# Patient Record
Sex: FEMALE | Race: WHITE | NOT HISPANIC OR LATINO | Employment: FULL TIME | URBAN - METROPOLITAN AREA
[De-identification: names, ages, dates, MRNs, and addresses within clinical notes are randomized per-mention and may not be internally consistent; named-entity substitution may affect disease eponyms.]

---

## 2017-01-16 ENCOUNTER — ALLSCRIPTS OFFICE VISIT (OUTPATIENT)
Dept: OTHER | Facility: OTHER | Age: 27
End: 2017-01-16

## 2017-04-14 ENCOUNTER — ALLSCRIPTS OFFICE VISIT (OUTPATIENT)
Dept: OTHER | Facility: OTHER | Age: 27
End: 2017-04-14

## 2017-05-22 ENCOUNTER — LAB REQUISITION (OUTPATIENT)
Dept: LAB | Facility: HOSPITAL | Age: 27
End: 2017-05-22
Payer: COMMERCIAL

## 2017-05-22 ENCOUNTER — ALLSCRIPTS OFFICE VISIT (OUTPATIENT)
Dept: OTHER | Facility: OTHER | Age: 27
End: 2017-05-22

## 2017-05-22 DIAGNOSIS — J02.9 ACUTE PHARYNGITIS: ICD-10-CM

## 2017-05-22 LAB — S PYO AG THROAT QL: NEGATIVE

## 2017-05-22 PROCEDURE — 87070 CULTURE OTHR SPECIMN AEROBIC: CPT | Performed by: INTERNAL MEDICINE

## 2017-05-24 LAB — BACTERIA THROAT CULT: NORMAL

## 2017-07-25 ENCOUNTER — ALLSCRIPTS OFFICE VISIT (OUTPATIENT)
Dept: OTHER | Facility: OTHER | Age: 27
End: 2017-07-25

## 2017-07-28 ENCOUNTER — GENERIC CONVERSION - ENCOUNTER (OUTPATIENT)
Dept: OTHER | Facility: OTHER | Age: 27
End: 2017-07-28

## 2017-08-08 ENCOUNTER — GENERIC CONVERSION - ENCOUNTER (OUTPATIENT)
Dept: OTHER | Facility: OTHER | Age: 27
End: 2017-08-08

## 2017-09-21 ENCOUNTER — TRANSCRIBE ORDERS (OUTPATIENT)
Dept: ADMINISTRATIVE | Age: 27
End: 2017-09-21

## 2017-09-21 ENCOUNTER — ALLSCRIPTS OFFICE VISIT (OUTPATIENT)
Dept: OTHER | Facility: OTHER | Age: 27
End: 2017-09-21

## 2017-09-21 ENCOUNTER — APPOINTMENT (OUTPATIENT)
Dept: LAB | Age: 27
End: 2017-09-21
Payer: COMMERCIAL

## 2017-09-21 DIAGNOSIS — R30.0 DYSURIA: ICD-10-CM

## 2017-09-21 DIAGNOSIS — N39.0 URINARY TRACT INFECTION: ICD-10-CM

## 2017-09-21 LAB
BILIRUB UR QL STRIP: NEGATIVE
CLARITY UR: CLEAR
COLOR UR: YELLOW
GLUCOSE UR STRIP-MCNC: NEGATIVE MG/DL
HGB UR QL STRIP.AUTO: NEGATIVE
KETONES UR STRIP-MCNC: NEGATIVE MG/DL
LEUKOCYTE ESTERASE UR QL STRIP: NEGATIVE
NITRITE UR QL STRIP: NEGATIVE
PH UR STRIP.AUTO: 6.5 [PH] (ref 4.5–8)
PROT UR STRIP-MCNC: NEGATIVE MG/DL
SP GR UR STRIP.AUTO: 1 (ref 1–1.03)
UROBILINOGEN UR QL STRIP.AUTO: 0.2 E.U./DL

## 2017-09-21 PROCEDURE — 87086 URINE CULTURE/COLONY COUNT: CPT

## 2017-09-21 PROCEDURE — 81003 URINALYSIS AUTO W/O SCOPE: CPT

## 2017-09-22 LAB — BACTERIA UR CULT: NORMAL

## 2017-09-29 ENCOUNTER — GENERIC CONVERSION - ENCOUNTER (OUTPATIENT)
Dept: OTHER | Facility: OTHER | Age: 27
End: 2017-09-29

## 2017-10-21 ENCOUNTER — HOSPITAL ENCOUNTER (EMERGENCY)
Facility: HOSPITAL | Age: 27
Discharge: HOME | End: 2017-10-21
Attending: EMERGENCY MEDICINE
Payer: COMMERCIAL

## 2017-10-21 ENCOUNTER — OFFICE VISIT (OUTPATIENT)
Dept: URGENT CARE | Age: 27
End: 2017-10-21
Payer: COMMERCIAL

## 2017-10-21 ENCOUNTER — APPOINTMENT (EMERGENCY)
Dept: CT IMAGING | Facility: HOSPITAL | Age: 27
End: 2017-10-21
Payer: COMMERCIAL

## 2017-10-21 VITALS
OXYGEN SATURATION: 100 % | TEMPERATURE: 98.4 F | HEIGHT: 60 IN | RESPIRATION RATE: 20 BRPM | HEART RATE: 76 BPM | SYSTOLIC BLOOD PRESSURE: 120 MMHG | DIASTOLIC BLOOD PRESSURE: 63 MMHG | BODY MASS INDEX: 23.5 KG/M2 | WEIGHT: 119.71 LBS

## 2017-10-21 DIAGNOSIS — H81.10 BENIGN POSITIONAL VERTIGO: Primary | ICD-10-CM

## 2017-10-21 LAB
ALBUMIN SERPL BCP-MCNC: 3.9 G/DL (ref 3.5–5)
ALP SERPL-CCNC: 57 U/L (ref 46–116)
ALT SERPL W P-5'-P-CCNC: 27 U/L (ref 12–78)
ANION GAP SERPL CALCULATED.3IONS-SCNC: 9 MMOL/L (ref 4–13)
AST SERPL W P-5'-P-CCNC: 17 U/L (ref 5–45)
BASOPHILS # BLD AUTO: 0.02 THOUSANDS/ΜL (ref 0–0.1)
BASOPHILS NFR BLD AUTO: 0 % (ref 0–1)
BILIRUB SERPL-MCNC: 0.3 MG/DL (ref 0.2–1)
BUN SERPL-MCNC: 8 MG/DL (ref 5–25)
CALCIUM SERPL-MCNC: 9 MG/DL (ref 8.3–10.1)
CHLORIDE SERPL-SCNC: 104 MMOL/L (ref 100–108)
CLARITY, POC: CLEAR
CO2 SERPL-SCNC: 26 MMOL/L (ref 21–32)
COLOR, POC: YELLOW
CREAT SERPL-MCNC: 0.74 MG/DL (ref 0.6–1.3)
EOSINOPHIL # BLD AUTO: 0.1 THOUSAND/ΜL (ref 0–0.61)
EOSINOPHIL NFR BLD AUTO: 2 % (ref 0–6)
ERYTHROCYTE [DISTWIDTH] IN BLOOD BY AUTOMATED COUNT: 12.6 % (ref 11.6–15.1)
EXT BILIRUBIN, UA: NEGATIVE
EXT BLOOD URINE: NORMAL
EXT GLUCOSE, UA: NEGATIVE
EXT KETONES: NEGATIVE
EXT NITRITE, UA: NEGATIVE
EXT PH, UA: 7.5
EXT PREG TEST URINE: NEGATIVE
EXT PROTEIN, UA: NEGATIVE
EXT SPECIFIC GRAVITY, UA: 1
EXT UROBILINOGEN: NORMAL
GFR SERPL CREATININE-BSD FRML MDRD: 113 ML/MIN/1.73SQ M
GLUCOSE SERPL-MCNC: 90 MG/DL (ref 65–140)
HCT VFR BLD AUTO: 36.2 % (ref 34.8–46.1)
HGB BLD-MCNC: 12 G/DL (ref 11.5–15.4)
LYMPHOCYTES # BLD AUTO: 2.34 THOUSANDS/ΜL (ref 0.6–4.47)
LYMPHOCYTES NFR BLD AUTO: 36 % (ref 14–44)
MAGNESIUM SERPL-MCNC: 2.1 MG/DL (ref 1.6–2.6)
MCH RBC QN AUTO: 29.7 PG (ref 26.8–34.3)
MCHC RBC AUTO-ENTMCNC: 33.1 G/DL (ref 31.4–37.4)
MCV RBC AUTO: 90 FL (ref 82–98)
MONOCYTES # BLD AUTO: 0.53 THOUSAND/ΜL (ref 0.17–1.22)
MONOCYTES NFR BLD AUTO: 8 % (ref 4–12)
NEUTROPHILS # BLD AUTO: 3.49 THOUSANDS/ΜL (ref 1.85–7.62)
NEUTS SEG NFR BLD AUTO: 54 % (ref 43–75)
PLATELET # BLD AUTO: 252 THOUSANDS/UL (ref 149–390)
PMV BLD AUTO: 9.6 FL (ref 8.9–12.7)
POTASSIUM SERPL-SCNC: 3.2 MMOL/L (ref 3.5–5.3)
PROT SERPL-MCNC: 7.4 G/DL (ref 6.4–8.2)
RBC # BLD AUTO: 4.04 MILLION/UL (ref 3.81–5.12)
SODIUM SERPL-SCNC: 139 MMOL/L (ref 136–145)
TSH SERPL DL<=0.05 MIU/L-ACNC: 4.1 UIU/ML (ref 0.36–3.74)
WBC # BLD AUTO: 6.48 THOUSAND/UL (ref 4.31–10.16)
WBC # BLD EST: NEGATIVE 10*3/UL

## 2017-10-21 PROCEDURE — 84443 ASSAY THYROID STIM HORMONE: CPT | Performed by: EMERGENCY MEDICINE

## 2017-10-21 PROCEDURE — 93005 ELECTROCARDIOGRAM TRACING: CPT | Performed by: EMERGENCY MEDICINE

## 2017-10-21 PROCEDURE — 81025 URINE PREGNANCY TEST: CPT | Performed by: EMERGENCY MEDICINE

## 2017-10-21 PROCEDURE — 85025 COMPLETE CBC W/AUTO DIFF WBC: CPT | Performed by: EMERGENCY MEDICINE

## 2017-10-21 PROCEDURE — 70450 CT HEAD/BRAIN W/O DYE: CPT

## 2017-10-21 PROCEDURE — 96360 HYDRATION IV INFUSION INIT: CPT

## 2017-10-21 PROCEDURE — 36415 COLL VENOUS BLD VENIPUNCTURE: CPT | Performed by: EMERGENCY MEDICINE

## 2017-10-21 PROCEDURE — 81002 URINALYSIS NONAUTO W/O SCOPE: CPT | Performed by: EMERGENCY MEDICINE

## 2017-10-21 PROCEDURE — 99203 OFFICE O/P NEW LOW 30 MIN: CPT | Performed by: FAMILY MEDICINE

## 2017-10-21 PROCEDURE — 80053 COMPREHEN METABOLIC PANEL: CPT | Performed by: EMERGENCY MEDICINE

## 2017-10-21 PROCEDURE — 93005 ELECTROCARDIOGRAM TRACING: CPT

## 2017-10-21 PROCEDURE — 83735 ASSAY OF MAGNESIUM: CPT | Performed by: EMERGENCY MEDICINE

## 2017-10-21 PROCEDURE — 99284 EMERGENCY DEPT VISIT MOD MDM: CPT

## 2017-10-21 RX ORDER — NITROFURANTOIN MACROCRYSTALS 50 MG/1
50 CAPSULE ORAL SEE ADMIN INSTRUCTIONS
COMMUNITY
Start: 2016-02-16 | End: 2018-02-27

## 2017-10-21 RX ORDER — PHENAZOPYRIDINE HYDROCHLORIDE 200 MG/1
200 TABLET, FILM COATED ORAL AS NEEDED
COMMUNITY
Start: 2017-09-19 | End: 2018-06-08 | Stop reason: ALTCHOICE

## 2017-10-21 RX ORDER — CIPROFLOXACIN 500 MG/1
500 TABLET, FILM COATED ORAL 2 TIMES DAILY
COMMUNITY
Start: 2017-06-16 | End: 2017-10-27

## 2017-10-21 RX ORDER — POTASSIUM CHLORIDE 20 MEQ/1
40 TABLET, EXTENDED RELEASE ORAL ONCE
Status: COMPLETED | OUTPATIENT
Start: 2017-10-21 | End: 2017-10-21

## 2017-10-21 RX ORDER — ALPRAZOLAM 0.25 MG/1
0.25 TABLET ORAL DAILY PRN
COMMUNITY
Start: 2014-08-12 | End: 2018-02-12 | Stop reason: SDUPTHER

## 2017-10-21 RX ORDER — MECLIZINE HYDROCHLORIDE 25 MG/1
25 TABLET ORAL 3 TIMES DAILY PRN
Qty: 30 TABLET | Refills: 0 | Status: SHIPPED | OUTPATIENT
Start: 2017-10-21 | End: 2018-02-12 | Stop reason: ALTCHOICE

## 2017-10-21 RX ORDER — LUBIPROSTONE 8 UG/1
8 CAPSULE, GELATIN COATED ORAL
COMMUNITY
End: 2018-02-27

## 2017-10-21 RX ORDER — ETONOGESTREL AND ETHINYL ESTRADIOL 11.7; 2.7 MG/1; MG/1
1 INSERT, EXTENDED RELEASE VAGINAL SEE ADMIN INSTRUCTIONS
COMMUNITY
Start: 2016-09-16 | End: 2018-02-27

## 2017-10-21 RX ORDER — MECLIZINE HCL 12.5 MG/1
25 TABLET ORAL ONCE
Status: COMPLETED | OUTPATIENT
Start: 2017-10-21 | End: 2017-10-21

## 2017-10-21 RX ADMIN — SODIUM CHLORIDE 1000 ML: 0.9 INJECTION, SOLUTION INTRAVENOUS at 18:51

## 2017-10-21 RX ADMIN — POTASSIUM CHLORIDE 40 MEQ: 1500 TABLET, EXTENDED RELEASE ORAL at 20:10

## 2017-10-21 RX ADMIN — MECLIZINE 25 MG: 12.5 TABLET ORAL at 19:06
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
## 2017-10-22 LAB
ATRIAL RATE: 78 BPM
P AXIS: -14 DEGREES
PR INTERVAL: 98 MS
QRS AXIS: 86 DEGREES
QRSD INTERVAL: 70 MS
QT INTERVAL: 368 MS
QTC INTERVAL: 419 MS
T WAVE AXIS: 66 DEGREES
VENTRICULAR RATE: 78 BPM

## 2017-10-22 NOTE — DISCHARGE INSTRUCTIONS
Benign Paroxysmal Positional Vertigo, Ambulatory Care   GENERAL INFORMATION:   Benign paroxysmal positional vertigo (BPPV)  is an inner ear condition  With BPPV you have paroxysmal (sudden) attacks of vertigo when you change your head position  Vertigo is the sudden feeling that you or the room is moving or spinning  With each attack of vertigo, you may have nystagmus  Nystagmus is a quick, shaky eye movement that you cannot control  The attacks of vertigo and nystagmus last from a few seconds up to 1 minute  Common symptoms include the following:  Head movements, such as looking up or down and bending over, may lead to an attack of vertigo  Vertigo may also occur when you first lie down or roll over in bed  The nystagmus will decrease with vertigo attacks that occur close together  When you have an attack of BPPV, you may also have the following symptoms:  · Changes in your vision    · Nausea or vomiting    · Poor balance or feeling unsteady when you walk  Seek immediate care for the following symptoms:   · A severe headache that does not go away    · New changes in your vision or feeling weak or confused    · Trouble hearing, or ringing or buzzing in your ears    · Symptoms that last longer than 1 minute  Treatment for BPPV  may go away on it's own  Treatments may include head movements or balance therapy  You may need surgery if other treatments have failed  Manage your symptoms:   · Avoid sudden head movements  · Do not bend over at the waist       · Keep your head raised when you lie down  Place pillows under your upper back and head or rest in a recliner  · Try not to stay in bed for long periods of time  Change your position often when you are in bed  Try not to lie with your head on the same side for long periods of time  · Go to vestibular and balance rehabilitation therapy (VBRT)  During VBRT, you learn exercises to improve your balance and strength   VBRT may help decrease your dizziness and prevent injuries if you are at risk for falls  Ask for more information about VBRT and exercises to decrease your symptoms  Follow up with your healthcare provider as directed:  Write down your questions so you remember to ask them during your visits  CARE AGREEMENT:   You have the right to help plan your care  Learn about your health condition and how it may be treated  Discuss treatment options with your caregivers to decide what care you want to receive  You always have the right to refuse treatment  The above information is an  only  It is not intended as medical advice for individual conditions or treatments  Talk to your doctor, nurse or pharmacist before following any medical regimen to see if it is safe and effective for you  © 2014 [address] is for End User's use only and may not be sold, redistributed or otherwise used for commercial purposes  All illustrations and images included in CareNotes® are the copyrighted property of A D A M , Inc  or Andi Hunter

## 2017-10-22 NOTE — PROGRESS NOTES
Assessment  1  Dizziness (780 4) (R42)    Discussion/Summary  Discussion Summary:   Dizziness with nausea: In the setting of taking ciprofloxacin for increased Urinary frequency, has tolerated ciprofloxacin before Feels like she is going to pass out, feels very unstable, will send to the ED for Further evaluation  Medication Side Effects Reviewed: Possible side effects of new medications were reviewed with the patient/guardian today  Understands and agrees with treatment plan: The treatment plan was reviewed with the patient/guardian  The patient/guardian understands and agrees with the treatment plan      Chief Complaint  Chief Complaint Free Text Note Form: Feeling Dizzy      History of Present Illness  HPI: Patient complaints of feeling Dizzysince today after noon, dizziness has since worsened  Feeling extremely dizzy with any movement of her head  of nausea, complains of ringing in her earstarted Ciprofloxacin for urinary symptom yesterday  denies fevers, complains of feeling heaviness in her head, denies chest pain  Hospital Based Practices Required Assessment:   Pain Assessment   the patient states they do not have pain  Vertigo (Brief): Symptoms:  dizziness,-- loss of balance,-- difficulty ambulating,-- tinnitus-- and-- nausea, but-- no hearing loss  Associated symptoms:  visual changes, but-- no ear pain,-- no ear fullness,-- no upper respiratory symptoms,-- no fever,-- no neck pain-- and-- no neck stiffness  Review of Systems  Focused-Female:   Constitutional: no fever-- and-- no chills  ENT: no earache,-- no nosebleeds-- and-- no hearing loss  Cardiovascular: no chest pain-- and-- no palpitations  Respiratory: no wheezing  Gastrointestinal: no abdominal pain-- and-- no constipation  Neurological: dizziness-- and-- fainting, but-- no headache  Other Symptoms: Feels fullness in her head  Active Problems  1  Chronic constipation (564 00) (H56 09)   2   Contraceptives (V25 02)   3  Depression with anxiety (300 4) (F41 8)   4  Dysuria (788 1) (R30 0)   5  Encounter for contraceptive surveillance (V25 40) (Z30 40)   6  Encounter for gynecological examination (V72 31) (Z01 419)   7  Generalized anxiety disorder (300 02) (F41 1)   8  Irritable bowel syndrome (564 1) (K58 9)   9  Need for influenza vaccination (V04 81) (Z23)   10  Postcoital UTI (599 0) (N39 0)   11  Recurrent urinary tract infection (599 0) (N39 0)   12  Screening for cervical cancer (V76 2) (Z12 4)   13  Sleep disturbances (780 50) (G47 9)    Past Medical History  1  History of Acute upper respiratory infection (465 9) (J06 9)   2  History of External Hemorrhoids With Bleeding (455 5)   3  History of Hematochezia (578 1) (K92 1)   4  History of abdominal pain (V13 89) (Z87 898)   5  History of acute bronchitis (V12 69) (Z87 09)   6  History of acute pharyngitis (V12 69) (Z87 09)   7  History of acute pharyngitis (V12 69) (Z87 09)   8  History of acute sinusitis (V12 69) (Z87 09)   9  History of fatigue (V13 89) (Z87 898)   10  History of urinary frequency (V13 09) (Z87 898)   11  History of urinary tract infection (V13 02) (Z87 440)   12  History of urinary tract infection (V13 02) (Z87 440)   13  History of viral infection (V12 09) (Z86 19)   14  History of Immunity status testing (V72 61) (Z01 84)   15  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)   16  History of Poison ivy dermatitis (692 6) (L23 7)   17  History of Sinusitis (473 9) (J32 9)   18  History of Skin Abscess Of Right Leg (682 6)   19  History of Urinary frequency (788 41) (R35 0)  Active Problems And Past Medical History Reviewed: The active problems and past medical history were reviewed and updated today  Family History  Mother    1  Family history of thyroid disease (V18 19) (Z83 49)  Father    2  Family history of thyroid disease (V18 19) (Z83 49)  Family History    3   Family history of Denial Of Any Significant Medical History   4  Family history of malignant neoplasm (V16 9) (Z80 9)  Family History Reviewed: The family history was reviewed and updated today  Social History   · Being A Social Drinker   · Exercising Regularly   · Never A Smoker  Social History Reviewed: The social history was reviewed and updated today  Surgical History  1  Contraceptives (V25 02)   2  History of Tonsillectomy   3  History of Tonsillectomy  Surgical History Reviewed: The surgical history was reviewed and updated today  Current Meds   1  ALPRAZolam 0 25 MG Oral Tablet; 1 tab PO daily prn; Therapy: 28Pnk9166 to (Evaluate:02Pjy6863); Last Rx:[id number] Ordered   2  Amitiza 8 MCG Oral Capsule Recorded   3  Ciprofloxacin HCl - 500 MG Oral Tablet; TAKE 1 TABLET EVERY 12 HOURS DAILY; Therapy: 29XDE6821 to (Erick 3599)  Requested for: 97PFB8522; Last   Rx:[id number] Ordered   4  Nitrofurantoin Macrocrystal 50 MG Oral Capsule; Take 1 capsule after sexual intercourse; Therapy: 99JKW9468 to (Last Alyssia Hey)  Requested for: 79Fao5941 Ordered   5  NuvaRing 0 12-0 015 MG/24HR Vaginal Ring; APPLY 1 UNIT Once every 3 weeks then   out for 1 week; Therapy: 22NHD3960 to (Last Rx:99Nwk5405)  Requested for: 69Sse0865 Ordered   6  NuvaRing 0 12-0 015 MG/24HR Vaginal Ring; INSERT 1 RING VAGINALLY FOR 3   WEEKS THEN 1 WEEK OFF; Therapy: 59TRF1451 to Recorded   7  Phenazopyridine HCl - 200 MG Oral Tablet; TAKE 1 TABLET 3 TIMES DAILY AFTER   MEALS; Therapy: 24Zam5074 to (Evaluate:59Owq1276)  Requested for: 31BMZ2548; Last   Rx:[id number] Ordered    Allergies  1  No Known Drug Allergies  Denied    2  Amoxicillin-Pot Clavulanate TABS    Physical Exam    Constitutional   General appearance: No acute distress, well appearing and well nourished  Eyes   Pupils and irises: Equal, round and reactive to light  -- No Nystagmus seen     Ears, Nose, Mouth, and Throat   External inspection of ears and nose: Normal  Otoscopic examination: Tympanic membranes translucent with normal light reflex  Canals patent without erythema  Pulmonary   Respiratory effort: No increased work of breathing or signs of respiratory distress  Auscultation of lungs: Clear to auscultation  Cardiovascular   Auscultation of heart: Normal rate and rhythm, normal S1 and S2, without murmurs  Musculoskeletal   Gait and station: Abnormal   Gait evaluation demonstrated Romberg's Positive  Future Appointments    Date/Time Provider Specialty Site   01/09/2018 08:00 AM JUAN Sesay   Internal Medicine [facility]     Signatures   Electronically signed by : JUAN Nelson ; Oct 21 2017  5:39PM EST                       (Author)

## 2017-10-25 ENCOUNTER — ALLSCRIPTS OFFICE VISIT (OUTPATIENT)
Dept: OTHER | Facility: OTHER | Age: 27
End: 2017-10-25

## 2017-10-26 ENCOUNTER — ALLSCRIPTS OFFICE VISIT (OUTPATIENT)
Dept: OTHER | Facility: OTHER | Age: 27
End: 2017-10-26

## 2017-10-26 DIAGNOSIS — R53.82 CHRONIC FATIGUE: ICD-10-CM

## 2017-10-26 DIAGNOSIS — F41.8 OTHER SPECIFIED ANXIETY DISORDERS: ICD-10-CM

## 2017-10-26 DIAGNOSIS — H81.10 BENIGN PAROXYSMAL VERTIGO: ICD-10-CM

## 2017-10-26 NOTE — PROGRESS NOTES
Assessment  1  Recurrent urinary tract infection (599 0) (N39 0)    Chief Complaint  Chief Complaint Free Text Note Form: recurring uti      History of Present Illness  HPI: This is a 44-year-old white female she is nulliparous  She does have a history of recurrent UTIs after intimacy  She is now taking prophylactic antibiotics after intercourse  The patient states she last had sexual activity on Thursday October 19th  She follow her regular antibiotic regimen after intercourse  On Friday she had return of UTI symptoms and was started on Cipro  On Saturday the 20 second she feel dizzy and lightheaded and was seen and and is in [place]  At that time she states urinalysis CT scan were negative  The diagnosis with vertigo  The port there were no urinary symptoms  On signed the the 23rd of October she had a return of some burning with urination and she was put on Keflex  On Monday the 24th to feel better not dizzy until the afternoon  In the afternoon of the day she started having abdominal pain and diarrhea  The diarrhea stopped on Tuesday the 24th  Dizziness has improved not as bad  She has no urinary tract symptoms at all   exam is benign is positive bowel sounds is no guarding is no rebound  There is no pelvic pain at all  There are no urinary tract symptoms at this time  reviewing her history I think she had and a FELECIA reaction to 3 different antibiotics Macrodantin, Cipro, and Keflex  She is now off all antibiotics and is feeling better from a bladder point of view  I do believe she probably has some intestinal discomfort  Will continue to rest her gut  She will call me tomorrow with a progress report  She may have to have a GI thyroid evaluation  Active Problems  1  Chronic constipation (564 00) (K59 09)   2  Contraceptives (V25 02)   3  Depression with anxiety (300 4) (F41 8)   4  Dizziness (780 4) (R42)   5  Dysuria (788 1) (R30 0)   6  Encounter for contraceptive surveillance (V25 40) (Z30 40)   7  Encounter for gynecological examination (V72 31) (Z01 419)   8  Generalized anxiety disorder (300 02) (F41 1)   9  Irritable bowel syndrome (564 1) (K58 9)   10  Need for influenza vaccination (V04 81) (Z23)   11  Postcoital UTI (599 0) (N39 0)   12  Recurrent urinary tract infection (599 0) (N39 0)   13  Screening for cervical cancer (V76 2) (Z12 4)   14  Sleep disturbances (780 50) (G47 9)    Past Medical History  1  History of Acute upper respiratory infection (465 9) (J06 9)   2  History of External Hemorrhoids With Bleeding (455 5)   3  History of Hematochezia (578 1) (K92 1)   4  History of abdominal pain (V13 89) (Z87 898)   5  History of acute bronchitis (V12 69) (Z87 09)   6  History of acute pharyngitis (V12 69) (Z87 09)   7  History of acute pharyngitis (V12 69) (Z87 09)   8  History of acute sinusitis (V12 69) (Z87 09)   9  History of fatigue (V13 89) (Z87 898)   10  History of urinary frequency (V13 09) (Z87 898)   11  History of urinary tract infection (V13 02) (Z87 440)   12  History of urinary tract infection (V13 02) (Z87 440)   13  History of viral infection (V12 09) (Z86 19)   14  History of Immunity status testing (V72 61) (Z01 84)   15  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)   16  History of Poison ivy dermatitis (692 6) (L23 7)   17  History of Sinusitis (473 9) (J32 9)   18  History of Skin Abscess Of Right Leg (682 6)   19  History of Urinary frequency (788 41) (R35 0)    Surgical History  1  Contraceptives (V25 02)   2  History of Tonsillectomy   3  History of Tonsillectomy    Family History  Mother    1  Family history of thyroid disease (V18 19) (Z83 49)  Father    2  Family history of thyroid disease (V18 19) (Z83 49)  Family History    3  Family history of Denial Of Any Significant Medical History   4   Family history of malignant neoplasm (V16 9) (Z80 9)    Social History   · Being A Social Drinker   · Exercising Regularly   · Never A Smoker    Current Meds   1  ALPRAZolam 0 25 MG Oral Tablet; 1 tab PO daily prn; Therapy: 57Iwm3199 to (Evaluate:43Dcv4653); Last Rx:95Qco2621 Ordered   2  Amitiza 8 MCG Oral Capsule Recorded   3  Ciprofloxacin HCl - 500 MG Oral Tablet; TAKE 1 TABLET EVERY 12 HOURS DAILY; Therapy: 09NXN7963 to (Erick 3599)  Requested for: 45PZT0048; Last   Rx:97Agp7855 Ordered   4  Nitrofurantoin Macrocrystal 50 MG Oral Capsule; Take 1 capsule after sexual intercourse; Therapy: 05IXR8371 to (Last Priyanka Cammy)  Requested for: 03Rpy5142 Ordered   5  NuvaRing 0 12-0 015 MG/24HR Vaginal Ring; APPLY 1 UNIT Once every 3 weeks then   out for 1 week; Therapy: 32BHK0537 to (Last Rx:32Fcb4469)  Requested for: 14Cmf0499 Ordered   6  NuvaRing 0 12-0 015 MG/24HR Vaginal Ring; INSERT 1 RING VAGINALLY FOR 3   WEEKS THEN 1 WEEK OFF; Therapy: 92DMJ0237 to Recorded   7  Phenazopyridine HCl - 200 MG Oral Tablet; TAKE 1 TABLET 3 TIMES DAILY AFTER   MEALS; Therapy: 29Vba4758 to (Evaluate:55Nre1179)  Requested for: 98CPA8460; Last   Rx:73Yga1680 Ordered    Allergies  1  No Known Drug Allergies  Denied    2  Amoxicillin-Pot Clavulanate TABS    Vitals  Vital Signs    Recorded: 81FCN6221 79:36BN   Systolic 979   Diastolic 72   Height 5 ft    Weight 116 lb    BMI Calculated 22 66   BSA Calculated 1 48   LMP 86Vva0169     Health Management  Screening for cervical cancer   PELVIC EXAM; every 1 year; Next Due: 04IJG4209; Overdue    Future Appointments    Date/Time Provider Specialty Site   10/31/2017 03:30 PM Patria Mares MD Urology [address] [facility]   01/09/2018 08:00 AM JUAN Peters  [facility]   12/05/2017 10:30 AM JUAN Acosta   Obstetrics/Gynecology [facility] [address]     Signatures   Electronically signed by : JUAN Tai ; Oct 25 2017  1:13PM EST                       (Author)

## 2017-10-26 NOTE — PROGRESS NOTES
Assessment  1  Recurrent urinary tract infection (599 0) (N39 0)    Plan  Dysuria    · Phenazopyridine HCl - 200 MG Oral Tablet; TAKE 1 TABLET 3 TIMES DAILY  AFTER MEALS   Rx By: Geovanny Rodriguez; Dispense: 2 Days ; #:6 Tablet; Refill: 0;For: Dysuria; JUAN MANUEL = N; Verified Transmission to [id number] Last Updated By: System, Konkura; 9/21/2017 11:26:12 AM    Discussion/Summary  Goals and Barriers: The patient has the current Goals: Goals met  The patent has the current Barriers: No barriers  Patient's Capacity to Self-Care: Patient is able to Self-Care  Chief Complaint  Chief Complaint Free Text Note Form: Recurrent UTI      History of Present Illness  HPI: This is a 29-year-old white female, she is nulliparous, she uses the NuvaRing for contraception  Is no problem with recurrent UTIs she's had in the past  Currently she is on Cipro  Still hasn't burning  shows the bladder to be tender  The vagina is clean  recurrent UTI we'll check UA CNS will continue Cipro we'll refill Pyridium  It may need to change to a different antibiotic depending on the culture and sensitivity  She'll talk to me tomorrow for follow-up before the weekend  Active Problems  1  Chronic constipation (564 00) (K59 09)   2  Contraceptives (V25 02)   3  Depression with anxiety (300 4) (F41 8)   4  Dysuria (788 1) (R30 0)   5  Encounter for contraceptive surveillance (V25 40) (Z30 40)   6  Encounter for gynecological examination (V72 31) (Z01 419)   7  Generalized anxiety disorder (300 02) (F41 1)   8  Irritable bowel syndrome (564 1) (K58 9)   9  Need for influenza vaccination (V04 81) (Z23)   10  Postcoital UTI (599 0) (N39 0)   11  Recurrent urinary tract infection (599 0) (N39 0)   12  Screening for cervical cancer (V76 2) (Z12 4)   13  Sleep disturbances (780 50) (G47 9)    Past Medical History  1  History of Acute upper respiratory infection (465 9) (J06 9)   2  History of External Hemorrhoids With Bleeding (455 5)   3  History of Hematochezia (578 1) (K92 1)   4  History of abdominal pain (V13 89) (Z87 898)   5  History of acute bronchitis (V12 69) (Z87 09)   6  History of acute pharyngitis (V12 69) (Z87 09)   7  History of acute pharyngitis (V12 69) (Z87 09)   8  History of acute sinusitis (V12 69) (Z87 09)   9  History of fatigue (V13 89) (Z87 898)   10  History of urinary frequency (V13 09) (Z87 898)   11  History of urinary tract infection (V13 02) (Z87 440)   12  History of urinary tract infection (V13 02) (Z87 440)   13  History of viral infection (V12 09) (Z86 19)   14  History of Immunity status testing (V72 61) (Z01 84)   15  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)   16  History of Poison ivy dermatitis (692 6) (L23 7)   17  History of Sinusitis (473 9) (J32 9)   18  History of Skin Abscess Of Right Leg (682 6)   19  History of Urinary frequency (788 41) (R35 0)    Surgical History  1  Contraceptives (V25 02)   2  History of Tonsillectomy   3  History of Tonsillectomy    Family History  Mother    1  Family history of thyroid disease (V18 19) (Z83 49)  Father    2  Family history of thyroid disease (V18 19) (Z83 49)  Family History    3  Family history of Denial Of Any Significant Medical History   4  Family history of malignant neoplasm (V16 9) (Z80 9)    Social History   · Being A Social Drinker   · Exercising Regularly   · Never A Smoker    Current Meds   1  ALPRAZolam 0 25 MG Oral Tablet; 1 tab PO daily prn; Therapy: 84Wdw8123 to (Evaluate:57Vgm8563); Last Rx:96Cye3610 Ordered   2  Amitiza 8 MCG Oral Capsule Recorded   3  Nitrofurantoin Macrocrystal 50 MG Oral Capsule; Take 1 capsule after sexual intercourse; Therapy: 30XBC8342 to (Last Dimitris Hernández)  Requested for: 96Ekh0508 Ordered   4  NuvaRing 0 12-0 015 MG/24HR Vaginal Ring; APPLY 1 UNIT Once every 3 weeks then   out for 1 week; Therapy: 92IVW3948 to (Last Rx:20Dec2016)  Requested for: 74Uwe4615 Ordered   5   NuvaRing 0  12-0 015 MG/24HR Vaginal Ring; INSERT 1 RING VAGINALLY FOR 3   WEEKS THEN 1 WEEK OFF; Therapy: 61TWJ7651 to Recorded   6  Pyridium 100 MG Oral Tablet; TAKE 1 TABLET 3 TIMES DAILY AFTER MEALS; Therapy: 68Pxd7684 to (Evaluate:19Tuu6057)  Requested for: 57Nae1729; Last   Rx:76Www3352 Ordered    Allergies  1  No Known Drug Allergies  Denied    2  Amoxicillin-Pot Clavulanate TABS    Vitals  Vital Signs    Recorded: 21VGM9285 44:57BD   Systolic 187   Diastolic 74   Height 5 ft 1 in   Weight 117 lb 6 08 oz   BMI Calculated 22 18   BSA Calculated 1 5   LMP 30-Aug-2017     Health Management  Screening for cervical cancer   PELVIC EXAM; every 1 year; Next Due: 91FPS8793; Overdue    Future Appointments    Date/Time Provider Specialty Site   01/09/2018 08:00 AM JUAN Angel   [facility] Medicine [facility]     Signatures   Electronically signed by : JUAN Aquino ; Sep 21 2017 11:26AM EST                       (Author)

## 2017-10-28 ENCOUNTER — APPOINTMENT (OUTPATIENT)
Dept: LAB | Age: 27
End: 2017-10-28
Payer: COMMERCIAL

## 2017-10-28 ENCOUNTER — TRANSCRIBE ORDERS (OUTPATIENT)
Dept: ADMINISTRATIVE | Age: 27
End: 2017-10-28

## 2017-10-28 DIAGNOSIS — F41.8 OTHER SPECIFIED ANXIETY DISORDERS: ICD-10-CM

## 2017-10-28 DIAGNOSIS — R53.82 CHRONIC FATIGUE: ICD-10-CM

## 2017-10-28 DIAGNOSIS — H81.10 BENIGN PAROXYSMAL VERTIGO: ICD-10-CM

## 2017-10-28 LAB — 25(OH)D3 SERPL-MCNC: 40.8 NG/ML (ref 30–100)

## 2017-10-28 PROCEDURE — 86617 LYME DISEASE ANTIBODY: CPT

## 2017-10-28 PROCEDURE — 36415 COLL VENOUS BLD VENIPUNCTURE: CPT

## 2017-10-28 PROCEDURE — 82306 VITAMIN D 25 HYDROXY: CPT

## 2017-10-30 ENCOUNTER — GENERIC CONVERSION - ENCOUNTER (OUTPATIENT)
Dept: OTHER | Facility: OTHER | Age: 27
End: 2017-10-30

## 2017-10-31 ENCOUNTER — GENERIC CONVERSION - ENCOUNTER (OUTPATIENT)
Dept: OTHER | Facility: OTHER | Age: 27
End: 2017-10-31

## 2017-10-31 LAB
B BURGDOR IGG PATRN SER IB-IMP: NEGATIVE
B BURGDOR IGM PATRN SER IB-IMP: NEGATIVE
B BURGDOR18KD IGG SER QL IB: NORMAL
B BURGDOR23KD IGG SER QL IB: NORMAL
B BURGDOR23KD IGM SER QL IB: NORMAL
B BURGDOR28KD IGG SER QL IB: NORMAL
B BURGDOR30KD IGG SER QL IB: NORMAL
B BURGDOR39KD IGG SER QL IB: NORMAL
B BURGDOR39KD IGM SER QL IB: NORMAL
B BURGDOR41KD IGG SER QL IB: NORMAL
B BURGDOR41KD IGM SER QL IB: NORMAL
B BURGDOR45KD IGG SER QL IB: NORMAL
B BURGDOR58KD IGG SER QL IB: NORMAL
B BURGDOR66KD IGG SER QL IB: NORMAL
B BURGDOR93KD IGG SER QL IB: NORMAL

## 2017-11-01 ENCOUNTER — GENERIC CONVERSION - ENCOUNTER (OUTPATIENT)
Dept: OTHER | Facility: OTHER | Age: 27
End: 2017-11-01

## 2017-11-02 NOTE — PROGRESS NOTES
Assessment  1  Benign paroxysmal positional vertigo (386 11) (H81 10)   2  Chronic fatigue (780 79) (R53 82)    Plan   Need for influenza vaccination    · Stop: Fluzone Quadrivalent 0 5 ML Intramuscular Suspension Prefilled  Syringe    (1) LYME ANTIBODY, WESTERN BLOT; Status:Resulted - Requires Verification;   Done: 12LHG9519 12:00AM  GIL:67RDL7421; Ordered; For:Benign paroxysmal positional vertigo, Chronic fatigue; Ordered By:Alok Frazier;   (1) VITAMIN D 25-HYDROXY; Status:Resulted - Requires Verification;   Done: 03BEJ8535 12:00AM  QJD:64KUO5114; Ordered; For:Benign paroxysmal positional vertigo, Chronic fatigue, Depression with anxiety; Ordered By:Alok Frazier;      Discussion/Summary    See ent tomorrowneed vestibular therapy     History of Present Illness  HPI: patient is here to follow up of dizziness that is a spinning sensationto ERscan and bloodwork negativehad a uti was on ciprodue to dizziness and started keflex  and tuesday had diarrheatired hard to focusfeels dizzy but diarrhea and uti symptoms have stopped      Review of Systems    Constitutional: no fever,-- not feeling poorly,-- no chills-- and-- not feeling tired  ENT: no earache,-- no nosebleeds,-- no hearing loss-- and-- no nasal discharge  Cardiovascular: the heart rate was not slow,-- no chest pain,-- the heart rate was not fast-- and-- no palpitations  Respiratory: no shortness of breath,-- no cough,-- no wheezing-- and-- no shortness of breath during exertion  Gastrointestinal: no abdominal pain,-- no nausea,-- no constipation-- and-- no diarrhea  Musculoskeletal: no arthralgias,-- no joint swelling,-- no myalgias-- and-- no joint stiffness  Integumentary: no rashes,-- no itching,-- no skin lesions-- and-- no skin wound  Neurological: dizziness, but-- no headache,-- no numbness-- and-- no confusion  Active Problems  1  Chronic constipation (564 00) (K59 09)   2  Contraceptives (V25 02)   3   Depression with anxiety (300 4) (F41 8)   4  Dizziness (780 4) (R42)   5  Dysuria (788 1) (R30 0)   6  Encounter for contraceptive surveillance (V25 40) (Z30 40)   7  Encounter for gynecological examination (V72 31) (Z01 419)   8  Generalized anxiety disorder (300 02) (F41 1)   9  Irritable bowel syndrome (564 1) (K58 9)   10  Need for influenza vaccination (V04 81) (Z23)   11  Postcoital UTI (599 0) (N39 0)   12  Recurrent urinary tract infection (599 0) (N39 0)   13  Screening for cervical cancer (V76 2) (Z12 4)   14  Sleep disturbances (780 50) (G47 9)    Past Medical History  Active Problems And Past Medical History Reviewed: The active problems and past medical history were reviewed and updated today  Surgical History  Surgical History Reviewed: The surgical history was reviewed and updated today  Social History   · Being A Social Drinker   · Exercising Regularly   · Never A Smoker  The social history was reviewed and updated today  The social history was reviewed and is unchanged  Family History  Family History Reviewed: The family history was reviewed and updated today  Current Meds   1  ALPRAZolam 0 25 MG Oral Tablet; 1 tab PO daily prn; Therapy: 34Wcg4700 to (Evaluate:24Keb8151); Last Rx:10Qva7543 Ordered   2  Amitiza 8 MCG Oral Capsule Recorded   3  Ciprofloxacin HCl - 500 MG Oral Tablet; TAKE 1 TABLET EVERY 12 HOURS DAILY; Therapy: 68QYO6978 to (Erick 4049)  Requested for: 67DJK1212; Last   Rx:59Hir0817 Ordered   4  Nitrofurantoin Macrocrystal 50 MG Oral Capsule; Take 1 capsule after sexual intercourse; Therapy: 38KNC4828 to (Last Albertcj Perez)  Requested for: 13Wej1913 Ordered   5  NuvaRing 0 12-0 015 MG/24HR Vaginal Ring; APPLY 1 UNIT Once every 3 weeks then   out for 1 week; Therapy: 51OXX1085 to (Last Rx:09Cmx0821)  Requested for: 53Ceg6926 Ordered   6  NuvaRing 0 12-0 015 MG/24HR Vaginal Ring; INSERT 1 RING VAGINALLY FOR 3   WEEKS THEN 1 WEEK OFF; Therapy: 37BUO8691 to Recorded   7  Phenazopyridine HCl - 200 MG Oral Tablet; TAKE 1 TABLET 3 TIMES DAILY AFTER   MEALS; Therapy: 23Fzn0688 to (Evaluate:15Ksa9761)  Requested for: 72Nzp8582; Last   Rx:87Isg1183 Ordered    The medication list was reviewed and updated today  Allergies  1  No Known Drug Allergies  Denied    2  Amoxicillin-Pot Clavulanate TABS    Vitals   Recorded: 76XOP4706 02:45PM   Heart Rate 78   Respiration 20   Systolic 749   Diastolic 60   Height 5 ft    Weight 115 lb    BMI Calculated 22 46   BSA Calculated 1 48   O2 Saturation 99     Physical Exam    Constitutional   General appearance: No acute distress, well appearing and well nourished  Eyes   Conjunctiva and lids: No swelling, erythema or discharge  Pupils and irises: Equal, round and reactive to light  Ears, Nose, Mouth, and Throat   External inspection of ears and nose: Normal     Otoscopic examination: Tympanic membranes translucent with normal light reflex  Canals patent without erythema  Nasal mucosa, septum, and turbinates: Normal without edema or erythema  Oropharynx: Normal with no erythema, edema, exudate or lesions  Pulmonary   Respiratory effort: No increased work of breathing or signs of respiratory distress  Auscultation of lungs: Clear to auscultation  Cardiovascular   Auscultation of heart: Normal rate and rhythm, normal S1 and S2, without murmurs  Examination of extremities for edema and/or varicosities: Normal     Abdomen   Abdomen: Non-tender, no masses  Liver and spleen: No hepatomegaly or splenomegaly  Lymphatic   Palpation of lymph nodes in neck: No lymphadenopathy  Musculoskeletal   Gait and station: Normal     Digits and nails: Normal without clubbing or cyanosis  Inspection/palpation of joints, bones, and muscles: Normal     Skin   Skin and subcutaneous tissue: Normal without rashes or lesions      Psychiatric   Orientation to person, place, and time: Normal     Mood and affect: Normal  Results/Data  PHQ-9 Adult Depression Screening 26Oct2017 02:45PM User, Seema     Test Name Result Flag Reference   PHQ-9 Adult Depression Score 5     Over the last two weeks, how often have you been bothered by any of the following problems? Little interest or pleasure in doing things: Not at all - 0  Feeling down, depressed, or hopeless: More than half the days - 2  Trouble falling or staying asleep, or sleeping too much: Not at all - 0  Feeling tired or having little energy: More than half the days - 2  Poor appetite or over eating: Not at all - 0  Feeling bad about yourself - or that you are a failure or have let yourself or your family down: Not at all - 0  Trouble concentrating on things, such as reading the newspaper or watching television: Several days - 1  Moving or speaking so slowly that other people could have noticed  Or the opposite -  being so fidgety or restless that you have been moving around a lot more than usual: Not at all - 0  Thoughts that you would be better off dead, or of hurting yourself in some way: Not at all - 0   PHQ-9 Adult Depression Screening Negative     PHQ-9 Difficulty Level Somewhat difficult     PHQ-9 Severity Mild Depression         Future Appointments    Date/Time Provider Specialty Site   01/09/2018 08:00 AM JUAN Morse  [facility] Medicine [facility]   12/05/2017 10:30 AM JUAN Gee   Obstetrics/Gynecology [facility] [address]     Signatures   Electronically signed by : Juan J Guerrero; Nov 1 2017  3:42PM EST                       (Author)    Electronically signed by : Mike Rodriguez DO; Nov 1 2017  8:54PM EST                       (Author)

## 2017-12-13 ENCOUNTER — GENERIC CONVERSION - ENCOUNTER (OUTPATIENT)
Dept: OTHER | Facility: OTHER | Age: 27
End: 2017-12-13

## 2017-12-13 DIAGNOSIS — R10.2 PELVIC AND PERINEAL PAIN: ICD-10-CM

## 2017-12-19 ENCOUNTER — LAB CONVERSION - ENCOUNTER (OUTPATIENT)
Dept: OTHER | Facility: OTHER | Age: 27
End: 2017-12-19

## 2017-12-19 LAB
ADDITIONAL INFORMATION (HISTORICAL): NORMAL
ADEQUACY: (HISTORICAL): NORMAL
COMMENT (HISTORICAL): NORMAL
CYTOTECHNOLOGIST: (HISTORICAL): NORMAL
INTERPRETATION (HISTORICAL): NORMAL
LMP (HISTORICAL): NORMAL
PREV. BX: (HISTORICAL): NORMAL
PREV. PAP (HISTORICAL): NORMAL
REVIEWED BY (HISTORICAL): NORMAL
SOURCE (HISTORICAL): NORMAL

## 2018-01-08 ENCOUNTER — APPOINTMENT (OUTPATIENT)
Dept: LAB | Age: 28
End: 2018-01-08
Payer: COMMERCIAL

## 2018-01-08 ENCOUNTER — TRANSCRIBE ORDERS (OUTPATIENT)
Dept: ADMINISTRATIVE | Age: 28
End: 2018-01-08

## 2018-01-08 ENCOUNTER — GENERIC CONVERSION - ENCOUNTER (OUTPATIENT)
Dept: INTERNAL MEDICINE CLINIC | Facility: CLINIC | Age: 28
End: 2018-01-08

## 2018-01-08 DIAGNOSIS — R10.9 STOMACH ACHE: ICD-10-CM

## 2018-01-08 DIAGNOSIS — R10.9 STOMACH ACHE: Primary | ICD-10-CM

## 2018-01-08 LAB
BACTERIA UR QL AUTO: NORMAL /HPF
BILIRUB UR QL STRIP: NEGATIVE
CLARITY UR: CLEAR
COLOR UR: YELLOW
GLUCOSE UR STRIP-MCNC: NEGATIVE MG/DL
HGB UR QL STRIP.AUTO: ABNORMAL
HYALINE CASTS #/AREA URNS LPF: NORMAL /LPF
KETONES UR STRIP-MCNC: NEGATIVE MG/DL
LEUKOCYTE ESTERASE UR QL STRIP: NEGATIVE
NITRITE UR QL STRIP: NEGATIVE
NON-SQ EPI CELLS URNS QL MICRO: NORMAL /HPF
PH UR STRIP.AUTO: 7 [PH] (ref 4.5–8)
PROT UR STRIP-MCNC: NEGATIVE MG/DL
RBC #/AREA URNS AUTO: NORMAL /HPF
SP GR UR STRIP.AUTO: 1.01 (ref 1–1.03)
UROBILINOGEN UR QL STRIP.AUTO: 0.2 E.U./DL
WBC #/AREA URNS AUTO: NORMAL /HPF

## 2018-01-08 PROCEDURE — 87086 URINE CULTURE/COLONY COUNT: CPT

## 2018-01-08 PROCEDURE — 81001 URINALYSIS AUTO W/SCOPE: CPT

## 2018-01-09 LAB — BACTERIA UR CULT: NORMAL

## 2018-01-10 ENCOUNTER — APPOINTMENT (EMERGENCY)
Dept: CT IMAGING | Facility: HOSPITAL | Age: 28
End: 2018-01-10
Payer: COMMERCIAL

## 2018-01-10 ENCOUNTER — HOSPITAL ENCOUNTER (EMERGENCY)
Facility: HOSPITAL | Age: 28
Discharge: HOME | End: 2018-01-10
Attending: EMERGENCY MEDICINE | Admitting: EMERGENCY MEDICINE
Payer: COMMERCIAL

## 2018-01-10 VITALS
WEIGHT: 115 LBS | RESPIRATION RATE: 18 BRPM | DIASTOLIC BLOOD PRESSURE: 73 MMHG | OXYGEN SATURATION: 99 % | BODY MASS INDEX: 22.46 KG/M2 | HEART RATE: 104 BPM | SYSTOLIC BLOOD PRESSURE: 149 MMHG | TEMPERATURE: 98.9 F

## 2018-01-10 DIAGNOSIS — R10.2 PELVIC PAIN: ICD-10-CM

## 2018-01-10 DIAGNOSIS — R10.10 PAIN OF UPPER ABDOMEN: Primary | ICD-10-CM

## 2018-01-10 DIAGNOSIS — R30.0 DYSURIA: ICD-10-CM

## 2018-01-10 LAB
ALBUMIN SERPL BCP-MCNC: 3.7 G/DL (ref 3.5–5)
ALP SERPL-CCNC: 53 U/L (ref 46–116)
ALT SERPL W P-5'-P-CCNC: 50 U/L (ref 12–78)
ANION GAP SERPL CALCULATED.3IONS-SCNC: 9 MMOL/L (ref 4–13)
AST SERPL W P-5'-P-CCNC: 33 U/L (ref 5–45)
BASOPHILS # BLD AUTO: 0.02 THOUSANDS/ΜL (ref 0–0.1)
BASOPHILS NFR BLD AUTO: 0 % (ref 0–1)
BILIRUB SERPL-MCNC: 0.3 MG/DL (ref 0.2–1)
BUN SERPL-MCNC: 7 MG/DL (ref 5–25)
CALCIUM SERPL-MCNC: 8.7 MG/DL (ref 8.3–10.1)
CHLAMYDIA DNA CVX QL NAA+PROBE: NORMAL
CHLORIDE SERPL-SCNC: 109 MMOL/L (ref 100–108)
CLARITY, POC: CLEAR
CO2 SERPL-SCNC: 25 MMOL/L (ref 21–32)
COLOR, POC: YELLOW
CREAT SERPL-MCNC: 0.77 MG/DL (ref 0.6–1.3)
EOSINOPHIL # BLD AUTO: 0.07 THOUSAND/ΜL (ref 0–0.61)
EOSINOPHIL NFR BLD AUTO: 2 % (ref 0–6)
ERYTHROCYTE [DISTWIDTH] IN BLOOD BY AUTOMATED COUNT: 12.2 % (ref 11.6–15.1)
EXT BILIRUBIN, UA: NEGATIVE
EXT BLOOD URINE: NEGATIVE
EXT GLUCOSE, UA: NEGATIVE
EXT KETONES: NEGATIVE
EXT NITRITE, UA: NEGATIVE
EXT PH, UA: 7.5
EXT PREG TEST URINE: NEGATIVE
EXT PROTEIN, UA: NEGATIVE
EXT SPECIFIC GRAVITY, UA: 1.01
EXT UROBILINOGEN: NEGATIVE
GFR SERPL CREATININE-BSD FRML MDRD: 108 ML/MIN/1.73SQ M
GLUCOSE SERPL-MCNC: 94 MG/DL (ref 65–140)
HCT VFR BLD AUTO: 37.2 % (ref 34.8–46.1)
HGB BLD-MCNC: 12 G/DL (ref 11.5–15.4)
LIPASE SERPL-CCNC: 91 U/L (ref 73–393)
LYMPHOCYTES # BLD AUTO: 1.46 THOUSANDS/ΜL (ref 0.6–4.47)
LYMPHOCYTES NFR BLD AUTO: 32 % (ref 14–44)
MCH RBC QN AUTO: 29.6 PG (ref 26.8–34.3)
MCHC RBC AUTO-ENTMCNC: 32.3 G/DL (ref 31.4–37.4)
MCV RBC AUTO: 92 FL (ref 82–98)
MONOCYTES # BLD AUTO: 0.42 THOUSAND/ΜL (ref 0.17–1.22)
MONOCYTES NFR BLD AUTO: 9 % (ref 4–12)
N GONORRHOEA DNA GENITAL QL NAA+PROBE: NORMAL
NEUTROPHILS # BLD AUTO: 2.55 THOUSANDS/ΜL (ref 1.85–7.62)
NEUTS SEG NFR BLD AUTO: 57 % (ref 43–75)
PLATELET # BLD AUTO: 239 THOUSANDS/UL (ref 149–390)
PMV BLD AUTO: 9.3 FL (ref 8.9–12.7)
POTASSIUM SERPL-SCNC: 3.8 MMOL/L (ref 3.5–5.3)
PROT SERPL-MCNC: 7.3 G/DL (ref 6.4–8.2)
RBC # BLD AUTO: 4.05 MILLION/UL (ref 3.81–5.12)
SODIUM SERPL-SCNC: 143 MMOL/L (ref 136–145)
WBC # BLD AUTO: 4.52 THOUSAND/UL (ref 4.31–10.16)
WBC # BLD EST: NEGATIVE 10*3/UL

## 2018-01-10 PROCEDURE — 36415 COLL VENOUS BLD VENIPUNCTURE: CPT | Performed by: EMERGENCY MEDICINE

## 2018-01-10 PROCEDURE — 80053 COMPREHEN METABOLIC PANEL: CPT | Performed by: EMERGENCY MEDICINE

## 2018-01-10 PROCEDURE — 99284 EMERGENCY DEPT VISIT MOD MDM: CPT

## 2018-01-10 PROCEDURE — 83690 ASSAY OF LIPASE: CPT | Performed by: EMERGENCY MEDICINE

## 2018-01-10 PROCEDURE — 74177 CT ABD & PELVIS W/CONTRAST: CPT

## 2018-01-10 PROCEDURE — 81002 URINALYSIS NONAUTO W/O SCOPE: CPT | Performed by: EMERGENCY MEDICINE

## 2018-01-10 PROCEDURE — 87591 N.GONORRHOEAE DNA AMP PROB: CPT | Performed by: EMERGENCY MEDICINE

## 2018-01-10 PROCEDURE — 96374 THER/PROPH/DIAG INJ IV PUSH: CPT

## 2018-01-10 PROCEDURE — 85025 COMPLETE CBC W/AUTO DIFF WBC: CPT | Performed by: EMERGENCY MEDICINE

## 2018-01-10 PROCEDURE — 96361 HYDRATE IV INFUSION ADD-ON: CPT

## 2018-01-10 PROCEDURE — 87491 CHLMYD TRACH DNA AMP PROBE: CPT | Performed by: EMERGENCY MEDICINE

## 2018-01-10 PROCEDURE — 81025 URINE PREGNANCY TEST: CPT | Performed by: EMERGENCY MEDICINE

## 2018-01-10 RX ORDER — ONDANSETRON 2 MG/ML
4 INJECTION INTRAMUSCULAR; INTRAVENOUS ONCE
Status: COMPLETED | OUTPATIENT
Start: 2018-01-10 | End: 2018-01-10

## 2018-01-10 RX ORDER — TRAMADOL HYDROCHLORIDE 50 MG/1
50 TABLET ORAL EVERY 6 HOURS PRN
Qty: 20 TABLET | Refills: 0 | Status: SHIPPED | OUTPATIENT
Start: 2018-01-10 | End: 2018-01-20

## 2018-01-10 RX ORDER — PHENAZOPYRIDINE HYDROCHLORIDE 200 MG/1
200 TABLET, FILM COATED ORAL 3 TIMES DAILY PRN
Qty: 6 TABLET | Refills: 0 | Status: SHIPPED | OUTPATIENT
Start: 2018-01-10 | End: 2018-01-12

## 2018-01-10 RX ADMIN — IOHEXOL 100 ML: 350 INJECTION, SOLUTION INTRAVENOUS at 12:30

## 2018-01-10 RX ADMIN — SODIUM CHLORIDE 1000 ML: 0.9 INJECTION, SOLUTION INTRAVENOUS at 11:41

## 2018-01-10 RX ADMIN — ONDANSETRON 4 MG: 2 INJECTION INTRAMUSCULAR; INTRAVENOUS at 11:41

## 2018-01-10 NOTE — DISCHARGE INSTRUCTIONS
Dysuria, Ambulatory Care   GENERAL INFORMATION:   Dysuria  is trouble urinating, or pain, burning, or discomfort when you urinate  Dysuria is usually a symptom of another problem, such as a blockage or urinary tract infection  Common symptoms include the following:   · Fever     · Cloudy, bad smelling urine     · Urge to urinate often but urinating little     · Back, side, or abdominal pain     · Blood in your urine     · Discharge that smells bad     · Itching  Seek immediate care for the following symptoms:   · Severe back, side, or abdominal pain    · A fever and shaking chills    · Vomiting several times in a row  Treatment for dysuria  may include medicines to treat a bacterial infection or help decrease bladder spasms  Manage your dysuria:   · Drink liquids as directed  Ask how much liquid to drink each day and which liquids are best for you  You may need to drink more liquid than usual to flush bacteria out of your body  · A sitz bath  may help decrease your pain  Healthcare providers may give you a portable sitz bath  This is a small tub that fits in the toilet  Fill the sitz bath or a bathtub with 4 to 6 inches of warm water  Sit in the warm water for 20 minutes 2 to 3 times a day  Follow up with your healthcare provider as directed:  Write down your questions so you remember to ask them during your visits  CARE AGREEMENT:   You have the right to help plan your care  Learn about your health condition and how it may be treated  Discuss treatment options with your caregivers to decide what care you want to receive  You always have the right to refuse treatment  The above information is an  only  It is not intended as medical advice for individual conditions or treatments  Talk to your doctor, nurse or pharmacist before following any medical regimen to see if it is safe and effective for you    © 2014 [address] [employer] is for End User's use only and may not be sold, redistributed or otherwise used for commercial purposes  All illustrations and images included in CareNotes® are the copyrighted property of [employer] , [employer]  or Andi Hunter  Acute Abdominal Pain   WHAT YOU NEED TO KNOW:   The cause of your abdominal pain may not be found  If a cause is found, treatment will depend on what the cause is  DISCHARGE INSTRUCTIONS:   Return to the emergency department if:   · You vomit blood or cannot stop vomiting  · You have blood in your bowel movement or it looks like tar  · You have bleeding from your rectum  · Your abdomen is larger than usual, more painful, and hard  · You have severe pain in your abdomen  · You stop passing gas and having bowel movements  · You feel weak, dizzy, or faint  Contact your healthcare provider if:   · You have a fever  · You have new signs and symptoms  · Your symptoms do not get better with treatment  · You have questions or concerns about your condition or care  Medicines  may be given to decrease pain, treat an infection, and manage your symptoms  Take your medicine as directed  Call your healthcare provider if you think your medicine is not helping or if you have side effects  Tell him if you are allergic to any medicine  Keep a list of the medicines, vitamins, and herbs you take  Include the amounts, and when and why you take them  Bring the list or the pill bottles to follow-up visits  Carry your medicine list with you in case of an emergency  Manage your symptoms:   · Apply heat  on your abdomen for 20 to 30 minutes every 2 hours for as many days as directed  Heat helps decrease pain and muscle spasms  · Manage your stress  Stress may cause abdominal pain  Your healthcare provider may recommend relaxation techniques and deep breathing exercises to help decrease your stress   Your healthcare provider may recommend you talk to someone about your stress or anxiety, such as a counselor or a trusted friend  Get plenty of sleep and exercise regularly  · Limit or do not drink alcohol  Alcohol can make your abdominal pain worse  Ask your healthcare provider if it is safe for you to drink alcohol  Also ask how much is safe for you to drink  · Do not smoke  Nicotine and other chemicals in cigarettes can damage your esophagus and stomach  Ask your healthcare provider for information if you currently smoke and need help to quit  E-cigarettes or smokeless tobacco still contain nicotine  Talk to your healthcare provider before you use these products  Make changes to the food you eat as directed:  Do not eat foods that cause abdominal pain or other symptoms  Eat small meals more often  · Eat more high-fiber foods if you are constipated  High-fiber foods include fruits, vegetables, whole-grain foods, and legumes  · Do not eat foods that cause gas if you have bloating  Examples include broccoli, cabbage, and cauliflower  Do not drink soda or carbonated drinks, because these may also cause gas  · Do not eat foods or drinks that contain sorbitol or fructose if you have diarrhea and bloating  Some examples are fruit juices, candy, jelly, and sugar-free gum  · Do not eat high-fat foods, such as fried foods, cheeseburgers, hot dogs, and desserts  · Limit or do not drink caffeine  Caffeine may make symptoms, such as heart burn or nausea, worse  · Drink plenty of liquids to prevent dehydration from diarrhea or vomiting  Ask your healthcare provider how much liquid to drink each day and which liquids are best for you  Follow up with your healthcare provider as directed:  Write down your questions so you remember to ask them during your visits  © 2017 [facility] [facility] Information is for End User's use only and may not be sold, redistributed or otherwise used for commercial purposes   All illustrations and images included in CareNotes® are the copyrighted property of [employer]DR[employer] M[employer]INE[employer] Cait[employer]  or Andi Hunter  The above information is an  only  It is not intended as medical advice for individual conditions or treatments  Talk to your doctor, nurse or pharmacist before following any medical regimen to see if it is safe and effective for you

## 2018-01-10 NOTE — ED PROVIDER NOTES
History  Chief Complaint   Patient presents with    Abdominal Pain     started friday with abd pain and cramping & bloating; hx of IBS and "gallbladder attacks" ; was vomiting over the weekend and now nauseas  burning with urination and bladder pressure noted  loss of appetite noted with nausea  57-year-old female presents to the emergency department for evaluation of abdominal pain  Patient states her symptoms started 6 days ago  She felt that she was experiencing a flare of her irritable bowel syndrome and had upper abdominal discomfort associated with nausea  She then developed nausea followed by dry heaving  She has had continued epigastric discomfort described as a burning pain  She reports extreme bloating of her abdomen over the weekend that has since improved  She is now having lower pelvic pressure with urinary frequency and dysuria  She continues to have upper abdominal pain  She was evaluated by the nurse practitioner at her gastroenterologist's office who recommended an ultrasound of her upper abdomen, she has not scheduled the test yet  She also recommended a urinalysis and sample checked at [facility] demonstrated blood but no other findings concerning for an acute infection  Urine culture is negative  patient has a history of recurrent urinary tract infections but feels that the symptoms are somewhat different than her typical symptoms  She also has a history of constipation and takes medication for this daily  She feels that her abdominal pain is a bit different than her normal ureteral bowel  She denies history of pancreatitis  She reports normal menstrual cycles  No history of ovarian cyst   No vaginal discharge          History provided by:  Patient and medical records   used: No    Abdominal Pain   Pain location:  Epigastric, LLQ and RLQ  Pain quality: burning (epigastric) and pressure (pelvis)    Pain radiates to:  Does not radiate  Pain severity: Moderate  Onset quality:  Gradual  Duration:  5 days  Timing:  Constant  Progression:  Unchanged  Chronicity:  Recurrent (has hx of similar with IBS but this is worse)  Context: not diet changes, not previous surgeries and not sick contacts    Relieved by:  Nothing  Worsened by:  Nothing  Ineffective treatments:  None tried  Associated symptoms: chills, dysuria, nausea and vomiting    Associated symptoms: no chest pain, no cough, no fever, no hematuria, no shortness of breath, no vaginal bleeding and no vaginal discharge    Risk factors: no alcohol abuse, has not had multiple surgeries and no recent hospitalization        Prior to Admission Medications   Prescriptions Last Dose Informant Patient Reported? Taking? ALPRAZolam (XANAX) 0 25 mg tablet   Yes No   Sig: Take 0 25 mg by mouth daily as needed   etonogestrel-ethinyl estradiol (NUVARING) 0 12-0 015 MG/24HR vaginal ring   Yes No   Sig: Insert 1 Dose into the vagina see administration instructions Insert 1 ring vaginally for 3 weeks then 1 week off   lubiprostone (AMITIZA) 8 mcg capsule   Yes No   Sig: Take 8 mcg by mouth as needed   meclizine (ANTIVERT) 25 mg tablet   No No   Sig: Take 1 tablet by mouth 3 (three) times a day as needed for dizziness   nitrofurantoin (MACRODANTIN) 50 mg capsule   Yes No   Sig: Take 50 mg by mouth see administration instructions Take one capsule following sexual intercourse   phenazopyridine (PYRIDIUM) 200 mg tablet   Yes No   Sig: Take 200 mg by mouth as needed      Facility-Administered Medications: None       Past Medical History:   Diagnosis Date    Depression     Gallbladder attack     IBS (irritable bowel syndrome)     Psychiatric disorder     UTI (urinary tract infection)        History reviewed  No pertinent surgical history  History reviewed  No pertinent family history  I have reviewed and agree with the history as documented      Social History   Substance Use Topics    Smoking status: Current Some Day Smoker Packs/day: 0 20     Types: Cigarettes    Smokeless tobacco: Never Used    Alcohol use No      Comment: not any more        Review of Systems   Constitutional: Positive for appetite change and chills  Negative for activity change and fever  Respiratory: Negative for cough, choking and shortness of breath  Cardiovascular: Negative for chest pain  Gastrointestinal: Positive for abdominal distention, abdominal pain, nausea and vomiting  Genitourinary: Positive for dysuria and pelvic pain  Negative for decreased urine volume, difficulty urinating, flank pain, hematuria, vaginal bleeding and vaginal discharge  Musculoskeletal: Negative for back pain  All other systems reviewed and are negative  Physical Exam  ED Triage Vitals [01/10/18 1100]   Temperature Pulse Respirations Blood Pressure SpO2   98 9 °F (37 2 °C) 104 18 149/73 99 %      Temp Source Heart Rate Source Patient Position - Orthostatic VS BP Location FiO2 (%)   Oral Monitor Sitting Left arm --      Pain Score       8           Orthostatic Vital Signs  Vitals:    01/10/18 1100   BP: 149/73   Pulse: 104   Patient Position - Orthostatic VS: Sitting       Physical Exam   Constitutional: She is oriented to person, place, and time  She appears well-developed and well-nourished  She appears distressed  HENT:   Head: Normocephalic  Nose: Nose normal    Mouth/Throat: Oropharynx is clear and moist  No oropharyngeal exudate  Eyes: Conjunctivae and EOM are normal  Pupils are equal, round, and reactive to light  Neck: Normal range of motion  Neck supple  Cardiovascular: Normal rate, regular rhythm, normal heart sounds and intact distal pulses  Pulmonary/Chest: Effort normal and breath sounds normal    Abdominal: Soft  Bowel sounds are normal  She exhibits no distension  There is tenderness in the epigastric area, suprapubic area and left lower quadrant  There is no rebound and no guarding  No hernia         Genitourinary: Uterus normal  Pelvic exam was performed with patient supine  Cervix exhibits no motion tenderness, no discharge and no friability  Right adnexum displays tenderness  Right adnexum displays no mass and no fullness  Left adnexum displays tenderness  Left adnexum displays no mass and no fullness  There is a foreign body (nuvaring) in the vagina  Musculoskeletal: Normal range of motion  She exhibits no edema, tenderness or deformity  Lymphadenopathy:     She has no cervical adenopathy  Neurological: She is alert and oriented to person, place, and time  She has normal strength and normal reflexes  No cranial nerve deficit or sensory deficit  She exhibits normal muscle tone  Coordination and gait normal    Skin: Skin is warm, dry and intact  No rash noted  Psychiatric: She has a normal mood and affect  Her behavior is normal  Judgment and thought content normal    Nursing note and vitals reviewed        ED Medications  Medications   ondansetron (ZOFRAN) injection 4 mg (4 mg Intravenous Given 1/10/18 1141)   sodium chloride 0 9 % bolus 1,000 mL (0 mL Intravenous Stopped 1/10/18 1320)   iohexol (OMNIPAQUE) 350 MG/ML injection (MULTI-DOSE) 100 mL (100 mL Intravenous Given 1/10/18 1230)       Diagnostic Studies  Results Reviewed     Procedure Component Value Units Date/Time    Comprehensive metabolic panel [[id number]]  (Abnormal) Collected:  01/10/18 1139    Lab Status:  Final result Specimen:  Blood from Arm, Right Updated:  01/10/18 1212     Sodium 143 mmol/L      Potassium 3 8 mmol/L      Chloride 109 (H) mmol/L      CO2 25 mmol/L      Anion Gap 9 mmol/L      BUN 7 mg/dL      Creatinine 0 77 mg/dL      Glucose 94 mg/dL      Calcium 8 7 mg/dL      AST 33 U/L      ALT 50 U/L      Alkaline Phosphatase 53 U/L      Total Protein 7 3 g/dL      Albumin 3 7 g/dL      Total Bilirubin 0 30 mg/dL      eGFR 108 ml/min/1 73sq m     Narrative:         National Kidney Disease Education Program recommendations are as follows:  GFR calculation is accurate only with a steady state creatinine  Chronic Kidney disease less than 60 ml/min/1 73 sq  meters  Kidney failure less than 15 ml/min/1 73 sq  meters  Lipase [[id number]]  (Normal) Collected:  01/10/18 1139    Lab Status:  Final result Specimen:  Blood from Arm, Right Updated:  01/10/18 1212     Lipase 91 u/L     Chlamydia/GC amplified DNA by PCR [[id number]] Collected:  01/10/18 1206    Lab Status:   In process Specimen:  Urine from Urine, Other Updated:  01/10/18 1208    CBC and differential [[id number]]  (Normal) Collected:  01/10/18 1139    Lab Status:  Final result Specimen:  Blood from Arm, Right Updated:  01/10/18 1154     WBC 4 52 Thousand/uL      RBC 4 05 Million/uL      Hemoglobin 12 0 g/dL      Hematocrit 37 2 %      MCV 92 fL      MCH 29 6 pg      MCHC 32 3 g/dL      RDW 12 2 %      MPV 9 3 fL      Platelets 227 Thousands/uL      Neutrophils Relative 57 %      Lymphocytes Relative 32 %      Monocytes Relative 9 %      Eosinophils Relative 2 %      Basophils Relative 0 %      Neutrophils Absolute 2 55 Thousands/µL      Lymphocytes Absolute 1 46 Thousands/µL      Monocytes Absolute 0 42 Thousand/µL      Eosinophils Absolute 0 07 Thousand/µL      Basophils Absolute 0 02 Thousands/µL     POCT pregnancy, urine [[id number]]  (Normal) Resulted:  01/10/18 1116    Lab Status:  Final result Updated:  01/10/18 1116     EXT PREG TEST UR (Ref: Negative) negative    POCT urinalysis dipstick [[id number]]  (Normal) Resulted:  01/10/18 1116    Lab Status:  Final result Updated:  01/10/18 1116     Color, UA yellow     Clarity, UA clear     EXT Glucose, UA negative     EXT Bilirubin, UA (Ref: Negative) negative     EXT Ketones, UA (Ref: Negative) negative     EXT Spec Grav, UA 1 010     EXT Blood, UA (Ref: Negative) negative     EXT pH, UA 7 5     EXT Protein, UA (Ref: Negative) negative     EXT Urobilinogen, UA (Ref: 0 2- 1 0) negative     EXT Leukocytes, UA (Ref: Negative) negative     EXT Nitrite, UA (Ref: Negative) negative                 CT abdomen pelvis with contrast   Final Result by Venkata Johnston MD (01/10 0037)   No acute findings  Workstation performed: [id number]                    Procedures  Procedures       Phone Contacts  ED Phone Contact    ED Course  ED Course                                MDM  Number of Diagnoses or Management Options  Dysuria: new and requires workup  Pain of upper abdomen: new and requires workup  Pelvic pain: new and requires workup     Amount and/or Complexity of Data Reviewed  Clinical lab tests: ordered and reviewed  Tests in the radiology section of CPT®: ordered and reviewed  Decide to obtain previous medical records or to obtain history from someone other than the patient: yes  Review and summarize past medical records: yes  Independent visualization of images, tracings, or specimens: yes    Risk of Complications, Morbidity, and/or Mortality  General comments: 57-year-old female with epigastric abdominal pain as well as pelvic pressure and dysuria  Workup in the department does not reveal an etiology of patient's symptoms  I suspect that she may be experiencing a flare of her irritable bowel syndrome as well as she may have recently had an ovarian cyst   Her pelvic exam is normal other than mild tenderness and I feel that PID is unlikely  No sign of urinary tract infection and culture from 2 days ago is without growth  Will recommend follow up with gyn in Gastroenterology  Discussed signs and symptoms to return to the emergency department      Patient Progress  Patient progress: stable    CritCare Time    Disposition  Final diagnoses:   Pain of upper abdomen   Pelvic pain   Dysuria     Time reflects when diagnosis was documented in both MDM as applicable and the Disposition within this note     Time User Action Codes Description Comment    1/10/2018  1:45 PM Evie Apple Add [R10 10] Pain of upper abdomen     1/10/2018  1:45 PM Evie Apple Add [R10 2] Pelvic pain 1/10/2018  1:45 PM Evie Apple Add [R30 0] Dysuria       ED Disposition     ED Disposition Condition Comment    Discharge  Teresita Hicks discharge to home/self care  Condition at discharge: Stable        Follow-up Information     Follow up With Specialties Details Why Georgi Summers MD Internal Medicine Schedule an appointment as soon as possible for a visit in 1 day  [address]  [phone]          Discharge Medication List as of 1/10/2018  1:50 PM      START taking these medications    Details   ! ! phenazopyridine (PYRIDIUM) 200 mg tablet Take 1 tablet by mouth 3 (three) times a day as needed for bladder spasms for up to 2 days Will turn urine orange , Starting Wed 1/10/2018, Until Fri 1/12/2018, Print      traMADol (ULTRAM) 50 mg tablet Take 1 tablet by mouth every 6 (six) hours as needed for moderate pain for up to 10 days, Starting Wed 1/10/2018, Until Sat 1/20/2018, Print       !! - Potential duplicate medications found  Please discuss with provider        CONTINUE these medications which have NOT CHANGED    Details   ALPRAZolam (XANAX) 0 25 mg tablet Take 0 25 mg by mouth daily as needed, Starting Tue 8/12/2014, Historical Med      etonogestrel-ethinyl estradiol (NUVARING) 0 12-0 015 MG/24HR vaginal ring Insert 1 Dose into the vagina see administration instructions Insert 1 ring vaginally for 3 weeks then 1 week off, Starting Fri 9/16/2016, Historical Med      lubiprostone (AMITIZA) 8 mcg capsule Take 8 mcg by mouth as needed, Historical Med      meclizine (ANTIVERT) 25 mg tablet Take 1 tablet by mouth 3 (three) times a day as needed for dizziness, Starting Sat 10/21/2017, Print      nitrofurantoin (MACRODANTIN) 50 mg capsule Take 50 mg by mouth see administration instructions Take one capsule following sexual intercourse, Starting Tue 2/16/2016, Historical Med      !! phenazopyridine (PYRIDIUM) 200 mg tablet Take 200 mg by mouth as needed, Starting Tue 9/19/2017, Historical Med       !! - Potential duplicate medications found  Please discuss with provider  No discharge procedures on file      ED Provider  Electronically Signed by           Omar Rivers DO  01/10/18 1925

## 2018-01-11 NOTE — MISCELLANEOUS
Message  Return to work or school:   Milena Nava is under my professional care  She was seen in my office on 8/30 and 9/9/16       Please excuse from work 8/29, 8/30, 8/31, 9/1, 9/9/16          Signatures   Electronically signed by : JUAN Atkins ; Sep 19 2016 12:11PM EST                       (Author)

## 2018-01-13 VITALS
SYSTOLIC BLOOD PRESSURE: 110 MMHG | HEIGHT: 61 IN | DIASTOLIC BLOOD PRESSURE: 74 MMHG | BODY MASS INDEX: 22.16 KG/M2 | WEIGHT: 117.38 LBS

## 2018-01-13 VITALS
BODY MASS INDEX: 22.58 KG/M2 | HEIGHT: 60 IN | OXYGEN SATURATION: 99 % | SYSTOLIC BLOOD PRESSURE: 110 MMHG | WEIGHT: 115 LBS | RESPIRATION RATE: 20 BRPM | DIASTOLIC BLOOD PRESSURE: 60 MMHG | HEART RATE: 78 BPM

## 2018-01-14 VITALS
SYSTOLIC BLOOD PRESSURE: 120 MMHG | BODY MASS INDEX: 22.84 KG/M2 | HEIGHT: 61 IN | DIASTOLIC BLOOD PRESSURE: 70 MMHG | WEIGHT: 121 LBS

## 2018-01-14 VITALS
SYSTOLIC BLOOD PRESSURE: 100 MMHG | RESPIRATION RATE: 20 BRPM | BODY MASS INDEX: 22.48 KG/M2 | OXYGEN SATURATION: 98 % | HEART RATE: 97 BPM | HEIGHT: 61 IN | WEIGHT: 119.05 LBS | DIASTOLIC BLOOD PRESSURE: 78 MMHG | TEMPERATURE: 99 F

## 2018-01-14 VITALS
WEIGHT: 116 LBS | BODY MASS INDEX: 22.78 KG/M2 | SYSTOLIC BLOOD PRESSURE: 120 MMHG | HEIGHT: 60 IN | DIASTOLIC BLOOD PRESSURE: 72 MMHG

## 2018-01-14 NOTE — PROGRESS NOTES
Assessment    1  Depression with anxiety (300 4) (F41 8)   2  Generalized anxiety disorder (300 02) (F41 1)    Plan  Depression with anxiety, Generalized anxiety disorder    · 1 - Stefan Solorio (Licensed Social Worker) Physician Referral  Consult  Status:  Hold For - Scheduling  Requested for: [id number]  Care Summary provided  : Yes  Generalized anxiety disorder    · From  Escitalopram Oxalate 5 MG Oral Tablet Take 1 tablet daily To  Escitalopram Oxalate 5 MG Oral Tablet Take 2 tablets daily    Discussion/Summary  The patient, patient's family was counseled regarding instructions for management, impressions  total time of encounter was 30 minutes and >15 minutes was spent counseling  Possible side effects of new medications were reviewed with the patient/guardian today  The treatment plan was reviewed with the patient/guardian  The patient/guardian understands and agrees with the treatment plan      Chief Complaint  Patient presents to office today with anxiety that she would like to discuss further  History of Present Illness  HPI: Here with her mother  Worsening anxiety and depression  Crying all the time, very sensitive that even little things bother her  + mood swings  + impulsive-retail therapy  She was on Lexapro 2 years ago and did very well initially  She stopped after < 1 year then restarted in August but did not feel the improvement like she did the first time she took it  She continued taking it until December  Lives with her boyfriend who is very supportive  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, manager    Depression (Follow-Up): The patient states her depression has worsened since the last visit  They have had recurrent episodes of major depression  She describes this as moderate in severity  Comorbid Illnesses: anxiety     Interval Symptoms: worsened depression, worsened depressed mood, worsened loss of interest or pleasure in activities, worsened insomnia, denies excessive sleepiness, denies inability to perform normal activities, worsened loss of energy, worsened trouble concentrating and worsened anxiety  Associated symptoms include: racing thoughts, but no recent weight gain, no recent weight loss, no thoughts of suicide, no social difficulties, no delusions, no employment difficulties, no auditory hallucinations, no periods of excess energy, no financial difficulties, no visual hallucinations, no periods of euphoria and no difficulty with activities of daily living  Social Support: the patient has good social support  Medications: The patient is not currently on any medications for her depression  Review of Systems    Constitutional: no fever  Cardiovascular: no chest pain and no palpitations  Respiratory: no shortness of breath and no cough  Gastrointestinal: no abdominal pain  Active Problems    1  Chronic constipation (564 00) (K59 00)   2  Contraceptives (V25 02)   3  Encounter for contraceptive surveillance (V25 40) (Z30 40)   4  Generalized anxiety disorder (300 02) (F41 1)    Past Medical History  Active Problems And Past Medical History Reviewed: The active problems and past medical history were reviewed and updated today  Social History    · Being A Social Drinker   · Exercising Regularly   · Never A Smoker  The social history was reviewed and updated today  Family History  Family History Reviewed: The family history was reviewed and updated today  Current Meds   1  ALPRAZolam 0 25 MG Oral Tablet; 1 tab PO daily prn; Therapy: 06Xlq5338 to (Evaluate:16Jan2016); Last Rx:26Csp5561 Ordered   2  Escitalopram Oxalate 5 MG Oral Tablet; Take 1 tablet daily; Therapy: 55Uoo4063 to (Last Rx:21Jan2016)  Requested for: 21Jan2016 Ordered   3  Sprintec 28 0 25-35 MG-MCG Oral Tablet; TAKE 1 TABLET DAILY AS DIRECTED; Therapy: ([id number]) to Recorded    The medication list was reviewed and updated today  Allergies    1  Amoxicillin-Pot Clavulanate TABS    Vitals   Recorded: 03YRB2558 12:03PM   Heart Rate 63   Systolic 805   Diastolic 60   Height 5 ft 1 in   Weight 112 lb 2 oz   BMI Calculated 21 19   BSA Calculated 1 48     Physical Exam    Constitutional   General appearance: No acute distress, well appearing and well nourished  Eyes   Conjunctiva and lids: No swelling, erythema or discharge  Ears, Nose, Mouth, and Throat   Otoscopic examination: Tympanic membranes translucent with normal light reflex  Canals patent without erythema  Oropharynx: Normal with no erythema, edema, exudate or lesions  Pulmonary   Respiratory effort: No increased work of breathing or signs of respiratory distress  Auscultation of lungs: Clear to auscultation  Cardiovascular   Auscultation of heart: Normal rate and rhythm, normal S1 and S2, without murmurs  Abdomen   Abdomen: Non-tender, no masses  Liver and spleen: No hepatomegaly or splenomegaly  Lymphatic   Palpation of lymph nodes in neck: No lymphadenopathy  Musculoskeletal   Gait and station: Normal     Psychiatric   Orientation to person, place, and time: Normal     Mood and affect: Normal          Future Appointments    Date/Time Provider Specialty Site   02/16/2016 05:00 PM JUAN Vergara  [facility] [address]   06/09/2016 09:30 AM JUAN Vergara   [facility] [address]     Signatures   Electronically signed by : JUAN Redding ; Jan 26 2016  7:33PM EST                       (Author)

## 2018-01-15 VITALS
HEART RATE: 68 BPM | DIASTOLIC BLOOD PRESSURE: 64 MMHG | HEIGHT: 61 IN | WEIGHT: 119 LBS | TEMPERATURE: 98.8 F | BODY MASS INDEX: 22.47 KG/M2 | SYSTOLIC BLOOD PRESSURE: 116 MMHG

## 2018-01-15 NOTE — PROGRESS NOTES
Assessment    1  Encounter for preventive health examination (V70 0) (Z00 00)   2  Irritable bowel syndrome (564 1) (K58 9)    Discussion/Summary  health maintenance visit Currently, she eats an adequate diet and has an adequate exercise regimen  cervical cancer screening is current Breast cancer screening: breast cancer screening is not indicated  Colorectal cancer screening: colorectal cancer screening is not indicated  Trial of an antihistamine for the reaction she had to the bee sting  She has had chronic constipation but in the past month has had these episodes of severe abd cramping  F/U with Dr Sonia Cardenas but I suspect she will need a PPI and Bentyl prn  rto 6months  The patient was counseled regarding impressions  The treatment plan was reviewed with the patient/guardian  The patient/guardian understands and agrees with the treatment plan      Chief Complaint  The patient presents today for annual wellness exam       History of Present Illness  HM, Adult Female: The patient is being seen for a health maintenance evaluation  The last health maintenance visit was 2 year(s) ago  Social History: Household members include domestic partner  Work status: working full time  General Health: The patient's health since the last visit is described as fair  She has regular dental visits  The patient brushes 2 time(s) a day and flosses 2 time(s) a day  Dental problems: no tooth pain and no caries  She denies vision problems  She denies hearing loss  Immunizations status: up to date  Lifestyle:  She consumes a diverse and healthy diet  She does not have any weight concerns  She exercises regularly  She exercises 1 times per week for 2 hours minutes per session  Exercise includes running/jogging  She does not use tobacco  The patient is a former cigarette smoker  She consumes alcohol  She reports occasional alcohol use and binge drinking 2 times per month   She typically drinks wine, but no beer consumption and no hard liquor consumption  Alcohol concern: The patient has no concerns about alcohol abuse  She denies drug use  Reproductive health: the patient is premenopausal   she reports normal menses  she uses no contraception  she is sexually active  she denies prior pregnancies  Screening: Cervical cancer screening includes a pap smear performed last year  Breast cancer screening includes no previous mammogram  Colorectal cancer screening includes a colonoscopy performed 2014  Metabolic screening includes no previous lipid profile, no previous glucose screening and thyroid function test performed last year  Cardiovascular risk factors: stress, but no hypertension, no obesity, no tobacco use, no illicit drug use and no sedentary lifestyle  HPI: She is seeing Dr Rae Chávez soon for chronic constipation, abd cramping x 1 month  She used to see Dr Li Hand  Epigastric burning, , abd feels hard after certain foods like coffee, carbonated drinks, alcohol, spicy , greasy food  TUMS and Miralax used to work , not so much anymore  no nausea, vomiting  Failed stool softeners and Linzess caused diarrhea  EGD colonoscopy in 2014  She has an apt with Dr Rae Chávez on Friday  She has missed work, events bec of this and it is causing a lot of anxiety  Got stung last week by a ground bee weed whacking at the back of the left knee and has a rash around it  It was much more swollen and it has improved  Applying cortisone cream bec of the itching which is helping      Review of Systems    Constitutional: no fever, no recent weight gain, no chills and no recent weight loss  Cardiovascular: no chest pain and no palpitations  Respiratory: no shortness of breath and no cough  Gastrointestinal: as noted in HPI and no blood in stools  Genitourinary: no dysuria  Psychiatric: sleep disturbances    The patient presents with complaints of anxiety (domenico now with the abdominal issues, takes Xanax infrequently)  Active Problems    1  Chronic constipation (564 00) (K59 09)   2  Contraceptives (V25 02)   3  Depression with anxiety (300 4) (F41 8)   4  Encounter for contraceptive surveillance (V25 40) (Z30 40)   5  Encounter for gynecological examination (V72 31) (Z01 419)   6  Generalized anxiety disorder (300 02) (F41 1)   7  Need for influenza vaccination (V04 81) (Z23)   8  Postcoital UTI (599 0) (N39 0)   9  Recurrent urinary tract infection (599 0) (N39 0)   10  Screening for cervical cancer (V76 2) (Z12 4)   11   Sleep disturbances (780 50) (G47 9)    Past Medical History    · History of Acute upper respiratory infection (465 9) (J06 9)   · History of Dysuria (788 1) (R30 0)   · History of External Hemorrhoids With Bleeding (455 5)   · History of Hematochezia (578 1) (K92 1)   · History of abdominal pain (V13 89) (W39 765)   · History of acute bronchitis (V12 69) (Z87 09)   · History of acute pharyngitis (V12 69) (Z87 09)   · History of acute pharyngitis (V12 69) (Z87 09)   · History of acute sinusitis (V12 69) (Z87 09)   · History of fatigue (V13 89) (V29 786)   · History of urinary frequency (V13 09) (Y25 079)   · History of urinary tract infection (V13 02) (Z87 440)   · History of urinary tract infection (V13 02) (Z87 440)   · History of viral infection (V12 09) (Z86 19)   · History of Immunity status testing (V72 61) (Z01 84)   · 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)   · History of Poison ivy dermatitis (692 6) (L23 7)   · History of Sinusitis (473 9) (J32 9)   · History of Skin Abscess Of Right Leg (682 6)   · History of Urinary frequency (788 41) (R35 0)    Surgical History    · Contraceptives (V25 02)   · History of Tonsillectomy   · History of Tonsillectomy    Family History  Mother    · Family history of thyroid disease (V18 19) (Z80 46)  Father    · Family history of thyroid disease (V18 19) (Z80 46)  Family History    · Family history of Denial Of Any Significant Medical History   · Family history of malignant neoplasm (V16 9) (Z80 9)    Social History    · Being A Social Drinker   · Exercising Regularly   · Never A Smoker    Current Meds   1  ALPRAZolam 0 25 MG Oral Tablet; 1 tab PO daily prn; Therapy: 95Rmp8853 to (Evaluate:93Wbl8551); Last Rx:18Nov2016 Ordered   2  Nitrofurantoin Macrocrystal 50 MG Oral Capsule; Take 1 capsule after sexual intercourse; Therapy: 14MEB8616 to Recorded   3  NuvaRing 0 12-0 015 MG/24HR Vaginal Ring; APPLY 1 UNIT Once every 3 weeks then   out for 1 week; Therapy: 16SLK7816 to (Last Rx:37Ibe1813)  Requested for: 11Yfu5281 Ordered   4  NuvaRing 0 12-0 015 MG/24HR Vaginal Ring; INSERT 1 RING VAGINALLY FOR 3   WEEKS THEN 1 WEEK OFF; Therapy: 80YDC5564 to Recorded    Allergies    1  No Known Drug Allergies  Denied    2  Amoxicillin-Pot Clavulanate TABS    Vitals   Recorded: 74Qhe1149 09:34AM   Temperature 98 7 F   Heart Rate 78   Respiration 20   Systolic 99   Diastolic 62   Weight 833 lb 12 8 oz   BMI Calculated 22 83   BSA Calculated 1 52     Physical Exam    Constitutional   General appearance: No acute distress, well appearing and well nourished  Head and Face   Head and face: Normal     Eyes   Conjunctiva and lids: No swelling, erythema or discharge  Ears, Nose, Mouth, and Throat   Otoscopic examination: Tympanic membranes translucent with normal light reflex  Canals patent without erythema  Oropharynx: Normal with no erythema, edema, exudate or lesions  Neck   Neck: Supple, symmetric, trachea midline, no masses  Thyroid: Normal, no thyromegaly  Pulmonary   Respiratory effort: No increased work of breathing or signs of respiratory distress  Auscultation of lungs: Clear to auscultation  Cardiovascular   Auscultation of heart: Normal rate and rhythm, normal S1 and S2, no murmurs  Abdomen   Abdomen: Non-tender, no masses  Lymphatic   Palpation of lymph nodes in neck: No lymphadenopathy      Musculoskeletal   Gait and station: Normal  Skin mild swelling behind the left knee in the popliteal surface with erythema; puncture site clean, no discharge  Health Management  Screening for cervical cancer   PELVIC EXAM; every 1 year; Next Due: 78NLR4227;  Overdue    Signatures   Electronically signed by : JUAN Lozano ; Jul 25 2017  9:40AM EST                       (Author)

## 2018-01-16 NOTE — RESULT NOTES
Verified Results  (1) VITAMIN D 25-HYDROXY 23Qkt9346 08:32AM Asuncion Trevizo    Order Number: [id number]     Test Name Result Flag Reference   VIT D 25-HYDROX 40 8 ng/mL  30 0-100 0   This assay is a certified procedure of the CDC Vitamin D Standardization Certification Program (VDSCP)     Deficiency <20ng/ml   Insufficiency 20-30ng/ml   Sufficient  ng/ml     *Patients undergoing fluorescein dye angiography may retain small amounts of fluorescein in the body for 48-72 hours post procedure  Samples containing fluorescein can produce falsely elevated Vitamin D values  If the patient had this procedure, a specimen should be resubmitted post fluorescein clearance

## 2018-01-16 NOTE — PSYCH
History of Present Illness  Psychotherapy Provided [facility]: Individual Psychotherapy 60 minutes minutes provided today  Goals addressed in session:   Patient continues to struggle with anxiety and depression  Continues to work and deal with multiple relationship and family stressors  Patient verbal and cooperative  HPI - Psych: Patient continues to report issues with fatigue, decreased energy and motivation  Sleep not as restful or steady as should be  Appetite variable  Working full time and in relationship with boyfriend who has 11year old autistic son and difficult biological mother  Works f/t in social work field  Takes little time for herself and fro self-preservation  Denies any SI  Long history of anxiety and worry  has very good support system   Note   Note:   Will meet weekly with patient to monitor response to meds  In addition, began identifying coping strategies and behavioral changes she will need to make to better manage stress and mood issues  Will assess status next week: 11/30/16 at 9:30AM       Assessment    1   Depression with anxiety (300 4) (F41 8)    Signatures   Electronically signed by : Rochel Closs, LCSW; Nov 23 2016  3:40PM EST                       (Author)

## 2018-01-18 NOTE — MISCELLANEOUS
Message  Return to work or school:   Dre Browne is under my professional care   She was seen in my office on 10/25/2017   She is able to return to work on  10/30/2017            Signatures   Electronically signed by : JUAN Graham ; Oct 25 2017  2:08PM EST                       (Author)

## 2018-01-22 VITALS
TEMPERATURE: 98.7 F | HEART RATE: 78 BPM | DIASTOLIC BLOOD PRESSURE: 62 MMHG | RESPIRATION RATE: 20 BRPM | SYSTOLIC BLOOD PRESSURE: 99 MMHG | BODY MASS INDEX: 22.82 KG/M2 | WEIGHT: 120.8 LBS

## 2018-01-23 ENCOUNTER — ALLSCRIPTS OFFICE VISIT (OUTPATIENT)
Dept: OTHER | Facility: OTHER | Age: 28
End: 2018-01-23

## 2018-01-24 VITALS
SYSTOLIC BLOOD PRESSURE: 118 MMHG | DIASTOLIC BLOOD PRESSURE: 60 MMHG | WEIGHT: 115 LBS | HEIGHT: 60 IN | BODY MASS INDEX: 22.58 KG/M2

## 2018-01-24 NOTE — PROCEDURES
Assessment    1  IUD contraception (V45 51) (Z97 5)    Discussion/Summary  Discussion Summary: This is a 79-year-old white female she is nulliparous, she has now decided to use the [place] IUD for contraception   Consent was given  The procedure went without incident or complication  Ultrasound was performed and it did not show any evidence of perforation  The patient tolerated procedure well  Return to office in 5 weeks for re-evaluation  She was given instruction booklet  Active Problems    1  Benign paroxysmal positional vertigo (386 11) (H81 10)   2  Chronic constipation (564 00) (K59 09)   3  Chronic fatigue (780 79) (R53 82)   4  Contraceptives (V25 02)   5  Depression with anxiety (300 4) (F41 8)   6  Dizziness (780 4) (R42)   7  Dysuria (788 1) (R30 0)   8  Encounter for contraceptive surveillance (V25 40) (Z30 40)   9  Encounter for gynecological examination (V72 31) (Z01 419)   10  Generalized anxiety disorder (300 02) (F41 1)   11  Irritable bowel syndrome (564 1) (K58 9)   12  IUD contraception (V45 51) (Z97 5)   13  Long term use of drug (V58 69) (Z79 899)   14  Need for influenza vaccination (V04 81) (Z23)   15  Pelvic pain (R10 2)   16  Postcoital UTI (599 0) (N39 0)   17  Recurrent urinary tract infection (599 0) (N39 0)   18  Screening for cervical cancer (V76 2) (Z12 4)   19  Sleep disturbances (780 50) (G47 9)   20  Vitamin D deficiency (268 9) (E55 9)    Current Meds    1  Amitiza 8 MCG Oral Capsule Recorded    Allergies    1  No Known Drug Allergies  Denied    2  Amoxicillin-Pot Clavulanate TABS    Procedure    Procedure: intrauterine device (IUD) placement  Risks, benefits and alternatives were discussed with the patient  written consent was obtained prior to the procedure and is detailed in the patient's record  the patient's LMP was 01/17/2018  Procedure Note:   Anesthesia: none  The cervix was stabilized with an Allis Clamp  The cervix required dilation   The uterus was anteverted and sounded to 7 cm  The Li IUD was gently inserted to the fundus of the uterus using standard technique  Post-Procedure:   Patient Status: the patient tolerated the procedure well  Complications: there were no complications  Follow up: return for follow up visit in 1-2 months        Signatures   Electronically signed by : JUAN Becerra ; Jan 23 2018  9:15AM EST                       (Author)

## 2018-02-12 ENCOUNTER — OFFICE VISIT (OUTPATIENT)
Dept: INTERNAL MEDICINE CLINIC | Facility: CLINIC | Age: 28
End: 2018-02-12
Payer: COMMERCIAL

## 2018-02-12 VITALS — HEART RATE: 99 BPM | OXYGEN SATURATION: 99 % | SYSTOLIC BLOOD PRESSURE: 108 MMHG | DIASTOLIC BLOOD PRESSURE: 70 MMHG

## 2018-02-12 DIAGNOSIS — H93.12 TINNITUS OF LEFT EAR: Primary | ICD-10-CM

## 2018-02-12 DIAGNOSIS — K58.1 IRRITABLE BOWEL SYNDROME WITH CONSTIPATION: ICD-10-CM

## 2018-02-12 PROBLEM — K58.9 IRRITABLE BOWEL SYNDROME: Status: ACTIVE | Noted: 2017-07-25

## 2018-02-12 PROCEDURE — 99214 OFFICE O/P EST MOD 30 MIN: CPT | Performed by: INTERNAL MEDICINE

## 2018-02-12 RX ORDER — ALPRAZOLAM 0.25 MG/1
0.25 TABLET ORAL
Qty: 30 TABLET | Refills: 0 | Status: SHIPPED | OUTPATIENT
Start: 2018-02-12 | End: 2019-05-21 | Stop reason: SDUPTHER

## 2018-02-12 NOTE — ASSESSMENT & PLAN NOTE
The hearing loss has resolved  She can try an oral antihistamine for now  She can also take the Xanax at night  Schedule with ENT  And if it is not improving in the next week I asked her to call

## 2018-02-12 NOTE — PROGRESS NOTES
Assessment/Plan:    Tinnitus of left ear  The hearing loss has resolved  She can try an oral antihistamine for now  She can also take the Xanax at night  Schedule with ENT  And if it is not improving in the next week I asked her to call    Irritable bowel syndrome  Better on Amitiza  F/U with GI         Problem List Items Addressed This Visit     Tinnitus of left ear - Primary     The hearing loss has resolved  She can try an oral antihistamine for now  She can also take the Xanax at night  Schedule with ENT  And if it is not improving in the next week I asked her to call         Relevant Medications    ALPRAZolam (XANAX) 0 25 mg tablet    Other Relevant Orders    Ambulatory Referral to Otolaryngology    Irritable bowel syndrome     Better on Amitiza  F/U with GI                 Subjective:      Patient ID: Kim Lawson is a 24 y o  female  She was out with friends on Saturday  (3days ago) and was seated next to the speakers in a club  She experienced hearing loss and ringing in her ears after  She has regained her hearing on that side but the ringing is unchanged  Sometimes, she feels a sharp pain in that ear            The following portions of the patient's history were reviewed and updated as appropriate: allergies, current medications, past family history, past medical history, past social history, past surgical history and problem list   Past Medical History:   Diagnosis Date    Depression     Gallbladder attack     IBS (irritable bowel syndrome)     Psychiatric disorder     UTI (urinary tract infection)      No past surgical history on file  Social History   Substance Use Topics    Smoking status: Current Some Day Smoker     Packs/day: 0 20     Types: Cigarettes    Smokeless tobacco: Never Used    Alcohol use Yes      Comment: occasional     No family history on file    Current Outpatient Prescriptions   Medication Sig Dispense Refill    ALPRAZolam (XANAX) 0 25 mg tablet Take 1 tablet (0 25 mg total) by mouth daily at bedtime as needed for sleep 30 tablet 0    lubiprostone (AMITIZA) 8 mcg capsule Take 8 mcg by mouth daily with breakfast        phenazopyridine (PYRIDIUM) 200 mg tablet Take 200 mg by mouth as needed      etonogestrel-ethinyl estradiol (NUVARING) 0 12-0 015 MG/24HR vaginal ring Insert 1 Dose into the vagina see administration instructions Insert 1 ring vaginally for 3 weeks then 1 week off      nitrofurantoin (MACRODANTIN) 50 mg capsule Take 50 mg by mouth see administration instructions Take one capsule following sexual intercourse       No current facility-administered medications for this visit  No Known Allergies  Review of Systems   Constitutional: Negative for fatigue, fever and unexpected weight change  HENT: Positive for ear pain and tinnitus  Negative for hearing loss, sinus pain, sinus pressure and sore throat  See hpi   Respiratory: Negative for cough, shortness of breath and wheezing  Cardiovascular: Negative for chest pain, palpitations and leg swelling  Gastrointestinal: Positive for constipation (taking amitiza which has helped, follows up with Dr Davie Banuelos)  Negative for abdominal pain, diarrhea, nausea and vomiting  Musculoskeletal: Negative for arthralgias and myalgias  Neurological: Negative for dizziness and headaches  Objective:  Vitals:    02/12/18 1329   BP: 108/70   BP Location: Left arm   Patient Position: Sitting   Cuff Size: Standard   Pulse: 99   SpO2: 99%          Physical Exam   Constitutional: She is oriented to person, place, and time  She appears well-developed and well-nourished  HENT:   Head: Normocephalic and atraumatic  Right Ear: External ear normal    Left Ear: External ear normal    Mouth/Throat: Oropharynx is clear and moist    Eyes: Conjunctivae are normal    Neck: Neck supple  Cardiovascular: Normal rate, regular rhythm and normal heart sounds  No murmur heard    Pulmonary/Chest: Effort normal and breath sounds normal  No respiratory distress  She has no wheezes  She has no rales  Abdominal: Soft  Bowel sounds are normal  She exhibits no distension and no mass  There is no tenderness  There is no rebound and no guarding  Musculoskeletal: Normal range of motion  Neurological: She is alert and oriented to person, place, and time  Skin: Skin is warm and dry  Psychiatric: She has a normal mood and affect   Her behavior is normal  Judgment and thought content normal

## 2018-02-27 ENCOUNTER — OFFICE VISIT (OUTPATIENT)
Dept: INTERNAL MEDICINE CLINIC | Facility: CLINIC | Age: 28
End: 2018-02-27
Payer: COMMERCIAL

## 2018-02-27 VITALS
HEIGHT: 60 IN | HEART RATE: 83 BPM | RESPIRATION RATE: 16 BRPM | SYSTOLIC BLOOD PRESSURE: 102 MMHG | TEMPERATURE: 98.7 F | WEIGHT: 112.2 LBS | OXYGEN SATURATION: 99 % | DIASTOLIC BLOOD PRESSURE: 72 MMHG | BODY MASS INDEX: 22.03 KG/M2

## 2018-02-27 DIAGNOSIS — K52.9 GASTROENTERITIS, ACUTE: Primary | ICD-10-CM

## 2018-02-27 PROCEDURE — 99213 OFFICE O/P EST LOW 20 MIN: CPT | Performed by: NURSE PRACTITIONER

## 2018-02-27 RX ORDER — ETONOGESTREL AND ETHINYL ESTRADIOL 11.7; 2.7 MG/1; MG/1
INSERT, EXTENDED RELEASE VAGINAL
COMMUNITY
Start: 2016-06-27 | End: 2018-02-28 | Stop reason: ALTCHOICE

## 2018-02-27 RX ORDER — LUBIPROSTONE 8 UG/1
8 CAPSULE, GELATIN COATED ORAL DAILY
COMMUNITY
End: 2019-05-21 | Stop reason: SDUPTHER

## 2018-02-27 RX ORDER — NITROFURANTOIN MACROCRYSTALS 50 MG/1
CAPSULE ORAL
COMMUNITY
Start: 2016-02-16 | End: 2018-10-18 | Stop reason: SDUPTHER

## 2018-02-27 RX ORDER — ONDANSETRON 4 MG/1
4 TABLET, FILM COATED ORAL EVERY 8 HOURS PRN
Qty: 20 TABLET | Refills: 0 | Status: SHIPPED | OUTPATIENT
Start: 2018-02-27 | End: 2018-06-08 | Stop reason: ALTCHOICE

## 2018-02-27 RX ORDER — PENICILLIN V POTASSIUM 500 MG/1
500 TABLET ORAL 4 TIMES DAILY
Refills: 1 | COMMUNITY
Start: 2018-01-03 | End: 2018-06-08 | Stop reason: ALTCHOICE

## 2018-02-27 NOTE — PATIENT INSTRUCTIONS
Acute Nausea and Vomiting   WHAT YOU NEED TO KNOW:   What is acute nausea and vomiting? Acute nausea and vomiting starts suddenly, gets worse quickly, and lasts a short time  What are some common causes of acute nausea and vomiting? · Food poisoning    · Large amounts of alcohol    · Certain medicines, too much of any medicine, or stopping a regular medicine too quickly    · Early stages of pregnancy    · Infection in the stomach, intestines, or other organs    · Trauma to the head    · Anxiety or stress    · Gastroparesis (a condition that prevents your stomach from emptying properly)    · Metabolic disorders, such as uremia or adrenal insufficiency  What causes acute nausea and vomiting with stomach pain? · Inflammation of the appendix, gallbladder, stomach, pancreas, kidneys, or other organs    · Gallstones    · Bacteria or a parasite in the digestive system    · Heart attack    · Stomach ulcers, or bowel blockage or twisting  What causes acute nausea and vomiting with other signs and symptoms? You may be sweating and have pale skin, problems with digestion, and more saliva than usual  These signs and symptoms may be caused by the following:  · Problems with your heart rate, blood flow to your heart muscle, blood pressure, or stomach fluid    · Increased pressure or bleeding in the brain    · Swelling of the tissue covering the brain    · Migraine or seizures    · Inner ear disorders that cause problems with balance  How is the cause of acute nausea and vomiting diagnosed? Your healthcare provider will examine you and ask about your medical history  Tell your provider about other signs and symptoms you have  Also tell your provider when you last had nausea and vomiting and how long it continued  Include if it happened before, during, or after a meal, and how much you ate  Your provider will need to know how much came out when you vomited, and if it was fast and forceful   Tell your provider if your vomit smelled like bowel movement or had blood or food in it  Tell your provider if the vomit was bright yellow  You may need any of the following:  · Blood tests  may be used to check for infection or inflammation  · X-ray, CT, or MRI pictures  may be used to find an injury or blockage  How may acute nausea and vomiting be treated? The first goal of treatment for nausea and vomiting is to prevent or treat dehydration  Treatment also depends on the cause of the nausea and vomiting  Any medical condition causing your nausea and vomiting will also be treated  Treatment is also aimed at stopping or preventing your signs and symptoms  You may need one or more of the following:  · Medicines  may be given to calm your stomach and stop your vomiting  You may also need medicines to help you feel more relaxed or to stop nausea and vomiting caused by motion sickness  Gastrointestinal stimulants may be used to help empty your stomach and bowels  This can help decrease your nausea and vomiting  · IV fluids  may be given to replace lost fluids and electrolytes  This may be needed it you cannot drink liquids  · Nasogastric (NG) tube: An NG tube is put into your nose, and passes down your throat until it reaches your stomach  Food and medicine may be given through an NG tube if you cannot take anything by mouth  The tube may instead be attached to suction if caregivers need to keep your stomach empty  What can I do to prevent or manage acute nausea and vomiting? · Do not drink alcohol  Alcohol may upset or irritate your stomach  Too much alcohol can also cause acute nausea and vomiting  · Control stress  Headaches due to stress may cause nausea and vomiting  Find ways to relax and manage your stress  Get more rest and sleep  · Drink more liquids as directed  Vomiting can lead to dehydration  It is important to drink more liquids to help replace lost body fluids   Ask your healthcare provider how much liquid to drink each day and which liquids are best for you  Your provider may recommend that you drink an oral rehydration solution (ORS)  ORS contains water, salts, and sugar that are needed to replace the lost body fluids  Ask what kind of ORS to use, how much to drink, and where to get it  · Eat smaller meals, more often  Eat small amounts of food every 2 to 3 hours, even if you are not hungry  Food in your stomach may decrease your nausea  · Talk to your healthcare provider before you take over-the-counter (OTC) medicines  These medicines can cause serious problems if you use certain other medicines, or you have a medical condition  You may have problems if you use too much or use them for longer than the label says  Follow directions on the label carefully  When should I seek immediate care? · You see blood in your vomit or your bowel movements  · You have sudden, severe pain in your chest and upper abdomen after hard vomiting or retching  · You have swelling in your neck and chest      · You are dizzy, cold, and thirsty, and your eyes and mouth are dry  · You are urinating very little or not at all  · You have muscle weakness, leg cramps, and trouble breathing  · Your heart is beating much faster than normal     · You continue to vomit for more than 48 hours  When should I contact my healthcare provider? · You have frequent dry heaves (vomiting but nothing comes out)  · Your nausea and vomiting does not get better or go away after you use medicine  · You have questions or concerns about your condition or care  CARE AGREEMENT:   You have the right to help plan your care  Learn about your health condition and how it may be treated  Discuss treatment options with your caregivers to decide what care you want to receive  You always have the right to refuse treatment  The above information is an  only   It is not intended as medical advice for individual conditions or treatments  Talk to your doctor, nurse or pharmacist before following any medical regimen to see if it is safe and effective for you  © 2017 [employer] Information is for End User's use only and may not be sold, redistributed or otherwise used for commercial purposes  All illustrations and images included in CareNotes® are the copyrighted property of [employer]  or [employer]

## 2018-02-27 NOTE — PROGRESS NOTES
Assessment/Plan:     Diagnoses and all orders for this visit:    Gastroenteritis, acute  -     ondansetron (ZOFRAN) 4 mg tablet; Take 1 tablet (4 mg total) by mouth every 8 (eight) hours as needed for nausea or vomiting    Other orders  -     penicillin V potassium (VEETID) 500 mg tablet; Take 500 mg by mouth 4 (four) times a day  -     lubiprostone (AMITIZA) 8 mcg capsule; Take by mouth  -     nitrofurantoin (MACRODANTIN) 50 mg capsule; Take by mouth  -     etonogestrel-ethinyl estradiol (NUVARING) 0 12-0 015 MG/24HR vaginal ring; Insert into the vagina          Subjective:      Patient ID: Lola Greenberg is a 24 y o  female  Symptoms started last Wednesday  Diarrhea and vomiting   Vomiting stopped Friday   Still having diarrhea   No appetite, still nauseous  Sunday normal bowel movement, yesterday diarrhea again  Gurgling but no abdominal pain        The following portions of the patient's history were reviewed and updated as appropriate: allergies, current medications, past family history, past medical history, past social history, past surgical history and problem list     Review of Systems   Constitutional: Negative  HENT: Negative  Eyes: Negative  Respiratory: Negative  Cardiovascular: Negative  Gastrointestinal: Positive for diarrhea, nausea and vomiting  Negative for abdominal pain and blood in stool  Genitourinary: Negative for difficulty urinating  Neurological: Negative  Psychiatric/Behavioral: Negative  Objective:      /72 (BP Location: Left arm, Patient Position: Sitting, Cuff Size: Standard)   Pulse 83   Temp 98 7 °F (37 1 °C)   Resp 16   Ht 5' (1 524 m)   Wt 50 9 kg (112 lb 3 2 oz)   SpO2 99%   BMI 21 91 kg/m²          Physical Exam   Constitutional: She is oriented to person, place, and time  She appears well-developed and well-nourished  Cardiovascular: Normal rate, regular rhythm and normal heart sounds      Pulmonary/Chest: Effort normal and breath sounds normal    Abdominal: Soft  Bowel sounds are normal  She exhibits no distension and no mass  There is no tenderness  There is no rebound and no guarding  Neurological: She is alert and oriented to person, place, and time  Psychiatric: She has a normal mood and affect  Her behavior is normal    Vitals reviewed

## 2018-02-28 ENCOUNTER — OFFICE VISIT (OUTPATIENT)
Dept: OBGYN CLINIC | Facility: CLINIC | Age: 28
End: 2018-02-28

## 2018-02-28 VITALS
DIASTOLIC BLOOD PRESSURE: 66 MMHG | BODY MASS INDEX: 22.19 KG/M2 | WEIGHT: 113 LBS | HEIGHT: 60 IN | SYSTOLIC BLOOD PRESSURE: 102 MMHG

## 2018-02-28 DIAGNOSIS — Z30.431 IUD CHECK UP: Primary | ICD-10-CM

## 2018-02-28 PROCEDURE — 99024 POSTOP FOLLOW-UP VISIT: CPT | Performed by: OBSTETRICS & GYNECOLOGY

## 2018-02-28 NOTE — PROGRESS NOTES
This is a 57-year-old white female now returns for IUD follow-up after insertion of the Doug IUD  She is doing well  She has some spotting  There is no problem with intimacy  Her partner is not aware the string  A transabdominal ultrasound shows proper placement of the IUD without evidence of perforation  Pelvic examination shows the IUD string is this present  The patient overall is happy with the IUD she will keep her regular scheduled yearly exam as directed

## 2018-02-28 NOTE — PATIENT INSTRUCTIONS
The patient was informed of a stable IUD checkup  She return to office as needed for yearly exam   She is not having problems with the IUD

## 2018-04-03 ENCOUNTER — OFFICE VISIT (OUTPATIENT)
Dept: INTERNAL MEDICINE CLINIC | Facility: CLINIC | Age: 28
End: 2018-04-03
Payer: COMMERCIAL

## 2018-04-03 VITALS
HEIGHT: 61 IN | WEIGHT: 115.4 LBS | OXYGEN SATURATION: 96 % | SYSTOLIC BLOOD PRESSURE: 104 MMHG | HEART RATE: 81 BPM | BODY MASS INDEX: 21.79 KG/M2 | DIASTOLIC BLOOD PRESSURE: 66 MMHG | TEMPERATURE: 98.4 F

## 2018-04-03 DIAGNOSIS — J01.00 ACUTE NON-RECURRENT MAXILLARY SINUSITIS: Primary | ICD-10-CM

## 2018-04-03 PROBLEM — H93.12 TINNITUS OF LEFT EAR: Status: RESOLVED | Noted: 2018-02-12 | Resolved: 2018-04-03

## 2018-04-03 PROCEDURE — 99213 OFFICE O/P EST LOW 20 MIN: CPT | Performed by: INTERNAL MEDICINE

## 2018-04-03 RX ORDER — AZITHROMYCIN 250 MG/1
TABLET, FILM COATED ORAL
Qty: 6 TABLET | Refills: 0 | Status: SHIPPED | OUTPATIENT
Start: 2018-04-03 | End: 2018-04-07

## 2018-04-03 NOTE — LETTER
April 3, 2018     Patient: Rhys Gutierrez   YOB: 1990   Date of Visit: 4/3/2018       To Whom it May Concern:    Albertina Arias is under my professional care  She was seen in my office on 4/3/2018  Please excuse from work 4/2-4/3/18  If you have any questions or concerns, please don't hesitate to call           Sincerely,          Dhaval Li MD        CC: No Recipients

## 2018-04-03 NOTE — PROGRESS NOTES
Assessment/Plan:     Increase oral fluid intake  May cont OTC meds prn  Call if not improving    Problem List Items Addressed This Visit     Acute non-recurrent maxillary sinusitis - Primary    Relevant Medications    azithromycin (ZITHROMAX) 250 mg tablet            Subjective:      Patient ID: Mary Kay Renyoso is a 24 y o  female  HPI   4 days ago started with sneezing  Sinus congestion, drainage, sore throat  Headaches/sinus pressure  Cough  Dayquil and Flonase Aleve  Denies vomiting, diarrhea      The following portions of the patient's history were reviewed and updated as appropriate: allergies, current medications, past family history, past medical history, past social history, past surgical history and problem list     Review of Systems   Constitutional: Negative for fatigue, fever and unexpected weight change  HENT: Positive for postnasal drip, sinus pain, sinus pressure, sore throat and tinnitus  Respiratory: Positive for cough  Negative for shortness of breath and wheezing  Gastrointestinal: Positive for constipation  Negative for diarrhea, nausea and vomiting  Neurological: Positive for headaches  Objective:      /66   Pulse 81   Temp 98 4 °F (36 9 °C) (Oral)   Ht 5' 1" (1 549 m)   Wt 52 3 kg (115 lb 6 4 oz)   SpO2 96%   BMI 21 80 kg/m²          Physical Exam   Constitutional: She is oriented to person, place, and time  She appears well-developed and well-nourished  HENT:   Head: Normocephalic and atraumatic  Right Ear: External ear normal    Left Ear: External ear normal    Mouth/Throat: Oropharynx is clear and moist    Eyes: Conjunctivae are normal    Neck: Neck supple  Cardiovascular: Normal rate, regular rhythm and normal heart sounds  No murmur heard  Pulmonary/Chest: Effort normal and breath sounds normal  No respiratory distress  She has no wheezes  She has no rales  Abdominal: Soft   Bowel sounds are normal  She exhibits no distension and no mass  There is no tenderness  There is no rebound and no guarding  Musculoskeletal: Normal range of motion  Neurological: She is alert and oriented to person, place, and time  Skin: Skin is warm and dry  Psychiatric: She has a normal mood and affect   Her behavior is normal  Judgment and thought content normal

## 2018-04-04 ENCOUNTER — TELEPHONE (OUTPATIENT)
Dept: INTERNAL MEDICINE CLINIC | Facility: CLINIC | Age: 28
End: 2018-04-04

## 2018-04-04 NOTE — TELEPHONE ENCOUNTER
Pt called in stating she was seen yesterday and diagnosed with a sinus infection and Dr Peggy Smalls gave her a work note however she did not go into work today so she is asking if we can write her a new note and she will come to the office to pick it up as she lives close

## 2018-04-05 ENCOUNTER — TELEPHONE (OUTPATIENT)
Dept: INTERNAL MEDICINE CLINIC | Facility: CLINIC | Age: 28
End: 2018-04-05

## 2018-04-20 ENCOUNTER — TELEPHONE (OUTPATIENT)
Dept: OBGYN CLINIC | Facility: CLINIC | Age: 28
End: 2018-04-20

## 2018-04-20 DIAGNOSIS — B37.9 YEAST INFECTION: Primary | ICD-10-CM

## 2018-04-20 RX ORDER — FLUCONAZOLE 150 MG/1
150 TABLET ORAL ONCE
Qty: 1 TABLET | Refills: 0 | Status: SHIPPED | OUTPATIENT
Start: 2018-04-20 | End: 2018-04-20

## 2018-04-20 NOTE — TELEPHONE ENCOUNTER
Pt having vag itching - has menses now so unable to tell if discharge - was on antibiotic recently for sinus infection - will tx with diflucan x 1 dose, monistat 7 externally    escribe diflucan 150 mg x 1 dose to [facility] ([address])

## 2018-05-07 ENCOUNTER — OFFICE VISIT (OUTPATIENT)
Dept: INTERNAL MEDICINE CLINIC | Facility: CLINIC | Age: 28
End: 2018-05-07
Payer: COMMERCIAL

## 2018-05-07 VITALS
HEART RATE: 72 BPM | BODY MASS INDEX: 21.3 KG/M2 | WEIGHT: 112.8 LBS | HEIGHT: 61 IN | SYSTOLIC BLOOD PRESSURE: 120 MMHG | DIASTOLIC BLOOD PRESSURE: 70 MMHG

## 2018-05-07 DIAGNOSIS — R42 DIZZINESS: ICD-10-CM

## 2018-05-07 DIAGNOSIS — F41.1 GENERALIZED ANXIETY DISORDER: ICD-10-CM

## 2018-05-07 DIAGNOSIS — H81.10 BENIGN PAROXYSMAL POSITIONAL VERTIGO, UNSPECIFIED LATERALITY: ICD-10-CM

## 2018-05-07 DIAGNOSIS — J01.00 ACUTE NON-RECURRENT MAXILLARY SINUSITIS: Primary | ICD-10-CM

## 2018-05-07 DIAGNOSIS — K58.1 IRRITABLE BOWEL SYNDROME WITH CONSTIPATION: ICD-10-CM

## 2018-05-07 PROCEDURE — 99214 OFFICE O/P EST MOD 30 MIN: CPT | Performed by: INTERNAL MEDICINE

## 2018-05-07 RX ORDER — VENLAFAXINE HYDROCHLORIDE 37.5 MG/1
37.5 CAPSULE, EXTENDED RELEASE ORAL DAILY
Qty: 30 CAPSULE | Refills: 1 | Status: SHIPPED | OUTPATIENT
Start: 2018-05-07 | End: 2018-08-28 | Stop reason: SDUPTHER
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
## 2018-05-09 ENCOUNTER — EVALUATION (OUTPATIENT)
Dept: PHYSICAL THERAPY | Facility: CLINIC | Age: 28
End: 2018-05-09
Payer: COMMERCIAL

## 2018-05-09 DIAGNOSIS — H81.10 BENIGN PAROXYSMAL POSITIONAL VERTIGO, UNSPECIFIED LATERALITY: ICD-10-CM

## 2018-05-09 PROCEDURE — 97162 PT EVAL MOD COMPLEX 30 MIN: CPT | Performed by: PHYSICAL THERAPIST

## 2018-05-09 PROCEDURE — G8978 MOBILITY CURRENT STATUS: HCPCS | Performed by: PHYSICAL THERAPIST

## 2018-05-09 PROCEDURE — G8979 MOBILITY GOAL STATUS: HCPCS | Performed by: PHYSICAL THERAPIST
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
## 2018-05-10 ENCOUNTER — TELEPHONE (OUTPATIENT)
Dept: INTERNAL MEDICINE CLINIC | Facility: CLINIC | Age: 28
End: 2018-05-10

## 2018-05-10 DIAGNOSIS — R42 DIZZINESS: Primary | ICD-10-CM

## 2018-05-10 NOTE — TELEPHONE ENCOUNTER
Pt called in stating she was told by physical therapy that they would be sending a "script" over to you to sign off on for her to have an MRI - I don't see anything in her chart like that however I see it mentioned in the chartnote from yesterday that they do recommend that she receive an MRI; she is asking for some clarification on who/what is going to be ordered    Please advise, ty

## 2018-05-10 NOTE — TELEPHONE ENCOUNTER
I spoke with the patient  She will schedule the MRI and call us back with information to be prior authorization
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
## 2018-05-16 DIAGNOSIS — R42 DIZZINESS: Primary | ICD-10-CM

## 2018-05-17 ENCOUNTER — HOSPITAL ENCOUNTER (OUTPATIENT)
Dept: RADIOLOGY | Age: 28
Discharge: HOME | End: 2018-05-17
Payer: COMMERCIAL

## 2018-05-17 ENCOUNTER — EVALUATION (OUTPATIENT)
Dept: OCCUPATIONAL THERAPY | Facility: CLINIC | Age: 28
End: 2018-05-17
Payer: COMMERCIAL

## 2018-05-17 DIAGNOSIS — R42 DIZZINESS: ICD-10-CM

## 2018-05-17 DIAGNOSIS — R42 DIZZINESS: Primary | ICD-10-CM

## 2018-05-17 PROCEDURE — G8991 OTHER PT/OT GOAL STATUS: HCPCS

## 2018-05-17 PROCEDURE — 70551 MRI BRAIN STEM W/O DYE: CPT

## 2018-05-17 PROCEDURE — 97166 OT EVAL MOD COMPLEX 45 MIN: CPT

## 2018-05-17 PROCEDURE — G8990 OTHER PT/OT CURRENT STATUS: HCPCS

## 2018-05-18 ENCOUNTER — TELEPHONE (OUTPATIENT)
Dept: INTERNAL MEDICINE CLINIC | Facility: CLINIC | Age: 28
End: 2018-05-18

## 2018-05-21 ENCOUNTER — OFFICE VISIT (OUTPATIENT)
Dept: OCCUPATIONAL THERAPY | Facility: CLINIC | Age: 28
End: 2018-05-21
Payer: COMMERCIAL

## 2018-05-21 DIAGNOSIS — R42 DIZZINESS: Primary | ICD-10-CM

## 2018-05-21 PROCEDURE — 97535 SELF CARE MNGMENT TRAINING: CPT

## 2018-05-21 NOTE — PROGRESS NOTES
Daily Note     Today's date: 2018  Patient name: James Rendon  : 1990  MRN: [id number]  Referring provider: Elder Brito MD  Dx:   Encounter Diagnosis   Name Primary?  Dizziness Yes                   Subjective: "sometime noise bothers me "      Objective: See treatment diary below      Assessment: Tolerated treatment fair  Patient arrived with a 3/10 HA  Reports that computers and noise usually irritate symptoms  Educated on utilizing earplugs and blue occluder to decrease sensitivities  Multi matrix and noted with difficulty alternating between clutter and closure cards  Brain Box:[place] with 45 second study time and able to recall 75%-100% of information  [id number] word search printed on blue paper and utilized occluder to block clutter  Tracking tube with simple background colors and noted with nausea and increase in HA to a 5/10  Rest break decreased nausea symptoms       Plan: Continued skilled OT per POC with focus on oculo motor and sustained attnetion and memory strategies    INTERVENTION COMMENTS:  Diagnosis: Dizziness [R42]  Precautions: dizziness, anxiety   FOTO:  2 of 10 visits, PN due

## 2018-05-24 ENCOUNTER — OFFICE VISIT (OUTPATIENT)
Dept: OCCUPATIONAL THERAPY | Facility: CLINIC | Age: 28
End: 2018-05-24
Payer: COMMERCIAL

## 2018-05-24 DIAGNOSIS — R42 DIZZINESS: Primary | ICD-10-CM

## 2018-05-24 PROCEDURE — 97535 SELF CARE MNGMENT TRAINING: CPT

## 2018-05-24 NOTE — PROGRESS NOTES
Daily Note     Today's date: 2018  Patient name: Yudi Comer  : 1990  MRN: [id number]  Referring provider: Karina Pulido MD  Dx:   Encounter Diagnosis   Name Primary?  Dizziness Yes                  Subjective: "I was dizzy for like a day after last session "      Objective: See treatment diary below      Assessment: Tolerated treatment fair  Patient stated that she felt an increase in dizziness after last session for about a day  Completed internal/external memory strategies and noted with no difficulties completing packet and patient reporting that she utilizes many of the external strategies already  Underlined letter copy and noted with an increase in dizziness and HA  Discussed with PT possible reassessment if symptoms continue to be severe      Plan: Continued skilled OT per POC     INTERVENTION COMMENTS:  Diagnosis: Dizziness [R42]  Precautions: dizziness, anxiety  FOTO:  3 of 10 visits, PN due

## 2018-05-29 ENCOUNTER — APPOINTMENT (OUTPATIENT)
Dept: OCCUPATIONAL THERAPY | Facility: CLINIC | Age: 28
End: 2018-05-29
Payer: COMMERCIAL

## 2018-05-30 ENCOUNTER — APPOINTMENT (OUTPATIENT)
Dept: OCCUPATIONAL THERAPY | Facility: CLINIC | Age: 28
End: 2018-05-30
Payer: COMMERCIAL

## 2018-05-31 ENCOUNTER — OFFICE VISIT (OUTPATIENT)
Dept: OCCUPATIONAL THERAPY | Facility: CLINIC | Age: 28
End: 2018-05-31
Payer: COMMERCIAL

## 2018-05-31 DIAGNOSIS — R42 DIZZINESS: Primary | ICD-10-CM

## 2018-05-31 PROCEDURE — 97535 SELF CARE MNGMENT TRAINING: CPT

## 2018-05-31 NOTE — PROGRESS NOTES
Daily Note     Today's date: 2018  Patient name: Kandace Sena  : 1990  MRN: [id number]  Referring provider: Karin Hilliard MD  Dx:   Encounter Diagnosis   Name Primary?  Dizziness Yes                  Subjective: "I may have over done over the weekend      Objective: See treatment diary below      Assessment: Tolerated treatment well  No HA upon arrival  Word circles with head turns to spell words with no dizziness or eye fatigue  5-letter word unscrambles with clutter cards and mild difficulty unscrambling  Directional arrow word search with head turns and no noted symptoms  Reports that she is going to a new ENT tomorrow due to fluid in ear and constant "under water" feeling      Plan: Continued skilled OT per POC    INTERVENTION COMMENTS:  Diagnosis: Dizziness [R42]  Precautions: dizziness, anxiety  FOTO:  4 of 10 visits, PN due

## 2018-06-05 ENCOUNTER — OFFICE VISIT (OUTPATIENT)
Dept: OCCUPATIONAL THERAPY | Facility: CLINIC | Age: 28
End: 2018-06-05
Payer: COMMERCIAL

## 2018-06-05 DIAGNOSIS — R42 DIZZINESS: Primary | ICD-10-CM

## 2018-06-05 PROCEDURE — 97535 SELF CARE MNGMENT TRAINING: CPT

## 2018-06-05 NOTE — PROGRESS NOTES
Daily Note     Today's date: 2018  Patient name: Milagros Koyanagi  : 1990  MRN: [id number]  Referring provider: Sharif Pimentel MD  Dx:   Encounter Diagnosis   Name Primary?  Dizziness Yes                  Subjective: "I get really dizzy when I look up and down fast "      Objective: See treatment diary below      Assessment: Tolerated treatment well  4 word mental manipulation task with head turns at seated level  No dizziness noted  Code task with saccades and board to table copy with noted dizziness and nausea at 5/10  Embedded words with categories and required blue occluder  Discussed another PT re-eval for symptoms and will attmept to schedule at next appointment when patient brings planner with  Plan: Continued skilled OT per POC with focus on oculo motor       INTERVENTION COMMENTS:  Diagnosis: Dizziness [R42]  Precautions: dizziness, anxiety  FOTO:  5 of 10 visits, PN due

## 2018-06-07 ENCOUNTER — OFFICE VISIT (OUTPATIENT)
Dept: OCCUPATIONAL THERAPY | Facility: CLINIC | Age: 28
End: 2018-06-07
Payer: COMMERCIAL

## 2018-06-07 DIAGNOSIS — R42 DIZZINESS: Primary | ICD-10-CM

## 2018-06-07 PROCEDURE — 97535 SELF CARE MNGMENT TRAINING: CPT

## 2018-06-07 NOTE — PROGRESS NOTES
Daily Note     Today's date: 2018  Patient name: León Proctor  : 1990  MRN: [id number]  Referring provider: Irma Fenton MD  Dx:   Encounter Diagnosis   Name Primary?  Dizziness Yes                  Subjective: "I was a little off after the other day, but I'm okay today "      Objective: See treatment diary below      Assessment: Tolerated treatment well  Reported pressure in head upon arrival to this session, but no HA or nausea  Initiated Organize the Hour: Visual and Attention with no difficulty sequencing tasks and organizing worksheets  Difficulty with word puzzles with connecting letters to create words/phrases  HA increased to a 6/10 with line tangles  Able to complete last shape maze in window, but task was modified to not use blue foam due to HA  HA decreased to a 5/10 at end of session  Plan: Continued skilled OT per POC with focus on oculo motor and auditory processing      INTERVENTION COMMENTS:  Diagnosis: Dizziness [R42]  Precautions: dizziness, anxiety  FOTO:  6 of 10 visits, PN due

## 2018-06-08 ENCOUNTER — OFFICE VISIT (OUTPATIENT)
Dept: INTERNAL MEDICINE CLINIC | Facility: CLINIC | Age: 28
End: 2018-06-08
Payer: COMMERCIAL

## 2018-06-08 VITALS
HEART RATE: 80 BPM | HEIGHT: 61 IN | SYSTOLIC BLOOD PRESSURE: 115 MMHG | WEIGHT: 111 LBS | DIASTOLIC BLOOD PRESSURE: 70 MMHG | BODY MASS INDEX: 20.96 KG/M2 | TEMPERATURE: 98.5 F

## 2018-06-08 DIAGNOSIS — L30.9 DERMATITIS: ICD-10-CM

## 2018-06-08 DIAGNOSIS — L30.9 ECZEMA, UNSPECIFIED TYPE: Primary | ICD-10-CM

## 2018-06-08 PROCEDURE — 99213 OFFICE O/P EST LOW 20 MIN: CPT | Performed by: NURSE PRACTITIONER

## 2018-06-08 RX ORDER — DESONIDE 0.5 MG/G
CREAM TOPICAL 2 TIMES DAILY
Qty: 30 G | Refills: 0 | Status: SHIPPED | OUTPATIENT
Start: 2018-06-08 | End: 2019-05-21 | Stop reason: ALTCHOICE

## 2018-06-08 NOTE — PROGRESS NOTES
Assessment/Plan:     Diagnoses and all orders for this visit:    Eczema, unspecified type  -     desonide (DESOWEN) 0 05 % cream; Apply topically 2 (two) times a day  -     Ambulatory referral to Dermatology; Future    Dermatitis  Comments:  recommend applying cera ve cream 2-3 times a day   use steroid cream sparingly for 1 week  take antihistamine OTC like loratadine daily for 1 week            Subjective:      Patient ID: James Rendon is a 34 y o  female  Here for a rash under her chin and around her eyes   She first noticed it under her chin a few weeks ago, itchy dry and scaly  That has gone away  Now noticing some dry skin around her eyes  Moisturizing everyday   Using hydrocortisone with some relief   Denies any new lotions, detergents, makeup products  She had eczema as a child         The following portions of the patient's history were reviewed and updated as appropriate: allergies, current medications, past family history, past medical history, past social history, past surgical history and problem list     Review of Systems   Constitutional: Negative  HENT: Negative  Eyes: Positive for itching  Respiratory: Negative  Cardiovascular: Negative  Musculoskeletal: Negative  Skin: Positive for rash  Objective:      /70   Pulse 80   Temp 98 5 °F (36 9 °C)   Ht 5' 0 5" (1 537 m)   Wt 50 3 kg (111 lb)   BMI 21 32 kg/m²          Physical Exam   Constitutional: She is oriented to person, place, and time  She appears well-developed and well-nourished  Neurological: She is oriented to person, place, and time  Skin: Rash noted  Dry and scaly under eyes and upper eyelids bilaterally  No erythema or excoriation    Psychiatric: She has a normal mood and affect  Her behavior is normal    Vitals reviewed

## 2018-06-11 ENCOUNTER — OFFICE VISIT (OUTPATIENT)
Dept: OCCUPATIONAL THERAPY | Facility: CLINIC | Age: 28
End: 2018-06-11
Payer: COMMERCIAL

## 2018-06-11 DIAGNOSIS — R42 DIZZINESS: Primary | ICD-10-CM

## 2018-06-11 PROCEDURE — 97535 SELF CARE MNGMENT TRAINING: CPT
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
## 2018-06-11 NOTE — PROGRESS NOTES
Daily Note     Today's date: 2018  Patient name: Crystal Pantoja  : 1990  MRN: [id number]  Referring provider: Leilani Dumont MD  Dx: No diagnosis found  Subjective: "Theres nothing like a challenge on a Monday morning "      Objective: See treatment diary below      Assessment: Tolerated treatment well  Patient would benefit from continued OT  Pt arrived to therapy session no 0/10 HA  Pt completed OTH focusing on alternating attention and direction follow,HA reported 2/10 with dizziness  Map task placed to right of pt with questions placed in front of pt tabletop w/pt balancing on ball for divided attention task  Pt reported increase in HA 3/10, dizziness 5/10  Therapist modified next activity downgrading to slant board placed slightly right of pt to decrease head turns from 90* to about 45* during peg pattern copy, pt reported decreased dizziness 0/10 and HA 2/10  Auditory processing with immediate recall of classified ads, pt required repeat of 1/5 ads with HA reported at 5/10, no increase in dizziness reported  Pt completed session on blue pball with no noted LOB or report of dizziness due to divided attention  Plan: Continued skilled OT per POC with focus on attention, head turns in HR plane and memory recall        INTERVENTION COMMENTS:  Diagnosis: Dizziness [R42]  Precautions: dizziness, anxiety  7 of 10 visits, PN due

## 2018-06-14 ENCOUNTER — EVALUATION (OUTPATIENT)
Dept: OCCUPATIONAL THERAPY | Facility: CLINIC | Age: 28
End: 2018-06-14
Payer: COMMERCIAL

## 2018-06-14 ENCOUNTER — TELEPHONE (OUTPATIENT)
Dept: NEUROLOGY | Facility: CLINIC | Age: 28
End: 2018-06-14

## 2018-06-14 DIAGNOSIS — R42 DIZZINESS: Primary | ICD-10-CM

## 2018-06-14 PROCEDURE — G8990 OTHER PT/OT CURRENT STATUS: HCPCS

## 2018-06-14 PROCEDURE — G8991 OTHER PT/OT GOAL STATUS: HCPCS

## 2018-06-14 PROCEDURE — 97530 THERAPEUTIC ACTIVITIES: CPT

## 2018-06-18 ENCOUNTER — OFFICE VISIT (OUTPATIENT)
Dept: OCCUPATIONAL THERAPY | Facility: CLINIC | Age: 28
End: 2018-06-18
Payer: COMMERCIAL

## 2018-06-18 DIAGNOSIS — R42 DIZZINESS: Primary | ICD-10-CM

## 2018-06-18 PROCEDURE — 97530 THERAPEUTIC ACTIVITIES: CPT

## 2018-06-21 ENCOUNTER — EVALUATION (OUTPATIENT)
Dept: PHYSICAL THERAPY | Facility: CLINIC | Age: 28
End: 2018-06-21
Payer: COMMERCIAL

## 2018-06-21 DIAGNOSIS — R42 DIZZINESS: Primary | ICD-10-CM

## 2018-06-21 PROCEDURE — 97112 NEUROMUSCULAR REEDUCATION: CPT | Performed by: PHYSICAL THERAPIST

## 2018-06-21 PROCEDURE — G8979 MOBILITY GOAL STATUS: HCPCS | Performed by: PHYSICAL THERAPIST

## 2018-06-21 PROCEDURE — 97164 PT RE-EVAL EST PLAN CARE: CPT | Performed by: PHYSICAL THERAPIST

## 2018-06-21 PROCEDURE — G8978 MOBILITY CURRENT STATUS: HCPCS | Performed by: PHYSICAL THERAPIST
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
## 2018-06-22 ENCOUNTER — OFFICE VISIT (OUTPATIENT)
Dept: OCCUPATIONAL THERAPY | Facility: CLINIC | Age: 28
End: 2018-06-22
Payer: COMMERCIAL

## 2018-06-22 DIAGNOSIS — R42 DIZZINESS: Primary | ICD-10-CM

## 2018-06-22 PROCEDURE — 97535 SELF CARE MNGMENT TRAINING: CPT

## 2018-06-22 NOTE — PROGRESS NOTES
Daily Note     Today's date: 2018  Patient name: Augusto Carlton  : 1990  MRN: [id number]  Referring provider: Dmitri Martinez MD  Dx:   Encounter Diagnosis   Name Primary?  Dizziness Yes                   Subjective: "Is this supposed to be hard?"      Objective: See treatment diary below      Assessment: Tolerated treatment well  Map task on high low table for sustained convergence and visual scanning  Noted with no HA or eye fatigue  Visual perceptual task in mirror placing wooden barrels, mushroom pegs, and pom poms in large peg boards  Moderate difficulty with task and noted with frustration  Attempted B/L peripheral taping, but did not see improvement  Encouraged patient to simulate task at home in mirror  HA at a 4/10 at end of session  Plan: Continued skilled OT per POC with focus on visual perceptual tasks in mirror      INTERVENTION COMMENTS:  Diagnosis: Dizziness [R42]  Precautions: dizziness  FOTO:  10 of 10 visits, PN due

## 2018-06-25 ENCOUNTER — OFFICE VISIT (OUTPATIENT)
Dept: OCCUPATIONAL THERAPY | Facility: CLINIC | Age: 28
End: 2018-06-25
Payer: COMMERCIAL

## 2018-06-25 DIAGNOSIS — R42 DIZZINESS: Primary | ICD-10-CM

## 2018-06-25 PROCEDURE — 97535 SELF CARE MNGMENT TRAINING: CPT

## 2018-06-25 NOTE — PROGRESS NOTES
Daily Note     Today's date: 2018  Patient name: Pascual Zabala  : 1990  MRN: [id number]  Referring provider: Elaine Mcclellan MD  Dx:   Encounter Diagnosis   Name Primary?  Dizziness Yes                   Subjective: "this is a little better than last time "      Objective: See treatment diary below      Assessment: Tolerated treatment well  Visual perceptual retraining utilizing the mirror and completing word circles  Provided patient with modified taping to glasses to prevent below horizon shift when looking into mirror  No noted HA  Occular task with post-it note hallway and noted with minimal eye fatigue  Patient noted with increased functional performance and tolerance to tasks this session         Plan: Continued skilled OT per POC     INTERVENTION COMMENTS:  Diagnosis: Dizziness [R42]  Precautions: dizziness  FOTO:  1 of 8 visits, PN due
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
## 2018-06-27 ENCOUNTER — APPOINTMENT (OUTPATIENT)
Dept: PHYSICAL THERAPY | Facility: CLINIC | Age: 28
End: 2018-06-27
Payer: COMMERCIAL

## 2018-06-28 ENCOUNTER — OFFICE VISIT (OUTPATIENT)
Dept: OCCUPATIONAL THERAPY | Facility: CLINIC | Age: 28
End: 2018-06-28
Payer: COMMERCIAL

## 2018-06-28 ENCOUNTER — OFFICE VISIT (OUTPATIENT)
Dept: PHYSICAL THERAPY | Facility: CLINIC | Age: 28
End: 2018-06-28
Payer: COMMERCIAL

## 2018-06-28 DIAGNOSIS — R42 DIZZINESS: Primary | ICD-10-CM

## 2018-06-28 PROCEDURE — 97530 THERAPEUTIC ACTIVITIES: CPT

## 2018-06-28 PROCEDURE — 97112 NEUROMUSCULAR REEDUCATION: CPT | Performed by: PHYSICAL THERAPIST

## 2018-06-28 NOTE — PROGRESS NOTES
Daily Note     Today's date: 2018  Patient name: Mary Kay Reynoso  : 1990  MRN: [id number]  Referring provider: Trell Higgins MD  Dx:   Encounter Diagnosis     ICD-10-CM    1  Dizziness R42                   Subjective: No dizziness upon arrival, HA about a 2-3/10  No new complaints  After session with increased c/o floating  Discussed issuing HEP tomorrow  Objective: See treatment diary below    Positional testing: Right Left   [facility] [place] [facility] (-) (-)   Roll test: (-) (-)     Precautions: Hx depression and anxiety    Specialty Daily Treatment Diary     Exercise Diary         VORx1, H/V  Standing Plain  2x30s ea       VORcx, H/V  walking In gipson  2x40'       Saccades, H/V  Standing Plain  2x30s ea       BWD Walking with HTs, H/V In gipson  2x40' ea       Walking with 360 turns In gipson EO  2x40'       Foam arms crossed EC Tandem  2x30s       Tandem gait Arms crossed EC in gipson  2x40'       Foam HTs, H/V EC FT  1 min ea                                                                                                           Modalities                                        Assessment: Initiated POC addressing vestibular deficits  Pt tested negative with positional testing once again today  Had most difficulty with walking with 360 turns due to dizziness  Tolerated treatment well as seen by no rest breaks and ability to complete exercises without physical assist from PT  Patient would benefit from continued PT      Plan: Issue and review HEP

## 2018-06-29 ENCOUNTER — OFFICE VISIT (OUTPATIENT)
Dept: PHYSICAL THERAPY | Facility: CLINIC | Age: 28
End: 2018-06-29
Payer: COMMERCIAL

## 2018-06-29 DIAGNOSIS — R42 DIZZINESS: Primary | ICD-10-CM

## 2018-06-29 PROCEDURE — 97112 NEUROMUSCULAR REEDUCATION: CPT | Performed by: PHYSICAL THERAPIST

## 2018-06-29 NOTE — PROGRESS NOTES
Daily Note     Today's date: 2018  Patient name: Ruby Shetty  : 1990  MRN: [id number]  Referring provider: Annabelle Maria MD  Dx:   Encounter Diagnosis     ICD-10-CM    1  Dizziness R42                   Subjective: Pt with no dizziness or HA upon arrival   Stated after last session no increased symptoms later in day  Continued with floating feeling and HA stayed the same, but did not increase  Discussed HEP today  Objective: See treatment diary below    Precautions: Hx depression and anxiety    Specialty Daily Treatment Diary     Exercise Diary  18      VORx1, H/V  Standing Plain  2x30s ea Plain  2x45s ea      VORcx, H/V  walking In gipson  2x40' In gipson  2x40'      Saccades, H/V  Standing Plain  2x30s ea Plain  2x45s ea      BWD Walking with HTs, H/V In gipson  2x40' ea n gipson  2x40' ea      Walking with 360 turns In gipson EO  2x40' in gipson EO  2x40'      Foam arms crossed [facility] Tandem  2x30s Tandem  2x30s ea foot      Tandem gait Arms crossed EC in gipson  2x40' Arms crossed EC in gipson  2x40'      Foam HTs, H/V EC FT  1 min ea Firm in tandem  1 min ea                                                                                                          Modalities                                        Assessment:  Pt able to increase times with oculomotor exercises today without increased complaint  Limited mostly due to eye strain however able to complete  Issued and reviewed HEP, pt communicated understanding  Patient would benefit from continued PT      Plan:  Progress to busy background with oculomotor exercises as pt able
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
## 2018-07-02 ENCOUNTER — APPOINTMENT (OUTPATIENT)
Dept: OCCUPATIONAL THERAPY | Facility: CLINIC | Age: 28
End: 2018-07-02
Payer: COMMERCIAL

## 2018-07-03 ENCOUNTER — OFFICE VISIT (OUTPATIENT)
Dept: PHYSICAL THERAPY | Facility: CLINIC | Age: 28
End: 2018-07-03
Payer: COMMERCIAL

## 2018-07-03 DIAGNOSIS — R42 DIZZINESS: Primary | ICD-10-CM

## 2018-07-03 PROCEDURE — 97140 MANUAL THERAPY 1/> REGIONS: CPT | Performed by: PHYSICAL THERAPIST

## 2018-07-03 PROCEDURE — G0283 ELEC STIM OTHER THAN WOUND: HCPCS | Performed by: PHYSICAL THERAPIST

## 2018-07-03 PROCEDURE — 97010 HOT OR COLD PACKS THERAPY: CPT | Performed by: PHYSICAL THERAPIST

## 2018-07-03 PROCEDURE — 97014 ELECTRIC STIMULATION THERAPY: CPT | Performed by: PHYSICAL THERAPIST

## 2018-07-03 NOTE — PROGRESS NOTES
Daily Note     Today's date: 2018  Patient name: Cora Ellis  : 1990  MRN: [id number]  Referring provider: Jhonatan Nugent MD  Dx:   Encounter Diagnosis     ICD-10-CM    1  Dizziness R42                   Subjective: Massive migraine that started yesterday, only able to tolerate 1/2 day took 2 Alieve yesterday, had to rest, unable to go out with boyfriend  Medication only takes the edge off  With symptoms of dizziness   took 1 Advil Migraine - helped a little bit, still feels like her head and ears are going to explode  Can't think of anything that brought it on  Can't be outside in the heat for long exacerbate symptoms   Could feel spinning  Did not go to work today     Objective: See treatment diary below    Precautions: Hx depression and anxiety    Specialty Daily Treatment Diary     Exercise Diary  18     VORx1, H/V  Standing Plain  2x30s ea Plain  2x45s ea      VORcx, H/V  walking In gipson  2x40' In gipson  2x40'      Saccades, H/V  Standing Plain  2x30s ea Plain  2x45s ea      BWD Walking with HTs, H/V In gipson  2x40' ea n gipson  2x40' ea      Walking with 360 turns In gipson EO  2x40' in gipson EO  2x40'      Foam arms crossed [facility] Tandem  2x30s Tandem  2x30s ea foot      Tandem gait Arms crossed EC in gipson  2x40' Arms crossed EC in gipson  2x40'      Foam HTs, H/V EC FT  1 min ea Firm in tandem  1 min ea              Manual Therapy    Grad II PA mobs C2/C3 x 30" ea     SOR 30"x2  Manual traction   30"x3                                                                                         Modalities        Cold pack E-stim   15 min                             Assessment: Reports good relief of headache symptoms with manual therapy, TPP at C2/C3 avoided frequent palpation and mobs to this area  Also noted good relief with stimulation and cold pack  Educated patient on proper sleeping position - keeping neck in a neurtral position  Advised patient to perform CS stretches  Noted her mom has a tens unit at home she is going to borrow, but is interested in receiving her own stim unit  Plan: continue to progress as able

## 2018-07-05 ENCOUNTER — OFFICE VISIT (OUTPATIENT)
Dept: OCCUPATIONAL THERAPY | Facility: CLINIC | Age: 28
End: 2018-07-05
Payer: COMMERCIAL

## 2018-07-05 ENCOUNTER — OFFICE VISIT (OUTPATIENT)
Dept: PHYSICAL THERAPY | Facility: CLINIC | Age: 28
End: 2018-07-05
Payer: COMMERCIAL

## 2018-07-05 DIAGNOSIS — R42 DIZZINESS: Primary | ICD-10-CM

## 2018-07-05 PROCEDURE — 97140 MANUAL THERAPY 1/> REGIONS: CPT | Performed by: PHYSICAL THERAPIST

## 2018-07-05 PROCEDURE — G0283 ELEC STIM OTHER THAN WOUND: HCPCS | Performed by: PHYSICAL THERAPIST

## 2018-07-05 PROCEDURE — 97535 SELF CARE MNGMENT TRAINING: CPT

## 2018-07-05 PROCEDURE — 97010 HOT OR COLD PACKS THERAPY: CPT | Performed by: PHYSICAL THERAPIST

## 2018-07-05 PROCEDURE — 97014 ELECTRIC STIMULATION THERAPY: CPT | Performed by: PHYSICAL THERAPIST

## 2018-07-05 NOTE — PROGRESS NOTES
Daily Note     Today's date: 2018  Patient name: Facundo Mckinley  : 1990  MRN: [id number]  Referring provider: Gustabo Woodward MD  Dx:   Encounter Diagnosis     ICD-10-CM    1  Dizziness R42                   Subjective: Patient felt good after last sesion, slight increase yesterday, utilized moms TENs unit with good affect  Today with Tyson 6/10 denies dizziness     Objective: See treatment diary below    Precautions: Hx depression and anxiety    Specialty Daily Treatment Diary     Exercise Diary  18    VORx1, H/V  Standing Plain  2x30s ea Plain  2x45s ea      VORcx, H/V  walking In gipson  2x40' In gipson  2x40'      Saccades, H/V  Standing Plain  2x30s ea Plain  2x45s ea      BWD Walking with HTs, H/V In gipson  2x40' ea n gipson  2x40' ea      Walking with 360 turns In gipson EO  2x40' in gipson EO  2x40'      Foam arms crossed [facility] Tandem  2x30s Tandem  2x30s ea foot      Tandem gait Arms crossed EC in gipson  2x40' Arms crossed EC in gipson  2x40'      Foam HTs, H/V EC FT  1 min ea Firm in tandem  1 min ea              Manual Therapy    Grad II PA mobs C2/C3 x 30" ea     SOR 30"x2  Manual traction   30"x3 C2/C3 palpation     SOR 30"x2    Manual traction   30"x3    STM to SCM and Cs paraspinals                                                                                        Modalities        Cold pack E-stim   15 min                             Assessment: ramins with TtP at C2/C3  Post session HA rated 2/10  Continues to find relief with  cervical manual traction     Plan: continue to progress as able

## 2018-07-05 NOTE — PROGRESS NOTES
Daily Note     Today's date: 2018  Patient name: Iggy Tijerina  : 1990  MRN: [id number]  Referring provider: Bony Carrillo MD  Dx:   Encounter Diagnosis   Name Primary?  Dizziness Yes                  Subjective: "I had a really bad migraine "      Objective: See treatment diary below      Assessment: Tolerated treatment well  Arrived to session with a 4/10 HA and stated having a migraine the other day and has had residual HA since  Crossword task in window with head turns and visual perceptual skills  Grocery sort time line task with positional changes and noted with 2/10 dizziness and 5/10 nausea  Seated visual task with horizontal saccades on small slant boards  No relief in symptoms, but required 2 rest breaks due to eyes feeling heavy         Plan: Continued skilled OT per POC     INTERVENTION COMMENTS:  Diagnosis: Dizziness [R42]  Precautions: dizziness  FOTO:  3 of 4 visits, PN scheduled

## 2018-07-06 ENCOUNTER — OFFICE VISIT (OUTPATIENT)
Dept: PHYSICAL THERAPY | Facility: CLINIC | Age: 28
End: 2018-07-06
Payer: COMMERCIAL

## 2018-07-06 DIAGNOSIS — R42 DIZZINESS: Primary | ICD-10-CM

## 2018-07-06 PROCEDURE — 97014 ELECTRIC STIMULATION THERAPY: CPT | Performed by: PHYSICAL THERAPIST

## 2018-07-06 PROCEDURE — 97150 GROUP THERAPEUTIC PROCEDURES: CPT | Performed by: PHYSICAL THERAPIST

## 2018-07-06 PROCEDURE — G0283 ELEC STIM OTHER THAN WOUND: HCPCS | Performed by: PHYSICAL THERAPIST

## 2018-07-06 PROCEDURE — 97140 MANUAL THERAPY 1/> REGIONS: CPT | Performed by: PHYSICAL THERAPIST

## 2018-07-06 NOTE — PROGRESS NOTES
Daily Note     Today's date: 2018  Patient name: Yaz Painting  : 1990  MRN: [id number]  Referring provider: Marcos Calhoun MD  Dx:   Encounter Diagnosis     ICD-10-CM    1  Dizziness R42                   Subjective: Patient felt good after last sesion, slight increase yesterday, utilized moms TENs unit with good affect  Today with Tyson 6/10 denies dizziness     Objective: See treatment diary below    Precautions: Hx depression and anxiety    Specialty Daily Treatment Diary     Exercise Diary  18    VORx1, H/V  Standing Plain  2x30s ea Plain  2x45s ea      VORcx, H/V  walking In gipson  2x40' In gipson  2x40'      Saccades, H/V  Standing Plain  2x30s ea Plain  2x45s ea      BWD Walking with HTs, H/V In gipson  2x40' ea n gipson  2x40' ea      Walking with 360 turns In gipson EO  2x40' in gipson EO  2x40'      Foam arms crossed [facility] Tandem  2x30s Tandem  2x30s ea foot      Tandem gait Arms crossed EC in gipson  2x40' Arms crossed EC in gipson  2x40'      Foam HTs, H/V EC FT  1 min ea Firm in tandem  1 min ea              Manual Therapy    Grad II PA mobs C2/C3 x 30" ea     SOR 30"x2  Manual traction   30"x3 C2/C3 palpation     SOR 30"x2    Manual traction   30"x3    STM to SCM and Cs paraspinals                                                                                        Modalities        Cold pack E-stim   15 min                             Assessment: ramins with TtP at C2/C3  Post session HA rated 2/10  Continues to find relief with  cervical manual traction     Plan: continue to progress as able

## 2018-07-09 ENCOUNTER — EVALUATION (OUTPATIENT)
Dept: OCCUPATIONAL THERAPY | Facility: CLINIC | Age: 28
End: 2018-07-09
Payer: COMMERCIAL

## 2018-07-09 DIAGNOSIS — R42 DIZZINESS: Primary | ICD-10-CM

## 2018-07-09 DIAGNOSIS — H81.10 BENIGN PAROXYSMAL POSITIONAL VERTIGO, UNSPECIFIED LATERALITY: Primary | ICD-10-CM

## 2018-07-09 DIAGNOSIS — R51.9 CHRONIC NONINTRACTABLE HEADACHE, UNSPECIFIED HEADACHE TYPE: ICD-10-CM

## 2018-07-09 DIAGNOSIS — R42 DIZZINESS: ICD-10-CM

## 2018-07-09 DIAGNOSIS — G89.29 CHRONIC NONINTRACTABLE HEADACHE, UNSPECIFIED HEADACHE TYPE: ICD-10-CM

## 2018-07-09 PROCEDURE — G8990 OTHER PT/OT CURRENT STATUS: HCPCS

## 2018-07-09 PROCEDURE — G8991 OTHER PT/OT GOAL STATUS: HCPCS

## 2018-07-09 PROCEDURE — 97530 THERAPEUTIC ACTIVITIES: CPT

## 2018-07-09 RX ORDER — PEN NEEDLE, DIABETIC 32GX 5/32"
1 NEEDLE, DISPOSABLE MISCELLANEOUS DAILY
Qty: 1 DEVICE | Refills: 0 | Status: SHIPPED | OUTPATIENT
Start: 2018-07-09 | End: 2018-07-23 | Stop reason: ALTCHOICE
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
## 2018-07-10 ENCOUNTER — OFFICE VISIT (OUTPATIENT)
Dept: PHYSICAL THERAPY | Facility: CLINIC | Age: 28
End: 2018-07-10
Payer: COMMERCIAL

## 2018-07-10 DIAGNOSIS — M54.2 CERVICALGIA: ICD-10-CM

## 2018-07-10 DIAGNOSIS — R42 DIZZINESS: Primary | ICD-10-CM

## 2018-07-10 PROCEDURE — 97140 MANUAL THERAPY 1/> REGIONS: CPT | Performed by: PHYSICAL THERAPIST

## 2018-07-10 PROCEDURE — 97014 ELECTRIC STIMULATION THERAPY: CPT | Performed by: PHYSICAL THERAPIST

## 2018-07-10 PROCEDURE — G0283 ELEC STIM OTHER THAN WOUND: HCPCS | Performed by: PHYSICAL THERAPIST

## 2018-07-11 ENCOUNTER — OFFICE VISIT (OUTPATIENT)
Dept: OCCUPATIONAL THERAPY | Facility: CLINIC | Age: 28
End: 2018-07-11
Payer: COMMERCIAL

## 2018-07-11 ENCOUNTER — OFFICE VISIT (OUTPATIENT)
Dept: URGENT CARE | Age: 28
End: 2018-07-11
Payer: COMMERCIAL

## 2018-07-11 VITALS
SYSTOLIC BLOOD PRESSURE: 115 MMHG | HEART RATE: 95 BPM | OXYGEN SATURATION: 100 % | TEMPERATURE: 98.5 F | BODY MASS INDEX: 19.79 KG/M2 | HEIGHT: 60 IN | WEIGHT: 100.8 LBS | RESPIRATION RATE: 14 BRPM | DIASTOLIC BLOOD PRESSURE: 69 MMHG

## 2018-07-11 DIAGNOSIS — R39.15 URGENCY OF URINATION: Primary | ICD-10-CM

## 2018-07-11 DIAGNOSIS — R42 DIZZINESS: Primary | ICD-10-CM

## 2018-07-11 LAB
SL AMB  POCT GLUCOSE, UA: NORMAL
SL AMB LEUKOCYTE ESTERASE,UA: NEGATIVE
SL AMB POCT BILIRUBIN,UA: NORMAL
SL AMB POCT BLOOD,UA: NORMAL
SL AMB POCT CLARITY,UA: CLEAR
SL AMB POCT COLOR,UA: YELLOW
SL AMB POCT KETONES,UA: NORMAL
SL AMB POCT NITRITE,UA: NEGATIVE
SL AMB POCT PH,UA: 6.5
SL AMB POCT SPECIFIC GRAVITY,UA: 1.06
SL AMB POCT URINE PROTEIN: NORMAL
SL AMB POCT UROBILINOGEN: 0.2

## 2018-07-11 PROCEDURE — 99213 OFFICE O/P EST LOW 20 MIN: CPT | Performed by: FAMILY MEDICINE

## 2018-07-11 PROCEDURE — 87147 CULTURE TYPE IMMUNOLOGIC: CPT | Performed by: NURSE PRACTITIONER

## 2018-07-11 PROCEDURE — 81002 URINALYSIS NONAUTO W/O SCOPE: CPT | Performed by: FAMILY MEDICINE

## 2018-07-11 PROCEDURE — 87086 URINE CULTURE/COLONY COUNT: CPT | Performed by: NURSE PRACTITIONER

## 2018-07-11 RX ORDER — NITROFURANTOIN 25; 75 MG/1; MG/1
100 CAPSULE ORAL 2 TIMES DAILY
Qty: 10 CAPSULE | Refills: 0 | Status: SHIPPED | OUTPATIENT
Start: 2018-07-11 | End: 2018-07-16

## 2018-07-11 NOTE — PROGRESS NOTES
Daily Note     Today's date: 7/10/2018  Patient name: León Proctor  : 1990  MRN: [id number]  Referring provider: Irma Fenton MD  Dx:   Encounter Diagnosis     ICD-10-CM    1  Dizziness R42                   Subjective: Patient did not have migraine for several days but returned today    Objective: See treatment diary below    Precautions: Hx depression and anxiety    Specialty Daily Treatment Diary     Exercise Diary  18    VORx1, H/V  Standing Plain  2x30s ea Plain  2x45s ea      VORcx, H/V  walking In gipson  2x40' In gipson  2x40'      Saccades, H/V  Standing Plain  2x30s ea Plain  2x45s ea      BWD Walking with HTs, H/V In gipson  2x40' ea n gipson  2x40' ea      Walking with 360 turns In igpson EO  2x40' in gipson EO  2x40'      Foam arms crossed [place] Tandem  2x30s Tandem  2x30s ea foot      Tandem gait Arms crossed EC in gipson  2x40' Arms crossed EC in gipson  2x40'      Foam HTs, H/V EC FT  1 min ea Firm in tandem  1 min ea              Manual Therapy    Grad II PA mobs C2/C3 x 30" ea     SOR 30"x2  Manual traction   30"x3 C2/C3 palpation     SOR 30"x2    Manual traction   30"x3    STM to SCM and Cs paraspinals SOR: left upglides C4-7: manual traction: STM and TrP to UT and cervical paraspinals: GR 5 t-spine                                                                                       Modalities   07/03 7/10    Cold pack E-stim   15 min 10 min                            Assessment: Pt continues to have significant tedenress and hypertoniciyt in right cervical paraspinals and UT  Pt was significantly dizzy after manual therapy  Held on all other activity as HA and nausea were significant to start session and did not resolve post modality and manual      Plan: continue to progress as able

## 2018-07-11 NOTE — PATIENT INSTRUCTIONS
Rest and drink extra fluids  We will start antibiotic and send for culture  If urine culture is negative stop antibiotic  Call in 2 days for results as I will be out of office  Tylenol or motrin as needed for pain or fever  As we discussed the diarrhea, nausea and feeling run down may be related to a viral syndrome  If symptoms become worse go to ER

## 2018-07-11 NOTE — PROGRESS NOTES
Pt arrived for OT session, insurance authorization not yet obtained therefore unable to be seen  Spent 20 minutes discussing w/ pt recent c/o increased HA of 5/10, recent N/V concern for GI bug recently, seen by urgent care, though was d/c'd w/ fluid, unable to complete labs, urged pt to present at ED though pt deferred  Physiatry next door available today for visits, relayed to [facility] PT, to coordinate potential appt  Communicated to pt Abdiel Russell will call pt when insurance authorization is obtained  Pt aware, also relayed to Andrey Ayoub PT as pt is scheduled for PT tomorrow am at 1am    Thank you for the consult!   Please call if you have any questions: [phone]  JUDY Little, OTR/L, C-GCJUAN, CSRS  Director of Outpatient Neuro Occupational Therapy

## 2018-07-11 NOTE — PROGRESS NOTES
[facility]        NAME: Aletha Beauchamp is a 34 y o  female  : 1990    MRN: [id number]  DATE: 2018  TIME: 6:36 PM    Assessment and Plan   Urgency of urination [R39 15]  1  Urgency of urination  POCT urine dip    nitrofurantoin (MACROBID) 100 mg capsule    Urine culture         Patient Instructions     Patient Instructions   Rest and drink extra fluids  We will start antibiotic and send for culture  If urine culture is negative stop antibiotic  Call in 2 days for results as I will be out of office  Tylenol or motrin as needed for pain or fever  As we discussed the diarrhea, nausea and feeling run down may be related to a viral syndrome  If symptoms become worse go to ER  Chief Complaint     Chief Complaint   Patient presents with    Nausea    Abdominal Pain    Possible UTI     urgency         History of Present Illness   Aletha Beauchamp presents to the clinic c/o    This is a 34year old female here today with nausea, diarrhea, headache, urinary urgency, frequency and burning  Symptoms started on 2 nights ago  She states she started to have decreased appetite nausea and feeling poorly  She states the next day she started with diarrhea and feeling poorly  She states diarrhea subsided at this point  She states today she started with urgency, burning and frequency  She has abd discomfort  No fevers or back pain  She feels as though she is dehydrated  She had migraine last week  Today she feels lightheaded but not dizzy  History of dizziness  Review of Systems   Review of Systems   Constitutional: Positive for activity change and fatigue  Negative for fever  HENT: Negative  Respiratory: Negative  Cardiovascular: Negative  Gastrointestinal: Positive for diarrhea and nausea  Negative for vomiting  Genitourinary: Positive for dysuria, frequency and urgency  Neurological: Negative  Psychiatric/Behavioral: Negative  Current Medications     Long-Term Prescriptions   Medication Sig Dispense Refill    ALPRAZolam (XANAX) 0 25 mg tablet Take 1 tablet (0 25 mg total) by mouth daily at bedtime as needed for sleep 30 tablet 0    desonide (DESOWEN) 0 05 % cream Apply topically 2 (two) times a day 30 g 0    venlafaxine (EFFEXOR-XR) 37 5 mg 24 hr capsule Take 1 capsule (37 5 mg total) by mouth daily 30 capsule 1       Current Allergies     Allergies as of 07/11/2018    (No Known Allergies)            The following portions of the patient's history were reviewed and updated as appropriate: allergies, current medications, past family history, past medical history, past social history, past surgical history and problem list     Objective   /69   Pulse 95   Temp 98 5 °F (36 9 °C)   Resp 14   Ht 5' (1 524 m)   Wt 45 7 kg (100 lb 12 8 oz)   SpO2 100%   BMI 19 69 kg/m²        Physical Exam     Physical Exam   Constitutional: She is oriented to person, place, and time  She appears well-developed and well-nourished  Neck: Normal range of motion  Neck supple  Cardiovascular: Normal rate, regular rhythm and normal heart sounds  Pulmonary/Chest: Effort normal    Abdominal: Soft  Bowel sounds are normal  There is no tenderness  Neurological: She is alert and oriented to person, place, and time  Skin: Skin is warm and dry  Psychiatric: She has a normal mood and affect  Her behavior is normal    Nursing note and vitals reviewed        Urine dip: negative
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
## 2018-07-12 ENCOUNTER — APPOINTMENT (OUTPATIENT)
Dept: PHYSICAL THERAPY | Facility: CLINIC | Age: 28
End: 2018-07-12
Payer: COMMERCIAL

## 2018-07-13 ENCOUNTER — HOSPITAL ENCOUNTER (EMERGENCY)
Facility: HOSPITAL | Age: 28
Discharge: HOME | End: 2018-07-13
Attending: EMERGENCY MEDICINE | Admitting: EMERGENCY MEDICINE
Payer: COMMERCIAL

## 2018-07-13 VITALS
DIASTOLIC BLOOD PRESSURE: 56 MMHG | OXYGEN SATURATION: 100 % | RESPIRATION RATE: 16 BRPM | BODY MASS INDEX: 19.48 KG/M2 | HEIGHT: 60 IN | WEIGHT: 99.21 LBS | HEART RATE: 74 BPM | TEMPERATURE: 98.1 F | SYSTOLIC BLOOD PRESSURE: 100 MMHG

## 2018-07-13 DIAGNOSIS — R51.9 CHRONIC NONINTRACTABLE HEADACHE, UNSPECIFIED HEADACHE TYPE: Primary | ICD-10-CM

## 2018-07-13 DIAGNOSIS — G89.29 CHRONIC NONINTRACTABLE HEADACHE, UNSPECIFIED HEADACHE TYPE: Primary | ICD-10-CM

## 2018-07-13 DIAGNOSIS — R11.0 NAUSEA: ICD-10-CM

## 2018-07-13 LAB
BACTERIA UR CULT: ABNORMAL
BACTERIA UR CULT: ABNORMAL
EXT PREG TEST URINE: NEGATIVE

## 2018-07-13 PROCEDURE — 81025 URINE PREGNANCY TEST: CPT | Performed by: EMERGENCY MEDICINE

## 2018-07-13 PROCEDURE — 99283 EMERGENCY DEPT VISIT LOW MDM: CPT

## 2018-07-13 PROCEDURE — 96372 THER/PROPH/DIAG INJ SC/IM: CPT

## 2018-07-13 RX ORDER — LORAZEPAM 0.5 MG/1
1 TABLET ORAL ONCE
Status: COMPLETED | OUTPATIENT
Start: 2018-07-13 | End: 2018-07-13

## 2018-07-13 RX ORDER — ACETAMINOPHEN 325 MG/1
650 TABLET ORAL ONCE
Status: COMPLETED | OUTPATIENT
Start: 2018-07-13 | End: 2018-07-13

## 2018-07-13 RX ORDER — LIDOCAINE HYDROCHLORIDE 20 MG/ML
4 INJECTION, SOLUTION EPIDURAL; INFILTRATION; INTRACAUDAL; PERINEURAL ONCE
Status: COMPLETED | OUTPATIENT
Start: 2018-07-13 | End: 2018-07-13

## 2018-07-13 RX ORDER — LIDOCAINE HYDROCHLORIDE 20 MG/ML
JELLY TOPICAL ONCE
Status: DISCONTINUED | OUTPATIENT
Start: 2018-07-13 | End: 2018-07-13

## 2018-07-13 RX ORDER — METOCLOPRAMIDE 10 MG/1
10 TABLET ORAL ONCE
Status: COMPLETED | OUTPATIENT
Start: 2018-07-13 | End: 2018-07-13

## 2018-07-13 RX ORDER — KETOROLAC TROMETHAMINE 30 MG/ML
15 INJECTION, SOLUTION INTRAMUSCULAR; INTRAVENOUS ONCE
Status: COMPLETED | OUTPATIENT
Start: 2018-07-13 | End: 2018-07-13

## 2018-07-13 RX ORDER — SUMATRIPTAN 100 MG/1
100 TABLET, FILM COATED ORAL ONCE AS NEEDED
Qty: 20 TABLET | Refills: 0 | Status: SHIPPED | OUTPATIENT
Start: 2018-07-13 | End: 2018-09-05 | Stop reason: ALTCHOICE

## 2018-07-13 RX ORDER — HALOPERIDOL 5 MG/ML
5 INJECTION INTRAMUSCULAR ONCE
Status: COMPLETED | OUTPATIENT
Start: 2018-07-13 | End: 2018-07-13

## 2018-07-13 RX ORDER — LIDOCAINE HYDROCHLORIDE 20 MG/ML
5 INJECTION, SOLUTION EPIDURAL; INFILTRATION; INTRACAUDAL; PERINEURAL ONCE
Status: DISCONTINUED | OUTPATIENT
Start: 2018-07-13 | End: 2018-07-13

## 2018-07-13 RX ADMIN — METOCLOPRAMIDE HYDROCHLORIDE 10 MG: 10 TABLET ORAL at 10:33

## 2018-07-13 RX ADMIN — HALOPERIDOL LACTATE 5 MG: 5 INJECTION INTRAMUSCULAR at 11:55

## 2018-07-13 RX ADMIN — ACETAMINOPHEN 650 MG: 325 TABLET, FILM COATED ORAL at 10:33

## 2018-07-13 RX ADMIN — LIDOCAINE HYDROCHLORIDE 4 ML: 20 INJECTION, SOLUTION EPIDURAL; INFILTRATION; INTRACAUDAL; PERINEURAL at 10:50

## 2018-07-13 RX ADMIN — LORAZEPAM 1 MG: 0.5 TABLET ORAL at 11:54

## 2018-07-13 RX ADMIN — KETOROLAC TROMETHAMINE 15 MG: 30 INJECTION, SOLUTION INTRAMUSCULAR at 10:37

## 2018-07-13 NOTE — DISCHARGE INSTRUCTIONS
I have prescribed you Imitrex  This is an abortive headache medication and should be taken at the onset of the headache  If the original dose doesn't decrease the headache you may take one additional dose two hours later  Please use the following medications for your headache  - Tylenol 650mg every 6 hours  - Motrin 400mg every 6 hours  There is no benefit of doses above the above doses for pain  They can be used together at the exact same time as they work in different ways  I also usually recommend riboflavin 400mg and magnesium oxide 400mg taken daily to keep from having headaches; there are various papers that support that these medications can be used daily for prophyalxis (to prevent the headache from coming back on) and if it does come back, it will be less severe than preivous headaches  That being said, if you have worsening of your headache or develop any stroke-like symptoms or if anything else is concerning you you're always welcomed back here for re-evaluation as we discussed  Please follow up with your scheduled neurology visit

## 2018-07-13 NOTE — ED ATTENDING ATTESTATION
Keenan Multani DO, saw and evaluated the patient  I have discussed the patient with the resident/non-physician practitioner and agree with the resident's/non-physician practitioner's findings, Plan of Care, and MDM as documented in the resident's/non-physician practitioner's note, except where noted  All available labs and Radiology studies were reviewed  At this point I agree with the current assessment done in the Emergency Department  I have conducted an independent evaluation of this patient a history and physical is as follows:    51-year-old female presents emergency department for chief complaint of persistent migraines  She states that she has been evaluated by her family doctor who referred her to Neurology  She has also been emergency department  She has trouble getting any relief of her headache  She has an MRI of her brain from several months ago that shows no intracranial abnormalities, but possible mastoid issue  Currently she is alert and oriented x4, no neurologic deficits, pupils equal round reactive to light, extraocular muscles intact, normal range of motion, lungs clear, heart regular rate and rhythm, abdomen soft nontender, mastoids are nontender  Urine hcg neg    We attempted Toradol IM, Reglan p o  in cp no palatine block via intranasal lidocaine  She had minimal relief  We then gave her intramuscular Haldol and oral benzodiazepines  She stated she had moderate relief and was ready for discharge  We attempted to expedite her neurology appointment but Neurology could not make it any sooner for her  She was upset that her neurology appointment is not until October  We also requested a consult emergency department which they are unable to do emergently  We requested for recommendations on prescriptions for migraines and they recommended Imitrex as an abortive      Diagnosis  Headache    Follow-up recommended          Critical Care Time  CritCare Time    Procedures

## 2018-07-13 NOTE — ED PROVIDER NOTES
History  Chief Complaint   Patient presents with    Migraine     Patient esperanza grady the E R  with a recurrent migraine and nausea  This is a 62-year-old female with previous medical history of  Vestibular headaches,anxiety, IBS, depression who presents with a migraine that started last Monday  Patient admits to weakness, diarrhea, nausea and decreased p o  Intake  She has been unable to work for the last week and a half due to the nausea associated with her headache  She denies neck stiffness, fevers, change in vision, change in hearing, focal weakness, numbness tingling  Patient has been unable to take her IBS medication, as she is required to take the IBS medication with food and her p o  Intake has been negligible  Patient admits diarrhea for the last week and a half, cold sweats, occasional dizziness  Patient had two episodes of vertigo last October  She has been worked up for this vertigo and associated migraines from two ENT physicians  The 1st ENT physician diagnosed with Meniere's disease  The 2nd ENT physician Dr Tanya Hines, diagnosed her with vestibular headaches  She has been referred to Neurology and has an appointment in October  She has also seen her PCP for these headaches  She has not been prescribed prophylactic medication  Prior to Admission Medications   Prescriptions Last Dose Informant Patient Reported? Taking?    ALPRAZolam (XANAX) 0 25 mg tablet  Self No Yes   Sig: Take 1 tablet (0 25 mg total) by mouth daily at bedtime as needed for sleep   Nerve Stimulator (PRO COMFORT TENS UNIT) MUSTAPHA  Self No Yes   Si Units by Does not apply route daily   desonide (DESOWEN) 0 05 % cream  Self No Yes   Sig: Apply topically 2 (two) times a day   lubiprostone (AMITIZA) 8 mcg capsule  Self Yes Yes   Sig: Take by mouth   nitrofurantoin (MACROBID) 100 mg capsule   No Yes   Sig: Take 1 capsule (100 mg total) by mouth 2 (two) times a day for 5 days   nitrofurantoin (MACRODANTIN) 50 mg capsule  Self Yes Yes   Sig: Take by mouth   venlafaxine (EFFEXOR-XR) 37 5 mg 24 hr capsule  Self No Yes   Sig: Take 1 capsule (37 5 mg total) by mouth daily      Facility-Administered Medications: None       Past Medical History:   Diagnosis Date    Anxiety     Contraceptive use     LAST ASSESSED: [id number]    Depression     External hemorrhoid, bleeding     LAST ASSESSED: [id number]    Gallbladder attack     Hematochezia     LAST ASSESSED: [id number]    IBS (irritable bowel syndrome)     Immunity status testing     LAST ASSESSED: [id number]    Methicillin resistant Staphylococcus aureus infection     Poison ivy dermatitis     LAST ASSESSED: [id number]    Psychiatric disorder     Skin abscess     RIGHT LEG    Urinary frequency     LAST ASSESSED: [id number]    UTI (urinary tract infection)        Past Surgical History:   Procedure Laterality Date    TONSILLECTOMY      ONSET: 1997    WISDOM TOOTH EXTRACTION         Family History   Problem Relation Age of Onset    Thyroid disease Mother     Thyroid disease Father     Cancer Family         MALIGNANT NEOPLASM     I have reviewed and agree with the history as documented  Social History   Substance Use Topics    Smoking status: Current Some Day Smoker     Packs/day: 0 20     Types: Cigarettes    Smokeless tobacco: Never Used    Alcohol use No      Comment: occasional, SOCIAL        Review of Systems   Constitutional: Positive for chills  Negative for fever  Gastrointestinal: Negative for abdominal pain  Neurological: Positive for dizziness, weakness and headaches  Negative for tremors and numbness         Physical Exam  ED Triage Vitals [07/13/18 0937]   Temperature Pulse Respirations Blood Pressure SpO2   98 1 °F (36 7 °C) 105 18 123/65 99 %      Temp Source Heart Rate Source Patient Position - Orthostatic VS BP Location FiO2 (%)   Oral Monitor Sitting Left arm --      Pain Score       7           Orthostatic Vital Signs  Vitals:    07/13/18 8010 07/13/18 1119 07/13/18 1207   BP: 123/65 120/68 100/56   Pulse: 105 98 74   Patient Position - Orthostatic VS: Sitting Sitting Sitting       Physical Exam   Constitutional: She appears well-developed and well-nourished  No distress  HENT:   Head: Normocephalic and atraumatic  Right Ear: Tympanic membrane and external ear normal    Left Ear: Tympanic membrane and external ear normal    Nose: Nose normal    Mouth/Throat: Oropharynx is clear and moist    Eyes: EOM are normal  Right eye exhibits no discharge  Left eye exhibits no discharge  No scleral icterus  Neck: Normal range of motion  No tracheal deviation present  Cardiovascular: Intact distal pulses  Tachycardia present  Exam reveals no gallop and no friction rub  No murmur heard  Pulmonary/Chest: Effort normal and breath sounds normal  No stridor  No respiratory distress  She has no wheezes  She has no rales  Abdominal: She exhibits no distension  Musculoskeletal: She exhibits no edema or deformity  Neurological: She is alert  No cranial nerve deficit or sensory deficit  She exhibits normal muscle tone  Coordination normal    Negative rhomberg, pronator drift, point to point, heal to shin  Gait without abnormalities  Skin: Skin is warm and dry  She is not diaphoretic  No erythema  No pallor  Psychiatric: She has a normal mood and affect   Her behavior is normal  Judgment and thought content normal        ED Medications  Medications   metoclopramide (REGLAN) tablet 10 mg (10 mg Oral Given 7/13/18 1033)   ketorolac (TORADOL) injection 15 mg (15 mg Intramuscular Given 7/13/18 1037)   acetaminophen (TYLENOL) tablet 650 mg (650 mg Oral Given 7/13/18 1033)   lidocaine (PF) (XYLOCAINE-MPF) 2 % injection 4 mL (4 mL Infiltration Given by Other 7/13/18 1050)   LORazepam (ATIVAN) tablet 1 mg (1 mg Oral Given 7/13/18 1154)   haloperidol lactate (HALDOL) injection 5 mg (5 mg Intramuscular Given 7/13/18 1155)       Diagnostic Studies  Results Reviewed Procedure Component Value Units Date/Time    POCT pregnancy, urine [[id number]]  (Normal) Resulted:  07/13/18 1023    Lab Status:  Final result Updated:  07/13/18 1121     EXT PREG TEST UR (Ref: Negative) negative                 No orders to display         Procedures  Procedures      Phone Consults  ED Phone Contact    ED Course                         MDM  Number of Diagnoses or Management Options  Chronic nonintractable headache, unspecified headache type:   Nausea:   Diagnosis management comments: Patient with long history of headaches similar to this headache  No signs of meningitis (fevers, neck stiffness)  No signs of CVA (normal neuroligical exam and no focal deficits, headache similar to previous headaches)  Recent normal MRI head  This appears to be just a headache  I consulted neurology (Dr Roopa Pacheco) who recommended the patient keep her current follow up and to prescribe her 100 mg of imitrex at discharge with instruction to re-dose on time two hours later if no relief from the initial dose  Patient tolerating PO fluids well  Patient received 15 mg Toradol IM, PO tylenol, lidocaine nasal block and PO Reglan  Nausea was relieved, but headache continued  Patient agreed to IM 5mg haldol and 1 mg PO ativan  On re-asesment patient's headache has decreased and she was ready to be discharged home, which I agreed to  Will follow up with neurology  I also recommended following with PCP        CritCare Time    Disposition  Final diagnoses:   Chronic nonintractable headache, unspecified headache type   Nausea     Time reflects when diagnosis was documented in both MDM as applicable and the Disposition within this note     Time User Action Codes Description Comment    7/13/2018 12:46 PM José Miguel Jackson Add [R51] Chronic nonintractable headache, unspecified headache type     7/13/2018 12:46 PM Meredith Chong Modify [R51] Chronic nonintractable headache, unspecified headache type     7/13/2018 12:47 PM Lorikiko Castañeda Add [R11 0] Nausea       ED Disposition     ED Disposition Condition Comment    Discharge  [facility] discharge to home/self care  Condition at discharge: Stable        Follow-up Information     Follow up With Specialties Details Why Contact Info Additional [address] Theron Diaz MD Internal Medicine   [address]        [facility] [address] Emergency Department Emergency Medicine  If symptoms worsen, focal weakness, numbness, tingling, changes in vision or hearing   [address]  [phone]  ED, [address], [address]    Radhika Silva MD Internal Medicine  As needed [address]  [phone]             Discharge Medication List as of 7/13/2018 12:52 PM      START taking these medications    Details   SUMAtriptan (IMITREX) 100 mg tablet Take 1 tablet (100 mg total) by mouth once as needed for migraine for up to 1 dose May take one additional dose 2 hours later if no affect from first dose , Starting Fri 7/13/2018, Print         CONTINUE these medications which have NOT CHANGED    Details   ALPRAZolam (XANAX) 0 25 mg tablet Take 1 tablet (0 25 mg total) by mouth daily at bedtime as needed for sleep, Starting Mon 2/12/2018, Print      desonide (DESOWEN) 0 05 % cream Apply topically 2 (two) times a day, Starting Fri 6/8/2018, Normal      lubiprostone (AMITIZA) 8 mcg capsule Take by mouth, Historical Med      Nerve Stimulator (PRO COMFORT TENS UNIT) MUSTAPHA 1 Units by Does not apply route daily, Starting Mon 7/9/2018, Normal      nitrofurantoin (MACROBID) 100 mg capsule Take 1 capsule (100 mg total) by mouth 2 (two) times a day for 5 days, Starting Wed 7/11/2018, Until Mon 7/16/2018, Normal      nitrofurantoin (MACRODANTIN) 50 mg capsule Take by mouth, Starting Tue 2/16/2016, Historical Med      venlafaxine (EFFEXOR-XR) 37 5 mg 24 hr capsule Take 1 capsule (37 5 mg total) by mouth daily, Starting Mon 5/7/2018, Normal           No discharge procedures on file  ED Provider  Attending physically available and evaluated Chaitanya Hopkins I managed the patient along with the ED Attending      Electronically Signed by         Salvador Mcdonald DO  07/13/18 2276

## 2018-07-14 ENCOUNTER — HOSPITAL ENCOUNTER (EMERGENCY)
Facility: HOSPITAL | Age: 28
Discharge: HOME | End: 2018-07-14
Admitting: EMERGENCY MEDICINE
Payer: COMMERCIAL

## 2018-07-14 VITALS
TEMPERATURE: 98.9 F | HEART RATE: 96 BPM | WEIGHT: 111.99 LBS | DIASTOLIC BLOOD PRESSURE: 76 MMHG | OXYGEN SATURATION: 99 % | BODY MASS INDEX: 21.87 KG/M2 | SYSTOLIC BLOOD PRESSURE: 130 MMHG | RESPIRATION RATE: 16 BRPM

## 2018-07-14 DIAGNOSIS — R00.2 PALPITATIONS: Primary | ICD-10-CM

## 2018-07-14 LAB
ALBUMIN SERPL BCP-MCNC: 4.4 G/DL (ref 3.5–5)
ALP SERPL-CCNC: 70 U/L (ref 46–116)
ALT SERPL W P-5'-P-CCNC: 21 U/L (ref 12–78)
ANION GAP SERPL CALCULATED.3IONS-SCNC: 10 MMOL/L (ref 4–13)
APTT PPP: 28 SECONDS (ref 24–36)
AST SERPL W P-5'-P-CCNC: 21 U/L (ref 5–45)
BASOPHILS # BLD AUTO: 0.04 THOUSANDS/ΜL (ref 0–0.1)
BASOPHILS NFR BLD AUTO: 1 % (ref 0–1)
BILIRUB SERPL-MCNC: 0.5 MG/DL (ref 0.2–1)
BUN SERPL-MCNC: 4 MG/DL (ref 5–25)
CALCIUM SERPL-MCNC: 9.2 MG/DL (ref 8.3–10.1)
CHLORIDE SERPL-SCNC: 103 MMOL/L (ref 100–108)
CO2 SERPL-SCNC: 27 MMOL/L (ref 21–32)
CREAT SERPL-MCNC: 0.81 MG/DL (ref 0.6–1.3)
DEPRECATED D DIMER PPP: <270 NG/ML (FEU) (ref 0–424)
EOSINOPHIL # BLD AUTO: 0.04 THOUSAND/ΜL (ref 0–0.61)
EOSINOPHIL NFR BLD AUTO: 1 % (ref 0–6)
ERYTHROCYTE [DISTWIDTH] IN BLOOD BY AUTOMATED COUNT: 12.3 % (ref 11.6–15.1)
GFR SERPL CREATININE-BSD FRML MDRD: 101 ML/MIN/1.73SQ M
GLUCOSE SERPL-MCNC: 108 MG/DL (ref 65–140)
HCT VFR BLD AUTO: 39.5 % (ref 34.8–46.1)
HGB BLD-MCNC: 13.2 G/DL (ref 11.5–15.4)
INR PPP: 1.1 (ref 0.86–1.17)
LYMPHOCYTES # BLD AUTO: 1.22 THOUSANDS/ΜL (ref 0.6–4.47)
LYMPHOCYTES NFR BLD AUTO: 18 % (ref 14–44)
MAGNESIUM SERPL-MCNC: 2.2 MG/DL (ref 1.6–2.6)
MCH RBC QN AUTO: 30.3 PG (ref 26.8–34.3)
MCHC RBC AUTO-ENTMCNC: 33.4 G/DL (ref 31.4–37.4)
MCV RBC AUTO: 91 FL (ref 82–98)
MONOCYTES # BLD AUTO: 0.49 THOUSAND/ΜL (ref 0.17–1.22)
MONOCYTES NFR BLD AUTO: 7 % (ref 4–12)
NEUTROPHILS # BLD AUTO: 4.91 THOUSANDS/ΜL (ref 1.85–7.62)
NEUTS SEG NFR BLD AUTO: 73 % (ref 43–75)
PLATELET # BLD AUTO: 260 THOUSANDS/UL (ref 149–390)
PMV BLD AUTO: 9.5 FL (ref 8.9–12.7)
POTASSIUM SERPL-SCNC: 3.3 MMOL/L (ref 3.5–5.3)
PROT SERPL-MCNC: 7.8 G/DL (ref 6.4–8.2)
PROTHROMBIN TIME: 13.9 SECONDS (ref 11.8–14.2)
RBC # BLD AUTO: 4.36 MILLION/UL (ref 3.81–5.12)
SODIUM SERPL-SCNC: 140 MMOL/L (ref 136–145)
TROPONIN I SERPL-MCNC: <0.02 NG/ML
TSH SERPL DL<=0.05 MIU/L-ACNC: 2.21 UIU/ML (ref 0.36–3.74)
WBC # BLD AUTO: 6.7 THOUSAND/UL (ref 4.31–10.16)

## 2018-07-14 PROCEDURE — 83735 ASSAY OF MAGNESIUM: CPT | Performed by: PHYSICIAN ASSISTANT

## 2018-07-14 PROCEDURE — 84484 ASSAY OF TROPONIN QUANT: CPT | Performed by: PHYSICIAN ASSISTANT

## 2018-07-14 PROCEDURE — 96360 HYDRATION IV INFUSION INIT: CPT

## 2018-07-14 PROCEDURE — 85610 PROTHROMBIN TIME: CPT | Performed by: PHYSICIAN ASSISTANT

## 2018-07-14 PROCEDURE — 85025 COMPLETE CBC W/AUTO DIFF WBC: CPT | Performed by: PHYSICIAN ASSISTANT

## 2018-07-14 PROCEDURE — 85379 FIBRIN DEGRADATION QUANT: CPT | Performed by: PHYSICIAN ASSISTANT

## 2018-07-14 PROCEDURE — 93005 ELECTROCARDIOGRAM TRACING: CPT

## 2018-07-14 PROCEDURE — 36415 COLL VENOUS BLD VENIPUNCTURE: CPT | Performed by: PHYSICIAN ASSISTANT

## 2018-07-14 PROCEDURE — 85730 THROMBOPLASTIN TIME PARTIAL: CPT | Performed by: PHYSICIAN ASSISTANT

## 2018-07-14 PROCEDURE — 80053 COMPREHEN METABOLIC PANEL: CPT | Performed by: PHYSICIAN ASSISTANT

## 2018-07-14 PROCEDURE — 99285 EMERGENCY DEPT VISIT HI MDM: CPT

## 2018-07-14 PROCEDURE — 84443 ASSAY THYROID STIM HORMONE: CPT | Performed by: PHYSICIAN ASSISTANT

## 2018-07-14 RX ADMIN — SODIUM CHLORIDE 1000 ML: 0.9 INJECTION, SOLUTION INTRAVENOUS at 10:54

## 2018-07-14 NOTE — ED PROVIDER NOTES
History  Chief Complaint   Patient presents with    Rapid Heart Rate     was at [facility] yesterday for a migraine, she states she was given haldol and ativan prior to leaving and now her heart is "racing and pounding" out of her chest  unable to sleep, restless, called her pmd and was advised to come in for eval     Patient presents emergency room with complaints of a anxiousness and palpitations  She was seen at [facility] yesterday for a 5 day history of a migraine headache  She was medicated for pain with Toradol, and Reglan  That did not relieve her symptoms so she was given an IM dose of a 5 mg of Haldol  She was given a 2nd dose of Ativan because she felt that the Haldol would make her very anxious  She states that she went home and went to sleep for approximately 6 hr  After awakening from her sleep she felt quite anxious  She was instructed by the emergency room to take p o  Benadryl if she felt anxious  She states she took 1 dose of 25 mg without any relief  She still feels anxious today  She feels like she is crawling out of her skin  She complains infant intense palpitations and tachycardia  She denies any chest pain or shortness of breath  She has a history of irritable bowel syndrome social was for some abdominal or tenderness  It is has not changed from her normal  Her headache has resolved  She has had no nausea or vomiting or diarrhea  Past medical history is positive for anxiety, depression, external hemorrhoids, hematochezia, irritable bowel syndrome, MRSA, urinary tract infections  Past surgical history includes a tonsillectomy and wisdom tooth extraction  History provided by:  Patient      Prior to Admission Medications   Prescriptions Last Dose Informant Patient Reported? Taking?    ALPRAZolam (XANAX) 0 25 mg tablet  Self No No   Sig: Take 1 tablet (0 25 mg total) by mouth daily at bedtime as needed for sleep   Nerve Stimulator (PRO COMFORT TENS UNIT) MUSTAPHA Self No No   Si Units by Does not apply route daily   SUMAtriptan (IMITREX) 100 mg tablet   No No   Sig: Take 1 tablet (100 mg total) by mouth once as needed for migraine for up to 1 dose May take one additional dose 2 hours later if no affect from first dose  desonide (DESOWEN) 0 05 % cream  Self No No   Sig: Apply topically 2 (two) times a day   lubiprostone (AMITIZA) 8 mcg capsule  Self Yes No   Sig: Take by mouth   nitrofurantoin (MACROBID) 100 mg capsule   No No   Sig: Take 1 capsule (100 mg total) by mouth 2 (two) times a day for 5 days   nitrofurantoin (MACRODANTIN) 50 mg capsule  Self Yes No   Sig: Take by mouth   venlafaxine (EFFEXOR-XR) 37 5 mg 24 hr capsule  Self No No   Sig: Take 1 capsule (37 5 mg total) by mouth daily      Facility-Administered Medications: None       Past Medical History:   Diagnosis Date    Anxiety     Contraceptive use     LAST ASSESSED: [id number]    Depression     External hemorrhoid, bleeding     LAST ASSESSED: [id number]    Gallbladder attack     Hematochezia     LAST ASSESSED: [id number]    IBS (irritable bowel syndrome)     Immunity status testing     LAST ASSESSED: [id number]    Methicillin resistant Staphylococcus aureus infection     Poison ivy dermatitis     LAST ASSESSED: [id number]    Psychiatric disorder     Skin abscess     RIGHT LEG    Urinary frequency     LAST ASSESSED: [id number]    UTI (urinary tract infection)        Past Surgical History:   Procedure Laterality Date    TONSILLECTOMY      ONSET:     WISDOM TOOTH EXTRACTION         Family History   Problem Relation Age of Onset    Thyroid disease Mother     Thyroid disease Father     Cancer Family         MALIGNANT NEOPLASM     I have reviewed and agree with the history as documented      Social History   Substance Use Topics    Smoking status: Current Every Day Smoker     Packs/day: 0 50     Types: Cigarettes    Smokeless tobacco: Never Used    Alcohol use No      Comment: occasional, SOCIAL        Review of Systems   Constitutional: Positive for activity change  Negative for appetite change, chills, fatigue and fever  HENT: Negative for congestion, dental problem, ear discharge, ear pain, hearing loss, mouth sores, rhinorrhea, sore throat and trouble swallowing  Eyes: Negative for photophobia, pain, discharge, redness, itching and visual disturbance  Respiratory: Negative for cough, chest tightness, shortness of breath and wheezing  Cardiovascular: Positive for palpitations  Negative for chest pain and leg swelling  Gastrointestinal: Negative for abdominal pain, blood in stool, diarrhea, nausea and vomiting  Endocrine: Negative for cold intolerance, heat intolerance, polydipsia, polyphagia and polyuria  Genitourinary: Negative for difficulty urinating, dysuria, hematuria and urgency  Musculoskeletal: Negative for arthralgias, gait problem, myalgias, neck pain and neck stiffness  Skin: Negative for color change, pallor, rash and wound  Neurological: Negative for dizziness, weakness, light-headedness and headaches  Hematological: Negative for adenopathy  Does not bruise/bleed easily  Psychiatric/Behavioral: Negative for confusion  All other systems reviewed and are negative  Physical Exam  Physical Exam   Constitutional: She is oriented to person, place, and time  She appears well-developed and well-nourished  No distress  HENT:   Head: Normocephalic  Right Ear: External ear normal    Left Ear: External ear normal    Nose: Nose normal    Mouth/Throat: Oropharynx is clear and moist    Eyes: Conjunctivae are normal  Right eye exhibits no discharge  Left eye exhibits no discharge  Neck: Neck supple  No JVD present  No tracheal deviation present  No thyromegaly present  Cardiovascular: Regular rhythm, normal heart sounds and intact distal pulses  Exam reveals no gallop and no friction rub  No murmur heard    Tachycardia, 120   Pulmonary/Chest: Effort normal  No stridor  No respiratory distress  She has no wheezes  She has rales  Abdominal: Soft  Bowel sounds are normal  She exhibits no distension and no mass  There is no tenderness  There is rebound  There is no guarding  Musculoskeletal: She exhibits no edema or tenderness  Lymphadenopathy:     She has no cervical adenopathy  Neurological: She is alert and oriented to person, place, and time  Skin: Skin is warm  Capillary refill takes less than 2 seconds  She is not diaphoretic  Psychiatric: She has a normal mood and affect  Her behavior is normal  Judgment and thought content normal    Nursing note and vitals reviewed  Vital Signs  ED Triage Vitals   Temperature Pulse Respirations Blood Pressure SpO2   07/14/18 1012 07/14/18 1012 07/14/18 1012 07/14/18 1012 07/14/18 1012   98 9 °F (37 2 °C) (!) 120 16 130/76 99 %      Temp Source Heart Rate Source Patient Position - Orthostatic VS BP Location FiO2 (%)   07/14/18 1012 07/14/18 1012 07/14/18 1056 07/14/18 1012 --   Oral Monitor Sitting Right arm       Pain Score       07/14/18 1012       No Pain           Vitals:    07/14/18 1012 07/14/18 1056   BP: 130/76 130/76   Pulse: (!) 120 96   Patient Position - Orthostatic VS:  Sitting       Visual Acuity      ED Medications  Medications   sodium chloride 0 9 % bolus 1,000 mL (0 mL Intravenous Stopped 7/14/18 1136)       Diagnostic Studies  Results Reviewed     Procedure Component Value Units Date/Time    TSH [[id number]]  (Normal) Collected:  07/14/18 1050    Lab Status:  Final result Specimen:  Blood from Arm, Left Updated:  07/14/18 1129     TSH 3RD GENERATON 2 210 uIU/mL     Narrative:         Patients undergoing fluorescein dye angiography may retain small amounts of fluorescein in the body for 48-72 hours post procedure  Samples containing fluorescein can produce falsely depressed TSH values  If the patient had this procedure,a specimen should be resubmitted post fluorescein clearance            The recommended reference ranges for TSH during pregnancy are as follows:  First trimester 0 1 to 2 5 uIU/mL  Second trimester  0 2 to 3 0 uIU/mL  Third trimester 0 3 to 3 0 uIU/m      Magnesium [[id number]]  (Normal) Collected:  07/14/18 1050    Lab Status:  Final result Specimen:  Blood from Arm, Left Updated:  07/14/18 1129     Magnesium 2 2 mg/dL     Troponin I [[id number]]  (Normal) Collected:  07/14/18 1050    Lab Status:  Final result Specimen:  Blood from Arm, Left Updated:  07/14/18 1120     Troponin I <0 02 ng/mL     Comprehensive metabolic panel [[id number]]  (Abnormal) Collected:  07/14/18 1050    Lab Status:  Final result Specimen:  Blood from Arm, Left Updated:  07/14/18 1119     Sodium 140 mmol/L      Potassium 3 3 (L) mmol/L      Chloride 103 mmol/L      CO2 27 mmol/L      Anion Gap 10 mmol/L      BUN 4 (L) mg/dL      Creatinine 0 81 mg/dL      Glucose 108 mg/dL      Calcium 9 2 mg/dL      AST 21 U/L      ALT 21 U/L      Alkaline Phosphatase 70 U/L      Total Protein 7 8 g/dL      Albumin 4 4 g/dL      Total Bilirubin 0 50 mg/dL      eGFR 101 ml/min/1 73sq m     Narrative:         National Kidney Disease Education Program recommendations are as follows:  GFR calculation is accurate only with a steady state creatinine  Chronic Kidney disease less than 60 ml/min/1 73 sq  meters  Kidney failure less than 15 ml/min/1 73 sq  meters      D-Dimer [[id number]]  (Normal) Collected:  07/14/18 1051    Lab Status:  Final result Specimen:  Blood from Arm, Left Updated:  07/14/18 1117     D-Dimer, Quant <270 ng/ml (FEU)     Protime-INR [[id number]]  (Normal) Collected:  07/14/18 1051    Lab Status:  Final result Specimen:  Blood from Arm, Left Updated:  07/14/18 1112     Protime 13 9 seconds      INR 1 10    APTT [[id number]]  (Normal) Collected:  07/14/18 1051    Lab Status:  Final result Specimen:  Blood from Arm, Left Updated:  07/14/18 1112     PTT 28 seconds     CBC and differential [[id number]]  (Normal) Collected:  07/14/18 1050    Lab Status:  Final result Specimen:  Blood from Arm, Left Updated:  07/14/18 1103     WBC 6 70 Thousand/uL      RBC 4 36 Million/uL      Hemoglobin 13 2 g/dL      Hematocrit 39 5 %      MCV 91 fL      MCH 30 3 pg      MCHC 33 4 g/dL      RDW 12 3 %      MPV 9 5 fL      Platelets 425 Thousands/uL      Neutrophils Relative 73 %      Lymphocytes Relative 18 %      Monocytes Relative 7 %      Eosinophils Relative 1 %      Basophils Relative 1 %      Neutrophils Absolute 4 91 Thousands/µL      Lymphocytes Absolute 1 22 Thousands/µL      Monocytes Absolute 0 49 Thousand/µL      Eosinophils Absolute 0 04 Thousand/µL      Basophils Absolute 0 04 Thousands/µL                  No orders to display              Procedures  ECG 12 Lead Documentation  Date/Time: 7/14/2018 10:44 AM  Performed by: BloomBoardkiera Ashby  Authorized by: BloomBoardkiera Ashby     Indications / Diagnosis:  Palpitations  ECG reviewed by me, the ED Provider: yes    Patient location:  ED  Previous ECG:     Previous ECG:  Compared to current    Comparison ECG info:  10/21/17    Similarity:  Changes noted    Comparison to cardiac monitor: Yes    Rate:     ECG rate:  104    ECG rate assessment: tachycardic    Rhythm:     Rhythm: sinus tachycardia    Ectopy:     Ectopy: none    QRS:     QRS axis:  Normal    QRS intervals:  Normal  Conduction:     Conduction: normal    ST segments:     ST segments:  Normal  T waves:     T waves comment:  Increased T wave amplitude when compared to previous study  Comments:      Previous short MN interval on tracing from 10/2017           Phone Contacts  ED Phone Contact    ED Course                               MDM  Number of Diagnoses or Management Options  Palpitations: new and requires workup     Amount and/or Complexity of Data Reviewed  Clinical lab tests: ordered and reviewed  Tests in the radiology section of CPT®: ordered and reviewed  Tests in the medicine section of CPT®: ordered and reviewed    Risk of Complications, Morbidity, and/or Mortality  Presenting problems: high  Diagnostic procedures: high  Management options: high  General comments: Patient presents to the emergency room with complaints of palpitations and tachycardia since being medicated with Haldol yesterday at [facility] for an  atypical migraine headache  She was the seen and evaluated  Laboratory studies as well as an EKG were performed which were within normal limits  Her heart rate came down with a L bolus of fluids  She was feeling better  She was discharged home and instructed to follow up with her family physician  Patient Progress  Patient progress: stable    CritCare Time    Disposition  Final diagnoses:   Palpitations     Time reflects when diagnosis was documented in both MDM as applicable and the Disposition within this note     Time User Action Codes Description Comment    7/14/2018 11:31 AM Michael Galdamez Add [R00 2] Palpitations       ED Disposition     ED Disposition Condition Comment    Discharge  [facility] discharge to home/self care      Condition at discharge: Good        Follow-up Information     Follow up With Specialties Details Why Aureliano Mcrae MD Internal Medicine In 2 days  [address]  [phone]            Discharge Medication List as of 7/14/2018 11:33 AM      CONTINUE these medications which have NOT CHANGED    Details   ALPRAZolam (XANAX) 0 25 mg tablet Take 1 tablet (0 25 mg total) by mouth daily at bedtime as needed for sleep, Starting Mon 2/12/2018, Print      desonide (DESOWEN) 0 05 % cream Apply topically 2 (two) times a day, Starting Fri 6/8/2018, Normal      lubiprostone (AMITIZA) 8 mcg capsule Take by mouth, Historical Med      Nerve Stimulator (PRO COMFORT TENS UNIT) MUSTAPHA 1 Units by Does not apply route daily, Starting Mon 7/9/2018, Normal      nitrofurantoin (MACROBID) 100 mg capsule Take 1 capsule (100 mg total) by mouth 2 (two) times a day for 5 days, Starting Wed 7/11/2018, Until Mon 7/16/2018, Normal      nitrofurantoin (MACRODANTIN) 50 mg capsule Take by mouth, Starting Tue 2/16/2016, Historical Med      SUMAtriptan (IMITREX) 100 mg tablet Take 1 tablet (100 mg total) by mouth once as needed for migraine for up to 1 dose May take one additional dose 2 hours later if no affect from first dose , Starting Fri 7/13/2018, Print      venlafaxine (EFFEXOR-XR) 37 5 mg 24 hr capsule Take 1 capsule (37 5 mg total) by mouth daily, Starting Mon 5/7/2018, Normal           No discharge procedures on file      ED Provider  Electronically Signed by           Kevin Childress PA-C  07/14/18 7846

## 2018-07-14 NOTE — DISCHARGE INSTRUCTIONS
Heart Palpitations   WHAT YOU NEED TO KNOW:   Heart palpitations are feelings that your heart races, jumps, throbs, or flutters  You may feel extra beats, no beats for a short time, or skipped beats  You may have these feelings in your chest, throat, or neck  They may happen when you are sitting, standing, or lying  Heart palpitations may be frightening, but are usually not caused by a serious problem  DISCHARGE INSTRUCTIONS:   Call 911 or have someone else call for any of the following:   · You have any of the following signs of a heart attack:      ¨ Squeezing, pressure, or pain in your chest that lasts longer than 5 minutes or returns    ¨ Discomfort or pain in your back, neck, jaw, stomach, or arm     ¨ Trouble breathing    ¨ Nausea or vomiting    ¨ Lightheadedness or a sudden cold sweat, especially with chest pain or trouble breathing    · You have any of the following signs of a stroke:      ¨ Numbness or drooping on one side of your face     ¨ Weakness in an arm or leg    ¨ Confusion or difficulty speaking    ¨ Dizziness, a severe headache, or vision loss    · You faint or lose consciousness  Return to the emergency department if:   · Your palpitations happen more often or get more intense  Contact your healthcare provider if:   · You have new or worsening swelling in your feet or ankles  · You have questions or concerns about your condition or care  Follow up with your healthcare provider as directed: You may need to follow up with a cardiologist  Jelena Navarro may need tests to check for heart problems that cause palpitations  Write down your questions so you remember to ask them during your visits  Keep a record:  Write down when your palpitations start and stop, what you were doing when they started, and your symptoms  Keep track of what you ate or drank within a few hours of your palpitations  Include anything that seemed to help your symptoms, such as lying down or holding your breath   This record will help you and your healthcare provider learn what triggers your palpitations  Bring this record with you to your follow up visits  Help prevent heart palpitations:   · Manage stress and anxiety  Find ways to relax such as listening to music, meditating, or doing yoga  Exercise can also help decrease stress and anxiety  Talk to someone you trust about your stress or anxiety  You can also talk to a therapist      · Get plenty of sleep every night  Ask your healthcare provider how much sleep you need each night  · Do not drink caffeine or alcohol  Caffeine and alcohol can make your palpitations worse  Caffeine is found in soda, coffee, tea, chocolate, and drinks that increase your energy  · Do not smoke  Nicotine and other chemicals in cigarettes and cigars may damage your heart and blood vessels  Ask your healthcare provider for information if you currently smoke and need help to quit  E-cigarettes or smokeless tobacco still contain nicotine  Talk to your healthcare provider before you use these products  · Do not use illegal drugs  Talk to your healthcare provider if you use illegal drugs and want help to quit  © 2017 [facility] Information is for End User's use only and may not be sold, redistributed or otherwise used for commercial purposes  All illustrations and images included in CareNotes® are the copyrighted property of A D A M , Inc  or Andi Hunter  The above information is an  only  It is not intended as medical advice for individual conditions or treatments  Talk to your doctor, nurse or pharmacist before following any medical regimen to see if it is safe and effective for you

## 2018-07-16 ENCOUNTER — TELEPHONE (OUTPATIENT)
Dept: URGENT CARE | Age: 28
End: 2018-07-16

## 2018-07-16 ENCOUNTER — APPOINTMENT (OUTPATIENT)
Dept: PHYSICAL THERAPY | Facility: CLINIC | Age: 28
End: 2018-07-16
Payer: COMMERCIAL

## 2018-07-16 NOTE — TELEPHONE ENCOUNTER
Patient on Macrobid  Not  Currently having any symptoms  Will not change antibiotic as Macrobid would also cover Group B strep and symptoms have resolved

## 2018-07-17 LAB
ATRIAL RATE: 104 BPM
P AXIS: 81 DEGREES
PR INTERVAL: 136 MS
QRS AXIS: 88 DEGREES
QRSD INTERVAL: 76 MS
QT INTERVAL: 320 MS
QTC INTERVAL: 420 MS
T WAVE AXIS: 70 DEGREES
VENTRICULAR RATE: 104 BPM

## 2018-07-17 PROCEDURE — 93010 ELECTROCARDIOGRAM REPORT: CPT | Performed by: INTERNAL MEDICINE

## 2018-07-23 ENCOUNTER — OFFICE VISIT (OUTPATIENT)
Dept: INTERNAL MEDICINE CLINIC | Facility: CLINIC | Age: 28
End: 2018-07-23
Payer: COMMERCIAL

## 2018-07-23 VITALS
BODY MASS INDEX: 21.48 KG/M2 | WEIGHT: 109.4 LBS | DIASTOLIC BLOOD PRESSURE: 64 MMHG | SYSTOLIC BLOOD PRESSURE: 122 MMHG | HEIGHT: 60 IN | OXYGEN SATURATION: 96 % | HEART RATE: 83 BPM

## 2018-07-23 DIAGNOSIS — G43.809 VESTIBULAR MIGRAINE: Primary | ICD-10-CM

## 2018-07-23 DIAGNOSIS — F41.1 GENERALIZED ANXIETY DISORDER: ICD-10-CM

## 2018-07-23 PROCEDURE — 99214 OFFICE O/P EST MOD 30 MIN: CPT | Performed by: INTERNAL MEDICINE
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
## 2018-07-25 ENCOUNTER — OFFICE VISIT (OUTPATIENT)
Dept: PHYSICAL THERAPY | Facility: CLINIC | Age: 28
End: 2018-07-25
Payer: COMMERCIAL

## 2018-07-25 DIAGNOSIS — R42 DIZZINESS: ICD-10-CM

## 2018-07-25 DIAGNOSIS — M54.2 CERVICALGIA: Primary | ICD-10-CM

## 2018-07-25 PROCEDURE — G0283 ELEC STIM OTHER THAN WOUND: HCPCS | Performed by: PHYSICAL THERAPIST

## 2018-07-25 PROCEDURE — 97010 HOT OR COLD PACKS THERAPY: CPT | Performed by: PHYSICAL THERAPIST

## 2018-07-25 PROCEDURE — 97014 ELECTRIC STIMULATION THERAPY: CPT | Performed by: PHYSICAL THERAPIST

## 2018-07-25 PROCEDURE — 97140 MANUAL THERAPY 1/> REGIONS: CPT | Performed by: PHYSICAL THERAPIST

## 2018-07-25 NOTE — PROGRESS NOTES
Daily Note     Today's date: 2018  Patient name: Lelo Snyder  : 1990  MRN: [id number]  Referring provider: Corey Rowley MD  Dx:   Encounter Diagnosis     ICD-10-CM    1  Cervicalgia M54 2    2  Dizziness R42                   Subjective: Reports she was in the ER twice two weeks ago due to severe migraine, rec'd multiple medications left hospital felling like a zombie, later that evening her heart was racing took Benadryl as instructed by ED MD, still felt worse the next day and returened to ED  Placed on heart monitor for 4 hours, unremarkable as per patient  Released with new medication for migraine, was hesitant to use and followed up with PCP  Was advised by PCP to continue use of Effexor for anxiety which also has potential to help with migraine symptoms  Patient following up with neurology 2018  Did not follow up with Physiatry  Objective: See treatment diary below    Precautions: Hx depression and anxiety    Specialty Daily Treatment Diary     Exercise Diary         VORx1, H/V  Standing        VORcx, H/V  walking        Saccades, H/V  Standing        BWD Walking with HTs, H/V        Walking with 360 turns        Foam arms crossed EC        Tandem gait        Foam HTs, H/V                Manual Therapy lateral  Seated STM to LS, UT, SCM and Scalenes b/L  Supine manual cervical distraction  Supine 1st rib mobs  Supine lateral mobs t/o CS   SOR   15 mi                                                                                           Modalities        Cold pack E-stim        MHP 10 min                       Assessment: Denied HA before and after therapy  With increased TP at right CS with greater hypomobility of right 1st rib as well  Patient advised to stretch home      Plan: continue to progress as able

## 2018-07-27 ENCOUNTER — OFFICE VISIT (OUTPATIENT)
Dept: PHYSICAL THERAPY | Facility: CLINIC | Age: 28
End: 2018-07-27
Payer: COMMERCIAL

## 2018-07-27 DIAGNOSIS — M54.2 CERVICALGIA: Primary | ICD-10-CM

## 2018-07-27 DIAGNOSIS — R42 DIZZINESS: ICD-10-CM

## 2018-07-27 PROCEDURE — 97010 HOT OR COLD PACKS THERAPY: CPT | Performed by: PHYSICAL THERAPIST

## 2018-07-27 PROCEDURE — 97014 ELECTRIC STIMULATION THERAPY: CPT | Performed by: PHYSICAL THERAPIST

## 2018-07-27 PROCEDURE — 97140 MANUAL THERAPY 1/> REGIONS: CPT | Performed by: PHYSICAL THERAPIST

## 2018-07-27 PROCEDURE — G0283 ELEC STIM OTHER THAN WOUND: HCPCS | Performed by: PHYSICAL THERAPIST

## 2018-07-27 NOTE — PROGRESS NOTES
Daily Note     Today's date: 2018  Patient name: Karen Lewis  : 1990  MRN: [id number]  Referring provider: Jerica Simmons MD  Dx:   Encounter Diagnosis     ICD-10-CM    1  Cervicalgia M54 2    2  Dizziness R42                   Subjective: Denies issues after last session  Denies migraine today  Objective: See treatment diary below    Precautions: Hx depression and anxiety    Specialty Daily Treatment Diary     Exercise Diary  18      VORx1, H/V  Standing        VORcx, H/V  walking        Saccades, H/V  Standing        BWD Walking with HTs, H/V        Walking with 360 turns        Foam arms crossed EC        Tandem gait        Foam HTs, H/V                Manual Therapy lateral  Seated STM to LS, UT, SCM and Scalenes b/L  Supine manual cervical distraction  Supine 1st rib mobs  Supine lateral mobs t/o CS   SOR    Seated STM to LS, UT, SCM and Scalenes b/L  Supine manual cervical distraction  Supine 1st rib mobs  Supine lateral mobs t/o CS   SOR           bike  10 min   HA 0/10  /10                                                                                  Modalities       Cold pack E-stim        MHP 10 min 10 min                      Assessment: Denied HA before and after therapy  Remains with increased STR and trigger points t/o C-spine    Plan: continue to progress as able

## 2018-07-30 ENCOUNTER — OFFICE VISIT (OUTPATIENT)
Dept: PHYSICAL THERAPY | Facility: CLINIC | Age: 28
End: 2018-07-30
Payer: COMMERCIAL

## 2018-07-30 DIAGNOSIS — R51.9 CHRONIC NONINTRACTABLE HEADACHE, UNSPECIFIED HEADACHE TYPE: ICD-10-CM

## 2018-07-30 DIAGNOSIS — R42 DIZZINESS: ICD-10-CM

## 2018-07-30 DIAGNOSIS — G89.29 CHRONIC NONINTRACTABLE HEADACHE, UNSPECIFIED HEADACHE TYPE: ICD-10-CM

## 2018-07-30 DIAGNOSIS — G43.809 VESTIBULAR MIGRAINE: Primary | ICD-10-CM

## 2018-07-30 PROCEDURE — 97112 NEUROMUSCULAR REEDUCATION: CPT | Performed by: PHYSICAL THERAPIST

## 2018-07-30 PROCEDURE — 97140 MANUAL THERAPY 1/> REGIONS: CPT | Performed by: PHYSICAL THERAPIST

## 2018-07-30 NOTE — PROGRESS NOTES
Daily Note     Today's date: 2018  Patient name: Karrie Donato  : 1990  MRN: [id number]  Referring provider: Antony Howard MD  Dx:   No diagnosis found  Subjective: Denies issues with     Objective: See treatment diary below    Precautions: Hx depression and anxiety    Specialty Daily Treatment Diary     Exercise Diary        VORx1, H/V  Standing  30"x2      VORcx, H/V  walking        Saccades, H/V  Standing        BWD Walking with HTs, H/V        Walking with 360 turns        Foam arms crossed EC        Tandem gait        Foam HTs, H/V                Manual Therapy lateral  Seated STM to LS, UT, SCM and Scalenes b/L  Supine manual cervical distraction  Supine 1st rib mobs  Supine lateral mobs t/o CS   SOR    Seated ST; cervical distraction      Ut stretch  30" x2      llevatot stretch  30"x2      Chin tuck  [id number]                                                                  Modalities        Cold pack E-stim        MHP 10 min                       Assessment: Initiated chin tucks this session and demonstrated good understanding, Trialed VOR exercise which did not increase symptoms  Plan: continue to progress as able

## 2018-08-13 ENCOUNTER — TELEPHONE (OUTPATIENT)
Dept: INTERNAL MEDICINE CLINIC | Facility: CLINIC | Age: 28
End: 2018-08-13

## 2018-08-13 NOTE — TELEPHONE ENCOUNTER
Please have Jc Galvan call the patient to see how the symptoms are and if he thinks the increase is a good idea

## 2018-08-28 DIAGNOSIS — F41.1 GENERALIZED ANXIETY DISORDER: ICD-10-CM

## 2018-08-28 RX ORDER — VENLAFAXINE HYDROCHLORIDE 37.5 MG/1
CAPSULE, EXTENDED RELEASE ORAL
Qty: 30 CAPSULE | Refills: 1 | Status: SHIPPED | OUTPATIENT
Start: 2018-08-28 | End: 2018-09-05

## 2018-09-05 ENCOUNTER — OFFICE VISIT (OUTPATIENT)
Dept: INTERNAL MEDICINE CLINIC | Facility: CLINIC | Age: 28
End: 2018-09-05
Payer: COMMERCIAL

## 2018-09-05 VITALS
SYSTOLIC BLOOD PRESSURE: 112 MMHG | WEIGHT: 108.2 LBS | TEMPERATURE: 98.6 F | HEIGHT: 60 IN | HEART RATE: 106 BPM | OXYGEN SATURATION: 99 % | BODY MASS INDEX: 21.24 KG/M2 | DIASTOLIC BLOOD PRESSURE: 78 MMHG

## 2018-09-05 DIAGNOSIS — F41.1 GENERALIZED ANXIETY DISORDER: ICD-10-CM

## 2018-09-05 DIAGNOSIS — H66.002 ACUTE SUPPURATIVE OTITIS MEDIA OF LEFT EAR WITHOUT SPONTANEOUS RUPTURE OF TYMPANIC MEMBRANE, RECURRENCE NOT SPECIFIED: Primary | ICD-10-CM

## 2018-09-05 PROCEDURE — 99213 OFFICE O/P EST LOW 20 MIN: CPT | Performed by: NURSE PRACTITIONER

## 2018-09-05 RX ORDER — AMOXICILLIN 500 MG/1
500 TABLET, FILM COATED ORAL 2 TIMES DAILY
Qty: 14 TABLET | Refills: 0 | Status: SHIPPED | OUTPATIENT
Start: 2018-09-05 | End: 2018-09-12

## 2018-09-05 RX ORDER — VENLAFAXINE HYDROCHLORIDE 75 MG/1
75 CAPSULE, EXTENDED RELEASE ORAL DAILY
Qty: 30 CAPSULE | Refills: 0 | Status: SHIPPED | OUTPATIENT
Start: 2018-09-05 | End: 2018-09-28 | Stop reason: SDUPTHER

## 2018-09-05 RX ORDER — DEXAMETHASONE 4 MG/1
TABLET ORAL
Refills: 0 | COMMUNITY
Start: 2018-08-01 | End: 2018-09-05 | Stop reason: ALTCHOICE

## 2018-09-05 NOTE — PROGRESS NOTES
Assessment/Plan:    Generalized anxiety disorder  Increase effexor dose  Call with any worsening depression/suicidal thoughts        Diagnoses and all orders for this visit:    Acute suppurative otitis media of left ear without spontaneous rupture of tympanic membrane, recurrence not specified  -     amoxicillin (AMOXIL) 500 MG tablet; Take 1 tablet (500 mg total) by mouth 2 (two) times a day for 7 days    Generalized anxiety disorder  -     venlafaxine (EFFEXOR-XR) 75 mg 24 hr capsule; Take 1 capsule (75 mg total) by mouth daily    Other orders  -     Discontinue: dexamethasone (DECADRON) 4 mg tablet; TAKE 3 TABS IN THE AM FOR 1 DAYS, 2 TABS IN THE AM FOR 1 DAY THEN 1 TAB FOR 1 DAY FOR 3 DAYS          Subjective:      Patient ID: Kandace Sena is a 34 y o  female  Here for left earache, sore throat, nausea, and dry cough   Started Sunday   Denies fever, congestion       Started on effexor for anxiety/depression about 2 months ago  Saw neurology for vertigo/migraines, they recommend increase to 75 mg   She feels good on the effexor, denies any side effects other than mild occasional upset stomach         The following portions of the patient's history were reviewed and updated as appropriate: allergies, current medications, past family history, past medical history, past social history, past surgical history and problem list     Review of Systems   Constitutional: Negative  HENT: Positive for ear pain and sore throat  Negative for ear discharge and hearing loss  Eyes: Negative  Respiratory: Positive for cough  Negative for chest tightness, shortness of breath and wheezing  Cardiovascular: Negative  Gastrointestinal: Negative  Musculoskeletal: Negative for myalgias  Skin: Negative  Neurological: Negative  Psychiatric/Behavioral: Negative            Objective:      /78   Pulse (!) 106   Temp 98 6 °F (37 °C)   Ht 5' (1 524 m)   Wt 49 1 kg (108 lb 3 2 oz)   SpO2 99% BMI 21 13 kg/m²          Physical Exam   Constitutional: She is oriented to person, place, and time  She appears well-developed and well-nourished  HENT:   Right Ear: External ear normal    Left Ear: Tympanic membrane is erythematous and bulging  A middle ear effusion is present  Mouth/Throat: Posterior oropharyngeal erythema present  No oropharyngeal exudate or posterior oropharyngeal edema  Cardiovascular: Normal rate, regular rhythm and normal heart sounds  Pulmonary/Chest: Effort normal and breath sounds normal    Lymphadenopathy:     She has no cervical adenopathy  Neurological: She is alert and oriented to person, place, and time  Psychiatric: She has a normal mood and affect  Her behavior is normal    Vitals reviewed

## 2018-09-06 NOTE — PROGRESS NOTES
Pt last seen on 7/9 due to lack of insurance reimbursement coverage and pending auth pt was not seen, follow up w/ pt several weeks later pt denied interest and/or need for further OT services, D/C from OT caseload, D/c OT  Should pt return will require new script, pt aware  Thank you for the consult!   Please call if you have any questions: [phone]  JUDY Connors, OTR/L, C-GCM, CSRS  Director of Outpatient Neuro Occupational Therapy

## 2018-09-10 ENCOUNTER — TELEPHONE (OUTPATIENT)
Dept: OBGYN CLINIC | Facility: CLINIC | Age: 28
End: 2018-09-10

## 2018-09-10 DIAGNOSIS — N89.8 VAGINAL ITCHING: Primary | ICD-10-CM

## 2018-09-10 RX ORDER — FLUCONAZOLE 150 MG/1
150 TABLET ORAL ONCE
Qty: 2 TABLET | Refills: 0 | Status: SHIPPED | OUTPATIENT
Start: 2018-09-10 | End: 2018-09-10

## 2018-09-10 NOTE — TELEPHONE ENCOUNTER
Pt having vag itching, no noticeable discharge - on antibiotic for ear inf - has 2 days to complete - please sign off on diflucan presc in [facility]

## 2018-09-10 NOTE — TELEPHONE ENCOUNTER
Pt thinks might have yeast infection  Itching  Is on amoxicillin right now for ear infection   Please call [phone]

## 2018-09-10 NOTE — PROGRESS NOTES
Was awaiting authorization approval from insurance company  Was approved for 3 more visits in a minimal amount of time   Patient was called, voicemail left to call and schedule, however patient did not return phone calls and was lost to follow-up

## 2018-09-11 ENCOUNTER — HOSPITAL ENCOUNTER (EMERGENCY)
Facility: HOSPITAL | Age: 28
Discharge: HOME | End: 2018-09-11
Attending: EMERGENCY MEDICINE | Admitting: EMERGENCY MEDICINE
Payer: COMMERCIAL

## 2018-09-11 VITALS
SYSTOLIC BLOOD PRESSURE: 115 MMHG | BODY MASS INDEX: 21.86 KG/M2 | RESPIRATION RATE: 20 BRPM | HEART RATE: 86 BPM | WEIGHT: 111.33 LBS | HEIGHT: 60 IN | DIASTOLIC BLOOD PRESSURE: 73 MMHG | TEMPERATURE: 97.9 F | OXYGEN SATURATION: 98 %

## 2018-09-11 DIAGNOSIS — N39.0 ACUTE URINARY TRACT INFECTION: Primary | ICD-10-CM

## 2018-09-11 LAB
BACTERIA UR QL AUTO: ABNORMAL /HPF
BILIRUB UR QL STRIP: NEGATIVE
CLARITY UR: CLEAR
COLOR UR: ABNORMAL
EXT PREG TEST URINE: NEGATIVE
GLUCOSE UR STRIP-MCNC: NEGATIVE MG/DL
HGB UR QL STRIP.AUTO: ABNORMAL
KETONES UR STRIP-MCNC: NEGATIVE MG/DL
LEUKOCYTE ESTERASE UR QL STRIP: ABNORMAL
NITRITE UR QL STRIP: POSITIVE
NON-SQ EPI CELLS URNS QL MICRO: ABNORMAL /HPF
PH UR STRIP.AUTO: 6.5 [PH] (ref 4.5–8)
PROT UR STRIP-MCNC: NEGATIVE MG/DL
RBC #/AREA URNS AUTO: ABNORMAL /HPF
SP GR UR STRIP.AUTO: <=1.005 (ref 1–1.03)
UROBILINOGEN UR QL STRIP.AUTO: 1 E.U./DL
WBC #/AREA URNS AUTO: ABNORMAL /HPF

## 2018-09-11 PROCEDURE — 99283 EMERGENCY DEPT VISIT LOW MDM: CPT

## 2018-09-11 PROCEDURE — 81025 URINE PREGNANCY TEST: CPT | Performed by: EMERGENCY MEDICINE

## 2018-09-11 PROCEDURE — 81001 URINALYSIS AUTO W/SCOPE: CPT | Performed by: EMERGENCY MEDICINE

## 2018-09-11 RX ORDER — PHENAZOPYRIDINE HYDROCHLORIDE 100 MG/1
100 TABLET, FILM COATED ORAL ONCE
Status: COMPLETED | OUTPATIENT
Start: 2018-09-11 | End: 2018-09-11

## 2018-09-11 RX ORDER — CEPHALEXIN 250 MG/1
500 CAPSULE ORAL EVERY 6 HOURS SCHEDULED
Status: DISCONTINUED | OUTPATIENT
Start: 2018-09-11 | End: 2018-09-11 | Stop reason: HOSPADM

## 2018-09-11 RX ORDER — CEPHALEXIN 500 MG/1
500 CAPSULE ORAL 3 TIMES DAILY
Qty: 30 CAPSULE | Refills: 0 | Status: SHIPPED | OUTPATIENT
Start: 2018-09-11 | End: 2018-09-21

## 2018-09-11 RX ORDER — IBUPROFEN 400 MG/1
400 TABLET ORAL ONCE
Status: COMPLETED | OUTPATIENT
Start: 2018-09-11 | End: 2018-09-11

## 2018-09-11 RX ORDER — ACETAMINOPHEN 325 MG/1
650 TABLET ORAL ONCE
Status: COMPLETED | OUTPATIENT
Start: 2018-09-11 | End: 2018-09-11

## 2018-09-11 RX ADMIN — ACETAMINOPHEN 650 MG: 325 TABLET, FILM COATED ORAL at 01:42

## 2018-09-11 RX ADMIN — CEPHALEXIN 500 MG: 250 CAPSULE ORAL at 01:42

## 2018-09-11 RX ADMIN — IBUPROFEN 400 MG: 400 TABLET, FILM COATED ORAL at 01:42

## 2018-09-11 RX ADMIN — PHENAZOPYRIDINE HYDROCHLORIDE 100 MG: 100 TABLET ORAL at 01:42

## 2018-09-11 NOTE — DISCHARGE INSTRUCTIONS
Urinary Tract Infection in Women, Ambulatory Care   GENERAL INFORMATION:   A urinary tract infection (UTI)  is caused by bacteria that get inside your urinary tract  Most bacteria that enter your urinary tract are expelled when you urinate  If the bacteria stay in your urinary tract, you may get an infection  Your urinary tract includes your kidneys, ureters, bladder, and urethra  Urine is made in your kidneys, and it flows from the ureters to the bladder  Urine leaves the bladder through the urethra  A UTI is more common in your lower urinary tract, which includes your bladder and urethra  Common symptoms include the following:   · Urinating more often or waking from sleep to urinate    · Pain or burning when you urinate    · Pain or pressure in your lower abdomen     · Urine that smells bad    · Blood in your urine    · Leaking urine  Seek immediate care for the following symptoms:   · Urinating very little or not at all    · Vomiting    · Shaking chills with a fever    · Side or back pain that gets worse  Treatment for a UTI  may include medicines to treat a bacterial infection  You may also need medicines to decrease pain and burning, or decrease the urge to urinate often  Prevent a UTI:   · Urinate when you feel the urge  Do not hold your urine  Urinate as soon as you feel you have to  · Drink liquids as directed  Ask how much liquid to drink each day and which liquids are best for you  You may need to drink more fluids than usual to help flush out the bacteria  Do not drink alcohol, caffeine, and citrus juices  These can irritate your bladder and increase your symptoms  · Apply heat  on your abdomen for 20 to 30 minutes every 2 hours for as many days as directed  Heat helps decrease discomfort and pressure in your bladder  Follow up with your healthcare provider as directed:  Write down your questions so you remember to ask them during your visits     CARE AGREEMENT:   You have the right to help plan your care  Learn about your health condition and how it may be treated  Discuss treatment options with your caregivers to decide what care you want to receive  You always have the right to refuse treatment  The above information is an  only  It is not intended as medical advice for individual conditions or treatments  Talk to your doctor, nurse or pharmacist before following any medical regimen to see if it is safe and effective for you  © 2014 [address] is for End User's use only and may not be sold, redistributed or otherwise used for commercial purposes  All illustrations and images included in CareNotes® are the copyrighted property of [employer]  or Andi Hunter

## 2018-09-11 NOTE — ED NOTES
Discharge instruction given to patient  Medication and regimen reviewed with patient  No questions or concerns at this time  Patient able to ambulate out without difficulty        Tavares Tobin RN  09/11/18 9727

## 2018-09-11 NOTE — ED PROVIDER NOTES
History  Chief Complaint   Patient presents with    Painful Urination     Buring during urination and itching, noted blood after urination, frequent UTI's  Took a one time dose of diflucan, burning started within 1 hr, taking amoxicillin for a URI  History provided by:  Patient and parent  Difficulty Urinating   Pain quality:  Burning  Pain severity:  Severe  Onset quality:  Gradual  Duration:  1 day  Timing:  Constant  Progression:  Worsening  Chronicity:  New  Relieved by:  None tried  Worsened by:  Nothing  Ineffective treatments:  None tried  Urinary symptoms: frequent urination    Associated symptoms: no abdominal pain, no fever, no flank pain, no nausea, no vaginal discharge and no vomiting    Risk factors: no hx of pyelonephritis and not pregnant        Prior to Admission Medications   Prescriptions Last Dose Informant Patient Reported? Taking?    ALPRAZolam (XANAX) 0 25 mg tablet Past Week at Unknown time Self No Yes   Sig: Take 1 tablet (0 25 mg total) by mouth daily at bedtime as needed for sleep   amoxicillin (AMOXIL) 500 MG tablet 9/11/2018 at Unknown time  No Yes   Sig: Take 1 tablet (500 mg total) by mouth 2 (two) times a day for 7 days   desonide (DESOWEN) 0 05 % cream 9/11/2018 at Unknown time Self No Yes   Sig: Apply topically 2 (two) times a day   fluconazole (DIFLUCAN) 150 mg tablet   No No   Sig: Take 1 tablet (150 mg total) by mouth once for 1 dose Can repeat in 2 days if symptoms unimproved   lubiprostone (AMITIZA) 8 mcg capsule 9/11/2018 at Unknown time Self Yes Yes   Sig: Take by mouth   nitrofurantoin (MACRODANTIN) 50 mg capsule 9/11/2018 at Unknown time Self Yes Yes   Sig: Take by mouth   venlafaxine (EFFEXOR-XR) 75 mg 24 hr capsule 9/11/2018 at Unknown time  No Yes   Sig: Take 1 capsule (75 mg total) by mouth daily      Facility-Administered Medications: None       Past Medical History:   Diagnosis Date    Anxiety     Contraceptive use     LAST ASSESSED: [id number]    Depression  External hemorrhoid, bleeding     LAST ASSESSED: [id number]    Gallbladder attack     Hematochezia     LAST ASSESSED: [id number]    IBS (irritable bowel syndrome)     Immunity status testing     LAST ASSESSED: [id number]    Methicillin resistant Staphylococcus aureus infection     Migraine     Poison ivy dermatitis     LAST ASSESSED: [id number]    Psychiatric disorder     Skin abscess     RIGHT LEG    Urinary frequency     LAST ASSESSED: [id number]    UTI (urinary tract infection)        Past Surgical History:   Procedure Laterality Date    TONSILLECTOMY      ONSET: 1997    WISDOM TOOTH EXTRACTION         Family History   Problem Relation Age of Onset    Thyroid disease Mother     Thyroid disease Father     Cancer Family         MALIGNANT NEOPLASM     I have reviewed and agree with the history as documented  Social History   Substance Use Topics    Smoking status: Current Every Day Smoker     Packs/day: 0 25     Types: Cigarettes    Smokeless tobacco: Never Used    Alcohol use No      Comment: occasional, SOCIAL        Review of Systems   Constitutional: Negative for activity change, chills, diaphoresis and fever  HENT: Negative for congestion, sinus pressure and sore throat  Eyes: Negative for pain and visual disturbance  Respiratory: Negative for cough, chest tightness, shortness of breath, wheezing and stridor  Cardiovascular: Negative for chest pain and palpitations  Gastrointestinal: Negative for abdominal distention, abdominal pain, constipation, diarrhea, nausea and vomiting  Genitourinary: Positive for dysuria  Negative for flank pain, frequency and vaginal discharge  Musculoskeletal: Negative for neck pain and neck stiffness  Skin: Negative for rash  Neurological: Negative for dizziness, speech difficulty, light-headedness, numbness and headaches  Physical Exam  Physical Exam   Constitutional: She is oriented to person, place, and time   She appears well-developed  No distress  HENT:   Head: Normocephalic and atraumatic  Eyes: Pupils are equal, round, and reactive to light  Neck: Normal range of motion  Neck supple  No tracheal deviation present  Cardiovascular: Normal rate, regular rhythm, normal heart sounds and intact distal pulses  No murmur heard  Pulmonary/Chest: Effort normal and breath sounds normal  No stridor  No respiratory distress  Abdominal: Soft  She exhibits no distension  There is no tenderness  There is no rebound and no guarding  No CVA tenderness, nontender to deep palpation all 4 quadrants mild suprapubic tenderness   Musculoskeletal: Normal range of motion  Neurological: She is alert and oriented to person, place, and time  Skin: Skin is warm and dry  She is not diaphoretic  No erythema  No pallor  Psychiatric: She has a normal mood and affect  Vitals reviewed        Vital Signs  ED Triage Vitals [09/11/18 0016]   Temperature Pulse Respirations Blood Pressure SpO2   97 9 °F (36 6 °C) 86 20 115/73 98 %      Temp Source Heart Rate Source Patient Position - Orthostatic VS BP Location FiO2 (%)   Oral Monitor Sitting Right arm --      Pain Score       No Pain           Vitals:    09/11/18 0016   BP: 115/73   Pulse: 86   Patient Position - Orthostatic VS: Sitting       Visual Acuity      ED Medications  Medications   phenazopyridine (PYRIDIUM) tablet 100 mg (not administered)   cephalexin (KEFLEX) capsule 500 mg (not administered)   acetaminophen (TYLENOL) tablet 650 mg (not administered)   ibuprofen (MOTRIN) tablet 400 mg (not administered)       Diagnostic Studies  Results Reviewed     Procedure Component Value Units Date/Time    UA w Reflex to Microscopic w Reflex to Culture [[id number]]  (Abnormal) Collected:  09/11/18 0107    Lab Status:  Final result Specimen:  Urine from Urine, Clean Catch Updated:  09/11/18 0124     Color, UA Orange     Clarity, UA Clear     Specific Gravity, UA <=1 005     pH, UA 6 5     Leukocytes, UA Small (A)     Nitrite, UA Positive (A)     Protein, UA Negative mg/dl      Glucose, UA Negative mg/dl      Ketones, UA Negative mg/dl      Urobilinogen, UA 1 0 E U /dl      Bilirubin, UA Negative     Blood, UA Trace-Intact (A)    Urine Microscopic [[id number]]  (Abnormal) Collected:  09/11/18 0107    Lab Status:  Final result Specimen:  Urine from Urine, Clean Catch Updated:  09/11/18 0124     RBC, UA 0-1 (A) /hpf      WBC, UA 4-10 (A) /hpf      Epithelial Cells None Seen /hpf      Bacteria, UA Occasional /hpf     POCT pregnancy, urine [[id number]]  (Normal) Resulted:  09/11/18 0110    Lab Status:  Final result Updated:  09/11/18 0110     EXT PREG TEST UR (Ref: Negative) Negative                 No orders to display              Procedures  Procedures       Phone Contacts  ED Phone Contact    ED Course                               MDM  Number of Diagnoses or Management Options  Acute urinary tract infection: new and requires workup  Diagnosis management comments: History of urinary tract infection, nitrite leukocyte positive urine will treat with Keflex       Amount and/or Complexity of Data Reviewed  Clinical lab tests: ordered and reviewed  Decide to obtain previous medical records or to obtain history from someone other than the patient: yes  Obtain history from someone other than the patient: yes  Review and summarize past medical records: yes      CritCare Time    Disposition  Final diagnoses:   Acute urinary tract infection     Time reflects when diagnosis was documented in both MDM as applicable and the Disposition within this note     Time User Action Codes Description Comment    9/11/2018  1:38 AM Isha Bonds Add [N39 0] Acute urinary tract infection       ED Disposition     ED Disposition Condition Comment    Discharge  [address] discharge to home/self care      Condition at discharge: Good        Follow-up Information    None         Patient's Medications   Discharge Prescriptions CEPHALEXIN (KEFLEX) 500 MG CAPSULE    Take 1 capsule (500 mg total) by mouth 3 (three) times a day for 10 days       Start Date: 9/11/2018 End Date: 9/21/2018       Order Dose: 500 mg       Quantity: 30 capsule    Refills: 0     No discharge procedures on file      ED Provider  Electronically Signed by           Arnulfo Neal DO  09/11/18 6089

## 2018-09-14 ENCOUNTER — TELEPHONE (OUTPATIENT)
Dept: OBGYN CLINIC | Facility: CLINIC | Age: 28
End: 2018-09-14

## 2018-09-14 NOTE — TELEPHONE ENCOUNTER
Lm pt's as re: no urine C&S done in ER 9/11/18 when seen for poss UTI - pt was tx - recom to complete med as prescibed & recall if sx persist or recur

## 2018-09-24 ENCOUNTER — TELEPHONE (OUTPATIENT)
Dept: OBGYN CLINIC | Facility: CLINIC | Age: 28
End: 2018-09-24

## 2018-09-24 NOTE — TELEPHONE ENCOUNTER
Pt states she was seen @ [facility] on 9/22/18 - hematuria - urine culture sent out - also had internal exam - vag discharge - told poss yeast - given Diflucan  Pt still having urinary urgency & voiding lesser amts, also vag burning  Pt had called for urine culture results - told no growth   sched appt for 9/25/18 r/o vag inf, may need urology f/u

## 2018-09-26 ENCOUNTER — OFFICE VISIT (OUTPATIENT)
Dept: OBGYN CLINIC | Facility: CLINIC | Age: 28
End: 2018-09-26
Payer: COMMERCIAL

## 2018-09-26 VITALS
BODY MASS INDEX: 21.99 KG/M2 | DIASTOLIC BLOOD PRESSURE: 68 MMHG | HEIGHT: 60 IN | WEIGHT: 112 LBS | SYSTOLIC BLOOD PRESSURE: 112 MMHG

## 2018-09-26 DIAGNOSIS — N76.0 ACUTE VAGINITIS: ICD-10-CM

## 2018-09-26 DIAGNOSIS — N94.9 VAGINAL BURNING: Primary | ICD-10-CM

## 2018-09-26 LAB
SL AMB  POCT GLUCOSE, UA: NEGATIVE
SL AMB LEUKOCYTE ESTERASE,UA: 70
SL AMB POCT BILIRUBIN,UA: NEGATIVE
SL AMB POCT BLOOD,UA: POSITIVE
SL AMB POCT CLARITY,UA: ABNORMAL
SL AMB POCT COLOR,UA: ABNORMAL
SL AMB POCT KETONES,UA: NEGATIVE
SL AMB POCT NITRITE,UA: NEGATIVE
SL AMB POCT PH,UA: NEGATIVE
SL AMB POCT SPECIFIC GRAVITY,UA: 1.01
SL AMB POCT URINE PROTEIN: ABNORMAL
SL AMB POCT UROBILINOGEN: NEGATIVE

## 2018-09-26 PROCEDURE — 87591 N.GONORRHOEAE DNA AMP PROB: CPT | Performed by: NURSE PRACTITIONER

## 2018-09-26 PROCEDURE — 81002 URINALYSIS NONAUTO W/O SCOPE: CPT | Performed by: NURSE PRACTITIONER

## 2018-09-26 PROCEDURE — 87491 CHLMYD TRACH DNA AMP PROBE: CPT | Performed by: NURSE PRACTITIONER

## 2018-09-26 PROCEDURE — 87480 CANDIDA DNA DIR PROBE: CPT | Performed by: NURSE PRACTITIONER

## 2018-09-26 PROCEDURE — 99213 OFFICE O/P EST LOW 20 MIN: CPT | Performed by: NURSE PRACTITIONER

## 2018-09-26 PROCEDURE — 87086 URINE CULTURE/COLONY COUNT: CPT | Performed by: NURSE PRACTITIONER

## 2018-09-26 PROCEDURE — 87510 GARDNER VAG DNA DIR PROBE: CPT | Performed by: NURSE PRACTITIONER

## 2018-09-26 PROCEDURE — 87660 TRICHOMONAS VAGIN DIR PROBE: CPT | Performed by: NURSE PRACTITIONER

## 2018-09-26 NOTE — PROGRESS NOTES
Tarik Nicholson is a 34 y o  female who presents for evaluation of an abnormal vaginal discharge  Symptoms have been present for 3 weeks  She has been treated for an ear infection and presumed UTI dx at [facility]  She has also had 2 doses of Diflucan  Symptoms have not resolved  Vaginal symptoms: burning, discharge described as white, local irritation and vulvar itching  Contraception: IUD  She denies abnormal bleeding, blisters, bumps, dyspareunia, local irritation, post coital bleeding and tears Sexually transmitted infection risk: very low risk of STD exposure  The following portions of the patient's history were reviewed and updated as appropriate: allergies, current medications, past family history, past medical history, past social history, past surgical history and problem list     Review of Systems  Pertinent items are noted in HPI       Objective     Physical Exam   Constitutional: She is oriented to person, place, and time  Vital signs are normal  She appears well-developed and well-nourished  Genitourinary: Pelvic exam was performed with patient supine  Cervix is nulliparous  Cervix exhibits visible IUD strings  Cervix does not exhibit discharge or friability  Genitourinary Comments: The start of a menses is noted on exam     HENT:   Head: Normocephalic and atraumatic  Eyes: Conjunctivae are normal    Neck: Neck supple  Neurological: She is alert and oriented to person, place, and time  Skin: Skin is warm  Nursing note and vitals reviewed  POCT urine dip in office + leukocytes and blood  Assessment/Plan     Vanna Gilford was seen today for vaginal discharge  Diagnoses and all orders for this visit:    Vaginal burning  -     POCT urine dip  -     Urine culture    Acute vaginitis  -     VAGINOSIS DNA PROBE (AFFIRM)  -     Chlamydia/GC amplified DNA by PCR      Patient informed of exam findings   Will call patient with urine culture and genital culture results when available  If treatment is warranted will sent to pharmacy on file  I advised patient to apply monistat external around the introitus nightly  All questions and concerns addressed and patient verbalized understanding

## 2018-09-27 LAB
BACTERIA UR CULT: NORMAL
CHLAMYDIA DNA CVX QL NAA+PROBE: NORMAL
N GONORRHOEA DNA GENITAL QL NAA+PROBE: NORMAL

## 2018-09-28 ENCOUNTER — TELEPHONE (OUTPATIENT)
Dept: OBGYN CLINIC | Facility: CLINIC | Age: 28
End: 2018-09-28

## 2018-09-28 DIAGNOSIS — F41.1 GENERALIZED ANXIETY DISORDER: ICD-10-CM

## 2018-09-28 RX ORDER — VENLAFAXINE HYDROCHLORIDE 75 MG/1
CAPSULE, EXTENDED RELEASE ORAL
Qty: 30 CAPSULE | Refills: 0 | Status: SHIPPED | OUTPATIENT
Start: 2018-09-28 | End: 2018-10-22 | Stop reason: SDUPTHER

## 2018-09-28 NOTE — TELEPHONE ENCOUNTER
Patient is calling in to see if you can send a script for her Effexor to the pharmacy  She is leaving [facility] for [place] and only has a few pills left      Please advise

## 2018-09-29 LAB
CANDIDA RRNA VAG QL PROBE: NEGATIVE
G VAGINALIS RRNA GENITAL QL PROBE: NEGATIVE
T VAGINALIS RRNA GENITAL QL PROBE: NEGATIVE

## 2018-10-01 ENCOUNTER — TELEPHONE (OUTPATIENT)
Dept: OBGYN CLINIC | Facility: CLINIC | Age: 28
End: 2018-10-01

## 2018-10-10 ENCOUNTER — TELEPHONE (OUTPATIENT)
Dept: NEUROLOGY | Facility: CLINIC | Age: 28
End: 2018-10-10

## 2018-10-11 ENCOUNTER — TELEPHONE (OUTPATIENT)
Dept: NEUROLOGY | Facility: CLINIC | Age: 28
End: 2018-10-11

## 2018-10-18 DIAGNOSIS — N30.20 CHRONIC CYSTITIS: Primary | ICD-10-CM

## 2018-10-18 RX ORDER — NITROFURANTOIN MACROCRYSTALS 50 MG/1
50 CAPSULE ORAL AS NEEDED
Qty: 30 CAPSULE | Refills: 2 | Status: SHIPPED | OUTPATIENT
Start: 2018-10-18 | End: 2019-05-10 | Stop reason: SDUPTHER

## 2018-10-22 ENCOUNTER — OFFICE VISIT (OUTPATIENT)
Dept: INTERNAL MEDICINE CLINIC | Facility: CLINIC | Age: 28
End: 2018-10-22
Payer: COMMERCIAL

## 2018-10-22 VITALS
HEART RATE: 102 BPM | BODY MASS INDEX: 21.52 KG/M2 | WEIGHT: 110.2 LBS | RESPIRATION RATE: 16 BRPM | OXYGEN SATURATION: 97 % | SYSTOLIC BLOOD PRESSURE: 110 MMHG | DIASTOLIC BLOOD PRESSURE: 62 MMHG | TEMPERATURE: 98 F

## 2018-10-22 DIAGNOSIS — G43.709 CHRONIC MIGRAINE WITHOUT AURA WITHOUT STATUS MIGRAINOSUS, NOT INTRACTABLE: ICD-10-CM

## 2018-10-22 DIAGNOSIS — J02.9 PHARYNGITIS, UNSPECIFIED ETIOLOGY: Primary | ICD-10-CM

## 2018-10-22 DIAGNOSIS — F41.1 GENERALIZED ANXIETY DISORDER: ICD-10-CM

## 2018-10-22 PROBLEM — H81.10 BENIGN PAROXYSMAL POSITIONAL VERTIGO: Status: RESOLVED | Noted: 2017-10-26 | Resolved: 2018-10-22

## 2018-10-22 PROBLEM — R42 DIZZINESS: Status: RESOLVED | Noted: 2018-05-07 | Resolved: 2018-10-22

## 2018-10-22 PROBLEM — G43.809 VESTIBULAR MIGRAINE: Status: RESOLVED | Noted: 2018-07-23 | Resolved: 2018-10-22

## 2018-10-22 PROCEDURE — 99214 OFFICE O/P EST MOD 30 MIN: CPT | Performed by: INTERNAL MEDICINE

## 2018-10-22 RX ORDER — VENLAFAXINE HYDROCHLORIDE 75 MG/1
75 CAPSULE, EXTENDED RELEASE ORAL DAILY
Qty: 30 CAPSULE | Refills: 5 | Status: SHIPPED | OUTPATIENT
Start: 2018-10-22 | End: 2018-12-04 | Stop reason: SDUPTHER

## 2018-10-22 RX ORDER — FLUTICASONE PROPIONATE 50 MCG
SPRAY, SUSPENSION (ML) NASAL
Refills: 6 | COMMUNITY
Start: 2018-09-28 | End: 2019-01-21 | Stop reason: ALTCHOICE

## 2018-10-22 NOTE — PROGRESS NOTES
Assessment/Plan:  Likely viral  Increase oral fluids  Cont Flonase and try Mucinex prn  Salt water gargles  Lidocaine prn  Call if not improving  Cont Effexor  She also has 37 5mg do I advised that on those days she feels more tired, only take the lower dose  Medical marijuana has helped with her headaches and anxiety  She will look into this further     Problem List Items Addressed This Visit        Cardiovascular and Mediastinum    Chronic migraine without aura without status migrainosus, not intractable    Relevant Medications    venlafaxine (EFFEXOR-XR) 75 mg 24 hr capsule    Riboflavin (B-2-400) 400 MG CAPS       Other    Generalized anxiety disorder    Relevant Medications    venlafaxine (EFFEXOR-XR) 75 mg 24 hr capsule      Other Visit Diagnoses     Pharyngitis, unspecified etiology    -  Primary    Relevant Medications    lidocaine viscous (XYLOCAINE) 2 % mucosal solution            Subjective:      Patient ID: Bill Enriquez is a 34 y o  female  HPI  Scratchy throat 3 days ago, severe last night  Stuffy nose, post nasal drip  Cough  Flonase Dayquil Airborne, tea  Sees Dr Ileana Winchester for chronic migraines, she is on B2 vitamins  The Effexor has helped but has caused some fatigue at the 75mg  She also is on it for anxiety which has been stable  She has not needed the Xanax but admits to trying medical marijuana (her brother's) and this has helped tremendously      The following portions of the patient's history were reviewed and updated as appropriate: allergies, current medications, past family history, past medical history, past social history, past surgical history and problem list     Review of Systems   Constitutional: Positive for fatigue (since raising the Effexor to 75mg))  Negative for fever and unexpected weight change  HENT: Positive for postnasal drip, sinus pressure and sore throat  Negative for ear pain, hearing loss and sinus pain      Respiratory: Negative for cough, shortness of breath and wheezing  Cardiovascular: Positive for palpitations (occasional)  Negative for chest pain and leg swelling  Gastrointestinal: Negative for abdominal pain, constipation, diarrhea, nausea (just this morning from OTC ,meds?) and vomiting  Genitourinary: Positive for menstrual problem (irregular)  Negative for difficulty urinating, dysuria, vaginal discharge and vaginal pain  Musculoskeletal: Negative for arthralgias and myalgias  Neurological: Positive for headaches (On Effexor 75mg daily; sees Dr Lindy Beverly, started B2 vitamins recommended by him)  Negative for dizziness  Psychiatric/Behavioral: Positive for sleep disturbance (able to fall back to sleep easily)  The patient is nervous/anxious (stable on Effexor 75mg)  Objective:      /62 (BP Location: Left arm, Patient Position: Sitting, Cuff Size: Standard)   Pulse 102   Temp 98 °F (36 7 °C)   Resp 16   Wt 50 kg (110 lb 3 2 oz)   SpO2 97%   BMI 21 52 kg/m²          Physical Exam   Constitutional: She is oriented to person, place, and time  She appears well-developed and well-nourished  HENT:   Head: Normocephalic and atraumatic  Right Ear: External ear normal    Left Ear: External ear normal    Mouth/Throat: Oropharynx is clear and moist    Eyes: Conjunctivae are normal    Neck: Neck supple  Cardiovascular: Normal rate, regular rhythm and normal heart sounds  No murmur heard  Pulmonary/Chest: Effort normal and breath sounds normal  No respiratory distress  She has no wheezes  She has no rales  Abdominal: Soft  Bowel sounds are normal  She exhibits no distension and no mass  There is no tenderness  There is no rebound and no guarding  Musculoskeletal: Normal range of motion  Neurological: She is alert and oriented to person, place, and time  Skin: Skin is warm and dry  Psychiatric: She has a normal mood and affect   Her behavior is normal  Judgment and thought content normal

## 2018-10-23 ENCOUNTER — TELEPHONE (OUTPATIENT)
Dept: INTERNAL MEDICINE CLINIC | Facility: CLINIC | Age: 28
End: 2018-10-23

## 2018-10-23 DIAGNOSIS — J06.9 UPPER RESPIRATORY TRACT INFECTION, UNSPECIFIED TYPE: Primary | ICD-10-CM

## 2018-10-23 RX ORDER — AZITHROMYCIN 250 MG/1
TABLET, FILM COATED ORAL
Qty: 6 TABLET | Refills: 0 | Status: SHIPPED | OUTPATIENT
Start: 2018-10-23 | End: 2018-10-27

## 2018-10-23 NOTE — TELEPHONE ENCOUNTER
Pt called in to advise you that she is not feeling any better today - she actually feels worse and states it has started to go into her head she feels congested runny nose etc  Wanted to see if something could be sent in for her  She is also asking if a note can be written as she didn't go to work yesterday or today   Please advise, ty

## 2018-12-04 ENCOUNTER — OFFICE VISIT (OUTPATIENT)
Dept: INTERNAL MEDICINE CLINIC | Facility: CLINIC | Age: 28
End: 2018-12-04
Payer: COMMERCIAL

## 2018-12-04 VITALS
DIASTOLIC BLOOD PRESSURE: 62 MMHG | HEART RATE: 116 BPM | OXYGEN SATURATION: 98 % | BODY MASS INDEX: 22.42 KG/M2 | RESPIRATION RATE: 16 BRPM | HEIGHT: 60 IN | TEMPERATURE: 98.9 F | WEIGHT: 114.2 LBS | SYSTOLIC BLOOD PRESSURE: 100 MMHG

## 2018-12-04 DIAGNOSIS — Z23 NEED FOR INFLUENZA VACCINATION: ICD-10-CM

## 2018-12-04 DIAGNOSIS — K58.1 IRRITABLE BOWEL SYNDROME WITH CONSTIPATION: ICD-10-CM

## 2018-12-04 DIAGNOSIS — F41.1 GENERALIZED ANXIETY DISORDER: Primary | ICD-10-CM

## 2018-12-04 DIAGNOSIS — G43.709 CHRONIC MIGRAINE WITHOUT AURA WITHOUT STATUS MIGRAINOSUS, NOT INTRACTABLE: ICD-10-CM

## 2018-12-04 PROCEDURE — 99214 OFFICE O/P EST MOD 30 MIN: CPT | Performed by: INTERNAL MEDICINE

## 2018-12-04 PROCEDURE — 3008F BODY MASS INDEX DOCD: CPT | Performed by: INTERNAL MEDICINE

## 2018-12-04 RX ORDER — VENLAFAXINE HYDROCHLORIDE 37.5 MG/1
37.5 CAPSULE, EXTENDED RELEASE ORAL DAILY
Qty: 30 CAPSULE | Refills: 2 | Status: SHIPPED | OUTPATIENT
Start: 2018-12-04 | End: 2019-04-09 | Stop reason: ALTCHOICE

## 2018-12-04 NOTE — PROGRESS NOTES
Assessment/Plan:    Irritable bowel syndrome  Controlled on Amitiza    Chronic migraine without aura without status migrainosus, not intractable  Stable, on B2  She is to call Dr Donna Huang if this returns as we wean off Effexor    Generalized anxiety disorder  Because of fatigue and nausea, will cut back on Effexor to 37 5mg  Call with any worsening symptoms         Problem List Items Addressed This Visit        Digestive    Irritable bowel syndrome     Controlled on Amitiza            Cardiovascular and Mediastinum    Chronic migraine without aura without status migrainosus, not intractable     Stable, on B2  She is to call Dr Donna Huang if this returns as we wean off Effexor         Relevant Medications    venlafaxine (EFFEXOR-XR) 37 5 mg 24 hr capsule       Other    Generalized anxiety disorder - Primary     Because of fatigue and nausea, will cut back on Effexor to 37 5mg  Call with any worsening symptoms         Relevant Medications    venlafaxine (EFFEXOR-XR) 37 5 mg 24 hr capsule      Other Visit Diagnoses     Need for influenza vaccination                Subjective:      Patient ID: Wilfredo Mota is a 34 y o  female  HPI  She was placed  on 37 5mg of Effexor in May for anxiety, it was increased to 75mg in September because of her increased anxiety  She was on Lexapro in the past but had fatigue and weight gain  She sees Dr Donna Huang for her headaches who recommended she lower the dose bec of nausea and fatigue   which have worsened  She wants to sleep all the time  Poor motivation, no energy  She has not taken Xanax  The migraine headache comes on when she tries not to sleep   She is taking B2 vitamins  She has trouble sleeping at night but is sleepy most of the day  Also reports facial muscle clenching that she does not do deliberately    The following portions of the patient's history were reviewed and updated as appropriate: allergies, current medications, past family history, past medical history, past social history and problem list     Review of Systems   Constitutional: Positive for fatigue  Negative for fever and unexpected weight change  HENT: Negative for ear pain, hearing loss, sinus pain, sinus pressure and sore throat  Respiratory: Negative for cough, shortness of breath and wheezing  Cardiovascular: Negative for chest pain, palpitations and leg swelling  Gastrointestinal: Positive for nausea  Negative for abdominal pain, constipation (better on Amitiza), diarrhea and vomiting  Musculoskeletal: Negative for arthralgias and myalgias  Neurological: Positive for headaches  Negative for dizziness  Psychiatric/Behavioral: The patient is nervous/anxious  Objective:      /62 (BP Location: Left arm, Patient Position: Sitting, Cuff Size: Standard)   Pulse (!) 116   Temp 98 9 °F (37 2 °C) (Tympanic)   Resp 16   Ht 5' (1 524 m)   Wt 51 8 kg (114 lb 3 2 oz)   SpO2 98%   BMI 22 30 kg/m²          Physical Exam   Constitutional: She is oriented to person, place, and time  She appears well-developed and well-nourished  HENT:   Head: Normocephalic and atraumatic  Right Ear: External ear normal    Left Ear: External ear normal    Mouth/Throat: Oropharynx is clear and moist    Eyes: Conjunctivae are normal    Neck: Neck supple  Cardiovascular: Normal rate, regular rhythm and normal heart sounds  No murmur heard  Pulmonary/Chest: Effort normal and breath sounds normal  No respiratory distress  She has no wheezes  She has no rales  Abdominal: Soft  Bowel sounds are normal  She exhibits no distension and no mass  There is no tenderness  There is no rebound and no guarding  Musculoskeletal: Normal range of motion  Neurological: She is alert and oriented to person, place, and time  Skin: Skin is warm and dry  Psychiatric: She has a normal mood and affect   Her behavior is normal  Judgment and thought content normal

## 2018-12-31 ENCOUNTER — ANNUAL EXAM (OUTPATIENT)
Dept: OBGYN CLINIC | Facility: CLINIC | Age: 28
End: 2018-12-31
Payer: COMMERCIAL

## 2018-12-31 VITALS
BODY MASS INDEX: 24.07 KG/M2 | WEIGHT: 122.6 LBS | DIASTOLIC BLOOD PRESSURE: 64 MMHG | SYSTOLIC BLOOD PRESSURE: 104 MMHG | HEIGHT: 60 IN

## 2018-12-31 DIAGNOSIS — Z01.419 WOMEN'S ANNUAL ROUTINE GYNECOLOGICAL EXAMINATION: Primary | ICD-10-CM

## 2018-12-31 PROCEDURE — G0145 SCR C/V CYTO,THINLAYER,RESCR: HCPCS | Performed by: OBSTETRICS & GYNECOLOGY

## 2018-12-31 PROCEDURE — S0612 ANNUAL GYNECOLOGICAL EXAMINA: HCPCS | Performed by: OBSTETRICS & GYNECOLOGY

## 2018-12-31 NOTE — PATIENT INSTRUCTIONS
The patient was informed of a stable gyn examination  She has had some problem with irregular bleeding on the IUD  She will watch if this problem persists we may consider changing to a stronger IUD  This was skin tech she might have removed  Pilonidal cyst with weight need evaluation by colorectal surgeon

## 2018-12-31 NOTE — PROGRESS NOTES
This is a 32-year-old white female, she is nulliparous  She is no longer in a serious relationship  She has not been sexually active for over was 5 months  She does have  anibal IUD in place  This is work well  She still 7 she has mm of breakthrough bleeding or spotting  Will continue to monitor this if this interferes with her lifestyle we may consider changing to a stronger IUD  She does have a history of chronic migraine headaches  She also has a history of anxiety which she has been treated for  She has a history of honeymoon cystitis with the mac with the and is working well for  She sees a neurologist for her migraine headaches  She has been start her on Effexor for anxiety  The dose was increased this is associated with increased fatigue  The now cut back  She is still smoking a pack of cigarettes every 2 days  We counseled her about the situation  She will try to stop  There are no new major family illnesses report this time  She has a dentist on a regular basis  She is happy with her weight  Review of systems positive for chronic migraine headaches, and anxiety      Past medical history significant for chronic headaches, anxiety/depression, honeymoon cystitis    Surgical history is negative for any major abdominal or pelvic surgery      Family history is positive for thyroid disease, and for cancer  Social history is positive for tobacco, positive for social alcohol      Physical exam this is a well-developed well-nourished white female acute distress  Her BMI is 23 9  Her HEENT is was within normal limits  Her lungs are clear to auscultation  Breast exam symmetrical nontender no retraction or dimpling axilla clear bilaterally  Cardiac exam shows a regular rhythm and rate no murmur  Abdomen is softer no masses positive bowel sounds  Pelvic exam the external genitalia significant for probably a pilonidal cyst at the intergluteal fold, which is now resolved    Also some skin tags   She may consider having these removed the later date  The external genitalia was unremarkable the vagina is clean the cervix is closed IUD string was seen is uterus is midposition normal size is no cervical motion tenderness the adnexa clear bilaterally  A Pap smear was performed  Impression stable gyn examination  The IUD string was seen  A Pap smear was performed  She will watch her menstrual cycles over next few weeks this interfering for lifestyle we may consider removing the IUD replacing it with a stronger 1  She also has a skin tag is a possible cyst   If the possible cyst flares again I strongly suggest she see a colorectal surgeon for evaluation  She should return to office in 1 year

## 2019-01-04 LAB
LAB AP GYN PRIMARY INTERPRETATION: NORMAL
LAB AP LMP: NORMAL
Lab: NORMAL

## 2019-01-16 ENCOUNTER — TELEPHONE (OUTPATIENT)
Dept: INTERNAL MEDICINE CLINIC | Facility: CLINIC | Age: 29
End: 2019-01-16

## 2019-01-21 ENCOUNTER — OFFICE VISIT (OUTPATIENT)
Dept: INTERNAL MEDICINE CLINIC | Facility: CLINIC | Age: 29
End: 2019-01-21
Payer: COMMERCIAL

## 2019-01-21 VITALS
HEART RATE: 92 BPM | BODY MASS INDEX: 23.29 KG/M2 | OXYGEN SATURATION: 99 % | HEIGHT: 60 IN | DIASTOLIC BLOOD PRESSURE: 62 MMHG | SYSTOLIC BLOOD PRESSURE: 98 MMHG | WEIGHT: 118.6 LBS

## 2019-01-21 DIAGNOSIS — K58.1 IRRITABLE BOWEL SYNDROME WITH CONSTIPATION: ICD-10-CM

## 2019-01-21 DIAGNOSIS — G43.709 CHRONIC MIGRAINE WITHOUT AURA WITHOUT STATUS MIGRAINOSUS, NOT INTRACTABLE: ICD-10-CM

## 2019-01-21 DIAGNOSIS — F41.1 GENERALIZED ANXIETY DISORDER: Primary | ICD-10-CM

## 2019-01-21 DIAGNOSIS — R00.2 PALPITATION: ICD-10-CM

## 2019-01-21 PROCEDURE — 93000 ELECTROCARDIOGRAM COMPLETE: CPT | Performed by: INTERNAL MEDICINE

## 2019-01-21 PROCEDURE — 99214 OFFICE O/P EST MOD 30 MIN: CPT | Performed by: INTERNAL MEDICINE

## 2019-01-21 RX ORDER — TOPIRAMATE 25 MG/1
TABLET ORAL
COMMUNITY
Start: 2019-01-18 | End: 2019-03-25 | Stop reason: ALTCHOICE

## 2019-01-21 NOTE — PROGRESS NOTES
Assessment/Plan:    Chronic migraine without aura without status migrainosus, not intractable  Start Topamax    Generalized anxiety disorder  Continue Effexor for now  Discussed starting Buspar   if anxiety remains uncontrolled with or without the Effexor       Problem List Items Addressed This Visit        Digestive    Irritable bowel syndrome       Cardiovascular and Mediastinum    Chronic migraine without aura without status migrainosus, not intractable     Start Topamax         Relevant Medications    topiramate (TOPAMAX) 25 mg tablet       Other    Generalized anxiety disorder - Primary     Continue Effexor for now  Discussed starting Buspar   if anxiety remains uncontrolled with or without the Effexor           Other Visit Diagnoses     Palpitation        Relevant Orders    POCT ECG (Completed)            Subjective:      Patient ID: Jazmín Krueger is a 34 y o  female  HPI  Here for a follow up  Planning to start Topamax for her chronic headaches > three times a week  She has not started it yet, worried about side effects and starting it with being on Effexor  Anxiety is worse, shaky hands on less Effexor but the fatigue has improved  She used to be on Lexapro which helped but caused weight gain  She has noticed weight gain since she has been on Effexor especially since the dose has been changed      The following portions of the patient's history were reviewed and updated as appropriate: allergies, past family history, past medical history, past social history, past surgical history and problem list     Review of Systems   Constitutional: Negative for fatigue, fever and unexpected weight change  HENT: Negative for ear pain, hearing loss, sinus pain, sinus pressure and sore throat  Respiratory: Negative for cough, shortness of breath and wheezing  Cardiovascular: Positive for palpitations  Negative for chest pain and leg swelling     Gastrointestinal: Negative for abdominal pain, constipation, diarrhea, nausea and vomiting  Neurological: Positive for headaches  Negative for dizziness  Psychiatric/Behavioral: The patient is nervous/anxious  Objective:      BP 98/62   Pulse 92   Ht 5' (1 524 m)   Wt 53 8 kg (118 lb 9 6 oz)   SpO2 99%   BMI 23 16 kg/m²          Physical Exam   Constitutional: She is oriented to person, place, and time  She appears well-developed and well-nourished  HENT:   Head: Normocephalic and atraumatic  Eyes: Conjunctivae are normal    Neck: Neck supple  Cardiovascular: Normal rate, regular rhythm and normal heart sounds  No murmur heard  Pulmonary/Chest: Effort normal and breath sounds normal  No respiratory distress  She has no wheezes  She has no rales  Abdominal: Soft  Bowel sounds are normal  She exhibits no distension and no mass  There is no tenderness  There is no rebound and no guarding  Musculoskeletal: Normal range of motion  Neurological: She is alert and oriented to person, place, and time  Skin: Skin is warm and dry  Psychiatric: She has a normal mood and affect   Her behavior is normal  Judgment and thought content normal

## 2019-01-21 NOTE — ASSESSMENT & PLAN NOTE
Continue Effexor for now  Discussed starting Buspar   if anxiety remains uncontrolled with or without the Effexor

## 2019-02-07 ENCOUNTER — OFFICE VISIT (OUTPATIENT)
Dept: URGENT CARE | Age: 29
End: 2019-02-07
Payer: COMMERCIAL

## 2019-02-07 VITALS
HEART RATE: 100 BPM | TEMPERATURE: 98.5 F | OXYGEN SATURATION: 98 % | SYSTOLIC BLOOD PRESSURE: 126 MMHG | DIASTOLIC BLOOD PRESSURE: 60 MMHG | BODY MASS INDEX: 22.58 KG/M2 | HEIGHT: 60 IN | RESPIRATION RATE: 20 BRPM | WEIGHT: 115 LBS

## 2019-02-07 DIAGNOSIS — J06.9 UPPER RESPIRATORY TRACT INFECTION, UNSPECIFIED TYPE: Primary | ICD-10-CM

## 2019-02-07 PROCEDURE — 99213 OFFICE O/P EST LOW 20 MIN: CPT | Performed by: FAMILY MEDICINE

## 2019-02-07 NOTE — PATIENT INSTRUCTIONS
This appears to be a viral illness and no antibiotic is indicated at this time  Ibuprofen as needed for headache, sinus pressure  Mucinex D 1/2 to 1 tablet twice a day with full glass of fluid for daytime symptom relief  Nasal saline spray may be helpful  Vaporizer in bedroom  May use SHORT COURSE (no more than 3 days) of Afrin 12 hour decongestant nasal spray at bedtime to help alleviate nasal congestion  Push fluids  Rest     Nasal drainage, congestions and / or cough typically peak around 8-10 days and then slowly resolve over next few weeks  Yellow or green mucous does not necessarily mean a bacterial infection  If you develop persisting chest pain with shortness of breath seek further attention at ER

## 2019-02-07 NOTE — PROGRESS NOTES
[address] [facility]    NAME: Teresita Hicks is a 34 y o  female  : 1990    MRN: [id number]  DATE: 2019  TIME: 5:02 PM    Assessment and Plan   Upper respiratory tract infection, unspecified type [J06 9]  1  Upper respiratory tract infection, unspecified type         Patient Instructions     Patient Instructions     This appears to be a viral illness and no antibiotic is indicated at this time  Ibuprofen as needed for headache, sinus pressure  Mucinex D 1/2 to 1 tablet twice a day with full glass of fluid for daytime symptom relief  Nasal saline spray may be helpful  Vaporizer in bedroom  May use SHORT COURSE (no more than 3 days) of Afrin 12 hour decongestant nasal spray at bedtime to help alleviate nasal congestion  Push fluids  Rest     Nasal drainage, congestions and / or cough typically peak around 8-10 days and then slowly resolve over next few weeks  Yellow or green mucous does not necessarily mean a bacterial infection  If you develop persisting chest pain with shortness of breath seek further attention at ER  Chief Complaint     Chief Complaint   Patient presents with    Cough     Patient states she does not have an appetite for 1 week   Headache    Nausea       History of Present Illness   Terestia Hicks presents to the clinic c/o  28-year-old female that is here with sinus pressure, facial discomfort, nasal congestion drainage cough loss of appetite some nausea  Started last week with scratchy throat so she used organic tea and an airborne product   she said she had a migraine headache  She has had hot and cold sweats  Head pressure  Has been using as [employer] multi cold brand with Flonase without much relief  Review of Systems   Review of Systems   Constitutional: Positive for activity change and appetite change  HENT: Positive for congestion, postnasal drip, rhinorrhea and sinus pressure  Negative for sinus pain and sore throat  Eyes: Negative  Respiratory: Positive for cough  Negative for apnea, choking, chest tightness, shortness of breath, wheezing and stridor  Cardiovascular: Negative  Skin: Negative  Neurological: Negative          Current Medications     Long-Term Prescriptions   Medication Sig Dispense Refill    ALPRAZolam (XANAX) 0 25 mg tablet Take 1 tablet (0 25 mg total) by mouth daily at bedtime as needed for sleep 30 tablet 0    desonide (DESOWEN) 0 05 % cream Apply topically 2 (two) times a day 30 g 0    Riboflavin (B-2-400) 400 MG CAPS Take 1 capsule (400 mg total) by mouth daily  0    topiramate (TOPAMAX) 25 mg tablet       venlafaxine (EFFEXOR-XR) 37 5 mg 24 hr capsule Take 1 capsule (37 5 mg total) by mouth daily 30 capsule 2       Current Allergies     Allergies as of 02/07/2019    (No Known Allergies)          The following portions of the patient's history were reviewed and updated as appropriate: allergies, current medications, past family history, past medical history, past social history, past surgical history and problem list   Past Medical History:   Diagnosis Date    Anxiety     Contraceptive use     LAST ASSESSED: [id number]    Depression     External hemorrhoid, bleeding     LAST ASSESSED: [id number]    Gallbladder attack     Hematochezia     LAST ASSESSED: [id number]    IBS (irritable bowel syndrome)     Immunity status testing     LAST ASSESSED: [id number]    Methicillin resistant Staphylococcus aureus infection     Migraine     Poison ivy dermatitis     LAST ASSESSED: [id number]    Psychiatric disorder     Skin abscess     RIGHT LEG    Urinary frequency     LAST ASSESSED: [id number]    UTI (urinary tract infection)      Past Surgical History:   Procedure Laterality Date    TONSILLECTOMY      ONSET: 1997    WISDOM TOOTH EXTRACTION       Family History   Problem Relation Age of Onset    Thyroid disease Mother     Thyroid disease Father     Cancer Family         MALIGNANT NEOPLASM       Objective   /60   Pulse 100   Temp 98 5 °F (36 9 °C) (Temporal)   Resp 20   Ht 5' (1 524 m)   Wt 52 2 kg (115 lb)   LMP 01/21/2019 (Exact Date)   SpO2 98%   BMI 22 46 kg/m²   Patient's last menstrual period was 01/21/2019 (exact date)  Physical Exam     Physical Exam   Constitutional: She is oriented to person, place, and time  She appears well-developed and well-nourished  No distress  HENT:   Head: Normocephalic and atraumatic  Right Ear: External ear normal    Left Ear: External ear normal    Mouth/Throat: No oropharyngeal exudate  Cobblestoning of posterior pharynx with patchy redness  No exudate or fetid breath  Nasal turbinates red and edematous  No purulent mucus   Eyes: Pupils are equal, round, and reactive to light  Conjunctivae and EOM are normal  Right eye exhibits no discharge  Left eye exhibits no discharge  Neck: Normal range of motion  Neck supple  Cardiovascular: Normal rate, regular rhythm and normal heart sounds  Exam reveals no gallop and no friction rub  No murmur heard  Pulmonary/Chest: Effort normal and breath sounds normal  No respiratory distress  She has no wheezes  She has no rales  Lymphadenopathy:     She has no cervical adenopathy  Neurological: She is alert and oriented to person, place, and time  Skin: Skin is warm and dry  No rash noted  She is not diaphoretic  Psychiatric: She has a normal mood and affect  Nursing note and vitals reviewed

## 2019-02-14 ENCOUNTER — OFFICE VISIT (OUTPATIENT)
Dept: INTERNAL MEDICINE CLINIC | Facility: CLINIC | Age: 29
End: 2019-02-14
Payer: COMMERCIAL

## 2019-02-14 VITALS
TEMPERATURE: 99.4 F | HEART RATE: 101 BPM | BODY MASS INDEX: 22.54 KG/M2 | DIASTOLIC BLOOD PRESSURE: 64 MMHG | SYSTOLIC BLOOD PRESSURE: 112 MMHG | HEIGHT: 60 IN | OXYGEN SATURATION: 98 % | WEIGHT: 114.8 LBS

## 2019-02-14 DIAGNOSIS — J06.9 UPPER RESPIRATORY TRACT INFECTION, UNSPECIFIED TYPE: Primary | ICD-10-CM

## 2019-02-14 DIAGNOSIS — G43.709 CHRONIC MIGRAINE WITHOUT AURA WITHOUT STATUS MIGRAINOSUS, NOT INTRACTABLE: ICD-10-CM

## 2019-02-14 DIAGNOSIS — F41.1 GENERALIZED ANXIETY DISORDER: ICD-10-CM

## 2019-02-14 DIAGNOSIS — R19.7 DIARRHEA, UNSPECIFIED TYPE: ICD-10-CM

## 2019-02-14 PROCEDURE — 99214 OFFICE O/P EST MOD 30 MIN: CPT | Performed by: INTERNAL MEDICINE

## 2019-02-14 RX ORDER — PREDNISONE 20 MG/1
20 TABLET ORAL 2 TIMES DAILY WITH MEALS
Qty: 10 TABLET | Refills: 0 | Status: SHIPPED | OUTPATIENT
Start: 2019-02-14 | End: 2019-03-25 | Stop reason: ALTCHOICE

## 2019-02-14 NOTE — PROGRESS NOTES
Assessment/Plan:  Stop doxycycline  Start prednisone  Continue Mucinex DM  Watery diearrhea- Continue doxy for at least 1 more day  Obtain stool for Cdiff     Problem List Items Addressed This Visit        Cardiovascular and Mediastinum    Chronic migraine without aura without status migrainosus, not intractable       Other    Generalized anxiety disorder      Other Visit Diagnoses     Upper respiratory tract infection, unspecified type    -  Primary    Relevant Medications    predniSONE 20 mg tablet    Diarrhea, unspecified type        Relevant Orders    Clostridium difficile toxin by PCR            Subjective:      Patient ID: Cameron Ocasio is a 34 y o  female  HPI  2 weeks ago woke up with a scratchy throat but felt well otherwise  [employer]  She was seen in urgent care a week later for sinusitis and she was advised to start Mucinex DM and Afrin  Afrin caused burning in her nose after one use  A few days later, she was seen in [facility] for a severe cough and rhinorrhea  and was prescribed doxycycline 100mg BID and a codeine cough syrup  Rapid strep was negative  She is coughing but not abele to expectorate , +wheezing  She felt much better yesterday  Still with a poor appetite and feels tired  Today woke up with GI upset,watery diarrhea and vomiting x2  She is on day 7 of doxycyline  Ribs are very sore and tender, needs to hug herself when she coughs      The following portions of the patient's history were reviewed and updated as appropriate: allergies, past family history, past medical history, past social history, past surgical history and problem list     Review of Systems   Constitutional: Positive for fatigue  Negative for fever and unexpected weight change  HENT: Positive for congestion, rhinorrhea and sinus pressure  Negative for ear pain, hearing loss and sore throat  Respiratory: Positive for cough and wheezing  Negative for shortness of breath      Cardiovascular: Positive for chest pain  Negative for palpitations and leg swelling  Gastrointestinal: Positive for diarrhea, nausea and vomiting  Negative for abdominal pain and constipation  Musculoskeletal: Positive for neck pain (better with chiropractic manipulation)  Neurological: Negative for headaches  Objective:      /64   Pulse 101   Temp 99 4 °F (37 4 °C)   Ht 5' (1 524 m)   Wt 52 1 kg (114 lb 12 8 oz)   LMP 01/21/2019 (Exact Date)   SpO2 98%   BMI 22 42 kg/m²          Physical Exam   Constitutional: She is oriented to person, place, and time  She appears ill  HENT:   Head: Normocephalic and atraumatic  Right Ear: External ear normal    Left Ear: External ear normal    Mouth/Throat: Oropharynx is clear and moist    Eyes: Conjunctivae are normal    Neck: Neck supple  Cardiovascular: Normal rate, regular rhythm and normal heart sounds  No murmur heard  Pulmonary/Chest: Effort normal and breath sounds normal  No respiratory distress  She has no wheezes  She has no rales  Abdominal: Soft  Bowel sounds are normal  She exhibits no distension and no mass  There is no tenderness  There is no rebound and no guarding  Musculoskeletal: Normal range of motion  Neurological: She is alert and oriented to person, place, and time  Skin: Skin is warm and dry  Psychiatric: She has a normal mood and affect   Her behavior is normal  Judgment and thought content normal

## 2019-02-14 NOTE — LETTER
February 14, 2019     Patient: Rigoberto Franco   YOB: 1990   Date of Visit: 2/14/2019       To Whom it May Concern:    Yuli Gaxiola is under my professional care  She was seen in my office on 2/14/2019  She may return to work on 2/18/19  Please excuse from work 2/14-2/15/19  If you have any questions or concerns, please don't hesitate to call           Sincerely,          Antonia Steen MD        CC: No Recipients

## 2019-03-06 ENCOUNTER — OFFICE VISIT (OUTPATIENT)
Dept: OBGYN CLINIC | Facility: CLINIC | Age: 29
End: 2019-03-06
Payer: COMMERCIAL

## 2019-03-06 VITALS
SYSTOLIC BLOOD PRESSURE: 112 MMHG | HEIGHT: 61 IN | DIASTOLIC BLOOD PRESSURE: 78 MMHG | WEIGHT: 117 LBS | BODY MASS INDEX: 22.09 KG/M2

## 2019-03-06 DIAGNOSIS — L91.8 SKIN TAG: Primary | ICD-10-CM

## 2019-03-06 PROCEDURE — 88305 TISSUE EXAM BY PATHOLOGIST: CPT | Performed by: PATHOLOGY

## 2019-03-06 PROCEDURE — 11200 RMVL SKIN TAGS UP TO&INC 15: CPT | Performed by: OBSTETRICS & GYNECOLOGY

## 2019-03-06 RX ORDER — THIAMINE HCL 100 MG
1000 TABLET ORAL DAILY
COMMUNITY
End: 2019-04-09 | Stop reason: ALTCHOICE

## 2019-03-06 RX ORDER — LIDOCAINE 50 MG/G
OINTMENT TOPICAL AS NEEDED
Qty: 30 G | Refills: 0 | Status: SHIPPED | OUTPATIENT
Start: 2019-03-06 | End: 2019-05-21 | Stop reason: ALTCHOICE

## 2019-03-06 NOTE — PATIENT INSTRUCTIONS
A pedunculated skin tag was removed from the right upper thigh without difficulty and thick  Bleeding was controlled with Monsel's  Tissue sent to pathology  The patient denies use lidocaine gel for pain  Return to office p r n  Basis  The patient is also complaining of breakthrough bleeding on the IUD  I have asked her to continue she track her menstrual calendar return to office in 6 weeks with the counter for further discussion of bleeding pattern with the intrauterine device

## 2019-03-06 NOTE — PROGRESS NOTES
Skin tag removal  Date/Time: 3/6/2019 3:24 PM  Performed by: Brianna Burks MD  Authorized by: Brianna Burks MD     Procedure Details - Skin Tag Destruction:     Up to 15      Body area:  Lower extremity    Lower extremity location:  R upper leg    Initial size (mm):  3    Final defect size (mm):  1 5    Malignancy: benign lesion      Destruction method: scissors used for extraction    Lesion 6:      Small skin tag from the right upper thigh   Prior to removal the patient gave verbal consent her mother was present agrees  The right upper thigh lesion was cleaned with Betadine  The base of the incision was injected with 1% lidocaine  Using a scalpel and the lesion was excised  The remaining base was cauterized with Monsel's solution  A Band-Aid was applied  The in there was no bleeding patient is doing well  She will be given scripts in for lidocaine for pain that make her later  She return my office p r n     The tissue was sent to pathology  In a probably benign

## 2019-03-25 ENCOUNTER — OFFICE VISIT (OUTPATIENT)
Dept: INTERNAL MEDICINE CLINIC | Facility: CLINIC | Age: 29
End: 2019-03-25
Payer: COMMERCIAL

## 2019-03-25 VITALS
RESPIRATION RATE: 16 BRPM | BODY MASS INDEX: 21.49 KG/M2 | OXYGEN SATURATION: 97 % | SYSTOLIC BLOOD PRESSURE: 115 MMHG | DIASTOLIC BLOOD PRESSURE: 72 MMHG | HEIGHT: 61 IN | WEIGHT: 113.8 LBS | HEART RATE: 100 BPM

## 2019-03-25 DIAGNOSIS — F41.1 GENERALIZED ANXIETY DISORDER: Primary | ICD-10-CM

## 2019-03-25 DIAGNOSIS — F32.A DEPRESSION, UNSPECIFIED DEPRESSION TYPE: ICD-10-CM

## 2019-03-25 DIAGNOSIS — K58.1 IRRITABLE BOWEL SYNDROME WITH CONSTIPATION: ICD-10-CM

## 2019-03-25 DIAGNOSIS — G43.709 CHRONIC MIGRAINE WITHOUT AURA WITHOUT STATUS MIGRAINOSUS, NOT INTRACTABLE: ICD-10-CM

## 2019-03-25 PROCEDURE — 99214 OFFICE O/P EST MOD 30 MIN: CPT | Performed by: INTERNAL MEDICINE

## 2019-03-25 RX ORDER — BUPROPION HYDROCHLORIDE 150 MG/1
150 TABLET, EXTENDED RELEASE ORAL 2 TIMES DAILY
Qty: 60 TABLET | Refills: 2 | Status: SHIPPED | OUTPATIENT
Start: 2019-03-25 | End: 2019-05-21 | Stop reason: ALTCHOICE

## 2019-03-25 NOTE — ASSESSMENT & PLAN NOTE
PHQ9=15  LOUISA 7=15  Start Wellbutrin  Cont Effexor but discussed weaning off if feeling too tired, sedated  Lexapro caused weight gain before

## 2019-03-28 ENCOUNTER — TELEPHONE (OUTPATIENT)
Dept: INTERNAL MEDICINE CLINIC | Facility: CLINIC | Age: 29
End: 2019-03-28

## 2019-03-29 ENCOUNTER — APPOINTMENT (OUTPATIENT)
Dept: LAB | Facility: CLINIC | Age: 29
End: 2019-03-29
Payer: COMMERCIAL

## 2019-03-29 DIAGNOSIS — R19.7 DIARRHEA, UNSPECIFIED TYPE: ICD-10-CM

## 2019-03-29 PROCEDURE — 87493 C DIFF AMPLIFIED PROBE: CPT

## 2019-03-30 LAB — C DIFF TOX GENS STL QL NAA+PROBE: NORMAL

## 2019-04-09 ENCOUNTER — OFFICE VISIT (OUTPATIENT)
Dept: INTERNAL MEDICINE CLINIC | Facility: CLINIC | Age: 29
End: 2019-04-09
Payer: COMMERCIAL

## 2019-04-09 ENCOUNTER — APPOINTMENT (OUTPATIENT)
Dept: LAB | Facility: CLINIC | Age: 29
End: 2019-04-09
Payer: COMMERCIAL

## 2019-04-09 VITALS
HEIGHT: 61 IN | SYSTOLIC BLOOD PRESSURE: 110 MMHG | WEIGHT: 110.2 LBS | HEART RATE: 100 BPM | TEMPERATURE: 99 F | BODY MASS INDEX: 20.81 KG/M2 | OXYGEN SATURATION: 98 % | DIASTOLIC BLOOD PRESSURE: 70 MMHG

## 2019-04-09 DIAGNOSIS — F41.1 GENERALIZED ANXIETY DISORDER: ICD-10-CM

## 2019-04-09 DIAGNOSIS — R61 NIGHT SWEATS: ICD-10-CM

## 2019-04-09 DIAGNOSIS — K58.1 IRRITABLE BOWEL SYNDROME WITH CONSTIPATION: ICD-10-CM

## 2019-04-09 DIAGNOSIS — R19.7 DIARRHEA, UNSPECIFIED TYPE: Primary | ICD-10-CM

## 2019-04-09 LAB
ALBUMIN SERPL BCP-MCNC: 4 G/DL (ref 3.5–5)
ALP SERPL-CCNC: 91 U/L (ref 46–116)
ALT SERPL W P-5'-P-CCNC: 15 U/L (ref 12–78)
ANION GAP SERPL CALCULATED.3IONS-SCNC: 6 MMOL/L (ref 4–13)
AST SERPL W P-5'-P-CCNC: 11 U/L (ref 5–45)
BASOPHILS # BLD AUTO: 0.01 THOUSANDS/ΜL (ref 0–0.1)
BASOPHILS NFR BLD AUTO: 0 % (ref 0–1)
BILIRUB SERPL-MCNC: 0.55 MG/DL (ref 0.2–1)
BUN SERPL-MCNC: 4 MG/DL (ref 5–25)
CALCIUM SERPL-MCNC: 9.3 MG/DL (ref 8.3–10.1)
CHLORIDE SERPL-SCNC: 103 MMOL/L (ref 100–108)
CO2 SERPL-SCNC: 27 MMOL/L (ref 21–32)
CREAT SERPL-MCNC: 0.66 MG/DL (ref 0.6–1.3)
EOSINOPHIL # BLD AUTO: 0.08 THOUSAND/ΜL (ref 0–0.61)
EOSINOPHIL NFR BLD AUTO: 1 % (ref 0–6)
ERYTHROCYTE [DISTWIDTH] IN BLOOD BY AUTOMATED COUNT: 12.4 % (ref 11.6–15.1)
GFR SERPL CREATININE-BSD FRML MDRD: 122 ML/MIN/1.73SQ M
GLUCOSE P FAST SERPL-MCNC: 82 MG/DL (ref 65–99)
HCT VFR BLD AUTO: 38.8 % (ref 34.8–46.1)
HGB BLD-MCNC: 12.3 G/DL (ref 11.5–15.4)
IMM GRANULOCYTES # BLD AUTO: 0.03 THOUSAND/UL (ref 0–0.2)
IMM GRANULOCYTES NFR BLD AUTO: 0 % (ref 0–2)
LYMPHOCYTES # BLD AUTO: 0.74 THOUSANDS/ΜL (ref 0.6–4.47)
LYMPHOCYTES NFR BLD AUTO: 7 % (ref 14–44)
MCH RBC QN AUTO: 29.6 PG (ref 26.8–34.3)
MCHC RBC AUTO-ENTMCNC: 31.7 G/DL (ref 31.4–37.4)
MCV RBC AUTO: 93 FL (ref 82–98)
MONOCYTES # BLD AUTO: 0.58 THOUSAND/ΜL (ref 0.17–1.22)
MONOCYTES NFR BLD AUTO: 6 % (ref 4–12)
NEUTROPHILS # BLD AUTO: 9.14 THOUSANDS/ΜL (ref 1.85–7.62)
NEUTS SEG NFR BLD AUTO: 86 % (ref 43–75)
NRBC BLD AUTO-RTO: 0 /100 WBCS
PLATELET # BLD AUTO: 356 THOUSANDS/UL (ref 149–390)
PMV BLD AUTO: 9.7 FL (ref 8.9–12.7)
POTASSIUM SERPL-SCNC: 3.8 MMOL/L (ref 3.5–5.3)
PROT SERPL-MCNC: 7.8 G/DL (ref 6.4–8.2)
RBC # BLD AUTO: 4.16 MILLION/UL (ref 3.81–5.12)
SODIUM SERPL-SCNC: 136 MMOL/L (ref 136–145)
TSH SERPL DL<=0.05 MIU/L-ACNC: 0.81 UIU/ML (ref 0.36–3.74)
WBC # BLD AUTO: 10.58 THOUSAND/UL (ref 4.31–10.16)

## 2019-04-09 PROCEDURE — 84443 ASSAY THYROID STIM HORMONE: CPT | Performed by: INTERNAL MEDICINE

## 2019-04-09 PROCEDURE — 99214 OFFICE O/P EST MOD 30 MIN: CPT | Performed by: INTERNAL MEDICINE

## 2019-04-09 PROCEDURE — 3008F BODY MASS INDEX DOCD: CPT | Performed by: INTERNAL MEDICINE

## 2019-04-09 PROCEDURE — 36415 COLL VENOUS BLD VENIPUNCTURE: CPT | Performed by: INTERNAL MEDICINE

## 2019-04-09 PROCEDURE — 85025 COMPLETE CBC W/AUTO DIFF WBC: CPT | Performed by: INTERNAL MEDICINE

## 2019-04-09 PROCEDURE — 80053 COMPREHEN METABOLIC PANEL: CPT | Performed by: INTERNAL MEDICINE

## 2019-04-09 RX ORDER — DICYCLOMINE HYDROCHLORIDE 10 MG/1
10 CAPSULE ORAL 3 TIMES DAILY PRN
Qty: 30 CAPSULE | Refills: 1 | Status: SHIPPED | OUTPATIENT
Start: 2019-04-09 | End: 2019-05-21 | Stop reason: ALTCHOICE

## 2019-04-11 DIAGNOSIS — D72.9 NEUTROPHILIA: Primary | ICD-10-CM

## 2019-04-15 ENCOUNTER — TRANSCRIBE ORDERS (OUTPATIENT)
Dept: LAB | Facility: CLINIC | Age: 29
End: 2019-04-15

## 2019-04-15 ENCOUNTER — APPOINTMENT (OUTPATIENT)
Dept: LAB | Facility: CLINIC | Age: 29
End: 2019-04-15
Payer: COMMERCIAL

## 2019-04-15 DIAGNOSIS — R19.7 DIARRHEA, UNSPECIFIED TYPE: Primary | ICD-10-CM

## 2019-04-15 LAB
CAMPYLOBACTER DNA SPEC NAA+PROBE: NORMAL
SALMONELLA DNA SPEC QL NAA+PROBE: NORMAL
SHIGA TOXIN STX GENE SPEC NAA+PROBE: NORMAL
SHIGELLA DNA SPEC QL NAA+PROBE: NORMAL
WBC STL QL MICRO: NORMAL

## 2019-04-15 PROCEDURE — 87205 SMEAR GRAM STAIN: CPT | Performed by: INTERNAL MEDICINE

## 2019-04-15 PROCEDURE — 36415 COLL VENOUS BLD VENIPUNCTURE: CPT | Performed by: INTERNAL MEDICINE

## 2019-04-15 PROCEDURE — 87505 NFCT AGENT DETECTION GI: CPT | Performed by: INTERNAL MEDICINE

## 2019-04-15 PROCEDURE — 87209 SMEAR COMPLEX STAIN: CPT

## 2019-04-15 PROCEDURE — 87177 OVA AND PARASITES SMEARS: CPT

## 2019-04-18 LAB — O+P STL CONC: NORMAL

## 2019-04-19 ENCOUNTER — OFFICE VISIT (OUTPATIENT)
Dept: OBGYN CLINIC | Facility: CLINIC | Age: 29
End: 2019-04-19
Payer: COMMERCIAL

## 2019-04-19 VITALS
HEIGHT: 60 IN | DIASTOLIC BLOOD PRESSURE: 68 MMHG | BODY MASS INDEX: 21.48 KG/M2 | SYSTOLIC BLOOD PRESSURE: 120 MMHG | WEIGHT: 109.4 LBS

## 2019-04-19 DIAGNOSIS — Z30.432 ENCOUNTER FOR REMOVAL OF INTRAUTERINE CONTRACEPTIVE DEVICE (IUD): Primary | ICD-10-CM

## 2019-04-19 PROCEDURE — 58301 REMOVE INTRAUTERINE DEVICE: CPT | Performed by: OBSTETRICS & GYNECOLOGY

## 2019-05-10 DIAGNOSIS — N30.20 CHRONIC CYSTITIS: ICD-10-CM

## 2019-05-10 RX ORDER — NITROFURANTOIN MACROCRYSTALS 50 MG/1
50 CAPSULE ORAL AS NEEDED
Qty: 30 CAPSULE | Refills: 2 | Status: SHIPPED | OUTPATIENT
Start: 2019-05-10 | End: 2019-12-15 | Stop reason: SDUPTHER

## 2019-05-21 ENCOUNTER — OFFICE VISIT (OUTPATIENT)
Dept: INTERNAL MEDICINE CLINIC | Facility: CLINIC | Age: 29
End: 2019-05-21
Payer: COMMERCIAL

## 2019-05-21 VITALS
WEIGHT: 113.6 LBS | DIASTOLIC BLOOD PRESSURE: 70 MMHG | OXYGEN SATURATION: 98 % | HEART RATE: 88 BPM | BODY MASS INDEX: 21.45 KG/M2 | HEIGHT: 61 IN | SYSTOLIC BLOOD PRESSURE: 130 MMHG

## 2019-05-21 DIAGNOSIS — F41.1 GENERALIZED ANXIETY DISORDER: Primary | ICD-10-CM

## 2019-05-21 DIAGNOSIS — G43.709 CHRONIC MIGRAINE WITHOUT AURA WITHOUT STATUS MIGRAINOSUS, NOT INTRACTABLE: ICD-10-CM

## 2019-05-21 DIAGNOSIS — K58.1 IRRITABLE BOWEL SYNDROME WITH CONSTIPATION: ICD-10-CM

## 2019-05-21 PROCEDURE — 99214 OFFICE O/P EST MOD 30 MIN: CPT | Performed by: INTERNAL MEDICINE

## 2019-05-21 RX ORDER — ALPRAZOLAM 0.25 MG/1
0.25 TABLET ORAL
Qty: 30 TABLET | Refills: 0 | Status: SHIPPED | OUTPATIENT
Start: 2019-05-21 | End: 2020-01-28

## 2019-05-21 RX ORDER — LUBIPROSTONE 8 UG/1
8 CAPSULE, GELATIN COATED ORAL DAILY
Qty: 90 CAPSULE | Refills: 1
Start: 2019-05-21 | End: 2019-07-19 | Stop reason: SDUPTHER

## 2019-05-28 ENCOUNTER — APPOINTMENT (EMERGENCY)
Dept: CT IMAGING | Facility: HOSPITAL | Age: 29
End: 2019-05-28
Payer: COMMERCIAL

## 2019-05-28 ENCOUNTER — HOSPITAL ENCOUNTER (EMERGENCY)
Facility: HOSPITAL | Age: 29
Discharge: HOME | End: 2019-05-28
Attending: EMERGENCY MEDICINE | Admitting: EMERGENCY MEDICINE
Payer: COMMERCIAL

## 2019-05-28 ENCOUNTER — APPOINTMENT (EMERGENCY)
Dept: RADIOLOGY | Facility: HOSPITAL | Age: 29
End: 2019-05-28
Payer: COMMERCIAL

## 2019-05-28 VITALS
SYSTOLIC BLOOD PRESSURE: 125 MMHG | OXYGEN SATURATION: 100 % | WEIGHT: 112.43 LBS | TEMPERATURE: 98.5 F | BODY MASS INDEX: 21.24 KG/M2 | RESPIRATION RATE: 20 BRPM | HEART RATE: 116 BPM | DIASTOLIC BLOOD PRESSURE: 61 MMHG

## 2019-05-28 DIAGNOSIS — M54.16 LUMBAR RADICULOPATHY: Primary | ICD-10-CM

## 2019-05-28 LAB — EXT PREG TEST URINE: NEGATIVE

## 2019-05-28 PROCEDURE — 70450 CT HEAD/BRAIN W/O DYE: CPT

## 2019-05-28 PROCEDURE — 72100 X-RAY EXAM L-S SPINE 2/3 VWS: CPT

## 2019-05-28 PROCEDURE — 81025 URINE PREGNANCY TEST: CPT | Performed by: EMERGENCY MEDICINE

## 2019-05-28 PROCEDURE — 99285 EMERGENCY DEPT VISIT HI MDM: CPT

## 2019-05-28 PROCEDURE — 99284 EMERGENCY DEPT VISIT MOD MDM: CPT | Performed by: EMERGENCY MEDICINE

## 2019-05-28 RX ORDER — METHYLPREDNISOLONE 4 MG/1
TABLET ORAL
Qty: 21 TABLET | Refills: 0 | Status: SHIPPED | OUTPATIENT
Start: 2019-05-28 | End: 2019-07-19 | Stop reason: ALTCHOICE

## 2019-05-30 ENCOUNTER — OFFICE VISIT (OUTPATIENT)
Dept: OBGYN CLINIC | Facility: CLINIC | Age: 29
End: 2019-05-30
Payer: COMMERCIAL

## 2019-05-30 ENCOUNTER — TELEPHONE (OUTPATIENT)
Dept: OBGYN CLINIC | Facility: CLINIC | Age: 29
End: 2019-05-30

## 2019-05-30 VITALS
HEIGHT: 60 IN | BODY MASS INDEX: 21.99 KG/M2 | WEIGHT: 112 LBS | SYSTOLIC BLOOD PRESSURE: 110 MMHG | DIASTOLIC BLOOD PRESSURE: 70 MMHG

## 2019-05-30 DIAGNOSIS — L23.9 CONTACT ALLERGIC REACTION: Primary | ICD-10-CM

## 2019-05-30 PROCEDURE — 99213 OFFICE O/P EST LOW 20 MIN: CPT | Performed by: NURSE PRACTITIONER

## 2019-07-19 ENCOUNTER — OFFICE VISIT (OUTPATIENT)
Dept: INTERNAL MEDICINE CLINIC | Facility: CLINIC | Age: 29
End: 2019-07-19
Payer: COMMERCIAL

## 2019-07-19 VITALS
DIASTOLIC BLOOD PRESSURE: 62 MMHG | SYSTOLIC BLOOD PRESSURE: 102 MMHG | BODY MASS INDEX: 21.6 KG/M2 | WEIGHT: 110 LBS | HEART RATE: 99 BPM | HEIGHT: 60 IN | OXYGEN SATURATION: 98 %

## 2019-07-19 DIAGNOSIS — K58.1 IRRITABLE BOWEL SYNDROME WITH CONSTIPATION: ICD-10-CM

## 2019-07-19 DIAGNOSIS — F41.1 GENERALIZED ANXIETY DISORDER: Primary | ICD-10-CM

## 2019-07-19 DIAGNOSIS — F32.A DEPRESSION, UNSPECIFIED DEPRESSION TYPE: ICD-10-CM

## 2019-07-19 DIAGNOSIS — R39.9 UTI SYMPTOMS: ICD-10-CM

## 2019-07-19 LAB
BACTERIA UR QL AUTO: ABNORMAL /HPF
BILIRUB UR QL STRIP: NEGATIVE
CLARITY UR: CLEAR
COLOR UR: YELLOW
GLUCOSE UR STRIP-MCNC: NEGATIVE MG/DL
HGB UR QL STRIP.AUTO: ABNORMAL
HYALINE CASTS #/AREA URNS LPF: ABNORMAL /LPF
KETONES UR STRIP-MCNC: NEGATIVE MG/DL
LEUKOCYTE ESTERASE UR QL STRIP: NEGATIVE
NITRITE UR QL STRIP: NEGATIVE
NON-SQ EPI CELLS URNS QL MICRO: ABNORMAL /HPF
PH UR STRIP.AUTO: 6.5 [PH]
PROT UR STRIP-MCNC: NEGATIVE MG/DL
RBC #/AREA URNS AUTO: ABNORMAL /HPF
SL AMB  POCT GLUCOSE, UA: NEGATIVE
SL AMB LEUKOCYTE ESTERASE,UA: NEGATIVE
SL AMB POCT BILIRUBIN,UA: NEGATIVE
SL AMB POCT BLOOD,UA: ABNORMAL
SL AMB POCT CLARITY,UA: CLEAR
SL AMB POCT COLOR,UA: YELLOW
SL AMB POCT KETONES,UA: NEGATIVE
SL AMB POCT NITRITE,UA: NEGATIVE
SL AMB POCT PH,UA: 6
SL AMB POCT SPECIFIC GRAVITY,UA: 1.02
SL AMB POCT URINE HCG: NEGATIVE
SL AMB POCT URINE PROTEIN: NEGATIVE
SL AMB POCT UROBILINOGEN: NEGATIVE
SP GR UR STRIP.AUTO: 1.02 (ref 1–1.03)
UROBILINOGEN UR QL STRIP.AUTO: 0.2 E.U./DL
WBC #/AREA URNS AUTO: ABNORMAL /HPF

## 2019-07-19 PROCEDURE — 81025 URINE PREGNANCY TEST: CPT | Performed by: INTERNAL MEDICINE

## 2019-07-19 PROCEDURE — 3008F BODY MASS INDEX DOCD: CPT | Performed by: INTERNAL MEDICINE

## 2019-07-19 PROCEDURE — 99214 OFFICE O/P EST MOD 30 MIN: CPT | Performed by: INTERNAL MEDICINE

## 2019-07-19 PROCEDURE — 81002 URINALYSIS NONAUTO W/O SCOPE: CPT | Performed by: INTERNAL MEDICINE

## 2019-07-19 PROCEDURE — 81001 URINALYSIS AUTO W/SCOPE: CPT | Performed by: INTERNAL MEDICINE

## 2019-07-19 RX ORDER — BUPROPION HYDROCHLORIDE 150 MG/1
150 TABLET, EXTENDED RELEASE ORAL 2 TIMES DAILY
Qty: 60 TABLET | Refills: 2
Start: 2019-07-19 | End: 2019-09-09 | Stop reason: SINTOL

## 2019-07-19 RX ORDER — LUBIPROSTONE 8 UG/1
8 CAPSULE, GELATIN COATED ORAL DAILY
Qty: 90 CAPSULE | Refills: 1 | Status: SHIPPED | OUTPATIENT
Start: 2019-07-19 | End: 2019-10-22 | Stop reason: HOSPADM

## 2019-07-19 NOTE — PROGRESS NOTES
Assessment/Plan:  Long h/o anxiety  Resume Wellbutrin     Problem List Items Addressed This Visit        Digestive    Irritable bowel syndrome    Relevant Medications    lubiprostone (AMITIZA) 8 mcg capsule       Other    Generalized anxiety disorder - Primary    Relevant Medications    buPROPion (WELLBUTRIN SR) 150 mg 12 hr tablet      Other Visit Diagnoses     Depression, unspecified depression type        Relevant Medications    buPROPion (WELLBUTRIN SR) 150 mg 12 hr tablet    UTI symptoms        Relevant Orders    POCT urine dip (Completed)    POCT urine HCG (Completed)    UA w Reflex to Microscopic w Reflex to Culture - Clinic Collect            Subjective:      Patient ID: Karrie Donato is a 34 y o  female  HPI  Increased anxiety over the past 2-3 weeks  Nervous for meetings,crying sleeps, heart racing, clammy hands and feet  No trigger, no new issues to cause symptoms  Known anxiety disorder  She took Xanax last night  She has been on Lexapro and Effexor which caused side effects  She did take Wellbutrin but not for a long time bec she was feeling well  She tolerated  LMP 6/35, stopped 7/1 but bled again 7/6-7/7  IUD removed 4/19/19  Using condoms for birth control  Possible UTI? +urgency, no dysuria    The following portions of the patient's history were reviewed and updated as appropriate: allergies, current medications, past family history, past medical history, past social history and problem list     Review of Systems   Constitutional: Negative for fatigue, fever and unexpected weight change  HENT: Negative for ear pain, hearing loss, sinus pressure, sinus pain and sore throat  Respiratory: Negative for cough, shortness of breath and wheezing  Cardiovascular: Negative for chest pain, palpitations and leg swelling  Gastrointestinal: Negative for abdominal pain, constipation, diarrhea, nausea and vomiting  Genitourinary: Positive for urgency  Negative for dysuria  Musculoskeletal: Negative for arthralgias and myalgias  Neurological: Positive for headaches  Negative for dizziness  Psychiatric/Behavioral: Positive for dysphoric mood  Negative for hallucinations and suicidal ideas  The patient is nervous/anxious  See hpi         Objective:      /62   Pulse 99   Ht 5' (1 524 m)   Wt 49 9 kg (110 lb)   SpO2 98%   BMI 21 48 kg/m²          Physical Exam   Constitutional: She is oriented to person, place, and time  She appears well-developed and well-nourished  HENT:   Head: Normocephalic and atraumatic  Mouth/Throat: Oropharynx is clear and moist    Eyes: Conjunctivae are normal    Neck: Neck supple  Cardiovascular: Normal rate, regular rhythm and normal heart sounds  No murmur heard  Pulmonary/Chest: Effort normal and breath sounds normal  No respiratory distress  She has no wheezes  She has no rales  Abdominal: Soft  Bowel sounds are normal  She exhibits no distension and no mass  There is no tenderness  There is no rebound and no guarding  Neurological: She is alert and oriented to person, place, and time  Skin: Skin is warm and dry  Psychiatric: Her behavior is normal  Judgment and thought content normal  Her mood appears anxious

## 2019-08-16 ENCOUNTER — TELEPHONE (OUTPATIENT)
Dept: OBGYN CLINIC | Facility: CLINIC | Age: 29
End: 2019-08-16

## 2019-08-16 DIAGNOSIS — B37.3 YEAST VAGINITIS: Primary | ICD-10-CM

## 2019-08-16 RX ORDER — FLUCONAZOLE 150 MG/1
150 TABLET ORAL DAILY
Qty: 2 TABLET | Refills: 0 | Status: SHIPPED | OUTPATIENT
Start: 2019-08-16 | End: 2019-08-18

## 2019-08-16 NOTE — TELEPHONE ENCOUNTER
Pt feels she has yeast infection secondary to antibiotic use (was on PCN x 7 days for dental)  Will use Monistat 7 ext & tx with Diflucan x 2 days    Please sign off on presc for same to [facility])

## 2019-09-09 ENCOUNTER — OFFICE VISIT (OUTPATIENT)
Dept: INTERNAL MEDICINE CLINIC | Facility: CLINIC | Age: 29
End: 2019-09-09
Payer: COMMERCIAL

## 2019-09-09 VITALS
HEART RATE: 64 BPM | DIASTOLIC BLOOD PRESSURE: 64 MMHG | SYSTOLIC BLOOD PRESSURE: 98 MMHG | HEIGHT: 60 IN | OXYGEN SATURATION: 100 % | WEIGHT: 110.6 LBS | BODY MASS INDEX: 21.71 KG/M2

## 2019-09-09 DIAGNOSIS — G43.709 CHRONIC MIGRAINE WITHOUT AURA WITHOUT STATUS MIGRAINOSUS, NOT INTRACTABLE: ICD-10-CM

## 2019-09-09 DIAGNOSIS — F41.1 GENERALIZED ANXIETY DISORDER: Primary | ICD-10-CM

## 2019-09-09 PROCEDURE — 99214 OFFICE O/P EST MOD 30 MIN: CPT | Performed by: INTERNAL MEDICINE

## 2019-09-09 RX ORDER — BUSPIRONE HYDROCHLORIDE 5 MG/1
5 TABLET ORAL 2 TIMES DAILY
Qty: 60 TABLET | Refills: 3 | Status: SHIPPED | OUTPATIENT
Start: 2019-09-09 | End: 2020-01-28

## 2019-09-09 NOTE — PROGRESS NOTES
Assessment/Plan:    Chronic migraine without aura without status migrainosus, not intractable  Off meds  stable    Generalized anxiety disorder  Willing to try new agent  Start Buspar 5mg and increase to BID if tolerated in a week      Declines flu vaccine     Problem List Items Addressed This Visit        Cardiovascular and Mediastinum    Chronic migraine without aura without status migrainosus, not intractable     Off meds  stable            Other    Generalized anxiety disorder - Primary     Willing to try new agent  Start Buspar 5mg and increase to BID if tolerated in a week           Relevant Medications    busPIRone (BUSPAR) 5 mg tablet            Subjective:      Patient ID: Augusto Carlton is a 34 y o  female  HPI  Here for a follow up  Started Wellbutrin in mid July for anxiety but experienced palpitations so stopped after 2 weeks  She has been doing well without it but has had a long h/o anxiety  She gained weight and had decrease in libido on Lexapro and had GI upset on Effexor  She is willing to try a new medication    The following portions of the patient's history were reviewed and updated as appropriate: allergies, current medications, past family history, past medical history, past social history and problem list     Review of Systems   Constitutional: Negative for fatigue, fever and unexpected weight change  HENT: Negative for ear pain, hearing loss, sinus pressure, sinus pain and sore throat  Respiratory: Negative for cough, shortness of breath and wheezing  Cardiovascular: Negative for chest pain, palpitations and leg swelling  Gastrointestinal: Negative for abdominal pain, constipation, diarrhea, nausea and vomiting  Musculoskeletal: Negative for arthralgias and myalgias  Neurological: Negative for dizziness and headaches (occasional)  Poor short term memory   Psychiatric/Behavioral: The patient is not nervous/anxious            Objective:      BP 98/64   Pulse 64 Ht 5' (1 524 m)   Wt 50 2 kg (110 lb 9 6 oz)   SpO2 100%   BMI 21 60 kg/m²          Physical Exam   Constitutional: She is oriented to person, place, and time  She appears well-developed and well-nourished  HENT:   Head: Normocephalic and atraumatic  Right Ear: External ear normal    Left Ear: External ear normal    Mouth/Throat: Oropharynx is clear and moist    Eyes: Conjunctivae are normal    Neck: Neck supple  Cardiovascular: Normal rate, regular rhythm and normal heart sounds  No murmur heard  Pulmonary/Chest: Effort normal and breath sounds normal  No respiratory distress  She has no wheezes  She has no rales  Abdominal: Soft  Bowel sounds are normal  She exhibits no distension and no mass  There is no tenderness  There is no rebound and no guarding  Musculoskeletal: Normal range of motion  Neurological: She is alert and oriented to person, place, and time  Skin: Skin is warm and dry  Psychiatric: She has a normal mood and affect   Her behavior is normal  Judgment and thought content normal

## 2019-09-27 ENCOUNTER — OFFICE VISIT (OUTPATIENT)
Dept: INTERNAL MEDICINE CLINIC | Facility: CLINIC | Age: 29
End: 2019-09-27
Payer: COMMERCIAL

## 2019-09-27 VITALS
BODY MASS INDEX: 21.75 KG/M2 | TEMPERATURE: 98.3 F | SYSTOLIC BLOOD PRESSURE: 122 MMHG | HEIGHT: 60 IN | DIASTOLIC BLOOD PRESSURE: 62 MMHG | WEIGHT: 110.8 LBS | RESPIRATION RATE: 16 BRPM | HEART RATE: 102 BPM | OXYGEN SATURATION: 99 %

## 2019-09-27 DIAGNOSIS — B02.9 HERPES ZOSTER WITHOUT COMPLICATION: Primary | ICD-10-CM

## 2019-09-27 PROCEDURE — 99213 OFFICE O/P EST LOW 20 MIN: CPT | Performed by: NURSE PRACTITIONER

## 2019-09-27 RX ORDER — VALACYCLOVIR HYDROCHLORIDE 1 G/1
1000 TABLET, FILM COATED ORAL 3 TIMES DAILY
Qty: 21 TABLET | Refills: 0 | Status: SHIPPED | OUTPATIENT
Start: 2019-09-27 | End: 2019-10-22 | Stop reason: ALTCHOICE

## 2019-09-27 RX ORDER — VALACYCLOVIR HYDROCHLORIDE 1 G/1
1000 TABLET, FILM COATED ORAL 3 TIMES DAILY
Qty: 21 TABLET | Refills: 0
Start: 2019-09-27 | End: 2019-09-27 | Stop reason: SDUPTHER

## 2019-09-27 NOTE — ASSESSMENT & PLAN NOTE
Appears to be shingles  Start valtrex  rto in one week to eval  Avoid contact with children and pregnant women

## 2019-09-27 NOTE — PROGRESS NOTES
Assessment/Plan:    Herpes zoster without complication  Appears to be shingles  Start valtrex  rto in one week to eval  Avoid contact with children and pregnant women       Diagnoses and all orders for this visit:    Herpes zoster without complication  -     Discontinue: valACYclovir (VALTREX) 1,000 mg tablet; Take 1 tablet (1,000 mg total) by mouth 3 (three) times a day for 7 days  -     valACYclovir (VALTREX) 1,000 mg tablet; Take 1 tablet (1,000 mg total) by mouth 3 (three) times a day for 7 days          Subjective:      Patient ID: Augusto Carlton is a 34 y o  female  Patient is here for vesicle like lesion right lower back that is painful and itchy  Had chicken pox as a child  Not draining yet    Being treated for bronchitis      The following portions of the patient's history were reviewed and updated as appropriate: allergies, current medications, past family history, past medical history, past social history, past surgical history and problem list     Review of Systems   Constitutional: Negative  HENT: Negative  Eyes: Negative  Respiratory: Negative  Cardiovascular: Negative  Gastrointestinal: Negative  Musculoskeletal: Negative  Skin: Positive for rash  Neurological: Negative            Objective:      /62   Pulse 102   Temp 98 3 °F (36 8 °C) (Oral)   Resp 16   Ht 5' (1 524 m)   Wt 50 3 kg (110 lb 12 8 oz)   SpO2 99%   BMI 21 64 kg/m²          Physical Exam   Skin:

## 2019-10-01 ENCOUNTER — OFFICE VISIT (OUTPATIENT)
Dept: INTERNAL MEDICINE CLINIC | Facility: CLINIC | Age: 29
End: 2019-10-01
Payer: COMMERCIAL

## 2019-10-01 VITALS
HEIGHT: 60 IN | OXYGEN SATURATION: 98 % | DIASTOLIC BLOOD PRESSURE: 64 MMHG | WEIGHT: 110 LBS | TEMPERATURE: 98.3 F | HEART RATE: 64 BPM | BODY MASS INDEX: 21.6 KG/M2 | SYSTOLIC BLOOD PRESSURE: 112 MMHG

## 2019-10-01 DIAGNOSIS — B02.9 HERPES ZOSTER WITHOUT COMPLICATION: Primary | ICD-10-CM

## 2019-10-01 DIAGNOSIS — J02.9 SORE THROAT: ICD-10-CM

## 2019-10-01 DIAGNOSIS — N76.0 ACUTE VAGINITIS: ICD-10-CM

## 2019-10-01 PROCEDURE — 99213 OFFICE O/P EST LOW 20 MIN: CPT | Performed by: INTERNAL MEDICINE

## 2019-10-01 RX ORDER — FLUCONAZOLE 150 MG/1
150 TABLET ORAL ONCE
Qty: 1 TABLET | Refills: 0 | Status: SHIPPED | OUTPATIENT
Start: 2019-10-01 | End: 2019-10-01

## 2019-10-01 NOTE — PROGRESS NOTES
Assessment/Plan:  I suspect the rash is herpes simplex more than shingles  Treatment is the same  Finish Valtrex  I asked her to call and come in if the vesicles return so a culture can be obtained  Current mild pharyngitis-finish Augmentin, Valtrex  May take otc antihistamines +/- INS for allergy symptoms       Problem List Items Addressed This Visit        Other    Herpes zoster without complication - Primary      Other Visit Diagnoses     Sore throat        Acute vaginitis        Relevant Medications    fluconazole (DIFLUCAN) 150 mg tablet            Subjective:      Patient ID: Braden Jaramillo is a 34 y o  female  HPI  She was seen at [facility] on 9/25 for a week of a cough  She was dx with bronchitis and treated with Augmentin  She was seen 2 days later here in the office for a vesicular rash on the right flank  Dx: shingles   She is still on the Valtrex  Lesion comes and goes, minimal pain  C/o 2 days of a sore throat and called today to get checked  Burning in the nose, on and off sinus congestion   Minimal cough  +vaginal discharge and itching    The following portions of the patient's history were reviewed and updated as appropriate: allergies, current medications, past medical history, past social history, past surgical history and problem list     Review of Systems   Constitutional: Negative for chills  HENT: Positive for congestion, postnasal drip, sinus pain and sore throat  Respiratory: Positive for cough  Skin: Positive for rash  Objective:      /64   Pulse 64   Temp 98 3 °F (36 8 °C)   Ht 5' (1 524 m)   Wt 49 9 kg (110 lb)   SpO2 98%   BMI 21 48 kg/m²          Physical Exam   Constitutional: She is oriented to person, place, and time  She appears well-nourished  Non-toxic appearance  She does not appear ill  No distress     HENT:   Right Ear: Hearing, tympanic membrane and ear canal normal    Left Ear: Hearing and ear canal normal  A middle ear effusion (minimal) is present  Mouth/Throat: Oropharynx is clear and moist and mucous membranes are normal  No oral lesions  No oropharyngeal exudate, posterior oropharyngeal edema or posterior oropharyngeal erythema  Neck: Neck supple  Cardiovascular: Normal rate, regular rhythm and normal heart sounds  No murmur heard  Pulmonary/Chest: Effort normal and breath sounds normal  No stridor  No respiratory distress  She has no wheezes  She has no rhonchi  She has no rales  Neurological: She is alert and oriented to person, place, and time  Skin: Rash (small crop of dried scabs on the right flank) noted  Psychiatric: She has a normal mood and affect   Her behavior is normal

## 2019-10-22 ENCOUNTER — TELEPHONE (OUTPATIENT)
Dept: INTERNAL MEDICINE CLINIC | Facility: CLINIC | Age: 29
End: 2019-10-22

## 2019-10-22 NOTE — TELEPHONE ENCOUNTER
Spoke with her  Bubble in the sacrum which was itching for a few days but now scabbed  It is not painful   At this point, no swab needed  Shingles (if this was even shingles)  does not pose a significant risk to her pregnancy which she just found out 2 weeks ago  She has stopped the Buspar (category B) on her own and has not been on Amitiza for weeks anyway  She has an OB appt in a few weeks

## 2019-10-22 NOTE — TELEPHONE ENCOUNTER
The patient was in about two months ago with the shingles  She was told to come back if another bubble formed  You wanted to swab it  The bubble looks different from the ones that she had with the shingles  She also just found out that she is pregnant and would like to know if the shingles would affect her fetus

## 2019-10-31 ENCOUNTER — OFFICE VISIT (OUTPATIENT)
Dept: INTERNAL MEDICINE CLINIC | Facility: CLINIC | Age: 29
End: 2019-10-31
Payer: COMMERCIAL

## 2019-10-31 VITALS
WEIGHT: 107.6 LBS | SYSTOLIC BLOOD PRESSURE: 110 MMHG | HEIGHT: 60 IN | TEMPERATURE: 99 F | DIASTOLIC BLOOD PRESSURE: 62 MMHG | OXYGEN SATURATION: 98 % | BODY MASS INDEX: 21.13 KG/M2 | HEART RATE: 70 BPM

## 2019-10-31 DIAGNOSIS — O21.0 MORNING SICKNESS: ICD-10-CM

## 2019-10-31 DIAGNOSIS — K58.1 IRRITABLE BOWEL SYNDROME WITH CONSTIPATION: ICD-10-CM

## 2019-10-31 DIAGNOSIS — F41.1 GENERALIZED ANXIETY DISORDER: Primary | ICD-10-CM

## 2019-10-31 DIAGNOSIS — B02.9 HERPES ZOSTER WITHOUT COMPLICATION: ICD-10-CM

## 2019-10-31 DIAGNOSIS — Z3A.01 LESS THAN 8 WEEKS GESTATION OF PREGNANCY: ICD-10-CM

## 2019-10-31 DIAGNOSIS — L73.9 FOLLICULITIS: ICD-10-CM

## 2019-10-31 DIAGNOSIS — R30.0 DYSURIA: ICD-10-CM

## 2019-10-31 LAB
SL AMB  POCT GLUCOSE, UA: NORMAL
SL AMB LEUKOCYTE ESTERASE,UA: NORMAL
SL AMB POCT BILIRUBIN,UA: NORMAL
SL AMB POCT BLOOD,UA: NORMAL
SL AMB POCT CLARITY,UA: NORMAL
SL AMB POCT COLOR,UA: YELLOW
SL AMB POCT KETONES,UA: NORMAL
SL AMB POCT NITRITE,UA: NORMAL
SL AMB POCT PH,UA: 6.5
SL AMB POCT SPECIFIC GRAVITY,UA: 1.01
SL AMB POCT URINE PROTEIN: NORMAL
SL AMB POCT UROBILINOGEN: 3.5

## 2019-10-31 PROCEDURE — 3008F BODY MASS INDEX DOCD: CPT | Performed by: INTERNAL MEDICINE

## 2019-10-31 PROCEDURE — 81002 URINALYSIS NONAUTO W/O SCOPE: CPT | Performed by: INTERNAL MEDICINE

## 2019-10-31 PROCEDURE — 99214 OFFICE O/P EST MOD 30 MIN: CPT | Performed by: INTERNAL MEDICINE

## 2019-10-31 RX ORDER — SENNA AND DOCUSATE SODIUM 50; 8.6 MG/1; MG/1
1 TABLET, FILM COATED ORAL DAILY
Refills: 0
Start: 2019-10-31 | End: 2020-01-28

## 2019-10-31 RX ORDER — SERTRALINE HYDROCHLORIDE 25 MG/1
25 TABLET, FILM COATED ORAL DAILY
Qty: 30 TABLET | Refills: 2 | Status: SHIPPED | OUTPATIENT
Start: 2019-10-31 | End: 2020-01-28

## 2019-10-31 RX ORDER — ONDANSETRON 4 MG/1
4 TABLET, FILM COATED ORAL EVERY 8 HOURS PRN
Qty: 30 TABLET | Refills: 2 | Status: SHIPPED | OUTPATIENT
Start: 2019-10-31 | End: 2020-05-07 | Stop reason: ALTCHOICE

## 2019-10-31 RX ORDER — ALBUTEROL SULFATE 90 UG/1
AEROSOL, METERED RESPIRATORY (INHALATION)
COMMUNITY
Start: 2019-09-19 | End: 2020-05-07 | Stop reason: ALTCHOICE

## 2019-10-31 NOTE — PROGRESS NOTES
Assessment/Plan:    Irritable bowel syndrome  She has stopped Amitiza  Try Senokot S    Generalized anxiety disorder  Start sertraline 25mg           Problem List Items Addressed This Visit        Digestive    Irritable bowel syndrome     She has stopped Amitiza  Try Senokot S         Relevant Medications    senna-docusate sodium (SENOKOT-S) 8 6-50 mg per tablet       Other    Generalized anxiety disorder - Primary     Start sertraline 25mg           Relevant Medications    sertraline (ZOLOFT) 25 mg tablet    RESOLVED: Herpes zoster without complication      Other Visit Diagnoses     Morning sickness        Zofran prn    Relevant Medications    ondansetron (ZOFRAN) 4 mg tablet    Less than 8 weeks gestation of pregnancy        Intake with OB nurse scheduled    Dysuria        Normal urine dip  Stay hydrated    Relevant Orders    POCT urine dip (Completed)    Folliculitis        Current lesions are not vesicular and are few in various areas  Pt reassured  She may try Hibiclens  Subjective:      Patient ID: Genna Trent is a 34 y o  female  HPI  Patient here with her mother  Found out she was pregnant 3-4 weeks ago  C/o increasing anxiety, depression  She has a h/o anxiety and has tried Lexapro, Effexor, and most recently , Buspar  Lexapro caused weight gain  Effexor caused fogginess  She was tolerating Buspar but stopped when she found out she was pregnant  Also reporting severe nausea, vomiting at times  Poor appetite and weight loss  She was diagnosed with shingles last month  She had a few vesicles on the right lower back  She was treated with Valtrex  She noticed a small similar looking lesion on the sacrum which has resolved  She has a "pimple" on the back, on each arm  Worried that this is shingles      The following portions of the patient's history were reviewed and updated as appropriate: allergies, current medications, past family history, past medical history, past surgical history and problem list     Review of Systems   Constitutional: Positive for fatigue and unexpected weight change  Negative for fever  HENT: Negative for congestion, sinus pressure and sore throat  Respiratory: Negative for cough, shortness of breath and wheezing  Cardiovascular: Negative for chest pain, palpitations and leg swelling  Gastrointestinal: Positive for constipation, nausea and vomiting  Negative for abdominal pain and diarrhea  Genitourinary: Positive for dysuria (at end of stream)  Negative for difficulty urinating  Musculoskeletal: Negative for arthralgias and myalgias  Skin:        See hpi   Neurological: Positive for headaches  Negative for dizziness  Psychiatric/Behavioral: Positive for dysphoric mood  The patient is nervous/anxious  Objective:      /62   Pulse 70   Temp 99 °F (37 2 °C)   Ht 5' (1 524 m)   Wt 48 8 kg (107 lb 9 6 oz)   SpO2 98%   BMI 21 01 kg/m²          Physical Exam   Constitutional: She is oriented to person, place, and time  No distress  HENT:   Head: Normocephalic and atraumatic  Right Ear: External ear normal    Left Ear: External ear normal    Mouth/Throat: Oropharynx is clear and moist    Eyes: Conjunctivae are normal    Neck: Neck supple  Cardiovascular: Normal rate, regular rhythm and normal heart sounds  No murmur heard  Pulmonary/Chest: Effort normal and breath sounds normal  No respiratory distress  She has no wheezes  She has no rales  Abdominal: Soft  She exhibits no distension and no mass  There is no tenderness  There is no rebound and no guarding  Neurological: She is alert and oriented to person, place, and time  Skin: Skin is warm and dry  She is not diaphoretic  Small pustule on the right mid back, on each arm   Psychiatric: Her behavior is normal  Judgment and thought content normal  Her mood appears anxious  She exhibits a depressed mood

## 2019-11-03 PROBLEM — B02.9 HERPES ZOSTER WITHOUT COMPLICATION: Status: RESOLVED | Noted: 2019-09-27 | Resolved: 2019-11-03

## 2019-11-06 ENCOUNTER — INITIAL PRENATAL (OUTPATIENT)
Dept: OBGYN CLINIC | Facility: CLINIC | Age: 29
End: 2019-11-06

## 2019-11-06 VITALS — HEIGHT: 60 IN | BODY MASS INDEX: 21.87 KG/M2 | WEIGHT: 111.4 LBS

## 2019-11-06 DIAGNOSIS — Z36.9 ANTENATAL SCREENING ENCOUNTER: ICD-10-CM

## 2019-11-06 DIAGNOSIS — Z3A.08 8 WEEKS GESTATION OF PREGNANCY: ICD-10-CM

## 2019-11-06 DIAGNOSIS — Z34.01 ENCOUNTER FOR SUPERVISION OF NORMAL FIRST PREGNANCY IN FIRST TRIMESTER: Primary | ICD-10-CM

## 2019-11-06 PROCEDURE — OBC: Performed by: OBSTETRICS & GYNECOLOGY

## 2019-11-06 NOTE — PROGRESS NOTES
INITIAL PRENATAL NURSE APPT:      Planned pregnancy but not actively attempting - had IUD removed 2019 then condoms until recently  Pt wears seat belt  Pt has q 6 months dental cleanings - up to date  No travel plans during pregnancy  Pt usually does not get flu vaccine - recom in pregnancy  Pt works as  ([employer])  Pt quit smoking with pregnancy ( prev / ppd)  Pt recently had shingles on back - tx with Valtrex 2019  Hx anxiety - was started on Zoloft 25 mg by pcp 19 - d/c Buspar with pregnancy  (+) nausea & dry heaves (has presc from pcp for Zofran 4 mg), (+) breast tenderness, (+) urinary frequency  No vag bleeding  initial prenatal labs ordered - pt will verify preferred lab  Reviewed nutrition, SQS testing, Level 2 U/S, TDAP @ 28 wk  Pt had yearly & pap 18 - pap wnl - no hx abn pap

## 2019-11-14 ENCOUNTER — APPOINTMENT (OUTPATIENT)
Dept: LAB | Age: 29
End: 2019-11-14
Payer: COMMERCIAL

## 2019-11-14 ENCOUNTER — TELEPHONE (OUTPATIENT)
Dept: OBGYN CLINIC | Facility: CLINIC | Age: 29
End: 2019-11-14

## 2019-11-14 DIAGNOSIS — O26.891 DYSURIA DURING PREGNANCY IN FIRST TRIMESTER: Primary | ICD-10-CM

## 2019-11-14 DIAGNOSIS — R30.0 DYSURIA DURING PREGNANCY IN FIRST TRIMESTER: Primary | ICD-10-CM

## 2019-11-14 LAB
BACTERIA UR QL AUTO: NORMAL /HPF
BILIRUB UR QL STRIP: NEGATIVE
CLARITY UR: CLEAR
COLOR UR: YELLOW
GLUCOSE UR STRIP-MCNC: NEGATIVE MG/DL
HGB UR QL STRIP.AUTO: NEGATIVE
HYALINE CASTS #/AREA URNS LPF: NORMAL /LPF
KETONES UR STRIP-MCNC: NEGATIVE MG/DL
LEUKOCYTE ESTERASE UR QL STRIP: NEGATIVE
NITRITE UR QL STRIP: NEGATIVE
NON-SQ EPI CELLS URNS QL MICRO: NORMAL /HPF
PH UR STRIP.AUTO: 6 [PH]
PROT UR STRIP-MCNC: NEGATIVE MG/DL
RBC #/AREA URNS AUTO: NORMAL /HPF
SP GR UR STRIP.AUTO: 1.01 (ref 1–1.03)
UROBILINOGEN UR QL STRIP.AUTO: 0.2 E.U./DL
WBC #/AREA URNS AUTO: NORMAL /HPF

## 2019-11-14 PROCEDURE — 87086 URINE CULTURE/COLONY COUNT: CPT | Performed by: NURSE PRACTITIONER

## 2019-11-14 PROCEDURE — 81001 URINALYSIS AUTO W/SCOPE: CPT | Performed by: NURSE PRACTITIONER

## 2019-11-14 NOTE — TELEPHONE ENCOUNTER
Patient called with complained of frequency and burning with urination  Orders placed for UA and Culture  Will call patient with results, if treatment is needed will send to pharmacy on file

## 2019-11-15 ENCOUNTER — TELEPHONE (OUTPATIENT)
Dept: OBGYN CLINIC | Facility: CLINIC | Age: 29
End: 2019-11-15

## 2019-11-15 LAB — BACTERIA UR CULT: NORMAL

## 2019-11-17 ENCOUNTER — APPOINTMENT (OUTPATIENT)
Dept: LAB | Age: 29
End: 2019-11-17
Payer: COMMERCIAL

## 2019-11-17 DIAGNOSIS — Z36.9 ANTENATAL SCREENING ENCOUNTER: ICD-10-CM

## 2019-11-17 LAB
ABO GROUP BLD: NORMAL
AMORPH PHOS CRY URNS QL MICRO: NORMAL /HPF
BACTERIA UR QL AUTO: NORMAL /HPF
BASOPHILS # BLD AUTO: 0.04 THOUSANDS/ΜL (ref 0–0.1)
BASOPHILS NFR BLD AUTO: 1 % (ref 0–1)
BILIRUB UR QL STRIP: NEGATIVE
BLD GP AB SCN SERPL QL: NEGATIVE
CLARITY UR: NORMAL
COLOR UR: YELLOW
EOSINOPHIL # BLD AUTO: 0.07 THOUSAND/ΜL (ref 0–0.61)
EOSINOPHIL NFR BLD AUTO: 1 % (ref 0–6)
ERYTHROCYTE [DISTWIDTH] IN BLOOD BY AUTOMATED COUNT: 13 % (ref 11.6–15.1)
GLUCOSE UR STRIP-MCNC: NEGATIVE MG/DL
HBV SURFACE AG SER QL: NORMAL
HCT VFR BLD AUTO: 34.6 % (ref 34.8–46.1)
HGB BLD-MCNC: 11.2 G/DL (ref 11.5–15.4)
HGB UR QL STRIP.AUTO: NEGATIVE
IMM GRANULOCYTES # BLD AUTO: 0.01 THOUSAND/UL (ref 0–0.2)
IMM GRANULOCYTES NFR BLD AUTO: 0 % (ref 0–2)
KETONES UR STRIP-MCNC: NEGATIVE MG/DL
LEUKOCYTE ESTERASE UR QL STRIP: NEGATIVE
LYMPHOCYTES # BLD AUTO: 0.88 THOUSANDS/ΜL (ref 0.6–4.47)
LYMPHOCYTES NFR BLD AUTO: 13 % (ref 14–44)
MCH RBC QN AUTO: 30.4 PG (ref 26.8–34.3)
MCHC RBC AUTO-ENTMCNC: 32.4 G/DL (ref 31.4–37.4)
MCV RBC AUTO: 94 FL (ref 82–98)
MONOCYTES # BLD AUTO: 0.46 THOUSAND/ΜL (ref 0.17–1.22)
MONOCYTES NFR BLD AUTO: 7 % (ref 4–12)
NEUTROPHILS # BLD AUTO: 5.58 THOUSANDS/ΜL (ref 1.85–7.62)
NEUTS SEG NFR BLD AUTO: 78 % (ref 43–75)
NITRITE UR QL STRIP: NEGATIVE
NON-SQ EPI CELLS URNS QL MICRO: NORMAL /HPF
NRBC BLD AUTO-RTO: 0 /100 WBCS
PH UR STRIP.AUTO: 8 [PH]
PLATELET # BLD AUTO: 239 THOUSANDS/UL (ref 149–390)
PMV BLD AUTO: 9.8 FL (ref 8.9–12.7)
PROT UR STRIP-MCNC: NEGATIVE MG/DL
RBC # BLD AUTO: 3.68 MILLION/UL (ref 3.81–5.12)
RBC #/AREA URNS AUTO: NORMAL /HPF
RH BLD: POSITIVE
RUBV IGG SERPL IA-ACNC: 12.4 IU/ML
SP GR UR STRIP.AUTO: 1.02 (ref 1–1.03)
UROBILINOGEN UR QL STRIP.AUTO: 0.2 E.U./DL
WBC # BLD AUTO: 7.04 THOUSAND/UL (ref 4.31–10.16)
WBC #/AREA URNS AUTO: NORMAL /HPF

## 2019-11-17 PROCEDURE — 80081 OBSTETRIC PANEL INC HIV TSTG: CPT

## 2019-11-17 PROCEDURE — 87086 URINE CULTURE/COLONY COUNT: CPT

## 2019-11-17 PROCEDURE — 36415 COLL VENOUS BLD VENIPUNCTURE: CPT

## 2019-11-17 PROCEDURE — 81001 URINALYSIS AUTO W/SCOPE: CPT

## 2019-11-18 ENCOUNTER — TELEPHONE (OUTPATIENT)
Dept: OBGYN CLINIC | Facility: CLINIC | Age: 29
End: 2019-11-18

## 2019-11-18 LAB
BACTERIA UR CULT: ABNORMAL
HIV 1+2 AB+HIV1 P24 AG SERPL QL IA: NORMAL
RPR SER QL: NORMAL

## 2019-11-20 ENCOUNTER — ROUTINE PRENATAL (OUTPATIENT)
Dept: OBGYN CLINIC | Facility: CLINIC | Age: 29
End: 2019-11-20
Payer: COMMERCIAL

## 2019-11-20 VITALS
DIASTOLIC BLOOD PRESSURE: 70 MMHG | BODY MASS INDEX: 21.79 KG/M2 | HEIGHT: 60 IN | SYSTOLIC BLOOD PRESSURE: 110 MMHG | WEIGHT: 111 LBS

## 2019-11-20 DIAGNOSIS — Z36.9 ANTENATAL SCREENING ENCOUNTER: ICD-10-CM

## 2019-11-20 DIAGNOSIS — Z34.81 PRENATAL CARE, SUBSEQUENT PREGNANCY, FIRST TRIMESTER: Primary | ICD-10-CM

## 2019-11-20 DIAGNOSIS — Z23 NEED FOR INFLUENZA VACCINATION: ICD-10-CM

## 2019-11-20 PROCEDURE — 87070 CULTURE OTHR SPECIMN AEROBIC: CPT | Performed by: OBSTETRICS & GYNECOLOGY

## 2019-11-20 PROCEDURE — 87591 N.GONORRHOEAE DNA AMP PROB: CPT | Performed by: OBSTETRICS & GYNECOLOGY

## 2019-11-20 PROCEDURE — 87106 FUNGI IDENTIFICATION YEAST: CPT | Performed by: OBSTETRICS & GYNECOLOGY

## 2019-11-20 PROCEDURE — 90471 IMMUNIZATION ADMIN: CPT | Performed by: OBSTETRICS & GYNECOLOGY

## 2019-11-20 PROCEDURE — 87491 CHLMYD TRACH DNA AMP PROBE: CPT | Performed by: OBSTETRICS & GYNECOLOGY

## 2019-11-20 PROCEDURE — G0145 SCR C/V CYTO,THINLAYER,RESCR: HCPCS | Performed by: OBSTETRICS & GYNECOLOGY

## 2019-11-20 PROCEDURE — 90686 IIV4 VACC NO PRSV 0.5 ML IM: CPT | Performed by: OBSTETRICS & GYNECOLOGY

## 2019-11-20 PROCEDURE — PNV: Performed by: OBSTETRICS & GYNECOLOGY

## 2019-11-20 NOTE — PATIENT INSTRUCTIONS
Intrauterine pregnancy is the size less than dates [facility] changed to the 17th June 2020  Suggest sequential screening test   Stable gyn examination    Her lab work reviewed return to office in 4 weeks

## 2019-11-20 NOTE — PROGRESS NOTES
Intrauterine pregnancy EDC changed to June 17th  The patient states she has irregular menstrual cycles  I strongly suggest making arrangements for consultation Maternal-Fetal Medicine  She stopped tobacco   We talked about diet calorie intake and water  She is doing well with the prenatal vitamins  Return to office in 4 weeks

## 2019-11-22 LAB — BACTERIA GENITAL AEROBE CULT: ABNORMAL

## 2019-11-26 LAB
C TRACH DNA SPEC QL NAA+PROBE: NEGATIVE
LAB AP GYN PRIMARY INTERPRETATION: NORMAL
LAB AP LMP: NORMAL
Lab: NORMAL
N GONORRHOEA DNA SPEC QL NAA+PROBE: NEGATIVE
PATH INTERP SPEC-IMP: NORMAL

## 2019-12-09 ENCOUNTER — ROUTINE PRENATAL (OUTPATIENT)
Dept: PERINATAL CARE | Facility: CLINIC | Age: 29
End: 2019-12-09
Payer: COMMERCIAL

## 2019-12-09 VITALS
BODY MASS INDEX: 20.73 KG/M2 | HEIGHT: 61 IN | WEIGHT: 109.8 LBS | SYSTOLIC BLOOD PRESSURE: 111 MMHG | DIASTOLIC BLOOD PRESSURE: 73 MMHG | HEART RATE: 84 BPM

## 2019-12-09 DIAGNOSIS — F41.1 GENERALIZED ANXIETY DISORDER: ICD-10-CM

## 2019-12-09 DIAGNOSIS — Z13.79 GENETIC SCREENING: ICD-10-CM

## 2019-12-09 DIAGNOSIS — K58.1 IRRITABLE BOWEL SYNDROME WITH CONSTIPATION: ICD-10-CM

## 2019-12-09 DIAGNOSIS — Z3A.13 13 WEEKS GESTATION OF PREGNANCY: Primary | ICD-10-CM

## 2019-12-09 PROCEDURE — 99202 OFFICE O/P NEW SF 15 MIN: CPT | Performed by: OBSTETRICS & GYNECOLOGY

## 2019-12-09 PROCEDURE — 76813 OB US NUCHAL MEAS 1 GEST: CPT | Performed by: OBSTETRICS & GYNECOLOGY

## 2019-12-09 NOTE — PROGRESS NOTES
Ob ultrasound and brief MFM consultation    An ultrasound for viability, dating and nuchal translucency was completed today  See Ob Procedures in EPIC  1  Live, herrera fetus with size = dates; JERILYN 06 14 2020  Normal nuchal translucency  Today's ultrasound findings and suggested follow-up were discussed  with the patient  The Sequential Screen was discussed in detail, including the sensitivity for detection of Down syndrome  Definitive prenatal diagnosis is possible only through genetic amniocentesis or [facility]   The patient had a fingerstick blood collection for hCG and CARL-A to complete the initial component of the Sequential Screen  Results should be available within one week  Ob Hx:  primigravida    Medical hx:  Depression and anxiety; on medication  Zoloft 25 mg daily;  IBS with constipation; not on medications now  Surgical hx:   negative    Medications:    Zoloft 25 mg daily; Macrodantin 50 p mg after vaginal intercourse  Was using Amitiza (lubiprostone) and D? Cd once she learned of the pregnancy due to lack of data on safety  Zofran 4 mg q 8 hr x nausea  Allergies: none    Family Hx: non contributory    Social Hx: No tobacco alcohol or illicit drug use  I reviewed the results of this ultrasound with Ms Shelby Benjamin and her partner Mary Jean-Baptiste  and answered their questions  Regarding SSRI and SNRI exposure in pregnancy, a recent systematic review and meta-analysis [facility] 2019 January, PMID [id number]) suggested that of this class of medications, sertraline (Zoloft) is associated with the lowest risk of PPHN  PPHN is a rare condition resulting in failure of normal relaxation of fetal vascular bed during the transition period after birth resulting in increased resistance in the pulmonary blood vessels; it occurs in 2-6 cases per 1000 live births   A lower risk for PPHN associated with sertraline, compared other SSRIs, is biologically plausible, as sertraline has been shown to cross the placenta at a lower percentage than other SSRIs  Table 3 of this article displays "treatment ranks" for SSRIs (via "P-scores") and probability of PPHN as follows, in order of decreasing risk of PPHN: sertraline (Zoloft), followed by escitalopram (Lexapro), Paroxetine (Paxil), Citalopram (Celexa), followed by Fluoxetine (Prozac)  SSRIs are compatible with breastfeeding ([place] 68 #92, Use of Psychiatric Medications in Pregnancy and Lactation)  Current practice guidelines encourage women who took effective antidepressant regimens prior to pregnancy, to continue with the same treatment regimen therefore I advised she continued  Counseling to reduce risk of recurrence of postpartum depression  There are significant risks associated with untreated and under treated depression and other mental illnesses in pregnancy  The [address] [place] [address] is an additional resource for screening and treatment of depression  Resources for a mental health crisis include the emergency department and the [place] [address] at 6-324-570-UCHP  Ms Manan Lentz has a history of autoimmune disease (irritable bowel syndrome with constipation) which can be associated with placental insufficiency and fetal growth restriction,  birth and increased risk for adverse  and maternal outcomes  Most women fall into one of three groups; those who have improvement in their symptoms with no flares in pregnancy, those who remain as they were before pregnancy and those who have worsening of symptoms all with an increased risk of  relapse in the postpartum period  It is recommended she has a consultation with rheumatology and in her case GI, to follow up in pregnancy and especially for care in the postpartum period  Recommendations:    1  Follow-up multiple marker serum screening at 16 to 20 weeks gestation is recommended to complete the Sequential Screen        2  Fetal Level II ultrasound imaging is scheduled at about 20 weeks gestation  In addition to review of the ultrasound results I completed a consultation in 20 minutes with > 50% in direct face to face contact and coordination of a plan of care  Thank you for referring your patient to our offices  The limitations of ultrasound to detect all anomalies was reviewed and how it is not  a test to rule out aneuploidy  If you have any further questions do not hesitate to contact us as [phone]      Monique Kiser MD

## 2019-12-09 NOTE — LETTER
December 9, 2019     Elmore Sandhoff, MD  [address]  [address]  [address] [place] [address]    Patient: Teddy Crespo   YOB: 1990   Date of Visit: 12/9/2019       Dear Dr Gamal Frederick: Thank you for referring Saul Crump to me for evaluation  Below are my notes for this consultation  If you have questions, please do not hesitate to call me  I look forward to following your patient along with you  Sincerely,        Augustus Vogel MD        CC: No Recipients  Augustus Vogel MD  12/9/2019 11:58 AM  Sign at close encounter  Ob ultrasound and brief MFM consultation    An ultrasound for viability, dating and nuchal translucency was completed today  See Ob Procedures in EPIC  1  Live, herrera fetus with size = dates; JERILYN 06 14 2020  Normal nuchal translucency  Today's ultrasound findings and suggested follow-up were discussed  with the patient  The Sequential Screen was discussed in detail, including the sensitivity for detection of Down syndrome  Definitive prenatal diagnosis is possible only through genetic amniocentesis or CVS   The patient had a fingerstick blood collection for hCG and CARL-A to complete the initial component of the Sequential Screen  Results should be available within one week  Ob Hx:  primigravida    Medical hx:  Depression and anxiety; on medication  Zoloft 25 mg daily;  IBS with constipation; not on medications now  Surgical hx:   negative    Medications:    Zoloft 25 mg daily; Macrodantin 50 p mg after vaginal intercourse  Was using Amitiza (lubiprostone) and D? Cd once she learned of the pregnancy due to lack of data on safety  Zofran 4 mg q 8 hr x nausea  Allergies: none    Family Hx: non contributory    Social Hx: No tobacco alcohol or illicit drug use  I reviewed the results of this ultrasound with Ms Rachell Griffin and her partner Monika Moy  and answered their questions      Regarding SSRI and SNRI exposure in pregnancy, a recent systematic review and meta-analysis [facility] 2019, PMID [id number]) suggested that of this class of medications, sertraline (Zoloft) is associated with the lowest risk of PPHN  PPHN is a rare condition resulting in failure of normal relaxation of fetal vascular bed during the transition period after birth resulting in increased resistance in the pulmonary blood vessels; it occurs in 2-6 cases per 1000 live births  A lower risk for PPHN associated with sertraline, compared other SSRIs, is biologically plausible, as sertraline has been shown to cross the placenta at a lower percentage than other SSRIs  Table 3 of this article displays "treatment ranks" for SSRIs (via "P-scores") and probability of PPHN as follows, in order of decreasing risk of PPHN: sertraline (Zoloft), followed by escitalopram (Lexapro), Paroxetine (Paxil), Citalopram (Celexa), followed by Fluoxetine (Prozac)  SSRIs are compatible with breastfeeding ([place] 68 #92, Use of Psychiatric Medications in Pregnancy and Lactation)  Current practice guidelines encourage women who took effective antidepressant regimens prior to pregnancy, to continue with the same treatment regimen therefore I advised she continued  Counseling to reduce risk of recurrence of postpartum depression  There are significant risks associated with untreated and under treated depression and other mental illnesses in pregnancy  The [address] [place] [address] is an additional resource for screening and treatment of depression  Resources for a mental health crisis include the emergency department and the [place] [address] at [phone]  Ms Julio Cesar Currie has a history of autoimmune disease (irritable bowel syndrome with constipation) which can be associated with placental insufficiency and fetal growth restriction,  birth and increased risk for adverse  and maternal outcomes   Most women fall into one of three groups; those who have improvement in their symptoms with no flares in pregnancy, those who remain as they were before pregnancy and those who have worsening of symptoms all with an increased risk of  relapse in the postpartum period  It is recommended she has a consultation with rheumatology and in her case GI, to follow up in pregnancy and especially for care in the postpartum period  Recommendations:    1  Follow-up multiple marker serum screening at 16 to 20 weeks gestation is recommended to complete the Sequential Screen  2  Fetal Level II ultrasound imaging is scheduled at about 20 weeks gestation  In addition to review of the ultrasound results I completed a consultation in 20 minutes with > 50% in direct face to face contact and coordination of a plan of care  Thank you for referring your patient to our offices  The limitations of ultrasound to detect all anomalies was reviewed and how it is not  a test to rule out aneuploidy  If you have any further questions do not hesitate to contact us as [phone]      Della Prieto MD

## 2019-12-15 DIAGNOSIS — N30.20 CHRONIC CYSTITIS: ICD-10-CM

## 2019-12-16 ENCOUNTER — TELEPHONE (OUTPATIENT)
Dept: PERINATAL CARE | Facility: CLINIC | Age: 29
End: 2019-12-16

## 2019-12-16 RX ORDER — NITROFURANTOIN MACROCRYSTALS 50 MG/1
50 CAPSULE ORAL AS NEEDED
Qty: 30 CAPSULE | Refills: 2 | Status: SHIPPED | OUTPATIENT
Start: 2019-12-16 | End: 2020-01-28

## 2019-12-16 NOTE — TELEPHONE ENCOUNTER
I left a message for Shalini Smalls to notify her of the result of part 1 of her sequential screen  I also notified her of the optimal dates to get part 2 drawn as well as where to go for this  I left the phone number for the nurse line for any questions

## 2019-12-16 NOTE — TELEPHONE ENCOUNTER
----- Message from Mario Amato MD sent at 12/13/2019  1:01 PM EST -----  I have reviewed the patient's lab results which are normal   Please contact the patient to inform her of the normal results        Mario Amato MD

## 2019-12-16 NOTE — LETTER
12/16/19  Duphi [facility]  1990    Thank you for completing Part 1 of your Sequential Screen  To obtain a complete test result, please complete blood work for Part 2 Sequential Screen between the weeks of 12/28/19 to 1/11/20  Based on your insurance coverage, please use one of the following locations  Call our office for any questions at [phone]      [address] [place] [address] Avenue  [address] [facility], [facility], [address]    [address] [facility], [facility] [address]       Phone: [phone]      Phone: [phone]    [address], [address] [place], [place], [address]  Phone: [phone]      Phone: [phone] (*ask for lab)    [address], [facility], [facility]   [address]  Phone: [phone]      Phone:  [phone]  Hours: Monday-Friday 6a-6p, Saturday 7a-12    [address]  [address] [facility], [address]  Phone: [phone]      Phone:  [address] [facility],[address] [facility]  [address] [facility], [facility], [address]   [address]  Phone: [phone]      Phone: [phone]      Sincerely,    Negrita Hogan RN

## 2019-12-18 ENCOUNTER — ROUTINE PRENATAL (OUTPATIENT)
Dept: OBGYN CLINIC | Facility: CLINIC | Age: 29
End: 2019-12-18

## 2019-12-18 VITALS — DIASTOLIC BLOOD PRESSURE: 56 MMHG | SYSTOLIC BLOOD PRESSURE: 110 MMHG | WEIGHT: 109 LBS | BODY MASS INDEX: 20.6 KG/M2

## 2019-12-18 DIAGNOSIS — Z34.82 PRENATAL CARE, SUBSEQUENT PREGNANCY, SECOND TRIMESTER: Primary | ICD-10-CM

## 2019-12-18 PROCEDURE — PNV: Performed by: OBSTETRICS & GYNECOLOGY

## 2019-12-18 NOTE — PROGRESS NOTES
Patient is stop her Zoloft    She will talk to her primary physician about switching to another SSRI, read to complete the 2nd part sequential screening test

## 2019-12-18 NOTE — PATIENT INSTRUCTIONS
No fetal movement may change her SSRI may stop altogether she will talk with her primary physician return to office in 4 weeks    Will complete the 2nd part sequential screening test

## 2020-01-10 ENCOUNTER — APPOINTMENT (OUTPATIENT)
Dept: LAB | Facility: CLINIC | Age: 30
End: 2020-01-10
Payer: COMMERCIAL

## 2020-01-10 ENCOUNTER — TRANSCRIBE ORDERS (OUTPATIENT)
Dept: LAB | Facility: CLINIC | Age: 30
End: 2020-01-10

## 2020-01-10 DIAGNOSIS — Z36.9 UNSPECIFIED ANTENATAL SCREENING: Primary | ICD-10-CM

## 2020-01-10 DIAGNOSIS — Z33.1 PREGNANT STATE, INCIDENTAL: ICD-10-CM

## 2020-01-10 DIAGNOSIS — Z36.9 UNSPECIFIED ANTENATAL SCREENING: ICD-10-CM

## 2020-01-10 PROCEDURE — 36415 COLL VENOUS BLD VENIPUNCTURE: CPT

## 2020-01-11 LAB — SCAN RESULT: NORMAL

## 2020-01-14 ENCOUNTER — TELEPHONE (OUTPATIENT)
Dept: PERINATAL CARE | Facility: CLINIC | Age: 30
End: 2020-01-14

## 2020-01-14 NOTE — TELEPHONE ENCOUNTER
----- Message from Zakia Mcgee MD sent at 1/13/2020  6:07 PM EST -----  I reviewed the lab study today and the results are normal

## 2020-01-15 ENCOUNTER — ROUTINE PRENATAL (OUTPATIENT)
Dept: OBGYN CLINIC | Facility: CLINIC | Age: 30
End: 2020-01-15

## 2020-01-15 VITALS
WEIGHT: 110 LBS | SYSTOLIC BLOOD PRESSURE: 106 MMHG | BODY MASS INDEX: 21.6 KG/M2 | HEIGHT: 60 IN | DIASTOLIC BLOOD PRESSURE: 60 MMHG

## 2020-01-15 DIAGNOSIS — Z34.02 ENCOUNTER FOR SUPERVISION OF NORMAL FIRST PREGNANCY IN SECOND TRIMESTER: Primary | ICD-10-CM

## 2020-01-15 PROCEDURE — PNV: Performed by: NURSE PRACTITIONER

## 2020-01-15 NOTE — PROGRESS NOTES
Patient is doing well except for constipation  She has a history of IBS with constipation  She states she has had to do enemas to promote a BM  She has also tried Senokot  I advised patient against use of enemas during pregnancy  Instead I recommended Miralax daily or every other day, increase her fiber  She believes that it may be her prenatal gummies causing her the constipation  She has noticed that if she does not take them she has a BM with no problem  We also discussed changing her prenatal vitamin to a capsule instead  She will keep us posted on her symptoms  Denies LOF/Bleeding/Cramping  +FM    Scheduled for level 2 on 1/29/2020  RTO in 4 weeks

## 2020-01-28 PROBLEM — Z3A.20 20 WEEKS GESTATION OF PREGNANCY: Status: ACTIVE | Noted: 2019-12-09

## 2020-01-29 ENCOUNTER — ROUTINE PRENATAL (OUTPATIENT)
Dept: PERINATAL CARE | Facility: OTHER | Age: 30
End: 2020-01-29
Payer: COMMERCIAL

## 2020-01-29 VITALS
HEIGHT: 60 IN | DIASTOLIC BLOOD PRESSURE: 70 MMHG | SYSTOLIC BLOOD PRESSURE: 118 MMHG | BODY MASS INDEX: 22.12 KG/M2 | HEART RATE: 69 BPM | WEIGHT: 112.66 LBS

## 2020-01-29 DIAGNOSIS — O35.9XX0 FETAL ABNORMALITY AFFECTING MANAGEMENT OF MOTHER, ANTEPARTUM, SINGLE OR UNSPECIFIED FETUS: Primary | ICD-10-CM

## 2020-01-29 DIAGNOSIS — Z3A.20 20 WEEKS GESTATION OF PREGNANCY: ICD-10-CM

## 2020-01-29 DIAGNOSIS — Z36.86 ENCOUNTER FOR ANTENATAL SCREENING FOR CERVICAL LENGTH: ICD-10-CM

## 2020-01-29 PROCEDURE — 76811 OB US DETAILED SNGL FETUS: CPT | Performed by: OBSTETRICS & GYNECOLOGY

## 2020-01-29 PROCEDURE — 76817 TRANSVAGINAL US OBSTETRIC: CPT | Performed by: OBSTETRICS & GYNECOLOGY

## 2020-01-29 PROCEDURE — 99213 OFFICE O/P EST LOW 20 MIN: CPT | Performed by: OBSTETRICS & GYNECOLOGY

## 2020-01-29 RX ORDER — PEDIATRIC MULTIVITAMIN NO.17
2 TABLET,CHEWABLE ORAL DAILY
COMMUNITY
End: 2021-10-25

## 2020-01-29 NOTE — PROGRESS NOTES
The patient was seen today for an ultrasound  Please see ultrasound report (located under Ob Procedures) for additional details  Thank you very much for allowing us to participate in the care of this very nice patient  Should you have any questions, please do not hesitate to contact me  MD Nikki Estevez  Attending Physician, [place]

## 2020-01-30 ENCOUNTER — TELEPHONE (OUTPATIENT)
Dept: PERINATAL CARE | Facility: CLINIC | Age: 30
End: 2020-01-30

## 2020-01-30 NOTE — TELEPHONE ENCOUNTER
Pt contacted our office questioning if there was a closer location she would be able to attend for her [facility] appointment  I informed the pt she would need to contact [facility] to check on their locations and available appointments  PT receptive and declines questions at this time

## 2020-02-01 ENCOUNTER — OFFICE VISIT (OUTPATIENT)
Dept: URGENT CARE | Age: 30
End: 2020-02-01
Payer: COMMERCIAL

## 2020-02-01 VITALS
HEART RATE: 94 BPM | TEMPERATURE: 97.9 F | HEIGHT: 60 IN | SYSTOLIC BLOOD PRESSURE: 144 MMHG | OXYGEN SATURATION: 99 % | WEIGHT: 112 LBS | BODY MASS INDEX: 21.99 KG/M2 | DIASTOLIC BLOOD PRESSURE: 60 MMHG | RESPIRATION RATE: 18 BRPM

## 2020-02-01 DIAGNOSIS — R59.0 ENLARGED SUBMENTAL LYMPH NODE: Primary | ICD-10-CM

## 2020-02-01 PROCEDURE — 99213 OFFICE O/P EST LOW 20 MIN: CPT | Performed by: EMERGENCY MEDICINE

## 2020-02-01 NOTE — PATIENT INSTRUCTIONS
Warm compresses as needed, keep mouth/lips lesions clean, moistened; recheck if still present in 2 weeks

## 2020-02-01 NOTE — PROGRESS NOTES
Assessment/Plan:    No problem-specific Assessment & Plan notes found for this encounter  Diagnoses and all orders for this visit:    Enlarged submental lymph node          Subjective:      Patient ID: Karis Mays is a 34 y o  female  Pt first noted lump under chin yesterday; larger, more tender today; small cracks at R corner of mouth - possible fever blister? Neck Pain    This is a new problem  The current episode started yesterday  The problem occurs constantly  The problem has been unchanged  The pain is associated with nothing  Pain location: under chin  The quality of the pain is described as aching  The pain is at a severity of 2/10  The pain is mild  The pain is same all the time  She has tried nothing for the symptoms  The treatment provided no relief  The following portions of the patient's history were reviewed and updated as appropriate: allergies  Review of Systems   Musculoskeletal: Positive for neck pain  All other systems reviewed and are negative  Objective:      /60 (BP Location: Right arm, Patient Position: Sitting, Cuff Size: Standard)   Pulse 94   Temp 97 9 °F (36 6 °C) (Temporal)   Resp 18   Ht 5' (1 524 m)   Wt 50 8 kg (112 lb)   LMP 09/08/2019   SpO2 99%   BMI 21 87 kg/m²          Physical Exam   Constitutional: She is oriented to person, place, and time  She appears well-developed and well-nourished  HENT:   Nose: Nose normal    Pulmonary/Chest: Effort normal    Lymphadenopathy:     She has cervical adenopathy (under chin - 1 tender node)  Neurological: She is alert and oriented to person, place, and time  Skin: Skin is warm and dry  Psychiatric: She has a normal mood and affect  Her behavior is normal  Judgment and thought content normal    Nursing note and vitals reviewed

## 2020-02-06 ENCOUNTER — TELEPHONE (OUTPATIENT)
Dept: PERINATAL CARE | Facility: OTHER | Age: 30
End: 2020-02-06

## 2020-02-12 ENCOUNTER — ROUTINE PRENATAL (OUTPATIENT)
Dept: OBGYN CLINIC | Facility: CLINIC | Age: 30
End: 2020-02-12

## 2020-02-12 VITALS — WEIGHT: 111.2 LBS | SYSTOLIC BLOOD PRESSURE: 116 MMHG | DIASTOLIC BLOOD PRESSURE: 60 MMHG | BODY MASS INDEX: 21.72 KG/M2

## 2020-02-12 DIAGNOSIS — Z34.82 PRENATAL CARE, SUBSEQUENT PREGNANCY, SECOND TRIMESTER: Primary | ICD-10-CM

## 2020-02-12 PROCEDURE — PNV: Performed by: OBSTETRICS & GYNECOLOGY

## 2020-02-12 NOTE — PATIENT INSTRUCTIONS
We reviewed the recent findings on level 2 ultrasound bilateral clubfeet  Prefers consultation with local orthopedic surgeon  Positive fetal movement

## 2020-02-12 NOTE — PROGRESS NOTES
The patient is aware of probable club feet bilaterally  She was to make arrangements with a local pediatric orthopedic surgeon in the [facility] community  All questions answered return to office in 4 weeks

## 2020-02-12 NOTE — LETTER
February 12, 2020     Patient: Braden Jaramillo   YOB: 1990   Date of Visit: 2/12/2020       To Whom it May Concern:    Olu Monzon is under my professional care  She was seen in my office on 2/12/2020  She may return to work on 02/13/2020  Please also excuse milagros Gresham    If you have any questions or concerns, please don't hesitate to call           Sincerely,          Kajal Santiago MD        CC: No Recipients

## 2020-03-09 ENCOUNTER — OFFICE VISIT (OUTPATIENT)
Dept: INTERNAL MEDICINE CLINIC | Facility: CLINIC | Age: 30
End: 2020-03-09
Payer: COMMERCIAL

## 2020-03-09 VITALS
SYSTOLIC BLOOD PRESSURE: 114 MMHG | HEIGHT: 60 IN | HEART RATE: 104 BPM | TEMPERATURE: 98.9 F | DIASTOLIC BLOOD PRESSURE: 70 MMHG | OXYGEN SATURATION: 98 % | BODY MASS INDEX: 22.97 KG/M2 | WEIGHT: 117 LBS

## 2020-03-09 DIAGNOSIS — Z3A.26 26 WEEKS GESTATION OF PREGNANCY: ICD-10-CM

## 2020-03-09 DIAGNOSIS — J01.00 ACUTE NON-RECURRENT MAXILLARY SINUSITIS: Primary | ICD-10-CM

## 2020-03-09 PROCEDURE — 99213 OFFICE O/P EST LOW 20 MIN: CPT | Performed by: INTERNAL MEDICINE

## 2020-03-09 PROCEDURE — 1036F TOBACCO NON-USER: CPT | Performed by: INTERNAL MEDICINE

## 2020-03-09 RX ORDER — AMOXICILLIN 500 MG/1
500 CAPSULE ORAL EVERY 8 HOURS SCHEDULED
Qty: 21 CAPSULE | Refills: 0 | Status: SHIPPED | OUTPATIENT
Start: 2020-03-09 | End: 2020-03-16

## 2020-03-09 NOTE — PROGRESS NOTES
Assessment/Plan:  May continue OTC meds-Robitussin DM, Tylenol and saline sprays prn     Problem List Items Addressed This Visit     None      Visit Diagnoses     Acute non-recurrent maxillary sinusitis    -  Primary    Relevant Medications    amoxicillin (AMOXIL) 500 mg capsule    26 weeks gestation of pregnancy                Subjective:      Patient ID: Erik Downs is a 34 y o  female  HPI  Last week started with a migraine  By the end of the day, felt run down  +scratchy throat, sinus pressure, drainage, ears hurt  Minimal cough  She is 6 months pregnant  She has been taking Robitussin DM Q 4h, Tylenol QID and saline spray  Thought she was feeling better but back to square one today  Boyfriend placed on amoxicillin; negative for the flu    The following portions of the patient's history were reviewed and updated as appropriate: allergies, past family history, past medical history, past social history, past surgical history and problem list     Review of Systems   Constitutional: Positive for fatigue  HENT: Positive for ear pain, sinus pressure and sore throat  Respiratory: Positive for cough  Gastrointestinal: Positive for constipation and nausea  Negative for vomiting  Genitourinary: Negative for difficulty urinating  Neurological: Positive for headaches  Objective:      /70   Pulse 104   Temp 98 9 °F (37 2 °C)   Ht 5' (1 524 m)   Wt 53 1 kg (117 lb)   LMP 09/08/2019   SpO2 98%   BMI 22 85 kg/m²          Physical Exam   Constitutional: She is oriented to person, place, and time  She appears well-developed and well-nourished  HENT:   Head: Normocephalic and atraumatic  Right Ear: External ear normal    Left Ear: External ear normal    Mouth/Throat: Oropharynx is clear and moist    Maxillary sinus tenderness   Eyes: Conjunctivae are normal    Neck: Neck supple  Cardiovascular: Normal rate, regular rhythm and normal heart sounds     No murmur heard   Pulmonary/Chest: Effort normal and breath sounds normal  No stridor  No respiratory distress  She has no wheezes  She has no rales  Neurological: She is alert and oriented to person, place, and time  Skin: Skin is warm and dry  Psychiatric: She has a normal mood and affect   Her behavior is normal  Judgment and thought content normal

## 2020-03-11 ENCOUNTER — ROUTINE PRENATAL (OUTPATIENT)
Dept: OBGYN CLINIC | Facility: CLINIC | Age: 30
End: 2020-03-11

## 2020-03-11 VITALS — DIASTOLIC BLOOD PRESSURE: 60 MMHG | WEIGHT: 120.2 LBS | BODY MASS INDEX: 23.47 KG/M2 | SYSTOLIC BLOOD PRESSURE: 96 MMHG

## 2020-03-11 DIAGNOSIS — Z36.9 ANTENATAL SCREENING ENCOUNTER: Primary | ICD-10-CM

## 2020-03-11 DIAGNOSIS — Z34.82 PRENATAL CARE, SUBSEQUENT PREGNANCY, SECOND TRIMESTER: ICD-10-CM

## 2020-03-11 PROCEDURE — PNV: Performed by: OBSTETRICS & GYNECOLOGY

## 2020-03-11 NOTE — PROGRESS NOTES
To make arrangement for 20 week lab work, infant with clubfeet she will manage for follow-up after delivery    Otherwise doing well

## 2020-03-11 NOTE — PATIENT INSTRUCTIONS
Positive fetal movement no contractions to make arrangements for 20 week lab work  We know that the infant is clubfeet will do with that method child is born

## 2020-03-20 ENCOUNTER — APPOINTMENT (OUTPATIENT)
Dept: LAB | Facility: CLINIC | Age: 30
End: 2020-03-20
Payer: COMMERCIAL

## 2020-03-20 DIAGNOSIS — Z36.9 ANTENATAL SCREENING ENCOUNTER: ICD-10-CM

## 2020-03-20 LAB
BASOPHILS # BLD AUTO: 0.04 THOUSANDS/ΜL (ref 0–0.1)
BASOPHILS NFR BLD AUTO: 0 % (ref 0–1)
EOSINOPHIL # BLD AUTO: 0.09 THOUSAND/ΜL (ref 0–0.61)
EOSINOPHIL NFR BLD AUTO: 1 % (ref 0–6)
ERYTHROCYTE [DISTWIDTH] IN BLOOD BY AUTOMATED COUNT: 13.2 % (ref 11.6–15.1)
GLUCOSE 1H P 50 G GLC PO SERPL-MCNC: 86 MG/DL
HCT VFR BLD AUTO: 35.1 % (ref 34.8–46.1)
HGB BLD-MCNC: 11.5 G/DL (ref 11.5–15.4)
IMM GRANULOCYTES # BLD AUTO: 0.05 THOUSAND/UL (ref 0–0.2)
IMM GRANULOCYTES NFR BLD AUTO: 1 % (ref 0–2)
LYMPHOCYTES # BLD AUTO: 0.76 THOUSANDS/ΜL (ref 0.6–4.47)
LYMPHOCYTES NFR BLD AUTO: 8 % (ref 14–44)
MCH RBC QN AUTO: 31.9 PG (ref 26.8–34.3)
MCHC RBC AUTO-ENTMCNC: 32.8 G/DL (ref 31.4–37.4)
MCV RBC AUTO: 97 FL (ref 82–98)
MONOCYTES # BLD AUTO: 0.51 THOUSAND/ΜL (ref 0.17–1.22)
MONOCYTES NFR BLD AUTO: 6 % (ref 4–12)
NEUTROPHILS # BLD AUTO: 7.83 THOUSANDS/ΜL (ref 1.85–7.62)
NEUTS SEG NFR BLD AUTO: 84 % (ref 43–75)
NRBC BLD AUTO-RTO: 0 /100 WBCS
PLATELET # BLD AUTO: 240 THOUSANDS/UL (ref 149–390)
PMV BLD AUTO: 9.1 FL (ref 8.9–12.7)
RBC # BLD AUTO: 3.61 MILLION/UL (ref 3.81–5.12)
RPR SER QL: NORMAL
WBC # BLD AUTO: 9.28 THOUSAND/UL (ref 4.31–10.16)

## 2020-03-20 PROCEDURE — 36415 COLL VENOUS BLD VENIPUNCTURE: CPT

## 2020-03-20 PROCEDURE — 85025 COMPLETE CBC W/AUTO DIFF WBC: CPT

## 2020-03-20 PROCEDURE — 86592 SYPHILIS TEST NON-TREP QUAL: CPT

## 2020-03-20 PROCEDURE — 82950 GLUCOSE TEST: CPT

## 2020-03-25 ENCOUNTER — ULTRASOUND (OUTPATIENT)
Dept: PERINATAL CARE | Facility: OTHER | Age: 30
End: 2020-03-25
Payer: COMMERCIAL

## 2020-03-25 VITALS — HEIGHT: 60 IN | BODY MASS INDEX: 23.47 KG/M2

## 2020-03-25 DIAGNOSIS — Z36.89 ENCOUNTER FOR ULTRASOUND TO ASSESS FETAL GROWTH: ICD-10-CM

## 2020-03-25 DIAGNOSIS — Z3A.28 28 WEEKS GESTATION OF PREGNANCY: Primary | ICD-10-CM

## 2020-03-25 DIAGNOSIS — O35.9XX0 FETAL ABNORMALITY AFFECTING MANAGEMENT OF MOTHER, ANTEPARTUM, SINGLE OR UNSPECIFIED FETUS: ICD-10-CM

## 2020-03-25 PROCEDURE — 76816 OB US FOLLOW-UP PER FETUS: CPT | Performed by: OBSTETRICS & GYNECOLOGY

## 2020-03-25 NOTE — PROGRESS NOTES
[address]: Ms Julio Cesar Currie was seen today at 28w0d for fetal growth assessment ultrasound  See ultrasound report under "OB Procedures" tab    Please don't hesitate to contact our office with any concerns or questions   -Kg Hernandez MD

## 2020-04-08 ENCOUNTER — TELEMEDICINE (OUTPATIENT)
Dept: OBGYN CLINIC | Facility: CLINIC | Age: 30
End: 2020-04-08

## 2020-04-08 DIAGNOSIS — Z34.83 PRENATAL CARE, SUBSEQUENT PREGNANCY, THIRD TRIMESTER: Primary | ICD-10-CM

## 2020-04-08 PROCEDURE — PNV: Performed by: OBSTETRICS & GYNECOLOGY

## 2020-04-23 ENCOUNTER — TELEMEDICINE (OUTPATIENT)
Dept: OBGYN CLINIC | Facility: CLINIC | Age: 30
End: 2020-04-23

## 2020-04-23 VITALS
WEIGHT: 123.46 LBS | DIASTOLIC BLOOD PRESSURE: 73 MMHG | BODY MASS INDEX: 24.11 KG/M2 | SYSTOLIC BLOOD PRESSURE: 102 MMHG

## 2020-04-23 DIAGNOSIS — Z34.03 ENCOUNTER FOR SUPERVISION OF NORMAL FIRST PREGNANCY IN THIRD TRIMESTER: Primary | ICD-10-CM

## 2020-04-23 PROCEDURE — PNV: Performed by: NURSE PRACTITIONER

## 2020-04-25 ENCOUNTER — AMB VIDEO VISIT (OUTPATIENT)
Dept: URGENT CARE | Facility: CLINIC | Age: 30
End: 2020-04-25

## 2020-04-25 ENCOUNTER — AMB VIDEO VISIT (OUTPATIENT)
Dept: OTHER | Facility: HOSPITAL | Age: 30
End: 2020-04-25

## 2020-04-25 DIAGNOSIS — R30.0 DYSURIA: Primary | ICD-10-CM

## 2020-04-25 PROCEDURE — EVISIT: Performed by: PHYSICIAN ASSISTANT

## 2020-04-25 RX ORDER — CEPHALEXIN 500 MG/1
500 CAPSULE ORAL EVERY 12 HOURS SCHEDULED
Qty: 14 CAPSULE | Refills: 0 | Status: SHIPPED | OUTPATIENT
Start: 2020-04-25 | End: 2020-05-02

## 2020-04-27 ENCOUNTER — ROUTINE PRENATAL (OUTPATIENT)
Dept: OBGYN CLINIC | Facility: CLINIC | Age: 30
End: 2020-04-27
Payer: COMMERCIAL

## 2020-04-27 DIAGNOSIS — Z23 NEED FOR TDAP VACCINATION: Primary | ICD-10-CM

## 2020-04-27 DIAGNOSIS — R30.0 DYSURIA: Primary | ICD-10-CM

## 2020-04-27 PROCEDURE — 90471 IMMUNIZATION ADMIN: CPT | Performed by: OBSTETRICS & GYNECOLOGY

## 2020-04-27 PROCEDURE — 90715 TDAP VACCINE 7 YRS/> IM: CPT | Performed by: OBSTETRICS & GYNECOLOGY

## 2020-04-27 RX ORDER — PHENAZOPYRIDINE HYDROCHLORIDE 100 MG/1
100 TABLET, FILM COATED ORAL 3 TIMES DAILY PRN
Qty: 10 TABLET | Refills: 0 | Status: ON HOLD | OUTPATIENT
Start: 2020-04-27 | End: 2020-06-11 | Stop reason: ALTCHOICE

## 2020-05-07 ENCOUNTER — TELEMEDICINE (OUTPATIENT)
Dept: OBGYN CLINIC | Facility: CLINIC | Age: 30
End: 2020-05-07

## 2020-05-07 VITALS — WEIGHT: 126 LBS | DIASTOLIC BLOOD PRESSURE: 79 MMHG | BODY MASS INDEX: 24.61 KG/M2 | SYSTOLIC BLOOD PRESSURE: 109 MMHG

## 2020-05-07 DIAGNOSIS — R39.9 LOWER URINARY TRACT SYMPTOMS: ICD-10-CM

## 2020-05-07 DIAGNOSIS — Z34.03 ENCOUNTER FOR SUPERVISION OF NORMAL FIRST PREGNANCY IN THIRD TRIMESTER: Primary | ICD-10-CM

## 2020-05-07 PROCEDURE — PNV: Performed by: NURSE PRACTITIONER

## 2020-05-07 RX ORDER — NITROFURANTOIN MACROCRYSTALS 50 MG/1
50 CAPSULE ORAL DAILY PRN
Qty: 30 CAPSULE | Refills: 1 | Status: SHIPPED | OUTPATIENT
Start: 2020-05-07 | End: 2021-12-03

## 2020-05-18 ENCOUNTER — TELEPHONE (OUTPATIENT)
Dept: OBGYN CLINIC | Facility: CLINIC | Age: 30
End: 2020-05-18

## 2020-05-21 ENCOUNTER — ROUTINE PRENATAL (OUTPATIENT)
Dept: OBGYN CLINIC | Facility: CLINIC | Age: 30
End: 2020-05-21

## 2020-05-21 VITALS
HEIGHT: 61 IN | SYSTOLIC BLOOD PRESSURE: 108 MMHG | DIASTOLIC BLOOD PRESSURE: 76 MMHG | WEIGHT: 132 LBS | BODY MASS INDEX: 24.92 KG/M2 | TEMPERATURE: 98 F

## 2020-05-21 DIAGNOSIS — Z34.03 ENCOUNTER FOR SUPERVISION OF NORMAL FIRST PREGNANCY IN THIRD TRIMESTER: Primary | ICD-10-CM

## 2020-05-21 DIAGNOSIS — Z36.85 SCREENING, ANTENATAL, FOR STREPTOCOCCUS B: ICD-10-CM

## 2020-05-21 PROCEDURE — 87653 STREP B DNA AMP PROBE: CPT | Performed by: NURSE PRACTITIONER

## 2020-05-21 PROCEDURE — PNV: Performed by: NURSE PRACTITIONER

## 2020-05-23 LAB — GP B STREP DNA SPEC QL NAA+PROBE: ABNORMAL

## 2020-05-29 ENCOUNTER — ROUTINE PRENATAL (OUTPATIENT)
Dept: OBGYN CLINIC | Facility: CLINIC | Age: 30
End: 2020-05-29

## 2020-05-29 VITALS
TEMPERATURE: 98.7 F | DIASTOLIC BLOOD PRESSURE: 72 MMHG | WEIGHT: 130.8 LBS | SYSTOLIC BLOOD PRESSURE: 120 MMHG | BODY MASS INDEX: 24.71 KG/M2

## 2020-05-29 DIAGNOSIS — Z34.83 PRENATAL CARE, SUBSEQUENT PREGNANCY, THIRD TRIMESTER: Primary | ICD-10-CM

## 2020-05-29 PROCEDURE — PNV: Performed by: OBSTETRICS & GYNECOLOGY

## 2020-06-03 ENCOUNTER — ROUTINE PRENATAL (OUTPATIENT)
Dept: OBGYN CLINIC | Facility: CLINIC | Age: 30
End: 2020-06-03

## 2020-06-03 VITALS
DIASTOLIC BLOOD PRESSURE: 60 MMHG | SYSTOLIC BLOOD PRESSURE: 98 MMHG | TEMPERATURE: 98.9 F | WEIGHT: 133.4 LBS | BODY MASS INDEX: 25.21 KG/M2

## 2020-06-03 DIAGNOSIS — Z34.83 PRENATAL CARE, SUBSEQUENT PREGNANCY, THIRD TRIMESTER: Primary | ICD-10-CM

## 2020-06-03 PROCEDURE — PNV: Performed by: OBSTETRICS & GYNECOLOGY

## 2020-06-11 ENCOUNTER — ANESTHESIA EVENT (INPATIENT)
Dept: ANESTHESIOLOGY | Facility: HOSPITAL | Age: 30
End: 2020-06-11
Payer: COMMERCIAL

## 2020-06-11 ENCOUNTER — HOSPITAL ENCOUNTER (INPATIENT)
Facility: HOSPITAL | Age: 30
LOS: 2 days | Discharge: HOME | End: 2020-06-13
Attending: OBSTETRICS & GYNECOLOGY | Admitting: OBSTETRICS & GYNECOLOGY
Payer: COMMERCIAL

## 2020-06-11 ENCOUNTER — ANESTHESIA (INPATIENT)
Dept: ANESTHESIOLOGY | Facility: HOSPITAL | Age: 30
End: 2020-06-11
Payer: COMMERCIAL

## 2020-06-11 DIAGNOSIS — Z3A.39 39 WEEKS GESTATION OF PREGNANCY: ICD-10-CM

## 2020-06-11 LAB
ABO GROUP BLD: NORMAL
AMPHETAMINES SERPL QL SCN: NEGATIVE
BARBITURATES UR QL: NEGATIVE
BASE EXCESS BLDCOA CALC-SCNC: -1.8 MMOL/L (ref 3–11)
BASE EXCESS BLDCOV CALC-SCNC: -2.6 MMOL/L (ref 1–9)
BASOPHILS # BLD AUTO: 0.04 THOUSANDS/ΜL (ref 0–0.1)
BASOPHILS NFR BLD AUTO: 0 % (ref 0–1)
BENZODIAZ UR QL: NEGATIVE
BLD GP AB SCN SERPL QL: NEGATIVE
COCAINE UR QL: NEGATIVE
EOSINOPHIL # BLD AUTO: 0.03 THOUSAND/ΜL (ref 0–0.61)
EOSINOPHIL NFR BLD AUTO: 0 % (ref 0–6)
ERYTHROCYTE [DISTWIDTH] IN BLOOD BY AUTOMATED COUNT: 12.8 % (ref 11.6–15.1)
HCO3 BLDCOA-SCNC: 23.3 MMOL/L (ref 17.3–27.3)
HCO3 BLDCOV-SCNC: 20.4 MMOL/L (ref 12.2–28.6)
HCT VFR BLD AUTO: 40.6 % (ref 34.8–46.1)
HGB BLD-MCNC: 13.4 G/DL (ref 11.5–15.4)
IMM GRANULOCYTES # BLD AUTO: 0.08 THOUSAND/UL (ref 0–0.2)
IMM GRANULOCYTES NFR BLD AUTO: 1 % (ref 0–2)
LYMPHOCYTES # BLD AUTO: 1.26 THOUSANDS/ΜL (ref 0.6–4.47)
LYMPHOCYTES NFR BLD AUTO: 8 % (ref 14–44)
MCH RBC QN AUTO: 30.9 PG (ref 26.8–34.3)
MCHC RBC AUTO-ENTMCNC: 33 G/DL (ref 31.4–37.4)
MCV RBC AUTO: 94 FL (ref 82–98)
METHADONE UR QL: NEGATIVE
MONOCYTES # BLD AUTO: 0.83 THOUSAND/ΜL (ref 0.17–1.22)
MONOCYTES NFR BLD AUTO: 6 % (ref 4–12)
NEUTROPHILS # BLD AUTO: 12.69 THOUSANDS/ΜL (ref 1.85–7.62)
NEUTS SEG NFR BLD AUTO: 85 % (ref 43–75)
NRBC BLD AUTO-RTO: 0 /100 WBCS
O2 CT VFR BLDCOA CALC: 9.4 ML/DL
OPIATES UR QL SCN: NEGATIVE
OXYHGB MFR BLDCOA: 40.7 %
OXYHGB MFR BLDCOV: 63.2 %
PCO2 BLDCOA: 40.7 MM[HG] (ref 30–60)
PCO2 BLDCOV: 31.3 MM HG (ref 27–43)
PCP UR QL: NEGATIVE
PH BLDCOA: 7.38 [PH] (ref 7.23–7.43)
PH BLDCOV: 7.43 [PH] (ref 7.19–7.49)
PLATELET # BLD AUTO: 253 THOUSANDS/UL (ref 149–390)
PMV BLD AUTO: 9.1 FL (ref 8.9–12.7)
PO2 BLDCOA: 18 MM HG (ref 5–25)
PO2 BLDCOV: 24.1 MM HG (ref 15–45)
RBC # BLD AUTO: 4.34 MILLION/UL (ref 3.81–5.12)
RH BLD: POSITIVE
RPR SER QL: NORMAL
SAO2 % BLDCOV: 14.9 ML/DL
SPECIMEN EXPIRATION DATE: NORMAL
THC UR QL: NEGATIVE
WBC # BLD AUTO: 14.93 THOUSAND/UL (ref 4.31–10.16)

## 2020-06-11 PROCEDURE — 10907ZC DRAINAGE OF AMNIOTIC FLUID, THERAPEUTIC FROM PRODUCTS OF CONCEPTION, VIA NATURAL OR ARTIFICIAL OPENING: ICD-10-PCS | Performed by: OBSTETRICS & GYNECOLOGY

## 2020-06-11 PROCEDURE — G0463 HOSPITAL OUTPT CLINIC VISIT: HCPCS

## 2020-06-11 PROCEDURE — 86901 BLOOD TYPING SEROLOGIC RH(D): CPT | Performed by: OBSTETRICS & GYNECOLOGY

## 2020-06-11 PROCEDURE — 99213 OFFICE O/P EST LOW 20 MIN: CPT

## 2020-06-11 PROCEDURE — 86900 BLOOD TYPING SEROLOGIC ABO: CPT | Performed by: OBSTETRICS & GYNECOLOGY

## 2020-06-11 PROCEDURE — 10H073Z INSERTION OF MONITORING ELECTRODE INTO PRODUCTS OF CONCEPTION, VIA NATURAL OR ARTIFICIAL OPENING: ICD-10-PCS | Performed by: OBSTETRICS & GYNECOLOGY

## 2020-06-11 PROCEDURE — 4A1H7CZ MONITORING OF PRODUCTS OF CONCEPTION, CARDIAC RATE, VIA NATURAL OR ARTIFICIAL OPENING: ICD-10-PCS | Performed by: OBSTETRICS & GYNECOLOGY

## 2020-06-11 PROCEDURE — 59400 OBSTETRICAL CARE: CPT | Performed by: OBSTETRICS & GYNECOLOGY

## 2020-06-11 PROCEDURE — 86850 RBC ANTIBODY SCREEN: CPT | Performed by: OBSTETRICS & GYNECOLOGY

## 2020-06-11 PROCEDURE — 80307 DRUG TEST PRSMV CHEM ANLYZR: CPT | Performed by: OBSTETRICS & GYNECOLOGY

## 2020-06-11 PROCEDURE — 4A1HXCZ MONITORING OF PRODUCTS OF CONCEPTION, CARDIAC RATE, EXTERNAL APPROACH: ICD-10-PCS | Performed by: OBSTETRICS & GYNECOLOGY

## 2020-06-11 PROCEDURE — 85025 COMPLETE CBC W/AUTO DIFF WBC: CPT | Performed by: OBSTETRICS & GYNECOLOGY

## 2020-06-11 PROCEDURE — 99024 POSTOP FOLLOW-UP VISIT: CPT | Performed by: OBSTETRICS & GYNECOLOGY

## 2020-06-11 PROCEDURE — 82805 BLOOD GASES W/O2 SATURATION: CPT | Performed by: OBSTETRICS & GYNECOLOGY

## 2020-06-11 PROCEDURE — 86592 SYPHILIS TEST NON-TREP QUAL: CPT | Performed by: OBSTETRICS & GYNECOLOGY

## 2020-06-11 PROCEDURE — 0KQM0ZZ REPAIR PERINEUM MUSCLE, OPEN APPROACH: ICD-10-PCS | Performed by: OBSTETRICS & GYNECOLOGY

## 2020-06-11 RX ORDER — ROPIVACAINE HYDROCHLORIDE 2 MG/ML
INJECTION, SOLUTION EPIDURAL; INFILTRATION; PERINEURAL CONTINUOUS PRN
Status: DISCONTINUED | OUTPATIENT
Start: 2020-06-11 | End: 2020-06-11 | Stop reason: SURG

## 2020-06-11 RX ORDER — METHYLERGONOVINE MALEATE 0.2 MG/ML
0.2 INJECTION INTRAVENOUS ONCE
Status: COMPLETED | OUTPATIENT
Start: 2020-06-11 | End: 2020-06-11

## 2020-06-11 RX ORDER — ACETAMINOPHEN 325 MG/1
650 TABLET ORAL EVERY 6 HOURS PRN
Status: DISCONTINUED | OUTPATIENT
Start: 2020-06-11 | End: 2020-06-13 | Stop reason: HOSPADM

## 2020-06-11 RX ORDER — IBUPROFEN 600 MG/1
600 TABLET ORAL EVERY 6 HOURS PRN
Status: DISCONTINUED | OUTPATIENT
Start: 2020-06-11 | End: 2020-06-13 | Stop reason: HOSPADM

## 2020-06-11 RX ORDER — OXYCODONE HYDROCHLORIDE AND ACETAMINOPHEN 5; 325 MG/1; MG/1
1 TABLET ORAL EVERY 4 HOURS PRN
Status: DISCONTINUED | OUTPATIENT
Start: 2020-06-11 | End: 2020-06-13 | Stop reason: HOSPADM

## 2020-06-11 RX ORDER — DOCUSATE SODIUM 100 MG/1
100 CAPSULE, LIQUID FILLED ORAL 2 TIMES DAILY
Status: DISCONTINUED | OUTPATIENT
Start: 2020-06-11 | End: 2020-06-13 | Stop reason: HOSPADM

## 2020-06-11 RX ORDER — ROPIVACAINE HYDROCHLORIDE 2 MG/ML
INJECTION, SOLUTION EPIDURAL; INFILTRATION; PERINEURAL
Status: COMPLETED | OUTPATIENT
Start: 2020-06-11 | End: 2020-06-11

## 2020-06-11 RX ORDER — OXYTOCIN/RINGER'S LACTATE 30/500 ML
62.5 PLASTIC BAG, INJECTION (ML) INTRAVENOUS CONTINUOUS
Status: DISCONTINUED | OUTPATIENT
Start: 2020-06-11 | End: 2020-06-13 | Stop reason: HOSPADM

## 2020-06-11 RX ORDER — CARBOPROST TROMETHAMINE 250 UG/ML
250 INJECTION, SOLUTION INTRAMUSCULAR
Status: DISCONTINUED | OUTPATIENT
Start: 2020-06-11 | End: 2020-06-13 | Stop reason: HOSPADM

## 2020-06-11 RX ORDER — OXYTOCIN/RINGER'S LACTATE 30/500 ML
250 PLASTIC BAG, INJECTION (ML) INTRAVENOUS
Status: DISCONTINUED | OUTPATIENT
Start: 2020-06-11 | End: 2020-06-13 | Stop reason: HOSPADM

## 2020-06-11 RX ORDER — OXYTOCIN/RINGER'S LACTATE 30/500 ML
PLASTIC BAG, INJECTION (ML) INTRAVENOUS
Status: COMPLETED
Start: 2020-06-11 | End: 2020-06-11

## 2020-06-11 RX ORDER — OXYCODONE HYDROCHLORIDE AND ACETAMINOPHEN 5; 325 MG/1; MG/1
2 TABLET ORAL EVERY 4 HOURS PRN
Status: DISCONTINUED | OUTPATIENT
Start: 2020-06-11 | End: 2020-06-13 | Stop reason: HOSPADM

## 2020-06-11 RX ORDER — ONDANSETRON 2 MG/ML
4 INJECTION INTRAMUSCULAR; INTRAVENOUS EVERY 6 HOURS PRN
Status: DISCONTINUED | OUTPATIENT
Start: 2020-06-11 | End: 2020-06-11

## 2020-06-11 RX ORDER — ROPIVACAINE HYDROCHLORIDE 2 MG/ML
INJECTION, SOLUTION EPIDURAL; INFILTRATION; PERINEURAL AS NEEDED
Status: DISCONTINUED | OUTPATIENT
Start: 2020-06-11 | End: 2020-06-11 | Stop reason: SURG

## 2020-06-11 RX ORDER — DIPHENHYDRAMINE HCL 25 MG
25 TABLET ORAL EVERY 6 HOURS PRN
Status: DISCONTINUED | OUTPATIENT
Start: 2020-06-11 | End: 2020-06-13 | Stop reason: HOSPADM

## 2020-06-11 RX ORDER — ONDANSETRON 2 MG/ML
4 INJECTION INTRAMUSCULAR; INTRAVENOUS EVERY 8 HOURS PRN
Status: DISCONTINUED | OUTPATIENT
Start: 2020-06-11 | End: 2020-06-13 | Stop reason: HOSPADM

## 2020-06-11 RX ORDER — DIAPER,BRIEF,INFANT-TODD,DISP
1 EACH MISCELLANEOUS 4 TIMES DAILY PRN
Status: DISCONTINUED | OUTPATIENT
Start: 2020-06-11 | End: 2020-06-13 | Stop reason: HOSPADM

## 2020-06-11 RX ORDER — SODIUM CHLORIDE, SODIUM LACTATE, POTASSIUM CHLORIDE, CALCIUM CHLORIDE 600; 310; 30; 20 MG/100ML; MG/100ML; MG/100ML; MG/100ML
125 INJECTION, SOLUTION INTRAVENOUS CONTINUOUS
Status: DISCONTINUED | OUTPATIENT
Start: 2020-06-11 | End: 2020-06-11

## 2020-06-11 RX ORDER — CALCIUM CARBONATE 200(500)MG
1000 TABLET,CHEWABLE ORAL DAILY PRN
Status: DISCONTINUED | OUTPATIENT
Start: 2020-06-11 | End: 2020-06-13 | Stop reason: HOSPADM

## 2020-06-11 RX ADMIN — SODIUM CHLORIDE, SODIUM LACTATE, POTASSIUM CHLORIDE, AND CALCIUM CHLORIDE 125 ML/HR: .6; .31; .03; .02 INJECTION, SOLUTION INTRAVENOUS at 05:00

## 2020-06-11 RX ADMIN — Medication 250 MILLI-UNITS/MIN: at 18:16

## 2020-06-11 RX ADMIN — SODIUM CHLORIDE, SODIUM LACTATE, POTASSIUM CHLORIDE, AND CALCIUM CHLORIDE 125 ML/HR: .6; .31; .03; .02 INJECTION, SOLUTION INTRAVENOUS at 06:37

## 2020-06-11 RX ADMIN — Medication 62.5 UNITS: at 19:55

## 2020-06-11 RX ADMIN — WITCH HAZEL 1 PAD: 500 SOLUTION RECTAL; TOPICAL at 20:40

## 2020-06-11 RX ADMIN — Medication 10 ML/HR: at 06:17

## 2020-06-11 RX ADMIN — Medication 1000 MG: at 18:30

## 2020-06-11 RX ADMIN — SODIUM CHLORIDE 2.5 MILLION UNITS: 9 INJECTION, SOLUTION INTRAVENOUS at 09:11

## 2020-06-11 RX ADMIN — METHYLERGONOVINE MALEATE 0.2 MG: 0.2 INJECTION INTRAVENOUS at 18:30

## 2020-06-11 RX ADMIN — SODIUM CHLORIDE 5 MILLION UNITS: 0.9 INJECTION, SOLUTION INTRAVENOUS at 05:15

## 2020-06-11 RX ADMIN — ROPIVACAINE HYDROCHLORIDE 12 ML: 2 INJECTION, SOLUTION EPIDURAL; INFILTRATION at 06:10

## 2020-06-11 RX ADMIN — BENZOCAINE AND LEVOMENTHOL: 200; 5 SPRAY TOPICAL at 20:40

## 2020-06-11 RX ADMIN — SODIUM CHLORIDE 2.5 MILLION UNITS: 9 INJECTION, SOLUTION INTRAVENOUS at 13:15

## 2020-06-11 RX ADMIN — ROPIVACAINE HYDROCHLORIDE 12 ML: 2 INJECTION, SOLUTION EPIDURAL; INFILTRATION at 06:22

## 2020-06-11 RX ADMIN — ONDANSETRON 4 MG: 2 INJECTION INTRAMUSCULAR; INTRAVENOUS at 18:46

## 2020-06-11 RX ADMIN — CARBOPROST TROMETHAMINE 250 MCG: 250 INJECTION, SOLUTION INTRAMUSCULAR at 18:30

## 2020-06-11 RX ADMIN — SODIUM CHLORIDE 2.5 MILLION UNITS: 9 INJECTION, SOLUTION INTRAVENOUS at 17:14

## 2020-06-12 PROCEDURE — 99024 POSTOP FOLLOW-UP VISIT: CPT | Performed by: OBSTETRICS & GYNECOLOGY

## 2020-06-12 RX ADMIN — DOCUSATE SODIUM 100 MG: 100 CAPSULE, LIQUID FILLED ORAL at 18:32

## 2020-06-12 RX ADMIN — IBUPROFEN 600 MG: 600 TABLET ORAL at 11:54

## 2020-06-12 RX ADMIN — DOCUSATE SODIUM 100 MG: 100 CAPSULE, LIQUID FILLED ORAL at 11:54

## 2020-06-13 VITALS
WEIGHT: 133 LBS | TEMPERATURE: 98.1 F | HEART RATE: 65 BPM | RESPIRATION RATE: 18 BRPM | HEIGHT: 61 IN | OXYGEN SATURATION: 96 % | SYSTOLIC BLOOD PRESSURE: 110 MMHG | BODY MASS INDEX: 25.11 KG/M2 | DIASTOLIC BLOOD PRESSURE: 66 MMHG

## 2020-06-13 PROCEDURE — 99024 POSTOP FOLLOW-UP VISIT: CPT | Performed by: OBSTETRICS & GYNECOLOGY

## 2020-06-13 RX ORDER — IBUPROFEN 600 MG/1
600 TABLET ORAL EVERY 6 HOURS PRN
Qty: 20 TABLET | Refills: 0 | Status: SHIPPED | OUTPATIENT
Start: 2020-06-13 | End: 2021-02-10

## 2020-06-13 RX ORDER — ACETAMINOPHEN 325 MG/1
650 TABLET ORAL EVERY 6 HOURS PRN
Qty: 30 TABLET | Refills: 0 | Status: SHIPPED | OUTPATIENT
Start: 2020-06-13 | End: 2021-02-10

## 2020-06-13 RX ADMIN — IBUPROFEN 600 MG: 600 TABLET ORAL at 05:32

## 2020-06-15 ENCOUNTER — TRANSITIONAL CARE MANAGEMENT (OUTPATIENT)
Dept: INTERNAL MEDICINE CLINIC | Facility: CLINIC | Age: 30
End: 2020-06-15

## 2020-06-15 PROCEDURE — TCMXX

## 2020-06-18 LAB — PLACENTA IN STORAGE: NORMAL

## 2020-07-06 ENCOUNTER — POSTPARTUM VISIT (OUTPATIENT)
Dept: OBGYN CLINIC | Facility: CLINIC | Age: 30
End: 2020-07-06

## 2020-07-06 VITALS
HEIGHT: 60 IN | SYSTOLIC BLOOD PRESSURE: 110 MMHG | DIASTOLIC BLOOD PRESSURE: 52 MMHG | BODY MASS INDEX: 23.75 KG/M2 | TEMPERATURE: 97.2 F | WEIGHT: 121 LBS

## 2020-07-06 PROCEDURE — 99024 POSTOP FOLLOW-UP VISIT: CPT | Performed by: OBSTETRICS & GYNECOLOGY

## 2020-07-06 PROCEDURE — 1111F DSCHRG MED/CURRENT MED MERGE: CPT | Performed by: OBSTETRICS & GYNECOLOGY

## 2020-07-06 NOTE — PROGRESS NOTES
3 WK POSTPARTUM APPT:     VAVD 2020 (39 1/7 wk)  "JAHAIRA"    Bottlefeeding q 2-3 hrs ( 4 - 4 1/2 HRS) - initially tried breastfeeding - latch issue, the decreased milk supply with pumping  gaining weight but very gassy - using drops - has tried different formulas  Lochia - lt brown spotting  Taking children's chewable vits  Ped - Dr Neil Johnson - next appt 2020  Staying @ home until 2020 ()  (+) GBS tx in labor  Had TDAP during pregnancy  Birth control - prev had IUD (Li) most recent, Nuvaring, weight gain on ocps  Discussed Depo Provera - pamphlet given - undecided @ present - will f/u @ 6 wk pp appt  Had 1st deg perineal lac  Completed [place] Dep scale (score = 7)  Postpartum packet given  Return to office for 6 wk pp appt

## 2020-07-21 ENCOUNTER — POSTPARTUM VISIT (OUTPATIENT)
Dept: OBGYN CLINIC | Facility: CLINIC | Age: 30
End: 2020-07-21

## 2020-07-21 VITALS
DIASTOLIC BLOOD PRESSURE: 58 MMHG | SYSTOLIC BLOOD PRESSURE: 94 MMHG | HEIGHT: 60 IN | WEIGHT: 120.6 LBS | BODY MASS INDEX: 23.68 KG/M2 | TEMPERATURE: 98.3 F

## 2020-07-21 PROCEDURE — 99024 POSTOP FOLLOW-UP VISIT: CPT | Performed by: OBSTETRICS & GYNECOLOGY

## 2020-07-21 PROCEDURE — 1111F DSCHRG MED/CURRENT MED MERGE: CPT | Performed by: OBSTETRICS & GYNECOLOGY

## 2020-07-21 PROCEDURE — 3008F BODY MASS INDEX DOCD: CPT | Performed by: INTERNAL MEDICINE

## 2020-07-21 NOTE — PATIENT INSTRUCTIONS
Patient was informed of a stable postpartum gyn examination  All restrictions were lifted  Use condoms contraception  Return to office in 6 months for yearly exam   Any problem with baby blues crying spells or changes infant she will let us know

## 2020-07-21 NOTE — PROGRESS NOTES
This is a 33-year-old white female, she is now 6 weeks status post vaginal delivery she is doing well  She is bottle-feeding  Denies any problem with postpartum depression or baby blues  She is happy with her weight loss  Plans using condoms for contraception  Infant has bilateral clubfeet she is now in cast and doing well  Examination of breasts the abdomen and pelvic exam all within normal limits  The perineum is completely healed      The patient was informed of a stable postpartum checkup all tractions were lifted to use condoms contraception return to office in 1 6 months for yearly exam

## 2020-07-31 ENCOUNTER — TELEMEDICINE (OUTPATIENT)
Dept: INTERNAL MEDICINE CLINIC | Facility: CLINIC | Age: 30
End: 2020-07-31
Payer: COMMERCIAL

## 2020-07-31 DIAGNOSIS — F41.1 GENERALIZED ANXIETY DISORDER: Primary | ICD-10-CM

## 2020-07-31 DIAGNOSIS — K58.1 IRRITABLE BOWEL SYNDROME WITH CONSTIPATION: ICD-10-CM

## 2020-07-31 DIAGNOSIS — G43.709 CHRONIC MIGRAINE WITHOUT AURA WITHOUT STATUS MIGRAINOSUS, NOT INTRACTABLE: ICD-10-CM

## 2020-07-31 PROBLEM — Z3A.39 39 WEEKS GESTATION OF PREGNANCY: Status: RESOLVED | Noted: 2019-12-09 | Resolved: 2020-07-31

## 2020-07-31 PROCEDURE — 99214 OFFICE O/P EST MOD 30 MIN: CPT | Performed by: INTERNAL MEDICINE

## 2020-07-31 PROCEDURE — 1111F DSCHRG MED/CURRENT MED MERGE: CPT | Performed by: INTERNAL MEDICINE

## 2020-07-31 PROCEDURE — 1036F TOBACCO NON-USER: CPT | Performed by: INTERNAL MEDICINE

## 2020-07-31 RX ORDER — BUSPIRONE HYDROCHLORIDE 5 MG/1
5 TABLET ORAL 2 TIMES DAILY
Qty: 60 TABLET | Refills: 5 | Status: SHIPPED | OUTPATIENT
Start: 2020-07-31 | End: 2021-02-10

## 2020-07-31 NOTE — PROGRESS NOTES
Virtual Regular Visit      Assessment/Plan:  She tolerated Buspar so will resume this  Call with any side effects    Problem List Items Addressed This Visit        Digestive    Irritable bowel syndrome       Cardiovascular and Mediastinum    Chronic migraine without aura without status migrainosus, not intractable       Other    Generalized anxiety disorder - Primary    Relevant Medications    busPIRone (BUSPAR) 5 mg tablet               Reason for visit is   Chief Complaint   Patient presents with    Virtual Regular Visit        Encounter provider Omar Vaca MD    Provider located at [address] [place] [id number]      Recent Visits  No visits were found meeting these conditions  Showing recent visits within past 7 days and meeting all other requirements     Today's Visits  Date Type Provider Dept   07/31/20 Telemedicine Omar Vaca MD [address] [facility] today's visits and meeting all other requirements     Future Appointments  No visits were found meeting these conditions  Showing future appointments within next 150 days and meeting all other requirements        The patient was identified by name and date of birth  Abran Moralez was informed that this is a telemedicine visit and that the visit is being conducted through [employer] and patient was informed that this is not a secure, HIPAA-complaint platform  She agrees to proceed  My office door was closed  No one else was in the room  She acknowledged consent and understanding of privacy and security of the video platform  The patient has agreed to participate and understands they can discontinue the visit at any time  Patient is aware this is a billable service  Subjective  Abran Moralez is a 31 y o  female with anxiety   Long history of anxiety disorder for which she has been on Lexapro, Effexor, Wellbutrin   All of these caused minor side effects-weight gain and decrease in libido with Lexapro  Effexor made her feel foggy  She was on this for migraines as well  Wellbutrin caused palpitations  She tolerated Buspar but stopped when she found out she was pregnant  She switched to sertraline but started having headaches so stopped  She did not take any meds through the rest of her pregnancy  She had anxiety but this was manageable  Her baby girl is 9 weeks old  She is healthy but has clubbed feet  She is doing well with the cast and feet are improving  She is not breastfeeding  She experienced depression for about 2 weeks  She has noticed that her anxiety is increasing  She denies panic attacks  Planning to return to work in about 4 weeks         Past Medical History:   Diagnosis Date    39 weeks gestation of pregnancy 12/9/2019    Anxiety     Contraceptive use     LAST ASSESSED: [id number]    Depression     d/c Buspar with preg - now started on Zoloft 25 mg 11/1/19    External hemorrhoid, bleeding     LAST ASSESSED: [id number]    Gallbladder attack     Hematochezia     LAST ASSESSED: [id number]    IBS (irritable bowel syndrome)     Immunity status testing     LAST ASSESSED: [id number]    Methicillin resistant Staphylococcus aureus infection     Migraine     prev on effexor for migraine & anxiety - d/c approx 1 yr ago    Poison ivy dermatitis     LAST ASSESSED: [id number]    Psychiatric disorder     Shingles     Skin abscess     RIGHT LEG    Urinary frequency     LAST ASSESSED: [id number]    UTI (urinary tract infection)     uses Macrobid after intercourse    Vacuum extractor delivery, delivered 6/11/2020    Varicella     childhood       Past Surgical History:   Procedure Laterality Date    TONSILLECTOMY      ONSET: 1997    WISDOM TOOTH EXTRACTION         Current Outpatient Medications   Medication Sig Dispense Refill    acetaminophen (TYLENOL) 325 mg tablet Take 2 tablets (650 mg total) by mouth every 6 (six) hours as needed for headaches (Patient not taking: Reported on 7/6/2020) 30 tablet 0    busPIRone (BUSPAR) 5 mg tablet Take 1 tablet (5 mg total) by mouth 2 (two) times a day 60 tablet 5    ibuprofen (MOTRIN) 600 mg tablet Take 1 tablet (600 mg total) by mouth every 6 (six) hours as needed for mild pain (Patient not taking: Reported on 7/6/2020) 20 tablet 0    Pediatric Multiple Vit-C-FA (MULTIVITAMIN CHILDRENS) CHEW Chew 2 tablets daily       No current facility-administered medications for this visit  No Known Allergies    Review of Systems   Constitutional: Positive for unexpected weight change (weighs more now than during her pregnancy at 120lbs (she was 115 ))  Negative for chills and fever  HENT: Negative for congestion, sinus pressure and sore throat  Respiratory: Negative for cough and shortness of breath  Cardiovascular: Negative for chest pain and palpitations  Gastrointestinal: Negative for abdominal pain, constipation, diarrhea, nausea and vomiting  Neurological: Positive for headaches (mild migraine recently)  Psychiatric/Behavioral: Negative for dysphoric mood and sleep disturbance  The patient is nervous/anxious  Video Exam    There were no vitals filed for this visit  Physical Exam     I spent 30 minutes directly with the patient during this visit      VIRTUAL VISIT DISCLAIMER    Asher Gonzalez acknowledges that she has consented to an online visit or consultation  She understands that the online visit is based solely on information provided by her, and that, in the absence of a face-to-face physical evaluation by the physician, the diagnosis she receives is both limited and provisional in terms of accuracy and completeness  This is not intended to replace a full medical face-to-face evaluation by the physician  Asher Gonzalez understands and accepts these terms

## 2020-09-04 ENCOUNTER — TELEMEDICINE (OUTPATIENT)
Dept: INTERNAL MEDICINE CLINIC | Facility: CLINIC | Age: 30
End: 2020-09-04
Payer: COMMERCIAL

## 2020-09-04 DIAGNOSIS — F41.1 GENERALIZED ANXIETY DISORDER: Primary | ICD-10-CM

## 2020-09-04 DIAGNOSIS — K13.0 ANGULAR CHEILITIS: ICD-10-CM

## 2020-09-04 PROCEDURE — 99442 PR PHYS/QHP TELEPHONE EVALUATION 11-20 MIN: CPT | Performed by: INTERNAL MEDICINE

## 2020-09-04 NOTE — PROGRESS NOTES
Virtual Brief Visit    Assessment/Plan:  Continue Buspar  Continue Mupirocin  She will finish the 2 weeks of terbinafine and f/u with derm  Problem List Items Addressed This Visit        Other    Generalized anxiety disorder - Primary      Other Visit Diagnoses     Angular cheilitis                    Reason for visit is   Chief Complaint   Patient presents with    Virtual Brief Visit        Encounter provider Latha Deras MD    Provider located at [address] [place] [id number]    Recent Visits  No visits were found meeting these conditions  Showing recent visits within past 7 days and meeting all other requirements     Today's Visits  Date Type Provider Dept   09/04/20 Telemedicine Latha Deras MD [facility] today's visits and meeting all other requirements     Future Appointments  No visits were found meeting these conditions  Showing future appointments within next 150 days and meeting all other requirements        After connecting through telephone, the patient was identified by name and date of birth  Moira Casper was informed that this is a telemedicine visit and that the visit is being conducted through telephone  My office door was closed  No one else was in the room  She acknowledged consent and understanding of privacy and security of the platform  The patient has agreed to participate and understands she can discontinue the visit at any time  Patient is aware this is a billable service  Subjective    Moira Casper is a 31 y o  female with anxiety  Started Buspar about 4 weeks ago for anxiety  It is helping and she is feeling well  Going back to work next week  Patch on right cheek which is not new and previously diagnosed as eczema and a new one on the corner of the mouth on the right, very itchy  She went to urgent care and topical steroid prescribed   It helped with the itching   Saw [facility] yesterday and was given  terbinafine 250mg daily x 2 weeks and mupirocin 1-2 times a day  She was advised to to use a topical steroid   The patches have already improved       Past Medical History:   Diagnosis Date    39 weeks gestation of pregnancy 12/9/2019    Anxiety     Contraceptive use     LAST ASSESSED: [id number]    Depression     d/c Buspar with preg - now started on Zoloft 25 mg 11/1/19    External hemorrhoid, bleeding     LAST ASSESSED: [id number]    Gallbladder attack     Hematochezia     LAST ASSESSED: [id number]    IBS (irritable bowel syndrome)     Immunity status testing     LAST ASSESSED: [id number]    Methicillin resistant Staphylococcus aureus infection     Migraine     prev on effexor for migraine & anxiety - d/c approx 1 yr ago    Poison ivy dermatitis     LAST ASSESSED: [id number]    Psychiatric disorder     Shingles     Skin abscess     RIGHT LEG    Urinary frequency     LAST ASSESSED: [id number]    UTI (urinary tract infection)     uses Macrobid after intercourse    Vacuum extractor delivery, delivered 6/11/2020    Varicella     childhood       Past Surgical History:   Procedure Laterality Date    TONSILLECTOMY      ONSET: 1997    WISDOM TOOTH EXTRACTION         Current Outpatient Medications   Medication Sig Dispense Refill    acetaminophen (TYLENOL) 325 mg tablet Take 2 tablets (650 mg total) by mouth every 6 (six) hours as needed for headaches (Patient not taking: Reported on 7/6/2020) 30 tablet 0    busPIRone (BUSPAR) 5 mg tablet Take 1 tablet (5 mg total) by mouth 2 (two) times a day 60 tablet 5    ibuprofen (MOTRIN) 600 mg tablet Take 1 tablet (600 mg total) by mouth every 6 (six) hours as needed for mild pain (Patient not taking: Reported on 7/6/2020) 20 tablet 0    Pediatric Multiple Vit-C-FA (MULTIVITAMIN CHILDRENS) CHEW Chew 2 tablets daily       No current facility-administered medications for this visit           No Known Allergies    Review of Systems   Constitutional: Negative for chills, fatigue, fever and unexpected weight change  HENT: Negative for congestion, rhinorrhea, sinus pressure and sore throat  Respiratory: Negative for cough, shortness of breath and wheezing  Cardiovascular: Negative for chest pain, palpitations and leg swelling  Gastrointestinal: Negative for abdominal pain, constipation, diarrhea, nausea and vomiting  Genitourinary: Negative for difficulty urinating  Musculoskeletal: Negative for arthralgias and myalgias  Skin:        See hpi   Neurological: Negative for dizziness and headaches  Psychiatric/Behavioral: The patient is nervous/anxious  There were no vitals filed for this visit  I spent 20 minutes directly with the patient during this visit    VIRTUAL VISIT DISCLAIMER    Antonia Beecynthia acknowledges that she has consented to an online visit or consultation  She understands that the online visit is based solely on information provided by her, and that, in the absence of a face-to-face physical evaluation by the physician, the diagnosis she receives is both limited and provisional in terms of accuracy and completeness  This is not intended to replace a full medical face-to-face evaluation by the physician  Antonia Stahl understands and accepts these terms

## 2020-09-14 ENCOUNTER — TELEPHONE (OUTPATIENT)
Dept: OBGYN CLINIC | Facility: CLINIC | Age: 30
End: 2020-09-14

## 2020-09-14 NOTE — TELEPHONE ENCOUNTER
faxed letter to pt's workplace (Attn: Philip Navarro) - [phone] re: pt's return to work date 9/8/2020  Was seen 7/21/2020 for 6 wk postpartum appt  Had taken 12 wk maternity leave

## 2020-11-10 ENCOUNTER — OFFICE VISIT (OUTPATIENT)
Dept: INTERNAL MEDICINE CLINIC | Facility: CLINIC | Age: 30
End: 2020-11-10
Payer: COMMERCIAL

## 2020-11-10 VITALS
OXYGEN SATURATION: 97 % | BODY MASS INDEX: 23.71 KG/M2 | DIASTOLIC BLOOD PRESSURE: 58 MMHG | WEIGHT: 120.8 LBS | SYSTOLIC BLOOD PRESSURE: 102 MMHG | TEMPERATURE: 97.3 F | HEART RATE: 91 BPM | HEIGHT: 60 IN

## 2020-11-10 DIAGNOSIS — M65.4 TENOSYNOVITIS, DE QUERVAIN: Primary | ICD-10-CM

## 2020-11-10 PROCEDURE — 99213 OFFICE O/P EST LOW 20 MIN: CPT | Performed by: INTERNAL MEDICINE

## 2020-11-10 PROCEDURE — 3008F BODY MASS INDEX DOCD: CPT | Performed by: INTERNAL MEDICINE

## 2020-11-10 PROCEDURE — 3725F SCREEN DEPRESSION PERFORMED: CPT | Performed by: INTERNAL MEDICINE

## 2020-11-10 RX ORDER — VALACYCLOVIR HYDROCHLORIDE 500 MG/1
500 TABLET, FILM COATED ORAL DAILY
COMMUNITY
Start: 2020-11-08 | End: 2021-02-10

## 2020-11-16 ENCOUNTER — TELEMEDICINE (OUTPATIENT)
Dept: INTERNAL MEDICINE CLINIC | Facility: CLINIC | Age: 30
End: 2020-11-16
Payer: COMMERCIAL

## 2020-11-16 DIAGNOSIS — B34.9 VIRAL INFECTION, UNSPECIFIED: Primary | ICD-10-CM

## 2020-11-16 DIAGNOSIS — B34.9 VIRAL INFECTION, UNSPECIFIED: ICD-10-CM

## 2020-11-16 PROCEDURE — U0003 INFECTIOUS AGENT DETECTION BY NUCLEIC ACID (DNA OR RNA); SEVERE ACUTE RESPIRATORY SYNDROME CORONAVIRUS 2 (SARS-COV-2) (CORONAVIRUS DISEASE [COVID-19]), AMPLIFIED PROBE TECHNIQUE, MAKING USE OF HIGH THROUGHPUT TECHNOLOGIES AS DESCRIBED BY CMS-2020-01-R: HCPCS | Performed by: INTERNAL MEDICINE

## 2020-11-16 PROCEDURE — 1036F TOBACCO NON-USER: CPT | Performed by: INTERNAL MEDICINE

## 2020-11-16 PROCEDURE — 99213 OFFICE O/P EST LOW 20 MIN: CPT | Performed by: INTERNAL MEDICINE

## 2020-11-17 ENCOUNTER — TELEPHONE (OUTPATIENT)
Dept: INTERNAL MEDICINE CLINIC | Facility: CLINIC | Age: 30
End: 2020-11-17

## 2020-11-17 LAB — SARS-COV-2 RNA SPEC QL NAA+PROBE: NOT DETECTED

## 2020-11-18 ENCOUNTER — TELEPHONE (OUTPATIENT)
Dept: INTERNAL MEDICINE CLINIC | Facility: CLINIC | Age: 30
End: 2020-11-18

## 2020-12-08 ENCOUNTER — CLINICAL SUPPORT (OUTPATIENT)
Dept: INTERNAL MEDICINE CLINIC | Facility: CLINIC | Age: 30
End: 2020-12-08
Payer: COMMERCIAL

## 2020-12-08 ENCOUNTER — TELEMEDICINE (OUTPATIENT)
Dept: INTERNAL MEDICINE CLINIC | Facility: CLINIC | Age: 30
End: 2020-12-08
Payer: COMMERCIAL

## 2020-12-08 DIAGNOSIS — Z20.822 EXPOSURE TO COVID-19 VIRUS: Primary | ICD-10-CM

## 2020-12-08 PROCEDURE — 99212 OFFICE O/P EST SF 10 MIN: CPT | Performed by: GENERAL ACUTE CARE HOSPITAL

## 2020-12-08 PROCEDURE — U0003 INFECTIOUS AGENT DETECTION BY NUCLEIC ACID (DNA OR RNA); SEVERE ACUTE RESPIRATORY SYNDROME CORONAVIRUS 2 (SARS-COV-2) (CORONAVIRUS DISEASE [COVID-19]), AMPLIFIED PROBE TECHNIQUE, MAKING USE OF HIGH THROUGHPUT TECHNOLOGIES AS DESCRIBED BY CMS-2020-01-R: HCPCS | Performed by: GENERAL ACUTE CARE HOSPITAL

## 2020-12-09 LAB — SARS-COV-2 RNA SPEC QL NAA+PROBE: NOT DETECTED

## 2021-01-25 ENCOUNTER — ANNUAL EXAM (OUTPATIENT)
Dept: OBGYN CLINIC | Facility: CLINIC | Age: 31
End: 2021-01-25
Payer: COMMERCIAL

## 2021-01-25 VITALS
HEIGHT: 60 IN | WEIGHT: 124.2 LBS | BODY MASS INDEX: 24.39 KG/M2 | DIASTOLIC BLOOD PRESSURE: 78 MMHG | SYSTOLIC BLOOD PRESSURE: 120 MMHG

## 2021-01-25 DIAGNOSIS — F41.9 ANXIETY: ICD-10-CM

## 2021-01-25 DIAGNOSIS — Z01.419 WOMEN'S ANNUAL ROUTINE GYNECOLOGICAL EXAMINATION: ICD-10-CM

## 2021-01-25 DIAGNOSIS — N39.3 STRESS INCONTINENCE OF URINE: ICD-10-CM

## 2021-01-25 DIAGNOSIS — N94.10 DYSPAREUNIA, FEMALE: Primary | ICD-10-CM

## 2021-01-25 PROCEDURE — S0612 ANNUAL GYNECOLOGICAL EXAMINA: HCPCS | Performed by: OBSTETRICS & GYNECOLOGY

## 2021-01-25 PROCEDURE — G0143 SCR C/V CYTO,THINLAYER,RESCR: HCPCS | Performed by: OBSTETRICS & GYNECOLOGY

## 2021-01-25 PROCEDURE — 3008F BODY MASS INDEX DOCD: CPT | Performed by: GENERAL ACUTE CARE HOSPITAL

## 2021-01-25 RX ORDER — ESCITALOPRAM OXALATE 5 MG/1
5 TABLET ORAL DAILY
Qty: 60 TABLET | Refills: 3 | Status: SHIPPED | OUTPATIENT
Start: 2021-01-25 | End: 2022-04-08

## 2021-01-25 NOTE — PATIENT INSTRUCTIONS
The patient was informed of a stable gyn examination  My concerns are discomfort on the right side particular with intercourse  Will make arrangements for an ultrasound for increasing dyspareunia  We also had a discussion about increasing anxiety will start her on Lexapro 5 mg daily she  Return my office in 3 weeks for follow-up  We had a discussion about other methods of contraception including the IUD was I think would help her  We encouraged her to try to exercise lose more weight if she so desires  Return my office for 3 weeks for re-evaluation

## 2021-01-25 NOTE — PROGRESS NOTES
Assessment/Plan: We have a variety issues of concern today  Starting with 1  Increasing anxiety  Some relationship stressed  She has been on Lexapro in the past on the restart Lexapro  5 mg per day  Return my office in 3 weeks for re-evaluation  We will stop the BuSpar  Hopefully this will improve her crying spells  2  Stress incontinence that is present  We talked about doing the Kegel exercises she will increase these  3  At the problem with dyspareunia particularly the right side  There was a right pain on the right adnexa  No fullness  Will make arrangements for ultrasound to evaluate the right adnexa  4  She is concerned about weight gain she does not feel like herself we encouraged her to exercise  She return my office in 3 weeks for discussion of ultrasound anxiety to see if the Lexapro is helping her  Subjective:      Patient ID: Tillman Lefort is a 31 y o  female  HPI    This is a 45-year-old white female, she is a  1 para 1 with 1 vaginal delivery approximately 7 months ago  She bottle-feeding  She has a normal return of menstrual cycles  Will talk today she had very emotional start to cry  She is living at home with her boyfriend and her parents  There was space issue  Her daughter is also in basis for clubfeet  That is somewhat stressful  Her daughter is doing better  She is not happy with her weight gain in  She has some slight stress urine incontinence  She is also complaining of some discomfort with intercourse  Currently she is using condoms for contraception  She is seeing a counselor for anxiety  She states the situation is getting worse  She denies any suicidal thoughts  Denies any thoughts of harming herself or her child  There are no new major family illnesses report  Currently she is taking BuSpar for anxiety  She does feel safe at home  She is dealing well with COVID  Unsure about getting the vaccine      The following portions of the patient's history were reviewed and updated as appropriate: allergies, current medications, past family history, past medical history, past social history, past surgical history and problem list     Review of Systems   Genitourinary:        Increasing dyspareunia with deep thrust,, slight stress incontinence   Psychiatric/Behavioral:        Increasing anxiety   All other systems reviewed and are negative  Objective:      /78   Ht 5' (1 524 m)   Wt 56 3 kg (124 lb 3 2 oz)   LMP 01/25/2021 (Exact Date)   Breastfeeding No   BMI 24 26 kg/m²          Physical Exam  Vitals signs reviewed  Exam conducted with a chaperone present  Constitutional:       Appearance: Normal appearance  She is normal weight  HENT:      Head: Normocephalic  Eyes:      Extraocular Movements: Extraocular movements intact  Neck:      Musculoskeletal: Normal range of motion and neck supple  Cardiovascular:      Rate and Rhythm: Normal rate and regular rhythm  Pulses: Normal pulses  Heart sounds: Normal heart sounds  Pulmonary:      Effort: Pulmonary effort is normal       Breath sounds: Normal breath sounds  Chest:      Breasts:         Right: Normal  No swelling, bleeding, inverted nipple, mass, nipple discharge, skin change or tenderness  Left: Normal  No swelling, bleeding, inverted nipple, mass, nipple discharge, skin change or tenderness  Abdominal:      General: Abdomen is flat  Bowel sounds are normal  There is no distension  Palpations: Abdomen is soft  There is no mass  Tenderness: There is no abdominal tenderness  There is no guarding or rebound  Hernia: No hernia is present  There is no hernia in the umbilical area, ventral area, left inguinal area or right inguinal area  Genitourinary:     General: Normal vulva  Pubic Area: No rash or pubic lice  Labia:         Right: No rash, tenderness, lesion or injury           Left: No rash, tenderness, lesion or injury  Urethra: No prolapse, urethral pain, urethral swelling or urethral lesion  Vagina: Normal  No signs of injury and foreign body  No vaginal discharge, erythema, tenderness, bleeding, lesions or prolapsed vaginal walls  Cervix: Normal       Uterus: Normal  Not deviated, not enlarged, not fixed, not tender and no uterine prolapse  Adnexa: Left adnexa normal         Right: Tenderness present  No mass or fullness  Left: No mass, tenderness or fullness  Comments: The external genitalia normal limits, the vagina shows evidence of recent menses  Cervix is parous but closed  A Pap smear was performed  Menses are present  The uterus is anterior there is no cervical motion tenderness  I was unable to reproduce the pain with dyspareunia by manipulating the uterus  She is tender in the right adnexa  There is no palpable masses there  The left adnexa completely benign  The bladder somewhat tender there is no prolapse  Musculoskeletal: Normal range of motion  Lymphadenopathy:      Upper Body:      Right upper body: No supraclavicular or axillary adenopathy  Left upper body: No supraclavicular or axillary adenopathy  Lower Body: No right inguinal adenopathy  No left inguinal adenopathy  Skin:     General: Skin is warm and dry  Neurological:      General: No focal deficit present  Mental Status: She is alert and oriented to person, place, and time     Psychiatric:         Mood and Affect: Mood normal

## 2021-01-28 LAB
LAB AP GYN PRIMARY INTERPRETATION: NORMAL
LAB AP LMP: NORMAL
Lab: NORMAL

## 2021-02-15 ENCOUNTER — OFFICE VISIT (OUTPATIENT)
Dept: OBGYN CLINIC | Facility: CLINIC | Age: 31
End: 2021-02-15
Payer: COMMERCIAL

## 2021-02-15 VITALS
WEIGHT: 125.2 LBS | HEIGHT: 60 IN | BODY MASS INDEX: 24.58 KG/M2 | DIASTOLIC BLOOD PRESSURE: 74 MMHG | SYSTOLIC BLOOD PRESSURE: 120 MMHG

## 2021-02-15 DIAGNOSIS — R87.615 ENCOUNTER FOR REPEAT PAP SMEAR DUE TO PREVIOUS INSUFF CERVICAL CELLS: ICD-10-CM

## 2021-02-15 DIAGNOSIS — F41.9 ANXIETY: Primary | ICD-10-CM

## 2021-02-15 PROCEDURE — 1036F TOBACCO NON-USER: CPT | Performed by: OBSTETRICS & GYNECOLOGY

## 2021-02-15 PROCEDURE — G0145 SCR C/V CYTO,THINLAYER,RESCR: HCPCS | Performed by: OBSTETRICS & GYNECOLOGY

## 2021-02-15 PROCEDURE — 99214 OFFICE O/P EST MOD 30 MIN: CPT | Performed by: OBSTETRICS & GYNECOLOGY

## 2021-02-15 PROCEDURE — 3008F BODY MASS INDEX DOCD: CPT | Performed by: OBSTETRICS & GYNECOLOGY

## 2021-02-15 NOTE — PROGRESS NOTES
This is a 45-year-old white female,  1 para 1  She returns today for follow-up for anxiety  She was started on Lexapro 5 mg a day  She states she feels much better much more control  She is also set currently has a father is NICU status post heart attack and cardiac disease he is doing better  She will to continue the Lexapro  She also has a history of none of cells on a Pap smear a Pap smear was repeated today she will be informed results of Pap smear when it returns  She return my office on a p r n  basis for follow-up for anxiety

## 2021-02-17 ENCOUNTER — TELEPHONE (OUTPATIENT)
Dept: OBGYN CLINIC | Facility: CLINIC | Age: 31
End: 2021-02-17

## 2021-02-17 DIAGNOSIS — B37.3 YEAST VAGINITIS: Primary | ICD-10-CM

## 2021-02-17 RX ORDER — FLUCONAZOLE 150 MG/1
150 TABLET ORAL DAILY
Qty: 2 TABLET | Refills: 0 | Status: SHIPPED | OUTPATIENT
Start: 2021-02-17 | End: 2021-02-19 | Stop reason: SDUPTHER

## 2021-02-17 NOTE — TELEPHONE ENCOUNTER
Pt having vag itching, white "liquid-like" vag discharge  Itching moreso @ vag opening    Please sign off on presc for Diflucan x 2 days to [facility]) if agree

## 2021-02-18 LAB
LAB AP GYN PRIMARY INTERPRETATION: NORMAL
LAB AP LMP: NORMAL
Lab: NORMAL
PATH INTERP SPEC-IMP: NORMAL

## 2021-02-19 DIAGNOSIS — B37.3 YEAST VAGINITIS: ICD-10-CM

## 2021-02-19 RX ORDER — FLUCONAZOLE 150 MG/1
150 TABLET ORAL DAILY
Qty: 2 TABLET | Refills: 0 | Status: SHIPPED | OUTPATIENT
Start: 2021-02-19 | End: 2021-02-21

## 2021-02-19 NOTE — PROGRESS NOTES
Message was left for the patient your fall of yeast infection her Pap smear, her Pap smear was otherwise unremarkable  Prescription sent for Diflucan  Any questions she may call her convenience

## 2021-03-01 ENCOUNTER — TRANSCRIBE ORDERS (OUTPATIENT)
Dept: ADMINISTRATIVE | Age: 31
End: 2021-03-01

## 2021-06-11 NOTE — TELEPHONE ENCOUNTER
I looked at PT's note and I see that they are recommending the MRI  I can order it  She can call to schedule Alert and oriented, no focal deficits, no motor or sensory deficits.
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
## 2021-07-20 ENCOUNTER — OFFICE VISIT (OUTPATIENT)
Dept: OBGYN CLINIC | Facility: CLINIC | Age: 31
End: 2021-07-20
Payer: COMMERCIAL

## 2021-07-20 VITALS
HEIGHT: 60 IN | BODY MASS INDEX: 21.52 KG/M2 | SYSTOLIC BLOOD PRESSURE: 120 MMHG | DIASTOLIC BLOOD PRESSURE: 72 MMHG | WEIGHT: 109.6 LBS

## 2021-07-20 DIAGNOSIS — R10.2 PELVIC PAIN: Primary | ICD-10-CM

## 2021-07-20 PROCEDURE — 1036F TOBACCO NON-USER: CPT | Performed by: NURSE PRACTITIONER

## 2021-07-20 PROCEDURE — 99213 OFFICE O/P EST LOW 20 MIN: CPT | Performed by: NURSE PRACTITIONER

## 2021-07-20 PROCEDURE — 3008F BODY MASS INDEX DOCD: CPT | Performed by: NURSE PRACTITIONER

## 2021-07-20 NOTE — PROGRESS NOTES
Luis Velazquez 42-year-old presents with c/o pain intercourse  The pain is unbearable, it is always on the right  It radiates to lower back and down the front of her thigh into her knee  She describes it as a contraction that occasionally gives her a shooting pain  They can't have intercourse often because it takes her several weeks to recover from the pain  She does not have pain outside of intercourse  She denies pain in the vagina  The pain is in her pelvis  Last night was the worse pain she has experienced  She is still feeling achy at the moment  This has been occurring since she resumed sexual activity after the birth of her daughter one year ago  ROS:  As indicated in HPI  All other ROS negative  Physical Exam  Constitutional:       Appearance: Normal appearance  Genitourinary:      Pelvic exam was performed with patient in the lithotomy position  Vulva, vagina and uterus normal       Cervix is parous  No cervical discharge, friability, lesion, erythema, bleeding, polyp or nabothian cyst       Uterus is anteverted  No right or left adnexal mass present  Right adnexa tender and mobile  Left adnexa not tender, full or mobile  HENT:      Head: Normocephalic and atraumatic  Musculoskeletal:      Cervical back: Neck supple  Neurological:      Mental Status: She is alert  Skin:     General: Skin is warm  Psychiatric:         Behavior: Behavior is cooperative  Vitals and nursing note reviewed  De [place] Torre Jodie was seen today for pelvic pain  Diagnoses and all orders for this visit:    Pelvic pain  -     Ambulatory referral to Physical Therapy; Future      There is a pelvic US in [facility] ordered by KIERSTEN in January  Patient will schedule appt  I also recommend an evaluation with Pelvic Health PT as it sounds like her pain can be coming from from her lower back and pelvis  Will call patient with US results

## 2021-07-22 ENCOUNTER — HOSPITAL ENCOUNTER (OUTPATIENT)
Dept: ULTRASOUND IMAGING | Facility: HOSPITAL | Age: 31
Discharge: HOME | End: 2021-07-22
Attending: OBSTETRICS & GYNECOLOGY
Payer: COMMERCIAL

## 2021-07-22 DIAGNOSIS — N94.10 DYSPAREUNIA, FEMALE: ICD-10-CM

## 2021-07-22 PROCEDURE — 76856 US EXAM PELVIC COMPLETE: CPT

## 2021-07-22 PROCEDURE — 76830 TRANSVAGINAL US NON-OB: CPT

## 2021-10-21 ENCOUNTER — OFFICE VISIT (OUTPATIENT)
Dept: URGENT CARE | Age: 31
End: 2021-10-21
Payer: COMMERCIAL

## 2021-10-21 VITALS — TEMPERATURE: 98.5 F | OXYGEN SATURATION: 98 % | HEART RATE: 116 BPM | RESPIRATION RATE: 18 BRPM

## 2021-10-21 DIAGNOSIS — J02.9 SORE THROAT: Primary | ICD-10-CM

## 2021-10-21 PROCEDURE — 99213 OFFICE O/P EST LOW 20 MIN: CPT | Performed by: STUDENT IN AN ORGANIZED HEALTH CARE EDUCATION/TRAINING PROGRAM

## 2021-10-21 PROCEDURE — 0241U HB NFCT DS VIR RESP RNA 4 TRGT: CPT | Performed by: STUDENT IN AN ORGANIZED HEALTH CARE EDUCATION/TRAINING PROGRAM

## 2021-10-21 PROCEDURE — 87070 CULTURE OTHR SPECIMN AEROBIC: CPT | Performed by: STUDENT IN AN ORGANIZED HEALTH CARE EDUCATION/TRAINING PROGRAM

## 2021-10-21 RX ORDER — BENZONATATE 200 MG/1
200 CAPSULE ORAL 3 TIMES DAILY PRN
Qty: 20 CAPSULE | Refills: 0 | Status: SHIPPED | OUTPATIENT
Start: 2021-10-21 | End: 2021-10-25

## 2021-10-21 RX ORDER — LIDOCAINE HYDROCHLORIDE 20 MG/ML
10 SOLUTION OROPHARYNGEAL 4 TIMES DAILY PRN
Qty: 100 ML | Refills: 1 | Status: SHIPPED | OUTPATIENT
Start: 2021-10-21 | End: 2022-06-30

## 2021-10-23 LAB — BACTERIA THROAT CULT: NORMAL

## 2021-10-25 ENCOUNTER — TELEMEDICINE (OUTPATIENT)
Dept: INTERNAL MEDICINE CLINIC | Facility: CLINIC | Age: 31
End: 2021-10-25
Payer: COMMERCIAL

## 2021-10-25 DIAGNOSIS — J20.5 RSV BRONCHITIS: Primary | ICD-10-CM

## 2021-10-25 PROCEDURE — 1036F TOBACCO NON-USER: CPT | Performed by: NURSE PRACTITIONER

## 2021-10-25 PROCEDURE — 99213 OFFICE O/P EST LOW 20 MIN: CPT | Performed by: NURSE PRACTITIONER

## 2021-10-25 RX ORDER — AZITHROMYCIN 250 MG/1
TABLET, FILM COATED ORAL
Qty: 6 TABLET | Refills: 0 | Status: SHIPPED | OUTPATIENT
Start: 2021-10-25 | End: 2021-10-29

## 2021-10-28 ENCOUNTER — TELEPHONE (OUTPATIENT)
Dept: INTERNAL MEDICINE CLINIC | Facility: CLINIC | Age: 31
End: 2021-10-28

## 2021-11-05 ENCOUNTER — TELEPHONE (OUTPATIENT)
Dept: INTERNAL MEDICINE CLINIC | Facility: CLINIC | Age: 31
End: 2021-11-05

## 2021-11-05 PROCEDURE — U0003 INFECTIOUS AGENT DETECTION BY NUCLEIC ACID (DNA OR RNA); SEVERE ACUTE RESPIRATORY SYNDROME CORONAVIRUS 2 (SARS-COV-2) (CORONAVIRUS DISEASE [COVID-19]), AMPLIFIED PROBE TECHNIQUE, MAKING USE OF HIGH THROUGHPUT TECHNOLOGIES AS DESCRIBED BY CMS-2020-01-R: HCPCS | Performed by: INTERNAL MEDICINE

## 2021-11-05 PROCEDURE — U0005 INFEC AGEN DETEC AMPLI PROBE: HCPCS | Performed by: INTERNAL MEDICINE

## 2021-12-02 DIAGNOSIS — R39.9 LOWER URINARY TRACT SYMPTOMS: ICD-10-CM

## 2021-12-03 RX ORDER — NITROFURANTOIN MACROCRYSTALS 50 MG/1
50 CAPSULE ORAL DAILY PRN
Qty: 30 CAPSULE | Refills: 1 | Status: SHIPPED | OUTPATIENT
Start: 2021-12-03 | End: 2022-01-02

## 2022-01-27 ENCOUNTER — ANNUAL EXAM (OUTPATIENT)
Dept: OBGYN CLINIC | Facility: CLINIC | Age: 32
End: 2022-01-27
Payer: COMMERCIAL

## 2022-01-27 VITALS
DIASTOLIC BLOOD PRESSURE: 68 MMHG | WEIGHT: 113.2 LBS | HEIGHT: 60 IN | SYSTOLIC BLOOD PRESSURE: 110 MMHG | BODY MASS INDEX: 22.23 KG/M2

## 2022-01-27 DIAGNOSIS — Z01.419 WOMEN'S ANNUAL ROUTINE GYNECOLOGICAL EXAMINATION: Primary | ICD-10-CM

## 2022-01-27 DIAGNOSIS — N94.10 DYSPAREUNIA, FEMALE: ICD-10-CM

## 2022-01-27 PROCEDURE — 99395 PREV VISIT EST AGE 18-39: CPT | Performed by: OBSTETRICS & GYNECOLOGY

## 2022-01-27 PROCEDURE — 1036F TOBACCO NON-USER: CPT | Performed by: OBSTETRICS & GYNECOLOGY

## 2022-01-27 PROCEDURE — G0145 SCR C/V CYTO,THINLAYER,RESCR: HCPCS | Performed by: OBSTETRICS & GYNECOLOGY

## 2022-01-27 PROCEDURE — 3008F BODY MASS INDEX DOCD: CPT | Performed by: OBSTETRICS & GYNECOLOGY

## 2022-01-27 NOTE — PATIENT INSTRUCTIONS
We had a long discussion about dyspareunia  I believe the dyspareunia secondary to some condom spinal compression  There are no structures discomfort or issues in the female pelvis  Recent ultrasound was normal   I am concerned that she continues to have pain shooting down the back of her leg consistent with sciatica and pain in her spine  I strongly suggest consultation with a spinal/pain center  The patient will make arrangements for treatment of the condyloma on the external genitalia with trichloracetic acid  She will consider getting the COVID vaccine prior to future pregnancy

## 2022-01-27 NOTE — PROGRESS NOTES
Assessment/Plan: We had a long discussion about dyspareunia  Her pelvic exam was completely benign without pain or discomfort  I was able to prove the dyspareunia pain with deep palpation of the posterior cul-de-sac against the hollow of the sacrum  This also cause pain in her right leg  Was also some pain in her spine  Because of these findings I have asked the patient may consultation with the spine/Pain Clinic  I believe the pain for dyspareunia is secondary to some contact nerve compression  She was also informed of evidence of condyloma on the external genitalia  We did biopsy-proven this area before  Will make arrangements for treatment with trichloracetic acid  I have also counseled the patient that if she consider future pregnancy she does strongly consider getting the COVID vaccine prior to pregnancy  She may consider  Subjective:      Patient ID: Destiny Mcgee is a 36 y o  female  HPI    This is a 27-year-old white female, she is a  1 para 1 1 vaginal delivery approximately year and half ago  Her infant was ball clubfeet  She has done well she no longer needs to wear the braces at all times  The patient still has problems with dyspareunia  She states the some positions with intercourse there is a pain that cultures like electrical charge down her leg on the right side  They tried a variety of positions  She has been to physical therapy which is no help  I think this now may be a spinal pain issue  I suggest consultation with the spine pain management  She does have a history of abnormal Pap smear she will need a Pap smear today  She is content with her weight  Denies any problem depression or anxiety  She has a dentist on a regular basis  The present time she declines to get the COVID vaccine  She is contemplating future pregnancy  I did remind her of the risks and complications associated with pregnancy and COVID    There are no new major family illnesses report  Her father is recovering well from his heart attack  He has  now resume smoking the family is not happy with him  We had a further discussion about her dyspareunia  With striking cyst that she states she has shooting sensations now her spine and down her right leg consistent with sciatica  She further states she is unable to lay flat on her back with her legs down on the table  She also states the missionary style intimacy is almost impossible  Is more successful on her back or rear entry    The following portions of the patient's history were reviewed and updated as appropriate: allergies, current medications, past family history, past medical history, past social history, past surgical history and problem list     Review of Systems   Genitourinary:        Dyspareunia with pain in her spine and down her right leg suspicious for sciatica   All other systems reviewed and are negative  Objective:      /68   Ht 5' (1 524 m)   Wt 51 3 kg (113 lb 3 2 oz)   LMP 01/06/2022   BMI 22 11 kg/m²          Physical Exam  Vitals reviewed  Exam conducted with a chaperone present  Constitutional:       Appearance: Normal appearance  She is normal weight  HENT:      Head: Normocephalic  Eyes:      Extraocular Movements: Extraocular movements intact  Cardiovascular:      Rate and Rhythm: Normal rate and regular rhythm  Pulses: Normal pulses  Heart sounds: Normal heart sounds  Pulmonary:      Effort: Pulmonary effort is normal       Breath sounds: Normal breath sounds  Chest:   Breasts: Breasts are symmetrical       Right: Normal  No swelling, bleeding, inverted nipple, mass, nipple discharge, skin change, tenderness, axillary adenopathy or supraclavicular adenopathy  Left: Normal  No swelling, bleeding, inverted nipple, mass, nipple discharge, skin change, tenderness, axillary adenopathy or supraclavicular adenopathy  Abdominal:      General: Abdomen is flat  Bowel sounds are normal  There is no distension  Palpations: Abdomen is soft  There is no mass  Tenderness: There is no abdominal tenderness  There is no right CVA tenderness, left CVA tenderness, guarding or rebound  Hernia: No hernia is present  There is no hernia in the umbilical area, ventral area, left inguinal area or right inguinal area  Genitourinary:     General: Normal vulva  Pubic Area: No rash or pubic lice  Labia:         Right: No rash, tenderness or lesion  Left: No rash, tenderness or lesion  Urethra: No prolapse, urethral pain, urethral swelling or urethral lesion  Vagina: Normal  No signs of injury and foreign body  No vaginal discharge, erythema, tenderness, bleeding, lesions or prolapsed vaginal walls  Cervix: No cervical motion tenderness  Uterus: Normal  Not deviated, not enlarged, not tender and no uterine prolapse  Adnexa: Right adnexa normal and left adnexa normal         Right: No mass, tenderness or fullness  Left: No mass, tenderness or fullness  Rectum: Normal           Comments: For 3 lesions which is suspicious for condyloma  This was explained to the patient  She will make arrangements for TCA treatment in the future  The remainder of the external genitalia normal limits the vagina is clean the cervix is parous but closed a Pap smear was performed  Uterus is normal size  There is no cervical motion tenderness  The adnexa clear bilaterally  There is no prolapse of the bladder or the uterus  I WAS ABLE TO REPRODUCE HER DYSPAREUNIA PAIN WITH DEEP PALPATION IN THE POSTERIOR  CUL-DE-SAC, AGAINST THE SACRUM  Musculoskeletal:         General: No swelling, tenderness, deformity or signs of injury  Normal range of motion  Cervical back: Normal range of motion and neck supple  Right lower leg: No edema  Left lower leg: No edema     Lymphadenopathy:      Upper Body:      Right upper body: No supraclavicular or axillary adenopathy  Left upper body: No supraclavicular or axillary adenopathy  Lower Body: No right inguinal adenopathy  No left inguinal adenopathy  Skin:     General: Skin is warm and dry  Neurological:      General: No focal deficit present  Mental Status: She is alert and oriented to person, place, and time  Psychiatric:         Mood and Affect: Mood normal          Behavior: Behavior normal          Thought Content:  Thought content normal

## 2022-02-02 LAB
LAB AP GYN PRIMARY INTERPRETATION: NORMAL
LAB AP LMP: NORMAL
Lab: NORMAL

## 2022-02-04 ENCOUNTER — PROCEDURE VISIT (OUTPATIENT)
Dept: OBGYN CLINIC | Facility: CLINIC | Age: 32
End: 2022-02-04
Payer: COMMERCIAL

## 2022-02-04 VITALS
HEIGHT: 60 IN | SYSTOLIC BLOOD PRESSURE: 100 MMHG | DIASTOLIC BLOOD PRESSURE: 64 MMHG | BODY MASS INDEX: 22.38 KG/M2 | WEIGHT: 114 LBS

## 2022-02-04 DIAGNOSIS — A63.0 CONDYLOMA ACUMINATUM OF PERIANAL REGION: Primary | ICD-10-CM

## 2022-02-04 PROCEDURE — 56501 DESTROY VULVA LESIONS SIM: CPT | Performed by: OBSTETRICS & GYNECOLOGY

## 2022-02-04 NOTE — PATIENT INSTRUCTIONS
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

## 2022-02-08 ENCOUNTER — OFFICE VISIT (OUTPATIENT)
Dept: OBGYN CLINIC | Facility: CLINIC | Age: 32
End: 2022-02-08
Payer: COMMERCIAL

## 2022-02-08 ENCOUNTER — TELEPHONE (OUTPATIENT)
Dept: OBGYN CLINIC | Facility: CLINIC | Age: 32
End: 2022-02-08

## 2022-02-08 VITALS
DIASTOLIC BLOOD PRESSURE: 80 MMHG | SYSTOLIC BLOOD PRESSURE: 110 MMHG | BODY MASS INDEX: 22.58 KG/M2 | WEIGHT: 115 LBS | HEIGHT: 60 IN

## 2022-02-08 DIAGNOSIS — A63.0 CONDYLOMA: Primary | ICD-10-CM

## 2022-02-08 PROCEDURE — 99024 POSTOP FOLLOW-UP VISIT: CPT | Performed by: OBSTETRICS & GYNECOLOGY

## 2022-02-08 NOTE — PROGRESS NOTES
This is a 61-year-old female,  1 para 1  She was seen in this office last week for TCA treatment of condyloma on 3 areas of perianal region  She called this morning set after wiping the largest condyloma on the left buttocks region was bleeding  She was asked come to the office immediately for evaluation  She placed a Band-Aid on the area  Examination today shows that the large condyloma on the lack side was no longer bleeding  The bandage had been removed and examined  There was no obvious lesion noted now  She was reassured  We placed a new Band-Aid over the area to keep covered for least 24 hours  She will keep her follow-up visit with me next week  Other size are consistent with therapy for condyloma healing well  I think there was some trauma to the area with wiping exposed scab  She was reassured

## 2022-02-08 NOTE — TELEPHONE ENCOUNTER
Pt had TCA tx condyloma 2/4/2022   1 larger area near (R) buttocks started bleeding today after wiping (br red)  JSW will see in office today  Pt informed

## 2022-02-08 NOTE — PATIENT INSTRUCTIONS
The patient was informed the probably in the process so hygiene she wiped and removed the scaphoid area  The Band-Aid was very effective is no longer bleeding there was no oozing there was no hematoma  A new Band-Aid was used to replace the OB and a  She will call me tomorrow this in with bleeding  She has a follow-up appointment in 10 days

## 2022-02-18 ENCOUNTER — OFFICE VISIT (OUTPATIENT)
Dept: OBGYN CLINIC | Facility: CLINIC | Age: 32
End: 2022-02-18
Payer: COMMERCIAL

## 2022-02-18 VITALS
BODY MASS INDEX: 22.1 KG/M2 | SYSTOLIC BLOOD PRESSURE: 92 MMHG | DIASTOLIC BLOOD PRESSURE: 62 MMHG | WEIGHT: 112.6 LBS | HEIGHT: 60 IN

## 2022-02-18 DIAGNOSIS — A63.0 CONDYLOMA ACUMINATUM OF PERIANAL REGION: Primary | ICD-10-CM

## 2022-02-18 PROCEDURE — 1036F TOBACCO NON-USER: CPT | Performed by: OBSTETRICS & GYNECOLOGY

## 2022-02-18 PROCEDURE — 99213 OFFICE O/P EST LOW 20 MIN: CPT | Performed by: OBSTETRICS & GYNECOLOGY

## 2022-02-18 PROCEDURE — 3008F BODY MASS INDEX DOCD: CPT | Performed by: OBSTETRICS & GYNECOLOGY

## 2022-02-18 NOTE — PROGRESS NOTES
The patient returns today for test of cure after shaving trichloracetic acid on 3 distinct lesions of condyloma  Examination shows that the lesions on a absent  Skin is healing well  She is happy with the results  She was informed been recurrence

## 2022-02-18 NOTE — PATIENT INSTRUCTIONS
The patient was informed that this can is healing well status post treated with TCA for condyloma  There was no evidence of recurrence  She will keep us informed of her progress  Return office on a p r n  basis

## 2022-03-11 NOTE — RESULT NOTES
Verified Results  (1) LYME ANTIBODY, WESTERN BLOT [id number] 08:32AM Micah Van   TW Order Number: [id number]     Test Name Result Flag Reference   LYME 18 KD IGG Absent     LYME 23 KD IGG Absent     LYME 28 KD IGG Absent     LYME 30 KD IGG Absent     LYME 39 KD IGG Absent     LYME 41 KD IGG Absent     LYME 45 KD IGG Absent     LYME 58 KD IGG Absent     LYME 66 KD IGG Absent     LYME 93 KD IGG Absent     LYME 23 KD IGM Absent     LYME 39 KD IGM Absent     LYME 41 KD IGM Absent     LYME IGG WB INTERP  Negative     Positive: 5 of the following                                 Borrelia-specific bands:                                 18,23,28,30,39,41,45,58,                                 66, and 93  Negative: No bands or banding                                 patterns which do not                                 meet positive criteria  LYME IGM WB INTERP  Negative     Note: An equivocal or positive EIA result followed by a negative  Western Blot result is considered NEGATIVE  An equivocal or positive  EIA result followed by a positive Western Blot is considered POSITIVE  by the CDC  Positive: 2 of the following bands: 23,39 or 41  Negative: No bands or banding patterns which do not meet positive  criteria  Criteria for positivity are those recommended by CDC/ASTPHLD   p23=Osp C, h69=ulzzjztay  Note:  Sera from individuals with the following may cross react in the  Lyme Western Blot assays: other spirochetal diseases (periodontal  disease, leptospirosis, relapsing fever, yaws, and pinta);  connective autoimmune (Rheumatoid Arthritis and Systemic Lupus  Erythematosus and also individuals with Antinuclear Antibody);  other infections COFFEE [facility] Spotted Fever; Anthony-Barr Virus,  and Cytomegalovirus)      Performed at:  [address] [place] [address]  [id number]  : Colette Wolfe MD, Phone:  [phone] Hatchet Flap Text: The defect edges were debeveled with a #15 scalpel blade.  Given the location of the defect, shape of the defect and the proximity to free margins a hatchet flap was deemed most appropriate.  Using a sterile surgical marker, an appropriate hatchet flap was drawn incorporating the defect and placing the expected incisions within the relaxed skin tension lines where possible.    The area thus outlined was incised deep to adipose tissue with a #15 scalpel blade.  The skin margins were undermined to an appropriate distance in all directions utilizing iris scissors.

## 2022-04-08 ENCOUNTER — OFFICE VISIT (OUTPATIENT)
Dept: INTERNAL MEDICINE CLINIC | Facility: CLINIC | Age: 32
End: 2022-04-08
Payer: COMMERCIAL

## 2022-04-08 VITALS
WEIGHT: 116.8 LBS | HEIGHT: 60 IN | HEART RATE: 93 BPM | SYSTOLIC BLOOD PRESSURE: 122 MMHG | BODY MASS INDEX: 22.93 KG/M2 | DIASTOLIC BLOOD PRESSURE: 82 MMHG | OXYGEN SATURATION: 99 %

## 2022-04-08 DIAGNOSIS — J06.9 UPPER RESPIRATORY TRACT INFECTION, UNSPECIFIED TYPE: Primary | ICD-10-CM

## 2022-04-08 PROCEDURE — 3725F SCREEN DEPRESSION PERFORMED: CPT | Performed by: NURSE PRACTITIONER

## 2022-04-08 PROCEDURE — 99213 OFFICE O/P EST LOW 20 MIN: CPT | Performed by: NURSE PRACTITIONER

## 2022-04-08 PROCEDURE — 1036F TOBACCO NON-USER: CPT | Performed by: NURSE PRACTITIONER

## 2022-04-08 PROCEDURE — 3008F BODY MASS INDEX DOCD: CPT | Performed by: NURSE PRACTITIONER

## 2022-04-08 RX ORDER — NITROFURANTOIN 25; 75 MG/1; MG/1
CAPSULE ORAL
COMMUNITY
Start: 2022-01-02 | End: 2022-04-08

## 2022-04-08 RX ORDER — CETIRIZINE HYDROCHLORIDE 10 MG/1
10 TABLET ORAL DAILY
COMMUNITY
End: 2022-06-30

## 2022-04-08 RX ORDER — AZITHROMYCIN 250 MG/1
TABLET, FILM COATED ORAL
Qty: 6 TABLET | Refills: 0 | Status: SHIPPED | OUTPATIENT
Start: 2022-04-08 | End: 2022-04-12

## 2022-04-08 NOTE — PROGRESS NOTES
Assessment/Plan:    Upper respiratory tract infection  Start antibiotics  Drink plenty of fluids and rest  May take over the counter mucinex and cough syrup/cough drops  Call if worsening  Diagnoses and all orders for this visit:    Upper respiratory tract infection, unspecified type  -     azithromycin (ZITHROMAX) 250 mg tablet; Take 2 tablets today then 1 tablet daily x 4 days    Other orders  -     Discontinue: nitrofurantoin (MACROBID) 100 mg capsule;  (Patient not taking: Reported on 4/8/2022 )  -     cetirizine (ZyrTEC) 10 mg tablet; Take 10 mg by mouth daily          Subjective:      Patient ID: Dago Gonzalez is a 36 y o  female  Patient is here for 10 days of sore throat, post nasal drip, hoarse voice  Phlegm is yellow  No fever or chills      The following portions of the patient's history were reviewed and updated as appropriate: allergies, current medications, past family history, past medical history, past social history, past surgical history and problem list     Review of Systems   Constitutional: Negative  HENT: Positive for rhinorrhea and sore throat  Eyes: Negative  Respiratory: Positive for cough  Cardiovascular: Negative  Gastrointestinal: Negative  Musculoskeletal: Negative  Neurological: Negative  Objective:      /82   Pulse 93   Ht 5' (1 524 m)   Wt 53 kg (116 lb 12 8 oz)   SpO2 99%   BMI 22 81 kg/m²          Physical Exam  Vitals and nursing note reviewed  Constitutional:       Appearance: She is well-developed  HENT:      Head: Normocephalic and atraumatic  Right Ear: External ear normal       Left Ear: External ear normal       Nose: Nose normal    Eyes:      Conjunctiva/sclera: Conjunctivae normal       Pupils: Pupils are equal, round, and reactive to light  Cardiovascular:      Rate and Rhythm: Normal rate and regular rhythm     Pulmonary:      Effort: Pulmonary effort is normal       Breath sounds: Normal breath sounds  Abdominal:      General: Bowel sounds are normal       Palpations: Abdomen is soft  Musculoskeletal:         General: Normal range of motion  Cervical back: Normal range of motion and neck supple  Skin:     General: Skin is warm and dry  Neurological:      Mental Status: She is alert and oriented to person, place, and time

## 2022-04-11 PROBLEM — J06.9 UPPER RESPIRATORY TRACT INFECTION: Status: ACTIVE | Noted: 2022-04-11

## 2022-04-28 ENCOUNTER — TELEPHONE (OUTPATIENT)
Dept: OBGYN CLINIC | Facility: CLINIC | Age: 32
End: 2022-04-28

## 2022-04-28 DIAGNOSIS — R39.9 URINARY TRACT INFECTION SYMPTOMS: Primary | ICD-10-CM

## 2022-04-28 RX ORDER — NITROFURANTOIN 25; 75 MG/1; MG/1
100 CAPSULE ORAL 2 TIMES DAILY
Qty: 14 CAPSULE | Refills: 0 | Status: SHIPPED | OUTPATIENT
Start: 2022-04-28 | End: 2022-05-05

## 2022-04-28 RX ORDER — PHENAZOPYRIDINE HYDROCHLORIDE 200 MG/1
200 TABLET, FILM COATED ORAL
Qty: 6 TABLET | Refills: 0 | Status: SHIPPED | OUTPATIENT
Start: 2022-04-28 | End: 2022-04-30

## 2022-04-28 NOTE — TELEPHONE ENCOUNTER
Patient has a full blown UTI - burning,urgency pain during urination  Please order antibiotics  Dr Shantel Jackson has ordered before

## 2022-04-28 NOTE — TELEPHONE ENCOUNTER
Pt having urinary urgency, dysuria, discomfort since 4/27/2022, afebrile, no back pain  Will have U/A, C&S ([facility] - lab orders in pt's chart)  If agree, pt to start [id number]    Please sign off on presc x 2 to [facility] ([facility])

## 2022-05-18 NOTE — ASSESSMENT & PLAN NOTE
Start antibiotics  Drink plenty of fluids and rest  May take over the counter mucinex and cough syrup/cough drops  Call if worsening  No

## 2022-06-17 ENCOUNTER — OFFICE VISIT (OUTPATIENT)
Dept: URGENT CARE | Age: 32
End: 2022-06-17
Payer: COMMERCIAL

## 2022-06-17 ENCOUNTER — HOSPITAL ENCOUNTER (EMERGENCY)
Facility: HOSPITAL | Age: 32
Discharge: HOME | End: 2022-06-17
Attending: EMERGENCY MEDICINE
Payer: COMMERCIAL

## 2022-06-17 ENCOUNTER — APPOINTMENT (EMERGENCY)
Dept: ULTRASOUND IMAGING | Facility: HOSPITAL | Age: 32
End: 2022-06-17
Payer: COMMERCIAL

## 2022-06-17 VITALS
RESPIRATION RATE: 16 BRPM | HEART RATE: 83 BPM | TEMPERATURE: 98.3 F | OXYGEN SATURATION: 98 % | DIASTOLIC BLOOD PRESSURE: 63 MMHG | SYSTOLIC BLOOD PRESSURE: 134 MMHG

## 2022-06-17 VITALS
HEART RATE: 73 BPM | TEMPERATURE: 98.3 F | DIASTOLIC BLOOD PRESSURE: 73 MMHG | SYSTOLIC BLOOD PRESSURE: 126 MMHG | RESPIRATION RATE: 18 BRPM | OXYGEN SATURATION: 98 %

## 2022-06-17 DIAGNOSIS — R10.11 RUQ PAIN: Primary | ICD-10-CM

## 2022-06-17 DIAGNOSIS — R10.11 RIGHT UPPER QUADRANT ABDOMINAL PAIN: Primary | ICD-10-CM

## 2022-06-17 DIAGNOSIS — R11.0 NAUSEA: ICD-10-CM

## 2022-06-17 LAB
ALBUMIN SERPL BCP-MCNC: 4.8 G/DL (ref 3.5–5)
ALP SERPL-CCNC: 59 U/L (ref 34–104)
ALT SERPL W P-5'-P-CCNC: 9 U/L (ref 7–52)
ANION GAP SERPL CALCULATED.3IONS-SCNC: 9 MMOL/L (ref 4–13)
AST SERPL W P-5'-P-CCNC: 17 U/L (ref 13–39)
BASOPHILS # BLD AUTO: 0.03 THOUSANDS/ΜL (ref 0–0.1)
BASOPHILS NFR BLD AUTO: 1 % (ref 0–1)
BILIRUB SERPL-MCNC: 0.3 MG/DL (ref 0.2–1)
BUN SERPL-MCNC: 7 MG/DL (ref 5–25)
CALCIUM SERPL-MCNC: 9.8 MG/DL (ref 8.4–10.2)
CARDIAC TROPONIN I PNL SERPL HS: <2 NG/L
CHLORIDE SERPL-SCNC: 103 MMOL/L (ref 96–108)
CO2 SERPL-SCNC: 27 MMOL/L (ref 21–32)
CREAT SERPL-MCNC: 0.71 MG/DL (ref 0.6–1.3)
EOSINOPHIL # BLD AUTO: 0.01 THOUSAND/ΜL (ref 0–0.61)
EOSINOPHIL NFR BLD AUTO: 0 % (ref 0–6)
ERYTHROCYTE [DISTWIDTH] IN BLOOD BY AUTOMATED COUNT: 13 % (ref 11.6–15.1)
EXT PREG TEST URINE: NEGATIVE
EXT. CONTROL ED NAV: NORMAL
GFR SERPL CREATININE-BSD FRML MDRD: 114 ML/MIN/1.73SQ M
GLUCOSE SERPL-MCNC: 110 MG/DL (ref 65–140)
HCT VFR BLD AUTO: 41.3 % (ref 34.8–46.1)
HGB BLD-MCNC: 13.5 G/DL (ref 11.5–15.4)
IMM GRANULOCYTES # BLD AUTO: 0.01 THOUSAND/UL (ref 0–0.2)
IMM GRANULOCYTES NFR BLD AUTO: 0 % (ref 0–2)
LIPASE SERPL-CCNC: 17 U/L (ref 11–82)
LYMPHOCYTES # BLD AUTO: 0.76 THOUSANDS/ΜL (ref 0.6–4.47)
LYMPHOCYTES NFR BLD AUTO: 16 % (ref 14–44)
MCH RBC QN AUTO: 29.7 PG (ref 26.8–34.3)
MCHC RBC AUTO-ENTMCNC: 32.7 G/DL (ref 31.4–37.4)
MCV RBC AUTO: 91 FL (ref 82–98)
MONOCYTES # BLD AUTO: 0.54 THOUSAND/ΜL (ref 0.17–1.22)
MONOCYTES NFR BLD AUTO: 11 % (ref 4–12)
NEUTROPHILS # BLD AUTO: 3.52 THOUSANDS/ΜL (ref 1.85–7.62)
NEUTS SEG NFR BLD AUTO: 72 % (ref 43–75)
NRBC BLD AUTO-RTO: 0 /100 WBCS
PLATELET # BLD AUTO: 251 THOUSANDS/UL (ref 149–390)
PMV BLD AUTO: 9.4 FL (ref 8.9–12.7)
POTASSIUM SERPL-SCNC: 3.7 MMOL/L (ref 3.5–5.3)
PROT SERPL-MCNC: 7.5 G/DL (ref 6.4–8.4)
RBC # BLD AUTO: 4.54 MILLION/UL (ref 3.81–5.12)
SODIUM SERPL-SCNC: 139 MMOL/L (ref 135–147)
WBC # BLD AUTO: 4.87 THOUSAND/UL (ref 4.31–10.16)

## 2022-06-17 PROCEDURE — 80053 COMPREHEN METABOLIC PANEL: CPT

## 2022-06-17 PROCEDURE — 93005 ELECTROCARDIOGRAM TRACING: CPT

## 2022-06-17 PROCEDURE — 99284 EMERGENCY DEPT VISIT MOD MDM: CPT

## 2022-06-17 PROCEDURE — 85025 COMPLETE CBC W/AUTO DIFF WBC: CPT

## 2022-06-17 PROCEDURE — 99204 OFFICE O/P NEW MOD 45 MIN: CPT | Performed by: PHYSICIAN ASSISTANT

## 2022-06-17 PROCEDURE — 36415 COLL VENOUS BLD VENIPUNCTURE: CPT

## 2022-06-17 PROCEDURE — 96374 THER/PROPH/DIAG INJ IV PUSH: CPT

## 2022-06-17 PROCEDURE — 81025 URINE PREGNANCY TEST: CPT

## 2022-06-17 PROCEDURE — 99285 EMERGENCY DEPT VISIT HI MDM: CPT | Performed by: EMERGENCY MEDICINE

## 2022-06-17 PROCEDURE — 84484 ASSAY OF TROPONIN QUANT: CPT

## 2022-06-17 PROCEDURE — 99213 OFFICE O/P EST LOW 20 MIN: CPT

## 2022-06-17 PROCEDURE — 83690 ASSAY OF LIPASE: CPT

## 2022-06-17 PROCEDURE — 76705 ECHO EXAM OF ABDOMEN: CPT

## 2022-06-17 RX ORDER — ONDANSETRON 2 MG/ML
4 INJECTION INTRAMUSCULAR; INTRAVENOUS ONCE
Status: COMPLETED | OUTPATIENT
Start: 2022-06-17 | End: 2022-06-17

## 2022-06-17 RX ORDER — ONDANSETRON 4 MG/1
4 TABLET, ORALLY DISINTEGRATING ORAL EVERY 6 HOURS PRN
Qty: 20 TABLET | Refills: 0 | Status: SHIPPED | OUTPATIENT
Start: 2022-06-17 | End: 2022-06-22

## 2022-06-17 RX ADMIN — ONDANSETRON 4 MG: 2 INJECTION INTRAMUSCULAR; INTRAVENOUS at 16:21

## 2022-06-17 NOTE — PROGRESS NOTES
[email or web address]        NAME: Shilpi Mccrary is a 29 y o  female  : 1990    MRN: [id number]  DATE: 2022  TIME: 3:03 PM    Assessment and Plan   Right upper quadrant abdominal pain [R10 11]  1  Right upper quadrant abdominal pain  Transfer to other facility   61-year-old female presents for evaluation of right upper quadrant abdominal pain over the past 2 days  Suspect this may be related to gallbladder dysfunction, recommending further evaluation in the emergency department  Patient understands and agrees to the plan  Patient Instructions   Procedure to [address] [place] [address] Emergency Department for further evaluation  Follow up with PCP in 3-5 days  Proceed to  ER if symptoms worsen  Chief Complaint     Chief Complaint   Patient presents with    Abdominal Pain     RUQ since Wednesday, nausea, constant, sharp pain, feels more when standing or stretching, coughing, deep breaths, no diarrhea, or vomiting         History of Present Illness       Patient is a 61-year-old female with past medical history significant for IBS, cholecystitis multiple years ago who presents for evaluation of right upper quadrant abdominal pain since Wednesday morning  She reports that she woke up with sharp right upper quadrant pain accompanied by nausea, and this pain has continued since  She rates the pain as up to an 8/10 and sharp in nature, worsened by standing up straight and walking  She reports continued nausea, although she denies vomiting, diarrhea, fevers, chest pain, shortness of breath, dizziness/syncope  She has done nothing for the pain  Review of Systems   Review of Systems   Constitutional: Negative for chills, fatigue and fever  HENT: Negative for ear pain, rhinorrhea, sinus pressure, sinus pain and sore throat  Eyes: Negative for pain and visual disturbance  Respiratory: Negative for cough and shortness of breath      Cardiovascular: Negative for chest pain and palpitations  Gastrointestinal: Positive for abdominal pain and nausea  Negative for diarrhea and vomiting  Endocrine: Negative  Genitourinary: Negative for decreased urine volume, difficulty urinating, dysuria, flank pain, hematuria, pelvic pain, urgency, vaginal bleeding, vaginal discharge and vaginal pain  Musculoskeletal: Negative for arthralgias, back pain, myalgias, neck pain and neck stiffness  Skin: Negative for color change and rash  Allergic/Immunologic: Negative  Neurological: Negative for dizziness, seizures, syncope, facial asymmetry, light-headedness, numbness and headaches  Hematological: Negative  Psychiatric/Behavioral: Negative  All other systems reviewed and are negative          Current Medications       Current Outpatient Medications:     cetirizine (ZyrTEC) 10 mg tablet, Take 10 mg by mouth daily (Patient not taking: Reported on 6/17/2022), Disp: , Rfl:     Lidocaine Viscous HCl (XYLOCAINE) 2 % mucosal solution, Swish and spit 10 mL 4 (four) times a day as needed for mouth pain or discomfort (Patient not taking: No sig reported), Disp: 100 mL, Rfl: 1    Current Allergies     Allergies as of 06/17/2022    (No Known Allergies)            The following portions of the patient's history were reviewed and updated as appropriate: allergies, current medications, past family history, past medical history, past social history, past surgical history and problem list      Past Medical History:   Diagnosis Date    39 weeks gestation of pregnancy 12/9/2019    Anxiety     Contraceptive use     LAST ASSESSED: [id number]    Depression     d/c Buspar with preg - now started on Zoloft 25 mg 11/1/19    External hemorrhoid, bleeding     LAST ASSESSED: [id number]    Gallbladder attack     Hematochezia     LAST ASSESSED: [id number]    IBS (irritable bowel syndrome)     Immunity status testing     LAST ASSESSED: [id number]    Methicillin resistant Staphylococcus aureus infection     Migraine     prev on effexor for migraine & anxiety - d/c approx 1 yr ago    Poison ivy dermatitis     LAST ASSESSED: [id number]    Psychiatric disorder     Shingles     Skin abscess     RIGHT LEG    Urinary frequency     LAST ASSESSED: [id number]    UTI (urinary tract infection)     uses Macrobid after intercourse    Vacuum extractor delivery, delivered 6/11/2020    Varicella     childhood       Past Surgical History:   Procedure Laterality Date    TONSILLECTOMY      ONSET: 1997    WISDOM TOOTH EXTRACTION         Family History   Problem Relation Age of Onset    Thyroid disease Mother         hypothyroid    Thyroid disease Father     Cancer Family         MALIGNANT NEOPLASM    Diabetes Maternal Grandmother         type 2    Dementia Maternal Grandmother     Cancer Maternal Grandfather         prostate    Emphysema Paternal Grandmother          Medications have been verified  Objective   /63   Pulse 83   Temp 98 3 °F (36 8 °C)   Resp 16   SpO2 98%        Physical Exam     Physical Exam  Vitals and nursing note reviewed  Constitutional:       General: She is not in acute distress  Appearance: Normal appearance  She is not ill-appearing, toxic-appearing or diaphoretic  Interventions: She is not intubated  HENT:      Head: Normocephalic and atraumatic  Right Ear: Tympanic membrane normal       Left Ear: Tympanic membrane normal       Nose: Nose normal  No congestion or rhinorrhea  Mouth/Throat:      Mouth: Mucous membranes are moist    Eyes:      Extraocular Movements: Extraocular movements intact  Conjunctiva/sclera: Conjunctivae normal       Pupils: Pupils are equal, round, and reactive to light  Cardiovascular:      Rate and Rhythm: Normal rate and regular rhythm  Pulses: Normal pulses  Heart sounds: Normal heart sounds, S1 normal and S2 normal  Heart sounds not distant  No murmur heard  No friction rub  No gallop     Pulmonary:      Effort: Pulmonary effort is normal  No tachypnea, bradypnea, accessory muscle usage, prolonged expiration, respiratory distress or retractions  She is not intubated  Breath sounds: Normal breath sounds  No stridor, decreased air movement or transmitted upper airway sounds  No decreased breath sounds, wheezing, rhonchi or rales  Chest:      Chest wall: No tenderness  Abdominal:      General: Abdomen is flat  Bowel sounds are normal       Palpations: Abdomen is soft  There is no shifting dullness, fluid wave, hepatomegaly, splenomegaly, mass or pulsatile mass  Tenderness: There is abdominal tenderness in the right upper quadrant  There is no guarding or rebound  Positive signs include Miles's sign  Musculoskeletal:         General: Normal range of motion  Cervical back: Normal range of motion and neck supple  No tenderness  Skin:     General: Skin is warm and dry  Capillary Refill: Capillary refill takes less than 2 seconds  Neurological:      General: No focal deficit present  Mental Status: She is alert and oriented to person, place, and time  Cranial Nerves: No cranial nerve deficit     Psychiatric:         Mood and Affect: Mood normal          Behavior: Behavior normal

## 2022-06-17 NOTE — ED ATTENDING ATTESTATION
6/17/2022  I, Crystal Raymond MD, saw and evaluated the patient  I have discussed the patient with the resident/non-physician practitioner and agree with the resident's/non-physician practitioner's findings, Plan of Care, and MDM as documented in the resident's/non-physician practitioner's note, except where noted  All available labs and Radiology studies were reviewed  I was present for key portions of any procedure(s) performed by the resident/non-physician practitioner and I was immediately available to provide assistance  At this point I agree with the current assessment done in the Emergency Department  I have conducted an independent evaluation of this patient a history and physical is as follows:    82-year-old female presented for evaluation of right upper quadrant pain for the last 2-3 days  States pain is sharp and has becoming more intense  It is fairly constant and worsens with activity such as sitting up straight, walking, deep inspiration, coughing  She has some associated nausea but no vomiting  Has still been able to eat although her appetite is decreased  Denies any shortness of breath, chest pain, feeling feverish or chills  Denies any history of similar symptoms  States she was treated for IBS in the past but stopped her medication when she was pregnant and has not needed it again  She has normal vitals  Appears comfortable at rest   She does have focal tenderness in the right upper quadrant with positive Miles's sign  Dressed the abdomen is benign  Breath sounds are clear  Heart sounds are normal   No rashes  Suspect biliary pathology  Will check labs, work quadrant ultrasound, re-evaluate  She declined any analgesics at this time  ED Course  ED Course as of 06/18/22 0134   Fri Jun 17, 2022   1753 Imaging remarkable for gallbladder polyp only  Lab work within normal limits  Given her ongoing pain will reach out to surgical team for evaluation       No acute surgical intervention needed  Patient stable for discharge home  Will refer to GI for further workup and recommend returning to the ED with any worsening symptoms        Critical Care Time  Procedures

## 2022-06-18 NOTE — ED PROVIDER NOTES
History  Chief Complaint   Patient presents with    Abdominal Pain     Pt reports RUQ sharp pain onset Wednesday, pain worsening, was sent from  for further eval to r/o gallbladder, hx of gallbladder issues, +nausea     46-year-old female past medical history of IBS and remote history of gallstones presents today with 3 days of constant sharp right upper quadrant pain  Patient states pain is worse with movement it became worse today which prompted evaluation at urgent care  Urgent care sent patient here for ultrasound of her gallbladder  Patient denies pain at rest   Associated with nausea and multiple episodes of diarrhea that occurred at onset of symptoms  Patient has been tolerating p o  But does states that her appetite is decreased  Diarrhea since resolved  Denies vomiting  Patient denies any surgical history  Denies fevers, chills, chest pain, shortness of breath her additional concerns at this time  Patient was treated for IBS in the past but stopped her medication when she became pregnant and has not required it within the past 2 years  Prior to Admission Medications   Prescriptions Last Dose Informant Patient Reported? Taking?    Lidocaine Viscous HCl (XYLOCAINE) 2 % mucosal solution   No No   Sig: Swish and spit 10 mL 4 (four) times a day as needed for mouth pain or discomfort   Patient not taking: No sig reported   cetirizine (ZyrTEC) 10 mg tablet   Yes No   Sig: Take 10 mg by mouth daily   Patient not taking: Reported on 6/17/2022      Facility-Administered Medications: None       Past Medical History:   Diagnosis Date    39 weeks gestation of pregnancy 12/9/2019    Anxiety     Contraceptive use     LAST ASSESSED: [id number]    Depression     d/c Buspar with preg - now started on Zoloft 25 mg 11/1/19    External hemorrhoid, bleeding     LAST ASSESSED: [id number]    Gallbladder attack     Hematochezia     LAST ASSESSED: [id number]    IBS (irritable bowel syndrome)     Immunity status testing     LAST ASSESSED: [id number]    Methicillin resistant Staphylococcus aureus infection     Migraine     prev on effexor for migraine & anxiety - d/c approx 1 yr ago    Poison ivy dermatitis     LAST ASSESSED: [id number]    Psychiatric disorder     Shingles     Skin abscess     RIGHT LEG    Urinary frequency     LAST ASSESSED: [id number]    UTI (urinary tract infection)     uses Macrobid after intercourse    Vacuum extractor delivery, delivered 2020    Varicella     childhood       Past Surgical History:   Procedure Laterality Date    TONSILLECTOMY      ONSET:     WISDOM TOOTH EXTRACTION         Family History   Problem Relation Age of Onset    Thyroid disease Mother         hypothyroid    Thyroid disease Father     Cancer Family         MALIGNANT NEOPLASM    Diabetes Maternal Grandmother         type 2    Dementia Maternal Grandmother     Cancer Maternal Grandfather         prostate    Emphysema Paternal Grandmother      I have reviewed and agree with the history as documented  E-Cigarette/Vaping    E-Cigarette Use Never User      E-Cigarette/Vaping Substances    Nicotine No     THC No     CBD No     Flavoring No     Other No     Unknown No      Social History     Tobacco Use    Smoking status: Former Smoker     Packs/day: 0 25     Years: 8 00     Pack years: 2 00     Types: Cigarettes     Start date: 2011     Quit date: 10/31/2019     Years since quittin 6    Smokeless tobacco: Never Used   Vaping Use    Vaping Use: Never used   Substance Use Topics    Alcohol use: Yes     Comment: occasional    Drug use: Not Currently        Review of Systems   Constitutional: Negative for chills and fever  HENT: Negative for ear pain and sore throat  Eyes: Negative for pain and visual disturbance  Respiratory: Negative for cough and shortness of breath  Cardiovascular: Negative for chest pain and palpitations     Gastrointestinal: Positive for abdominal pain, diarrhea and nausea  Negative for vomiting  Genitourinary: Negative for dysuria and hematuria  Musculoskeletal: Negative for arthralgias and back pain  Skin: Negative for color change and rash  Neurological: Negative for seizures and syncope  All other systems reviewed and are negative  Physical Exam  ED Triage Vitals   Temperature Pulse Respirations Blood Pressure SpO2   06/17/22 1541 06/17/22 1540 06/17/22 1540 06/17/22 1540 06/17/22 1540   98 3 °F (36 8 °C) 98 16 146/78 100 %      Temp Source Heart Rate Source Patient Position - Orthostatic VS BP Location FiO2 (%)   06/17/22 1541 06/17/22 1540 06/17/22 1540 06/17/22 1540 --   Oral Monitor Sitting Left arm       Pain Score       --                    Orthostatic Vital Signs  Vitals:    06/17/22 1540 06/17/22 1700 06/17/22 1902   BP: 146/78 117/72 126/73   Pulse: 98 66 73   Patient Position - Orthostatic VS: Sitting Lying Lying       Physical Exam  Vitals and nursing note reviewed  Constitutional:       General: She is not in acute distress (Resting comfortably in bed, no pain at rest)  Appearance: She is well-developed  She is not ill-appearing  HENT:      Head: Normocephalic and atraumatic  Mouth/Throat:      Mouth: Mucous membranes are moist    Eyes:      Conjunctiva/sclera: Conjunctivae normal    Cardiovascular:      Rate and Rhythm: Normal rate and regular rhythm  Heart sounds: No murmur heard  Pulmonary:      Effort: Pulmonary effort is normal  No respiratory distress  Breath sounds: Normal breath sounds  Abdominal:      General: Abdomen is flat  Palpations: Abdomen is soft  Tenderness: There is abdominal tenderness in the right upper quadrant  Positive signs include Miles's sign  Musculoskeletal:      Cervical back: Neck supple  Skin:     General: Skin is warm and dry  Neurological:      General: No focal deficit present  Mental Status: She is alert           ED Medications  Medications ondansetron (ZOFRAN) injection 4 mg (4 mg Intravenous Given 6/17/22 1621)       Diagnostic Studies  Results Reviewed     Procedure Component Value Units Date/Time    POCT pregnancy, urine [[id number]]  (Normal) Resulted: 06/17/22 1739    Lab Status: Final result Updated: 06/17/22 1739     EXT PREG TEST UR (Ref: Negative) negative     Control valid    Lipase [[id number]]  (Normal) Collected: 06/17/22 1624    Lab Status: Final result Specimen: Blood from Arm, Right Updated: 06/17/22 1733     Lipase 17 u/L     Comprehensive metabolic panel [[id number]] Collected: 06/17/22 1624    Lab Status: Final result Specimen: Blood from Arm, Right Updated: 06/17/22 1733     Sodium 139 mmol/L      Potassium 3 7 mmol/L      Chloride 103 mmol/L      CO2 27 mmol/L      ANION GAP 9 mmol/L      BUN 7 mg/dL      Creatinine 0 71 mg/dL      Glucose 110 mg/dL      Calcium 9 8 mg/dL      AST 17 U/L      ALT 9 U/L      Alkaline Phosphatase 59 U/L      Total Protein 7 5 g/dL      Albumin 4 8 g/dL      Total Bilirubin 0 30 mg/dL      eGFR 114 ml/min/1 73sq m     Narrative:      [place] guidelines for Chronic Kidney Disease (CKD):     Stage 1 with normal or high GFR (GFR > 90 mL/min/1 73 square meters)    Stage 2 Mild CKD (GFR = 60-89 mL/min/1 73 square meters)    Stage 3A Moderate CKD (GFR = 45-59 mL/min/1 73 square meters)    Stage 3B Moderate CKD (GFR = 30-44 mL/min/1 73 square meters)    Stage 4 Severe CKD (GFR = 15-29 mL/min/1 73 square meters)    Stage 5 End Stage CKD (GFR <15 mL/min/1 73 square meters)  Note: GFR calculation is accurate only with a steady state creatinine    HS Troponin 0hr (reflex protocol) [[id number]]  (Normal) Collected: 06/17/22 1624    Lab Status: Final result Specimen: Blood from Arm, Right Updated: 06/17/22 1720     hs TnI 0hr <2 ng/L     CBC and differential [[id number]] Collected: 06/17/22 1624    Lab Status: Final result Specimen: Blood from Arm, Right Updated: 06/17/22 1654 WBC 4 87 Thousand/uL      RBC 4 54 Million/uL      Hemoglobin 13 5 g/dL      Hematocrit 41 3 %      MCV 91 fL      MCH 29 7 pg      MCHC 32 7 g/dL      RDW 13 0 %      MPV 9 4 fL      Platelets 153 Thousands/uL      nRBC 0 /100 WBCs      Neutrophils Relative 72 %      Immat GRANS % 0 %      Lymphocytes Relative 16 %      Monocytes Relative 11 %      Eosinophils Relative 0 %      Basophils Relative 1 %      Neutrophils Absolute 3 52 Thousands/µL      Immature Grans Absolute 0 01 Thousand/uL      Lymphocytes Absolute 0 76 Thousands/µL      Monocytes Absolute 0 54 Thousand/µL      Eosinophils Absolute 0 01 Thousand/µL      Basophils Absolute 0 03 Thousands/µL                  US right upper quadrant   Final Result by Marino Houston MD (06/17 6347)         1  Small 5 mm gallbladder polyp versus tumefactive sludge  No further workup required  2  No evidence for cholecystitis or biliary obstruction  3  Otherwise normal study              Workstation performed: [id number]               Procedures  ECG 12 Lead Documentation Only    Date/Time: 6/18/2022 1:40 AM  Performed by: Shayne Pizano MD  Authorized by: Shayne Pizano MD     Indications / Diagnosis:  Abd pain  ECG reviewed by me, the ED Provider: yes    Patient location:  ED  Previous ECG:     Previous ECG:  Unavailable  Interpretation:     Interpretation: normal    Rate:     ECG rate:  80    ECG rate assessment: normal    Rhythm:     Rhythm: sinus rhythm    Ectopy:     Ectopy: none    QRS:     QRS axis:  Right    QRS intervals:  Normal  Conduction:     Conduction: normal    ST segments:     ST segments:  Normal  T waves:     T waves: normal    Comments:      Sinus rhythm with sinus arrhythmia           ED Course                                       MDM  Number of Diagnoses or Management Options  Nausea  RUQ pain  Diagnosis management comments: 72-year-old female past medical history of IBS and remote history of gallstones presents today with 3 days of constant sharp right upper quadrant pain associated with nausea and multiple episodes of diarrhea that occurred at onset of symptoms  Suspect biliary pathology given the localized tenderness and right upper quadrant  Patient is otherwise well appearing with an unremarkable physical exam   Imaging negative for acute gallbladder pathology  Lab work was within normal limits  Surgery was consulted given clinical history and concern for biliary pathology  Surgery came down to evaluate the patient and believe her pain is attributed to her IBS especially in the setting of her recent diarrhea  Per surgery, no acute surgical intervention is needed at this time  Patient is stable for discharge home  Patient was provided with GI follow-up for further workup and recommendations  Reviewed reasons to return to the emergency department including persistent pain in that area, abdominal pain, fevers or chills, persistent vomiting and unable to tolerate fluids  Patient is agreeable with this plan and understands reasons to return  Amount and/or Complexity of Data Reviewed  Clinical lab tests: ordered and reviewed  Tests in the radiology section of CPT®: reviewed and ordered  Discuss the patient with other providers: yes    Risk of Complications, Morbidity, and/or Mortality  Presenting problems: moderate  Diagnostic procedures: moderate  Management options: low    Patient Progress  Patient progress: stable      Disposition  Final diagnoses:   RUQ pain   Nausea     Time reflects when diagnosis was documented in both MDM as applicable and the Disposition within this note     Time User Action Codes Description Comment    6/17/2022  7:14 PM Peder Se Add [R10 11] RUQ pain     6/17/2022  7:23 PM Peder Se Add [R11 0] Nausea       ED Disposition     ED Disposition   Discharge    Condition   Stable    Date/Time   Fri Jun 17, 2022  7:23 PM    [address]  [facility] discharge to home/self care  Follow-up Information     Follow up With Specialties Details Why Contact Info Additional [facility] Schedule an appointment as soon as possible for a visit   Darryl Ray [address] [facility], [address] [facility], [address], [place], [place], [address] [facility]    [address] Emergency Department Emergency Medicine  If symptoms worsen including worsening or persistent RUQ pain, persistent nausea/vomiting unable to tolerate fluids, fevers/chills, chest pain, shortness of breath or any other concerning symptoms [address] [facility] [address] [facility] Emergency Department, [address], [place], [place], [address]          Discharge Medication List as of 6/17/2022  7:31 PM      START taking these medications    Details   ondansetron (Zofran ODT) 4 mg disintegrating tablet Take 1 tablet (4 mg total) by mouth every 6 (six) hours as needed for nausea or vomiting for up to 5 days, Starting Fri 6/17/2022, Until Wed 6/22/2022 at 2359, Normal         CONTINUE these medications which have NOT CHANGED    Details   cetirizine (ZyrTEC) 10 mg tablet Take 10 mg by mouth daily, Historical Med      Lidocaine Viscous HCl (XYLOCAINE) 2 % mucosal solution Swish and spit 10 mL 4 (four) times a day as needed for mouth pain or discomfort, Starting Thu 10/21/2021, Normal           No discharge procedures on file  PDMP Review     None           ED Provider  Attending physically available and evaluated Alice Mayo  JOHNNY managed the patient along with the ED Attending      Electronically Signed by         Shayne Pizano MD  06/24/22 9681

## 2022-06-20 LAB
ATRIAL RATE: 80 BPM
P AXIS: 74 DEGREES
PR INTERVAL: 142 MS
QRS AXIS: 95 DEGREES
QRSD INTERVAL: 74 MS
QT INTERVAL: 352 MS
QTC INTERVAL: 405 MS
T WAVE AXIS: 64 DEGREES
VENTRICULAR RATE: 80 BPM

## 2022-06-20 PROCEDURE — 93010 ELECTROCARDIOGRAM REPORT: CPT | Performed by: INTERNAL MEDICINE

## 2022-06-30 ENCOUNTER — OFFICE VISIT (OUTPATIENT)
Dept: INTERNAL MEDICINE CLINIC | Facility: CLINIC | Age: 32
End: 2022-06-30
Payer: COMMERCIAL

## 2022-06-30 ENCOUNTER — TELEPHONE (OUTPATIENT)
Dept: GASTROENTEROLOGY | Facility: CLINIC | Age: 32
End: 2022-06-30

## 2022-06-30 VITALS
WEIGHT: 117.6 LBS | HEART RATE: 79 BPM | SYSTOLIC BLOOD PRESSURE: 102 MMHG | OXYGEN SATURATION: 99 % | HEIGHT: 60 IN | DIASTOLIC BLOOD PRESSURE: 64 MMHG | BODY MASS INDEX: 23.09 KG/M2

## 2022-06-30 DIAGNOSIS — K58.1 IRRITABLE BOWEL SYNDROME WITH CONSTIPATION: Primary | ICD-10-CM

## 2022-06-30 PROCEDURE — 99213 OFFICE O/P EST LOW 20 MIN: CPT | Performed by: NURSE PRACTITIONER

## 2022-06-30 RX ORDER — LUBIPROSTONE 8 UG/1
8 CAPSULE, GELATIN COATED ORAL 2 TIMES DAILY WITH MEALS
Qty: 60 CAPSULE | Refills: 2 | Status: SHIPPED | OUTPATIENT
Start: 2022-06-30 | End: 2022-07-12

## 2022-06-30 RX ORDER — CEPHALEXIN 500 MG/1
CAPSULE ORAL
COMMUNITY
Start: 2022-05-07 | End: 2022-06-30

## 2022-06-30 RX ORDER — PHENAZOPYRIDINE HYDROCHLORIDE 200 MG/1
TABLET, FILM COATED ORAL
COMMUNITY
Start: 2022-05-07 | End: 2022-06-30

## 2022-06-30 NOTE — PROGRESS NOTES
Assessment/Plan:    Irritable bowel syndrome  Restart Amitiza  Follow-up with gastroenterologist Chester Ferro       Diagnoses and all orders for this visit:    Irritable bowel syndrome with constipation  -     Ambulatory Referral to Gastroenterology; Future  -     lubiprostone (AMITIZA) 8 mcg capsule; Take 1 capsule (8 mcg total) by mouth 2 (two) times a day with meals    Other orders  -     Discontinue: cephalexin (KEFLEX) 500 mg capsule; TAKE 1 CAPSULE BY MOUTH TWICE A DAY FOR 7 DAYS (Patient not taking: Reported on 6/30/2022)  -     Discontinue: phenazopyridine (PYRIDIUM) 200 mg tablet; TAKE 1 TABLET BY MOUTH 3 ) TIMES A DAY AS NEEDED FOR BLADDER SPASMS (BURNING WITH URINATION)  (Patient not taking: Reported on 6/30/2022)          Subjective:      Patient ID: Shun South is a 29 y o  female  Patient is here for ER follow-up for severe abdominal pain and IBS-C  She had IBS many years ago and was on Amitiza which helped tremendously however after her pregnancy she had no issues anymore with IBS  recently her symptoms flared up again  She has trigger foods which are fried greasy food, dairy, cabbage broccoli spicy foods  CT scanned in hospital showed some sludge but no stones in her gallbladder      The following portions of the patient's history were reviewed and updated as appropriate: allergies, current medications, past family history, past medical history, past social history, past surgical history and problem list     Review of Systems   Constitutional: Negative  HENT: Negative  Eyes: Negative  Respiratory: Negative  Cardiovascular: Negative  Gastrointestinal: Positive for abdominal pain and constipation  Musculoskeletal: Negative  Neurological: Negative            Objective:      /64 (BP Location: Left arm, Patient Position: Sitting, Cuff Size: Standard)   Pulse 79   Ht 5' (1 524 m)   Wt 53 3 kg (117 lb 9 6 oz)   SpO2 99%   BMI 22 97 kg/m²          Physical Exam  Vitals and nursing note reviewed  Constitutional:       Appearance: She is well-developed  HENT:      Head: Normocephalic and atraumatic  Right Ear: External ear normal       Left Ear: External ear normal       Nose: Nose normal    Eyes:      Conjunctiva/sclera: Conjunctivae normal       Pupils: Pupils are equal, round, and reactive to light  Cardiovascular:      Rate and Rhythm: Normal rate and regular rhythm  Pulmonary:      Effort: Pulmonary effort is normal       Breath sounds: Normal breath sounds  Abdominal:      General: Bowel sounds are normal       Palpations: Abdomen is soft  Musculoskeletal:         General: Normal range of motion  Cervical back: Normal range of motion and neck supple  Skin:     General: Skin is warm and dry  Neurological:      Mental Status: She is alert and oriented to person, place, and time

## 2022-06-30 NOTE — TELEPHONE ENCOUNTER
Called to schedule appt and left message for pt       Dx: K58 1 (ICD-10-CM) - Irritable bowel syndrome with constipation

## 2022-07-05 ENCOUNTER — TELEPHONE (OUTPATIENT)
Dept: INTERNAL MEDICINE CLINIC | Facility: CLINIC | Age: 32
End: 2022-07-05

## 2022-07-05 NOTE — TELEPHONE ENCOUNTER
Patient called in said the medication you sent for her IBS was rejected at the pharmacy   She asked what her options are?

## 2022-07-07 NOTE — TELEPHONE ENCOUNTER
Patient called in for a status update on Prior Auth  Advised patient that PA been started and this may take up to 3-7 days for insurance response  Also triaged call with MA who initiated PA she will call patient today/this afternoon to advise if a decision has been completed  Advise patient she verbalized understanding

## 2022-07-07 NOTE — TELEPHONE ENCOUNTER
I called the patient to update her  The prior Jay is in process it is stating that it will take 24 to 36 hours

## 2022-07-12 DIAGNOSIS — K58.1 IRRITABLE BOWEL SYNDROME WITH CONSTIPATION: Primary | ICD-10-CM

## 2022-07-12 RX ORDER — LUBIPROSTONE 8 UG/1
8 CAPSULE, GELATIN COATED ORAL 2 TIMES DAILY WITH MEALS
Qty: 60 CAPSULE | Refills: 5 | Status: SHIPPED | OUTPATIENT
Start: 2022-07-12

## 2022-07-13 NOTE — TELEPHONE ENCOUNTER
I spoke with the patient she will try the Amitiza and let us know if there are any side effects  The patient states that she was on Amitiza in the past and it worked well for her

## 2022-07-13 NOTE — TELEPHONE ENCOUNTER
MD Maureen Willis; [address] [facility]  So this letter says she is denied generic lubiprostone because there is no documentation of intolerance to brand Amitiza  I will send brand Amitiza  Please inform patient

## 2022-07-15 ENCOUNTER — TELEPHONE (OUTPATIENT)
Dept: GASTROENTEROLOGY | Facility: CLINIC | Age: 32
End: 2022-07-15

## 2022-07-15 NOTE — TELEPHONE ENCOUNTER
Called pt to schedule appt and left message       K58 1 (ICD-10-CM) - Irritable bowel syndrome with constipation

## 2022-07-29 ENCOUNTER — TELEPHONE (OUTPATIENT)
Dept: OBGYN CLINIC | Facility: CLINIC | Age: 32
End: 2022-07-29

## 2022-07-29 NOTE — TELEPHONE ENCOUNTER
Pt calling re: UTI sx - has been taking Pyridium this wk - advised f/u with pcp or Urgent Care since no provider in office this afternoon

## 2022-08-01 DIAGNOSIS — B37.31 VAGINAL YEAST INFECTION: Primary | ICD-10-CM

## 2022-08-01 DIAGNOSIS — N39.0 URINARY TRACT INFECTION WITHOUT HEMATURIA, SITE UNSPECIFIED: ICD-10-CM

## 2022-08-01 RX ORDER — FLUCONAZOLE 200 MG/1
TABLET ORAL
Qty: 2 TABLET | Refills: 1 | Status: SHIPPED | OUTPATIENT
Start: 2022-08-01 | End: 2022-08-02

## 2022-08-01 RX ORDER — NITROFURANTOIN 25; 75 MG/1; MG/1
100 CAPSULE ORAL 2 TIMES DAILY
Qty: 14 CAPSULE | Refills: 2 | Status: SHIPPED | OUTPATIENT
Start: 2022-08-01 | End: 2022-10-05

## 2022-08-01 NOTE — TELEPHONE ENCOUNTER
Spoke to the patient today, will treat her UTI with Macrobid  Also has signs symptoms of yeast infection prescription sent for Diflucan  She will keep us informed her progress

## 2022-08-01 NOTE — TELEPHONE ENCOUNTER
Pt is still experiencing UTI symptoms  Does have some slight itching and clear discharge when she tries to pass urine  She did not f/u at urgent care or PCP  Was out of town over weekend  Do you need to see her or can send medication to pharmacy on file and urine sample      # [phone]

## 2022-08-01 NOTE — PROGRESS NOTES
Patient complaining of UTI symptoms, will treat with Macrobid 1 mg b.i.d. for 7 days. Also complaining increasing itching questionable yeast infection we have a backup prescription for Diflucan.

## 2022-08-03 ENCOUNTER — TELEPHONE (OUTPATIENT)
Dept: OBGYN CLINIC | Facility: CLINIC | Age: 32
End: 2022-08-03

## 2022-08-03 NOTE — TELEPHONE ENCOUNTER
Pt called asking for a note excusing her from work due to uti  Pt was out since mon pt was offered multiple apts to be seen for problem and she denied them  I asked Abbey for note and she did not feel comfortable signing it because pt wasn't seen in office and Dr Joaquin Lilly was not in the office to approve  We offered pt apt for today or tomorrow and she did not want to be seen  Pt was not happy and said she would just keep apt with Dr Joaquin Lilly next week and discuss with him at that time

## 2022-08-09 ENCOUNTER — OFFICE VISIT (OUTPATIENT)
Dept: OBGYN CLINIC | Facility: CLINIC | Age: 32
End: 2022-08-09
Payer: COMMERCIAL

## 2022-08-09 VITALS
SYSTOLIC BLOOD PRESSURE: 112 MMHG | BODY MASS INDEX: 22.58 KG/M2 | DIASTOLIC BLOOD PRESSURE: 70 MMHG | HEIGHT: 60 IN | WEIGHT: 115 LBS

## 2022-08-09 DIAGNOSIS — N39.0 URINARY TRACT INFECTION WITHOUT HEMATURIA, SITE UNSPECIFIED: Primary | ICD-10-CM

## 2022-08-09 PROCEDURE — 99213 OFFICE O/P EST LOW 20 MIN: CPT | Performed by: OBSTETRICS & GYNECOLOGY

## 2022-08-09 RX ORDER — MULTIVITAMIN
1 TABLET ORAL DAILY
COMMUNITY

## 2022-08-09 NOTE — PATIENT INSTRUCTIONS
Patient was informed of a negative examination today she still has a history recurrent UTIs after intercourse  Will now have her take a Macrodantin prior to intercourse and will repeat 1-1/2 hour after intercourse  She will continue to hydrate  She will keep me informed her progress

## 2022-08-09 NOTE — PROGRESS NOTES
This is a 21-year-old female she is  1 para 1  Now complaining recurrent UTIs after intercourse  She had been using Macrodantin after intercourse  Still this is not working  We have now decided to use Macrodantin before and after intercourse  She will keep feet me informed her progress  Examination is completely benign today urethra is normal appearance  No abnormal discharge the bladder is nontender  She will keep me informed her progress and again she will take the Macrodantin before intercourse and 1 hour after intercourse

## 2022-09-29 ENCOUNTER — TELEPHONE (OUTPATIENT)
Dept: OBGYN CLINIC | Facility: CLINIC | Age: 32
End: 2022-09-29

## 2022-09-29 ENCOUNTER — APPOINTMENT (OUTPATIENT)
Dept: LAB | Age: 32
End: 2022-09-29
Payer: COMMERCIAL

## 2022-09-29 DIAGNOSIS — R39.89 SENSATION OF PRESSURE IN BLADDER AREA: Primary | ICD-10-CM

## 2022-09-29 DIAGNOSIS — R39.9 URINARY TRACT INFECTION SYMPTOMS: ICD-10-CM

## 2022-09-29 LAB
BACTERIA UR QL AUTO: NORMAL /HPF
BILIRUB UR QL STRIP: NEGATIVE
CLARITY UR: CLEAR
COLOR UR: COLORLESS
GLUCOSE UR STRIP-MCNC: NEGATIVE MG/DL
HGB UR QL STRIP.AUTO: NEGATIVE
KETONES UR STRIP-MCNC: NEGATIVE MG/DL
LEUKOCYTE ESTERASE UR QL STRIP: NEGATIVE
NITRITE UR QL STRIP: NEGATIVE
NON-SQ EPI CELLS URNS QL MICRO: NORMAL /HPF
PH UR STRIP.AUTO: 6.5 [PH]
PROT UR STRIP-MCNC: NEGATIVE MG/DL
RBC #/AREA URNS AUTO: NORMAL /HPF
SP GR UR STRIP.AUTO: 1 (ref 1–1.03)
UROBILINOGEN UR STRIP-ACNC: <2 MG/DL
WBC #/AREA URNS AUTO: NORMAL /HPF

## 2022-09-29 PROCEDURE — 81001 URINALYSIS AUTO W/SCOPE: CPT | Performed by: OBSTETRICS & GYNECOLOGY

## 2022-09-29 PROCEDURE — 87086 URINE CULTURE/COLONY COUNT: CPT | Performed by: OBSTETRICS & GYNECOLOGY

## 2022-09-29 NOTE — TELEPHONE ENCOUNTER
Pt calling with c/o UTI and/or yeast infection symptoms       Please call to triage    CB# [phone]

## 2022-09-29 NOTE — TELEPHONE ENCOUNTER
Patient had intercourse over weekend  Did take medication (Macrobid) before and after  Patient now started with brownish discharge, which has now stopped  Is feeling extra moist down there  Has pressure when voiding and burning

## 2022-09-30 ENCOUNTER — TELEPHONE (OUTPATIENT)
Dept: OBGYN CLINIC | Facility: CLINIC | Age: 32
End: 2022-09-30

## 2022-09-30 LAB — BACTERIA UR CULT: NORMAL

## 2022-10-03 NOTE — TELEPHONE ENCOUNTER
Pt informed of negative urine culture results  Pt req appt to f/u on bloating, pressure, vag discharge (white), no itching  Still having dysuria  taking Macrodantin pre & post intercourse

## 2022-10-04 ENCOUNTER — OFFICE VISIT (OUTPATIENT)
Dept: OBGYN CLINIC | Facility: CLINIC | Age: 32
End: 2022-10-04
Payer: COMMERCIAL

## 2022-10-04 VITALS
DIASTOLIC BLOOD PRESSURE: 60 MMHG | HEIGHT: 60 IN | BODY MASS INDEX: 23.01 KG/M2 | SYSTOLIC BLOOD PRESSURE: 120 MMHG | WEIGHT: 117.2 LBS

## 2022-10-04 DIAGNOSIS — N30.10 CYSTITIS, INTERSTITIAL: Primary | ICD-10-CM

## 2022-10-04 DIAGNOSIS — N32.89 BLADDER SPASM: Primary | ICD-10-CM

## 2022-10-04 PROCEDURE — 99214 OFFICE O/P EST MOD 30 MIN: CPT | Performed by: OBSTETRICS & GYNECOLOGY

## 2022-10-04 RX ORDER — PHENAZOPYRIDINE HYDROCHLORIDE 100 MG/1
100 TABLET, FILM COATED ORAL 3 TIMES DAILY PRN
Qty: 6 TABLET | Refills: 3 | Status: SHIPPED | OUTPATIENT
Start: 2022-10-04

## 2022-10-04 NOTE — PATIENT INSTRUCTIONS
We are concerned about the frequent UTI symptoms although urinalysis is negative  Click the consultation 4[facility] Urology for possibility interstitial cystitis  Will give her prior reading within next 2 days at with a bladder spasm  She will call me in 2 days with a progress report

## 2022-10-04 NOTE — PROGRESS NOTES
This is a 68-year-old white female history recurrent UTIs and a bladder spasms  She is complaining more frequency recent urinalysis completely negative  Examination today shows the bladder is tender but the vagina is clean I suspect we may have is more serious complication even the possibility of interstitial cystitis  I now recommend a consultation with Urology for the possibility of a cystoscopy  Will put a 1 Premarin for the next 2 days  She will keep me informed her progress

## 2022-10-05 ENCOUNTER — OFFICE VISIT (OUTPATIENT)
Dept: UROLOGY | Facility: MEDICAL CENTER | Age: 32
End: 2022-10-05
Payer: COMMERCIAL

## 2022-10-05 VITALS
HEART RATE: 96 BPM | HEIGHT: 60 IN | DIASTOLIC BLOOD PRESSURE: 70 MMHG | WEIGHT: 117 LBS | SYSTOLIC BLOOD PRESSURE: 100 MMHG | BODY MASS INDEX: 22.97 KG/M2

## 2022-10-05 DIAGNOSIS — N30.90 CYSTITIS: Primary | ICD-10-CM

## 2022-10-05 LAB
SL AMB  POCT GLUCOSE, UA: NORMAL
SL AMB LEUKOCYTE ESTERASE,UA: NORMAL
SL AMB POCT BILIRUBIN,UA: NORMAL
SL AMB POCT BLOOD,UA: NORMAL
SL AMB POCT CLARITY,UA: CLEAR
SL AMB POCT COLOR,UA: YELLOW
SL AMB POCT KETONES,UA: NORMAL
SL AMB POCT NITRITE,UA: NORMAL
SL AMB POCT PH,UA: 7
SL AMB POCT SPECIFIC GRAVITY,UA: 1
SL AMB POCT URINE PROTEIN: NORMAL
SL AMB POCT UROBILINOGEN: 0.2

## 2022-10-05 PROCEDURE — 81003 URINALYSIS AUTO W/O SCOPE: CPT | Performed by: UROLOGY

## 2022-10-05 PROCEDURE — 99204 OFFICE O/P NEW MOD 45 MIN: CPT | Performed by: UROLOGY

## 2022-10-05 RX ORDER — OXYBUTYNIN CHLORIDE 5 MG/1
5 TABLET ORAL 2 TIMES DAILY PRN
Qty: 30 TABLET | Refills: 1 | Status: SHIPPED | OUTPATIENT
Start: 2022-10-05

## 2022-10-05 NOTE — PROGRESS NOTES
Assessment/Plan:  One  Cystitis  -patient will hold off on prophylactic Macrodantin prior to and after sexual intercourse  If she has intercourse in get symptoms as previously described urine culture will be obtained  P r n  Ditropan for urgency and frequency will also be prescribed  Patient is cautioned about exacerbation of constipation with Ditropan  Cystoscopy and determination of PVR will take place as well  No problem-specific Assessment & Plan notes found for this encounter  Diagnoses and all orders for this visit:    Cystitis  -     POCT urine dip auto non-scope  -     oxybutynin (DITROPAN) 5 mg tablet; Take 1 tablet (5 mg total) by mouth 2 (two) times a day as needed (bladdder spasm)  -     Cystoscopy; Future  -     POCT Measure PVR; Future  -     Chlamydia/GC amplified DNA by PCR; Future  -     Urine culture    Other orders  -     Nitrofurantoin Monohyd Macro (MACROBID PO); Take 50 mg by mouth          Subjective:      Patient ID: Jeferson Ba is a 29 y o  female  HPI  82-year-old female with longstanding history urologic issues specifically post coital bouts of acute cystitis treated with prophylactic Macrodantin prior to and after intercourse now presents with progression of symptoms  Specifically the patient notes painful intercourse and after intercourse she often develops urgency frequency abdominal bloating and abdominal discomfort  She has a significant history of irritable bowel syndrome with constipation tendencies  She denies any blood in her urine but does note a clear discharge from the vagina which has recently been swabbed by her gyn  She presents for urologic evaluation to urologically determine whether she has interstitial cystitis and     The following portions of the patient's history were reviewed and updated as appropriate: allergies, current medications, past family history, past medical history, past social history, past surgical history and problem list     Review of Systems   Genitourinary: Positive for frequency, urgency and vaginal pain  All other systems reviewed and are negative  Objective:      /70   Pulse 96   Ht 5' (1 524 m)   Wt 53 1 kg (117 lb)   LMP 09/17/2022 (Exact Date)   BMI 22 85 kg/m²          Physical Exam  Vitals reviewed  Constitutional:       Appearance: Normal appearance  HENT:      Head: Normocephalic and atraumatic  Nose: Nose normal       Mouth/Throat:      Mouth: Mucous membranes are moist    Eyes:      Extraocular Movements: Extraocular movements intact  Pulmonary:      Effort: Pulmonary effort is normal  No respiratory distress  Musculoskeletal:      Cervical back: Neck supple  Neurological:      Mental Status: She is alert and oriented to person, place, and time  Psychiatric:         Mood and Affect: Mood normal          Behavior: Behavior normal          Thought Content:  Thought content normal          Judgment: Judgment normal

## 2022-10-12 PROBLEM — J20.5 RSV BRONCHITIS: Status: RESOLVED | Noted: 2021-10-25 | Resolved: 2022-10-12

## 2022-12-07 ENCOUNTER — TELEPHONE (OUTPATIENT)
Dept: OTHER | Facility: OTHER | Age: 32
End: 2022-12-07

## 2022-12-07 NOTE — TELEPHONE ENCOUNTER
Patient is calling regarding cancelling an appointment  Date/Time:12/7/22 at 3pm    Patient was rescheduled: YES [] NO [x]    Patient requesting call back to reschedule: YES [] NO [x]    Patient is calling in to cancel her upcoming appointment for today at 3 pm  She went to an urgent care instead to seek medical advise  I was unable to cancel appointment since it was already arrived

## 2022-12-15 ENCOUNTER — PROCEDURE VISIT (OUTPATIENT)
Dept: UROLOGY | Facility: MEDICAL CENTER | Age: 32
End: 2022-12-15

## 2022-12-15 VITALS
WEIGHT: 117 LBS | SYSTOLIC BLOOD PRESSURE: 100 MMHG | HEART RATE: 92 BPM | BODY MASS INDEX: 22.97 KG/M2 | DIASTOLIC BLOOD PRESSURE: 70 MMHG | HEIGHT: 60 IN

## 2022-12-15 DIAGNOSIS — N30.90 CYSTITIS: Primary | ICD-10-CM

## 2022-12-15 LAB
POST-VOID RESIDUAL VOLUME, ML POC: 0 ML
SL AMB  POCT GLUCOSE, UA: ABNORMAL
SL AMB LEUKOCYTE ESTERASE,UA: ABNORMAL
SL AMB POCT BILIRUBIN,UA: ABNORMAL
SL AMB POCT BLOOD,UA: ABNORMAL
SL AMB POCT CLARITY,UA: CLEAR
SL AMB POCT COLOR,UA: YELLOW
SL AMB POCT KETONES,UA: ABNORMAL
SL AMB POCT NITRITE,UA: ABNORMAL
SL AMB POCT PH,UA: 7
SL AMB POCT SPECIFIC GRAVITY,UA: 1.01
SL AMB POCT URINE PROTEIN: ABNORMAL
SL AMB POCT UROBILINOGEN: 0.2

## 2022-12-15 RX ORDER — SULFAMETHOXAZOLE AND TRIMETHOPRIM 800; 160 MG/1; MG/1
1 TABLET ORAL EVERY 12 HOURS SCHEDULED
Qty: 4 TABLET | Refills: 0 | Status: SHIPPED | OUTPATIENT
Start: 2022-12-15 | End: 2022-12-17

## 2022-12-15 NOTE — LETTER
December 15, 2022     Virginia Villafana MD  [address]  [address] [place] [address]    Patient: Beverly Dhaliwal   YOB: 1990   Date of Visit: 12/15/2022       Dear Dr Effie Jade: Thank you for referring Bhanu Wilson to me for evaluation  Below are my notes for this consultation  If you have questions, please do not hesitate to call me  I look forward to following your patient along with you  Sincerely,        Darcy Cortes MD        CC: No Recipients  Darcy Cortes MD  12/15/2022  2:07 PM  Sign when Signing Visit     Cystoscopy     Date/Time 12/15/2022 2:05 PM     Performed by  Darcy Cortes MD     Authorized by Darcy Cortes MD      Universal Protocol:  Consent: Verbal consent obtained  Written consent obtained  Risks and benefits: risks, benefits and alternatives were discussed  Consent given by: patient  Patient understanding: patient states understanding of the procedure being performed  Patient consent: the patient's understanding of the procedure matches consent given  Procedure consent: procedure consent matches procedure scheduled  Required items: required blood products, implants, devices, and special equipment available  Patient identity confirmed: verbally with patient        Procedure Details:  Procedure type: dilation of urethral stricture    Patient tolerance: Patient tolerated the procedure well with no immediate complications    Additional Procedure Details: With the patient in the dorsolithotomy position and after prepping the urethral meatus with Betadine video cystourethroscopy took place  Initial attempts of placing the cystoscope per urethra failed because of the small meatus  The meatus was probed using an 25 Western Gabbie female sound with lubrication and after it was probed I was able to get the cystoscope into the urethra  No strictures or lesions were identified    Urinary bladder was free of any intrinsic lesions or evidence of extrinsic mass compression  Stool in the colon was extrinsically compressing the urinary bladder to a mild degree  Ureteral orifice ease were normal position and configuration bilaterally with clear reflux bilaterally  No bladder neck abnormalities were identified around the bladder neck with retroflexion  There was some mild amount of pseudomembranous trigonitis noted    Scope was removed and the patient recovered uneventfully

## 2022-12-15 NOTE — PROGRESS NOTES
Cystoscopy     Date/Time 12/15/2022 2:05 PM     Performed by  Jerica Luis MD     Authorized by Jerica Luis MD      Universal Protocol:  Consent: Verbal consent obtained  Written consent obtained  Risks and benefits: risks, benefits and alternatives were discussed  Consent given by: patient  Patient understanding: patient states understanding of the procedure being performed  Patient consent: the patient's understanding of the procedure matches consent given  Procedure consent: procedure consent matches procedure scheduled  Required items: required blood products, implants, devices, and special equipment available  Patient identity confirmed: verbally with patient        Procedure Details:  Procedure type: dilation of urethral stricture    Patient tolerance: Patient tolerated the procedure well with no immediate complications    Additional Procedure Details: With the patient in the dorsolithotomy position and after prepping the urethral meatus with Betadine video cystourethroscopy took place  Initial attempts of placing the cystoscope per urethra failed because of the small meatus  The meatus was probed using an 25 Western Gabbie female sound with lubrication and after it was probed I was able to get the cystoscope into the urethra  No strictures or lesions were identified  Urinary bladder was free of any intrinsic lesions or evidence of extrinsic mass compression  Stool in the colon was extrinsically compressing the urinary bladder to a mild degree  Ureteral orifice ease were normal position and configuration bilaterally with clear reflux bilaterally  No bladder neck abnormalities were identified around the bladder neck with retroflexion  There was some mild amount of pseudomembranous trigonitis noted    Scope was removed and the patient recovered uneventfully

## 2023-02-08 NOTE — ED NOTES
Patient returned from  Wade Dove RN  10/21/17 1928
Patient transported to Saw Dove RN  10/21/17 3620
Resident

## 2023-03-27 DIAGNOSIS — N32.89 BLADDER SPASM: Primary | ICD-10-CM

## 2023-03-27 RX ORDER — NITROFURANTOIN MACROCRYSTALS 50 MG/1
CAPSULE ORAL
Qty: 60 CAPSULE | Refills: 0 | Status: SHIPPED | OUTPATIENT
Start: 2023-03-27

## 2023-03-27 NOTE — TELEPHONE ENCOUNTER
Pt states she was told by you to take Macrodantin pre & post intercourse    If agree, please sign off on presc for same to [facility])

## 2023-04-03 ENCOUNTER — TELEMEDICINE (OUTPATIENT)
Dept: INTERNAL MEDICINE CLINIC | Facility: CLINIC | Age: 33
End: 2023-04-03

## 2023-04-03 DIAGNOSIS — J40 BRONCHITIS: Primary | ICD-10-CM

## 2023-04-03 RX ORDER — AZITHROMYCIN 250 MG/1
TABLET, FILM COATED ORAL
Qty: 6 TABLET | Refills: 0 | Status: SHIPPED | OUTPATIENT
Start: 2023-04-03 | End: 2023-04-08

## 2023-04-03 RX ORDER — GUAIFENESIN 600 MG/1
1200 TABLET, EXTENDED RELEASE ORAL EVERY 12 HOURS SCHEDULED
Qty: 40 TABLET | Refills: 0 | Status: SHIPPED | OUTPATIENT
Start: 2023-04-03 | End: 2023-04-13

## 2023-04-03 NOTE — PROGRESS NOTES
Virtual Visit    Assessment/Plan:    Bronchitis  · Predominant symptoms : chest congestion, productive cough of yellowish sputum , (-) fever, chills , GI disturbances  Covid/influenza/rsv : neg ( tested herself for work)  · Onset of symptoms of approximately a month and a half unresponsive to conservative treatment ( aleve + zyrtec)  · Background of nicotine dependance    Plan  zpack initiated  Recommendation of plenty oral hydration, vit c   mucinex   Instructed to call the office if no improvement of symptoms in a week, consider in person eval       Diagnoses and all orders for this visit:    Bronchitis  -     azithromycin (Zithromax) 250 mg tablet; Take 2 tablets (500 mg total) by mouth daily for 1 day, THEN 1 tablet (250 mg total) daily for 4 days   -     guaiFENesin (MUCINEX) 600 mg 12 hr tablet; Take 2 tablets (1,200 mg total) by mouth every 12 (twelve) hours for 10 days        Subjective:      Patient ID: Donny Gilliam is a 29 y o  female  Mrs Aj Hanley on a virtual visit today for evaluation of upper respiratory symptoms  The following portions of the patient's history were reviewed and updated as appropriate: allergies, current medications, past family history, past medical history, past social history, past surgical history and problem list     Review of Systems      Objective: There were no vitals taken for this visit  Physical Exam  Constitutional:       General: She is not in acute distress  Appearance: She is well-developed  She is not toxic-appearing  Neurological:      Mental Status: She is alert         Rest of the physical exam limited due to the nature of the encounter ( virtual visit)        Therapeutic Lifestyle Change Visit:     One-on-one comprehensive counseling, coaching, and health behavior change visit completed

## 2023-04-03 NOTE — ASSESSMENT & PLAN NOTE
· Predominant symptoms : chest congestion, productive cough of yellowish sputum , (-) fever, chills , GI disturbances  Covid/influenza/rsv : neg ( tested herself for work)  · Onset of symptoms of approximately a month and a half unresponsive to conservative treatment ( aleve + zyrtec)    · Background of nicotine dependance    Plan  zpack initiated  Recommendation of plenty oral hydration, vit c   mucinex   Instructed to call the office if no improvement of symptoms in a week, consider in person eval

## 2023-04-07 ENCOUNTER — APPOINTMENT (OUTPATIENT)
Dept: RADIOLOGY | Age: 33
End: 2023-04-07

## 2023-04-07 ENCOUNTER — OFFICE VISIT (OUTPATIENT)
Dept: URGENT CARE | Age: 33
End: 2023-04-07

## 2023-04-07 ENCOUNTER — TELEPHONE (OUTPATIENT)
Dept: OBGYN CLINIC | Facility: CLINIC | Age: 33
End: 2023-04-07

## 2023-04-07 VITALS
SYSTOLIC BLOOD PRESSURE: 126 MMHG | TEMPERATURE: 98.4 F | HEART RATE: 78 BPM | RESPIRATION RATE: 18 BRPM | OXYGEN SATURATION: 98 % | DIASTOLIC BLOOD PRESSURE: 72 MMHG

## 2023-04-07 DIAGNOSIS — B37.31 VAGINAL CANDIDIASIS: ICD-10-CM

## 2023-04-07 DIAGNOSIS — R07.89 CHEST TIGHTNESS: ICD-10-CM

## 2023-04-07 DIAGNOSIS — R07.89 CHEST TIGHTNESS: Primary | ICD-10-CM

## 2023-04-07 RX ORDER — PREDNISONE 10 MG/1
10 TABLET ORAL 2 TIMES DAILY WITH MEALS
Qty: 10 TABLET | Refills: 0 | Status: SHIPPED | OUTPATIENT
Start: 2023-04-07 | End: 2023-04-12

## 2023-04-07 RX ORDER — ALBUTEROL SULFATE 90 UG/1
2 AEROSOL, METERED RESPIRATORY (INHALATION) EVERY 6 HOURS PRN
Qty: 8.5 G | Refills: 0 | Status: SHIPPED | OUTPATIENT
Start: 2023-04-07

## 2023-04-07 RX ORDER — FLUCONAZOLE 150 MG/1
150 TABLET ORAL ONCE
Qty: 1 TABLET | Refills: 0 | Status: SHIPPED | OUTPATIENT
Start: 2023-04-07 | End: 2023-04-07

## 2023-04-07 NOTE — PATIENT INSTRUCTIONS
Please take prednisone with breakfast and dinner for the next 5 days  Please take Diflucan once as discussed  Trial albuterol every 6 hours as needed for chest tightness

## 2023-04-07 NOTE — TELEPHONE ENCOUNTER
Return call to pt - completing Z-pack for chest congestion & cough - still symptomatic, (-) Covid test 4/6/2023  Awaiting call back from pcp  Now having yeast inf sx   recom to use Monistat 3 or 7 as no provider in office otherwise f/u with pcp for Diflucan or recall here 4/10/2023

## 2023-04-07 NOTE — PROGRESS NOTES
[facility] [facility]        NAME: Leonarda Marrero is a 29 y o  female  : 1990    MRN: [id number]  DATE: 2023  TIME: 4:56 PM    Assessment and Plan   Chest tightness [R07 89]  1  Chest tightness  XR chest pa & lateral    albuterol (ProAir HFA) 90 mcg/act inhaler    predniSONE 10 mg tablet      2  Vaginal candidiasis  fluconazole (DIFLUCAN) 150 mg tablet        Chest x-ray reviewed, no acute cardiopulmonary disease identified, awaiting final read per radiology  Patient Instructions     Please take prednisone with breakfast and dinner for the next 5 days  Please take Diflucan once as discussed  Trial albuterol every 6 hours as needed for chest tightness  Follow up with PCP in 3-5 days  Proceed to  ER if symptoms worsen  Chief Complaint     Chief Complaint   Patient presents with   • Cold Like Symptoms     Patient states that she has been sick on and off for over a month but currently she is progressively getting worse  She started Monday with a virtual apt and had a 5 day zpack that she finished today  She states that her chest congestion has not gotten better and she is SOB and wheezing  No chest pain but notes chest tightness  Daughter currently has pneumonia and bronchitis  Multiple home covids neg         History of Present Illness       Patient presenting for evaluation of worsening upper respiratory symptoms  Patient states that she has been ill with upper respiratory infections on and off for the past month  She states that 4 days ago she had a virtual visit with her primary care provider and was diagnosed with bronchitis  She states that she is on day 4 of azithromycin, and is having worsening symptoms  She complains of worsening cough, chest tightness and wheezing  She states that she is having diarrhea and nausea, but relates this to the antibiotic use  She also complains of worsening congestion  She denies any fevers or chills at this time    She states that her daughter was recently ill with pneumonia and bronchitis  Patient also states that she has been suffering from vaginal itching and discharge, and believes that she has vaginal candidiasis related to recent antibiotic use  Review of Systems   Review of Systems   Constitutional: Negative for chills and fever  HENT: Positive for congestion  Negative for sore throat  Respiratory: Positive for cough, chest tightness and wheezing  Negative for shortness of breath  Cardiovascular: Negative for chest pain  Gastrointestinal: Positive for diarrhea and nausea  Negative for vomiting  All other systems reviewed and are negative  Current Medications       Current Outpatient Medications:   •  albuterol (ProAir HFA) 90 mcg/act inhaler, Inhale 2 puffs every 6 (six) hours as needed for wheezing or shortness of breath, Disp: 8 5 g, Rfl: 0  •  Amitiza 8 MCG capsule, Take 1 capsule (8 mcg total) by mouth 2 (two) times a day with meals, Disp: 60 capsule, Rfl: 5  •  azithromycin (Zithromax) 250 mg tablet, Take 2 tablets (500 mg total) by mouth daily for 1 day, THEN 1 tablet (250 mg total) daily for 4 days  , Disp: 6 tablet, Rfl: 0  •  fluconazole (DIFLUCAN) 150 mg tablet, Take 1 tablet (150 mg total) by mouth once for 1 dose, Disp: 1 tablet, Rfl: 0  •  Multiple Vitamin (multivitamin) tablet, Take 1 tablet by mouth daily, Disp: , Rfl:   •  nitrofurantoin (MACRODANTIN) 50 mg capsule, Take 1 tablet by mouth pre & post intercourse, Disp: 60 capsule, Rfl: 0  •  predniSONE 10 mg tablet, Take 1 tablet (10 mg total) by mouth 2 (two) times a day with meals for 5 days, Disp: 10 tablet, Rfl: 0  •  guaiFENesin (MUCINEX) 600 mg 12 hr tablet, Take 2 tablets (1,200 mg total) by mouth every 12 (twelve) hours for 10 days (Patient not taking: Reported on 4/7/2023), Disp: 40 tablet, Rfl: 0  •  oxybutynin (DITROPAN) 5 mg tablet, Take 1 tablet (5 mg total) by mouth 2 (two) times a day as needed (bladdder spasm) (Patient not taking: Reported on 4/7/2023), Disp: 30 tablet, Rfl: 1  •  phenazopyridine (PYRIDIUM) 100 mg tablet, Take 1 tablet (100 mg total) by mouth 3 (three) times a day as needed for bladder spasms (Patient not taking: Reported on 10/5/2022), Disp: 6 tablet, Rfl: 3    Current Allergies     Allergies as of 04/07/2023   • (No Known Allergies)            The following portions of the patient's history were reviewed and updated as appropriate: allergies, current medications, past family history, past medical history, past social history, past surgical history and problem list      Past Medical History:   Diagnosis Date   • 39 weeks gestation of pregnancy 12/9/2019   • Anxiety    • Contraceptive use     LAST ASSESSED: [id number]   • Depression     d/c Buspar with preg - now started on Zoloft 25 mg 11/1/19   • External hemorrhoid, bleeding     LAST ASSESSED: [id number]   • Gallbladder attack    • Hematochezia     LAST ASSESSED: [id number]   • IBS (irritable bowel syndrome)    • Immunity status testing     LAST ASSESSED: [id number]   • Methicillin resistant Staphylococcus aureus infection    • Migraine     prev on effexor for migraine & anxiety - d/c approx 1 yr ago   • Poison ivy dermatitis     LAST ASSESSED: [id number]   • Psychiatric disorder    • Shingles    • Skin abscess     RIGHT LEG   • Urinary frequency     LAST ASSESSED: [id number]   • UTI (urinary tract infection)     uses Macrobid after intercourse   • Vacuum extractor delivery, delivered 6/11/2020   • Varicella     childhood       Past Surgical History:   Procedure Laterality Date   • TONSILLECTOMY      ONSET: 1997   • WISDOM TOOTH EXTRACTION         Family History   Problem Relation Age of Onset   • Thyroid disease Mother         hypothyroid   • Thyroid disease Father    • Cancer Family         MALIGNANT NEOPLASM   • Diabetes Maternal Grandmother         type 2   • Dementia Maternal Grandmother    • Cancer Maternal Grandfather         prostate   • Emphysema Paternal Grandmother Medications have been verified  Objective   /72   Pulse 78   Temp 98 4 °F (36 9 °C)   Resp 18   SpO2 98%        Physical Exam     Physical Exam  Vitals and nursing note reviewed  Constitutional:       General: She is not in acute distress  Appearance: Normal appearance  She is normal weight  She is not ill-appearing, toxic-appearing or diaphoretic  HENT:      Head: Normocephalic and atraumatic  Right Ear: Tympanic membrane normal       Left Ear: Tympanic membrane normal       Nose: Congestion present  No rhinorrhea  Eyes:      General:         Right eye: No discharge  Left eye: No discharge  Cardiovascular:      Rate and Rhythm: Normal rate  Pulses: Normal pulses  Pulmonary:      Effort: Pulmonary effort is normal  No respiratory distress  Breath sounds: No stridor  Examination of the right-middle field reveals decreased breath sounds  Examination of the left-middle field reveals decreased breath sounds  Examination of the right-lower field reveals decreased breath sounds  Examination of the left-lower field reveals decreased breath sounds  Decreased breath sounds present  No wheezing, rhonchi or rales  Chest:      Chest wall: No tenderness  Skin:     General: Skin is warm and dry  Neurological:      Mental Status: She is alert     Psychiatric:         Mood and Affect: Mood normal          Behavior: Behavior normal

## 2023-07-18 ENCOUNTER — TELEMEDICINE (OUTPATIENT)
Dept: INTERNAL MEDICINE CLINIC | Facility: CLINIC | Age: 33
End: 2023-07-18
Payer: COMMERCIAL

## 2023-07-18 DIAGNOSIS — R09.81 NASAL CONGESTION: Primary | ICD-10-CM

## 2023-07-18 PROCEDURE — 99213 OFFICE O/P EST LOW 20 MIN: CPT | Performed by: INTERNAL MEDICINE

## 2023-07-18 RX ORDER — AMOXICILLIN AND CLAVULANATE POTASSIUM 875; 125 MG/1; MG/1
1 TABLET, FILM COATED ORAL EVERY 12 HOURS SCHEDULED
Qty: 14 TABLET | Refills: 0 | Status: SHIPPED | OUTPATIENT
Start: 2023-07-18 | End: 2023-07-25

## 2023-07-18 NOTE — PROGRESS NOTES
Virtual Regular Visit    Verification of patient location:    Patient is located at Home in the following state in which I hold an active license [place]      Assessment/Plan:    Problem List Items Addressed This Visit        Other    Nasal congestion - Primary     Discussed using over-the-counter nasal steroid like Flonase, patient is already using this. We will send prescription for Augmentin to cover for sinusitis, follow-up if not improving. No white spots seen in the back of the throat. Discussed the possibility of COVID, but patient has had symptoms for 2 weeks and has had a negative COVID test.         Relevant Medications    amoxicillin-clavulanate (AUGMENTIN) 875-125 mg per tablet            Reason for visit is   Chief Complaint   Patient presents with   • Virtual Regular Visit        Encounter provider Hannah Michael MD    Provider located at [facility] [id number]      Recent Visits  No visits were found meeting these conditions. Showing recent visits within past 7 days and meeting all other requirements  Today's Visits  Date Type Provider Dept   07/18/23 Telemedicine Hannah Michael, [address] today's visits and meeting all other requirements  Future Appointments  No visits were found meeting these conditions. Showing future appointments within next 150 days and meeting all other requirements       The patient was identified by name and date of birth. Adelaida Stockton was informed that this is a telemedicine visit and that the visit is being conducted through the [employer]. She agrees to proceed. .  My office door was closed. No one else was in the room. She acknowledged consent and understanding of privacy and security of the video platform. The patient has agreed to participate and understands they can discontinue the visit at any time. Patient is aware this is a billable service. Yenny Forbes is a 34 y.o. female  . Pt has cold symptoms for about 2 weeks, post nasal drip and runny nose, headache, head congestion, scratchy throat. She missed 3 days of work last week. No fevers no chills. No facial pain or pressure. No foul smelling mucus.          Past Medical History:   Diagnosis Date   • 39 weeks gestation of pregnancy 12/9/2019   • Anxiety    • Contraceptive use     LAST ASSESSED: [id number]   • Depression     d/c Buspar with preg - now started on Zoloft 25 mg 11/1/19   • External hemorrhoid, bleeding     LAST ASSESSED: [id number]   • Gallbladder attack    • Hematochezia     LAST ASSESSED: [id number]   • IBS (irritable bowel syndrome)    • Immunity status testing     LAST ASSESSED: [id number]   • Methicillin resistant Staphylococcus aureus infection    • Migraine     prev on effexor for migraine & anxiety - d/c approx 1 yr ago   • Poison ivy dermatitis     LAST ASSESSED: [id number]   • Psychiatric disorder    • Shingles    • Skin abscess     RIGHT LEG   • Urinary frequency     LAST ASSESSED: [id number]   • UTI (urinary tract infection)     uses Macrobid after intercourse   • Vacuum extractor delivery, delivered 6/11/2020   • Varicella     childhood       Past Surgical History:   Procedure Laterality Date   • TONSILLECTOMY      ONSET: 1997   • WISDOM TOOTH EXTRACTION         Current Outpatient Medications   Medication Sig Dispense Refill   • Amitiza 8 MCG capsule Take 1 capsule (8 mcg total) by mouth 2 (two) times a day with meals 60 capsule 5   • amoxicillin-clavulanate (AUGMENTIN) 875-125 mg per tablet Take 1 tablet by mouth every 12 (twelve) hours for 7 days 14 tablet 0   • Multiple Vitamin (multivitamin) tablet Take 1 tablet by mouth daily     • albuterol (ProAir HFA) 90 mcg/act inhaler Inhale 2 puffs every 6 (six) hours as needed for wheezing or shortness of breath 8.5 g 0   • nitrofurantoin (MACRODANTIN) 50 mg capsule Take 1 tablet by mouth pre & post intercourse 60 capsule 0   • oxybutynin (DITROPAN) 5 mg tablet Take 1 tablet (5 mg total) by mouth 2 (two) times a day as needed (bladdder spasm) (Patient not taking: Reported on 4/7/2023) 30 tablet 1   • phenazopyridine (PYRIDIUM) 100 mg tablet Take 1 tablet (100 mg total) by mouth 3 (three) times a day as needed for bladder spasms (Patient not taking: Reported on 10/5/2022) 6 tablet 3     No current facility-administered medications for this visit. No Known Allergies    Review of Systems   Constitutional: Positive for fatigue. Negative for chills and fever. HENT: Positive for congestion, postnasal drip, rhinorrhea and sore throat. Negative for sinus pressure and sinus pain. Respiratory: Negative for cough and shortness of breath. Musculoskeletal: Positive for arthralgias and myalgias. Neurological: Positive for headaches. Video Exam    There were no vitals filed for this visit. Physical Exam  Constitutional:       General: She is not in acute distress. Appearance: Normal appearance. HENT:      Mouth/Throat:      Mouth: Mucous membranes are moist.      Pharynx: No oropharyngeal exudate. Pulmonary:      Effort: Pulmonary effort is normal. No respiratory distress. Neurological:      Mental Status: She is alert and oriented to person, place, and time.           Visit Time  Total Visit Duration: 20

## 2023-07-18 NOTE — PATIENT INSTRUCTIONS
Problem List Items Addressed This Visit          Other    Nasal congestion - Primary     Discussed using over-the-counter nasal steroid like Flonase, patient is already using this. We will send prescription for Augmentin to cover for sinusitis, follow-up if not improving. No white spots seen in the back of the throat.   Discussed the possibility of COVID, but patient has had symptoms for 2 weeks and has had a negative COVID test.         Relevant Medications    amoxicillin-clavulanate (AUGMENTIN) 875-125 mg per tablet

## 2023-07-18 NOTE — ASSESSMENT & PLAN NOTE
Discussed using over-the-counter nasal steroid like Flonase, patient is already using this. We will send prescription for Augmentin to cover for sinusitis, follow-up if not improving. No white spots seen in the back of the throat.   Discussed the possibility of COVID, but patient has had symptoms for 2 weeks and has had a negative COVID test.

## 2023-08-08 ENCOUNTER — ANNUAL EXAM (OUTPATIENT)
Dept: OBGYN CLINIC | Facility: CLINIC | Age: 33
End: 2023-08-08
Payer: COMMERCIAL

## 2023-08-08 VITALS
DIASTOLIC BLOOD PRESSURE: 64 MMHG | BODY MASS INDEX: 22.81 KG/M2 | HEIGHT: 60 IN | SYSTOLIC BLOOD PRESSURE: 102 MMHG | WEIGHT: 116.2 LBS

## 2023-08-08 DIAGNOSIS — Z01.419 WOMEN'S ANNUAL ROUTINE GYNECOLOGICAL EXAMINATION: Primary | ICD-10-CM

## 2023-08-08 PROCEDURE — S0612 ANNUAL GYNECOLOGICAL EXAMINA: HCPCS | Performed by: OBSTETRICS & GYNECOLOGY

## 2023-08-08 NOTE — PROGRESS NOTES
Assessment/Plan:    Patient was informed of a stable GYN examination. Pap smear was not performed. There is a lesion on the left buttocks that will make arrangements for excisional biopsy. She will continue to follow the recommendation of the urologist that she deals with question of bladder spasms. She will keep me informed of her progress. She is content with her weight. She feels safe at home. Her father is being evaluated for a kidney tumor he may need a nephrectomy. Is happy with her weight. She is contemplating another pregnancy within the next 2 years. Subjective:      Patient ID: Louie Garcia is a 34 y.o. female. HPI    This is a 81-year-old white female, she is a  1 para 1 with 1 vaginal delivery approximately 3 years ago. Currently she is in condoms for contraception. She is being evaluated for urology for possibility of interstitial cystitis work-up has been negative. Her menstrual cycles are somewhat delayed but mostly regular. She is content with her weight. She feels safe at home. She sees a dentist on a regular basis. There is still some dyspareunia which is better than last time she was here. Her father has been diagnosed with a kidney tumor. She will need a Pap smear today. The following portions of the patient's history were reviewed and updated as appropriate: allergies, current medications, past family history, past medical history, past social history, past surgical history and problem list.    Review of Systems   All other systems reviewed and are negative. Objective:      /64   Ht 5' (1.524 m)   Wt 52.7 kg (116 lb 3.2 oz)   LMP 2023 (Exact Date)   BMI 22.69 kg/m²          Physical Exam  Vitals reviewed. Exam conducted with a chaperone present. Constitutional:       Appearance: Normal appearance. She is normal weight. HENT:      Head: Normocephalic and atraumatic.       Mouth/Throat:      Mouth: Mucous membranes are moist.   Eyes:      Conjunctiva/sclera: Conjunctivae normal.      Pupils: Pupils are equal, round, and reactive to light. Cardiovascular:      Rate and Rhythm: Normal rate and regular rhythm. Pulses: Normal pulses. Heart sounds: Normal heart sounds. Pulmonary:      Effort: Pulmonary effort is normal.      Breath sounds: Normal breath sounds. Chest:   Breasts:     Breasts are symmetrical.      Right: Normal. No swelling or bleeding. Left: Normal. No swelling or bleeding. Abdominal:      General: Abdomen is flat. Bowel sounds are normal.      Palpations: Abdomen is soft. There is no hepatomegaly or splenomegaly. Tenderness: There is no abdominal tenderness. Hernia: No hernia is present. There is no hernia in the left inguinal area or right inguinal area. Genitourinary:     General: Normal vulva. Pubic Area: No rash or pubic lice. Labia:         Right: No rash, tenderness, lesion or injury. Left: No rash, tenderness, lesion or injury. Urethra: No prolapse, urethral pain, urethral swelling or urethral lesion. Vagina: Normal. No signs of injury and foreign body. No vaginal discharge, erythema, tenderness, bleeding, lesions or prolapsed vaginal walls. Cervix: Normal.      Uterus: Normal. Not deviated, not enlarged, not fixed, not tender and no uterine prolapse. Adnexa: Right adnexa normal and left adnexa normal.      Rectum: Normal.          Comments: Skin tag on the left buttocks will make appointment for removal, pain over the external genitalia normal limits the vagina is clean and cervix parous uterus is midposition normal size. There is no cervical motion tenderness but I was unable to to reproduce any pelvic pain associated with intimacy. Urethra normal bladder is nontender. A Pap smear not performed. Musculoskeletal:         General: Normal range of motion. Cervical back: Normal range of motion and neck supple. Lymphadenopathy:      Upper Body:      Right upper body: No supraclavicular adenopathy. Left upper body: No supraclavicular adenopathy. Lower Body: No right inguinal adenopathy. No left inguinal adenopathy. Skin:     General: Skin is warm and dry. Neurological:      General: No focal deficit present. Mental Status: She is alert and oriented to person, place, and time.    Psychiatric:         Mood and Affect: Mood normal.         Behavior: Behavior normal.

## 2023-08-08 NOTE — PATIENT INSTRUCTIONS
The patient was informed of a stable GYN examination. There is an external skin tag on the left buttocks which we went to remove in the office sometime within the next month. We will do an excisional biopsy. She is content with her weight. She will follow recommendations I will urologist follows her bladder issues. To keep informed of her problems with intimacy. She may consider pregnancy next year. A Pap smear was not performed.

## 2023-09-07 ENCOUNTER — OFFICE VISIT (OUTPATIENT)
Dept: OBGYN CLINIC | Facility: CLINIC | Age: 33
End: 2023-09-07
Payer: COMMERCIAL

## 2023-09-07 VITALS
BODY MASS INDEX: 21.52 KG/M2 | HEIGHT: 61 IN | WEIGHT: 114 LBS | SYSTOLIC BLOOD PRESSURE: 126 MMHG | DIASTOLIC BLOOD PRESSURE: 60 MMHG

## 2023-09-07 DIAGNOSIS — N90.89 SKIN TAG OF FEMALE PERINEUM: Primary | ICD-10-CM

## 2023-09-07 PROCEDURE — 11420 EXC H-F-NK-SP B9+MARG 0.5/<: CPT | Performed by: OBSTETRICS & GYNECOLOGY

## 2023-09-07 PROCEDURE — 88304 TISSUE EXAM BY PATHOLOGIST: CPT | Performed by: PATHOLOGY

## 2023-09-07 NOTE — PATIENT INSTRUCTIONS
The patient tolerated procedure well after removal from the left perineum close to the buttocks region. Bleeding was well controlled. 1 suture was placed. Return the office in 5 days for removal of the suture. She will watch out for fever chills excessive burning or pain.

## 2023-09-07 NOTE — PROGRESS NOTES
Skin tag removal    Date/Time: 9/7/2023 11:00 AM    Performed by: Meghan Meraz MD  Authorized by: Meghan Meraz MD  Universal Protocol:  Consent: Verbal consent obtained. Risks and benefits: risks, benefits and alternatives were discussed  Consent given by: patient  Timeout called at: 9/7/2023 11:35 AM.  Patient understanding: patient states understanding of the procedure being performed  Patient consent: the patient's understanding of the procedure matches consent given  Procedure consent: procedure consent matches procedure scheduled  Relevant documents: relevant documents present and verified  Test results: test results available and properly labeled  Site marked: the operative site was not marked  Radiology Images displayed and confirmed. If images not available, report reviewed: imaging studies not available  Patient identity confirmed: verbally with patient        Lesion 6:      A total of 1 biopsy was sent to pathology. Bleeding was well controlled 1 suture was applied using 4-0 Prolene. Patient return my office next week for suture removal.  She may use over-the-counter lidocaine to help with the pain if it does not improve.

## 2023-09-12 PROCEDURE — 88304 TISSUE EXAM BY PATHOLOGIST: CPT | Performed by: PATHOLOGY

## 2023-09-13 ENCOUNTER — OFFICE VISIT (OUTPATIENT)
Dept: OBGYN CLINIC | Facility: CLINIC | Age: 33
End: 2023-09-13

## 2023-09-13 VITALS
WEIGHT: 118 LBS | BODY MASS INDEX: 22.28 KG/M2 | DIASTOLIC BLOOD PRESSURE: 62 MMHG | SYSTOLIC BLOOD PRESSURE: 124 MMHG | HEIGHT: 61 IN

## 2023-09-13 DIAGNOSIS — A63.0 CONDYLOMA: Primary | ICD-10-CM

## 2023-09-13 DIAGNOSIS — Z48.02 VISIT FOR SUTURE REMOVAL: ICD-10-CM

## 2023-09-13 PROCEDURE — 99024 POSTOP FOLLOW-UP VISIT: CPT | Performed by: OBSTETRICS & GYNECOLOGY

## 2023-09-13 NOTE — PATIENT INSTRUCTIONS
Patient returns today for removal of suture from the biopsy site. The biopsy site looks well. He is dry and clean. Sutures removed without difficulty. Patient is aware the pathology is consistent with condyloma. She will continue to monitor her own health keep me informed of her progress. Return to my office on a as needed basis.

## 2023-09-13 NOTE — PROGRESS NOTES
This is a 51-year-old white female well-known to me. She returns today for evaluation excisional biopsy on the perineum. She has a history of condyloma in the past.  This biopsy is consistent with condyloma. The biopsy site look well clean and healthy. The 1 suture was removed. She will continue to check her self for any new condyloma. Otherwise she is doing well. Return to my office on a as needed basis.

## 2023-10-11 ENCOUNTER — TELEMEDICINE (OUTPATIENT)
Dept: INTERNAL MEDICINE CLINIC | Facility: CLINIC | Age: 33
End: 2023-10-11
Payer: COMMERCIAL

## 2023-10-11 DIAGNOSIS — K52.9 GASTROENTERITIS: Primary | ICD-10-CM

## 2023-10-11 PROCEDURE — 99214 OFFICE O/P EST MOD 30 MIN: CPT | Performed by: INTERNAL MEDICINE

## 2023-10-11 RX ORDER — ONDANSETRON 4 MG/1
4 TABLET, FILM COATED ORAL EVERY 8 HOURS PRN
Qty: 30 TABLET | Refills: 1 | Status: SHIPPED | OUTPATIENT
Start: 2023-10-11

## 2023-10-11 NOTE — PROGRESS NOTES
Virtual Regular Visit    Verification of patient location:    Patient is located at Home in the following state in which I hold an active license [place]      Assessment/Plan:    Problem List Items Addressed This Visit        Digestive    Gastroenteritis - Primary     Discussed with patient the possibility of gastroenteritis, which should resolve on its own without doing anything. Symptomatic care can be done for this including the brat diet with bananas rice applesauce toast, staying hydrated, no signs of dehydration on exam.  Discussed with patient other possibilities, and if she were to develop right upper quadrant pain and tenderness, then ultrasound should be done, if severe pain, then get to the emergency room. Pt not having RUQ abd pain. Discussed with patient the possibility of C. difficile, and to check stool studies if diarrhea continues. Zofran given to use for nausea and vomiting. Relevant Medications    ondansetron (ZOFRAN) 4 mg tablet    Other Relevant Orders    Stool Enteric Bacterial Panel by PCR    Clostridium difficile toxin by PCR            Reason for visit is   Chief Complaint   Patient presents with   • Virtual Regular Visit          Encounter provider Rosas Castellanos MD    Provider located at [address] [id number]      Recent Visits  No visits were found meeting these conditions. Showing recent visits within past 7 days and meeting all other requirements  Today's Visits  Date Type Provider Dept   10/11/23 Telemedicine Rosas Castellanos MD [facility] today's visits and meeting all other requirements  Future Appointments  No visits were found meeting these conditions. Showing future appointments within next 150 days and meeting all other requirements       The patient was identified by name and date of birth.  Wiley Burns was informed that this is a telemedicine visit and that the visit is being conducted through the [employer]. She agrees to proceed. .  My office door was closed. No one else was in the room. She acknowledged consent and understanding of privacy and security of the video platform. The patient has agreed to participate and understands they can discontinue the visit at any time. Patient is aware this is a billable service. Nazia Navarro is a 34 y.o. female  . Pt has had diarrhea for about 3 days, feels run down, upset stomach, then developed some cold symptoms with scratchy throat, some post nasal drip. Has fatigue and nausea. No vomiting, but did have dry heaves. No recent travel. She was around people at a wedding who were sick with cold symptoms. Pt did COVID test which was negative 2 days ago. No recent antibiotics.          Past Medical History:   Diagnosis Date   • 39 weeks gestation of pregnancy 12/9/2019   • Anxiety    • Contraceptive use     LAST ASSESSED: [id number]   • Depression     d/c Buspar with preg - now started on Zoloft 25 mg 11/1/19   • External hemorrhoid, bleeding     LAST ASSESSED: [id number]   • Gallbladder attack    • Hematochezia     LAST ASSESSED: [id number]   • IBS (irritable bowel syndrome)    • Immunity status testing     LAST ASSESSED: [id number]   • Methicillin resistant Staphylococcus aureus infection    • Migraine     prev on effexor for migraine & anxiety - d/c approx 1 yr ago   • Poison ivy dermatitis     LAST ASSESSED: [id number]   • Psychiatric disorder    • Shingles    • Skin abscess     RIGHT LEG   • Urinary frequency     LAST ASSESSED: [id number]   • UTI (urinary tract infection)     uses Macrobid after intercourse   • Vacuum extractor delivery, delivered 6/11/2020   • Varicella     childhood       Past Surgical History:   Procedure Laterality Date   • TONSILLECTOMY      ONSET: 1997   • WISDOM TOOTH EXTRACTION         Current Outpatient Medications   Medication Sig Dispense Refill   • Multiple Vitamin (multivitamin) tablet Take 1 tablet by mouth daily     • ondansetron (ZOFRAN) 4 mg tablet Take 1 tablet (4 mg total) by mouth every 8 (eight) hours as needed for nausea or vomiting 30 tablet 1   • albuterol (ProAir HFA) 90 mcg/act inhaler Inhale 2 puffs every 6 (six) hours as needed for wheezing or shortness of breath 8.5 g 0   • Amitiza 8 MCG capsule Take 1 capsule (8 mcg total) by mouth 2 (two) times a day with meals 60 capsule 5   • nitrofurantoin (MACRODANTIN) 50 mg capsule Take 1 tablet by mouth pre & post intercourse 60 capsule 0   • oxybutynin (DITROPAN) 5 mg tablet Take 1 tablet (5 mg total) by mouth 2 (two) times a day as needed (bladdder spasm) (Patient not taking: Reported on 9/7/2023) 30 tablet 1   • phenazopyridine (PYRIDIUM) 100 mg tablet Take 1 tablet (100 mg total) by mouth 3 (three) times a day as needed for bladder spasms (Patient not taking: Reported on 10/5/2022) 6 tablet 3     No current facility-administered medications for this visit. No Known Allergies    Review of Systems   Constitutional:  Positive for chills. Negative for fever. Gastrointestinal:  Positive for diarrhea, nausea and vomiting. Video Exam    There were no vitals filed for this visit. Physical Exam  Constitutional:       General: She is not in acute distress. Appearance: Normal appearance. HENT:      Mouth/Throat:      Mouth: Mucous membranes are moist.   Pulmonary:      Effort: Pulmonary effort is normal. No respiratory distress. Abdominal:      Tenderness: There is no abdominal tenderness. Skin:     Coloration: Skin is not jaundiced or pale. Neurological:      Mental Status: She is alert.           Visit Time  Total Visit Duration: 20

## 2023-10-11 NOTE — PATIENT INSTRUCTIONS
Problem List Items Addressed This Visit          Digestive    Gastroenteritis - Primary     Discussed with patient the possibility of gastroenteritis, which should resolve on its own without doing anything. Symptomatic care can be done for this including the brat diet with bananas rice applesauce toast, staying hydrated, no signs of dehydration on exam.  Discussed with patient other possibilities, and if she were to develop right upper quadrant pain and tenderness, then ultrasound should be done, if severe pain, then get to the emergency room. Pt not having RUQ abd pain. Discussed with patient the possibility of C. difficile, and to check stool studies if diarrhea continues. Zofran given to use for nausea and vomiting.          Relevant Medications    ondansetron (ZOFRAN) 4 mg tablet    Other Relevant Orders    Stool Enteric Bacterial Panel by PCR    Clostridium difficile toxin by PCR

## 2023-10-11 NOTE — ASSESSMENT & PLAN NOTE
Discussed with patient the possibility of gastroenteritis, which should resolve on its own without doing anything. Symptomatic care can be done for this including the brat diet with bananas rice applesauce toast, staying hydrated, no signs of dehydration on exam.  Discussed with patient other possibilities, and if she were to develop right upper quadrant pain and tenderness, then ultrasound should be done, if severe pain, then get to the emergency room. Pt not having RUQ abd pain. Discussed with patient the possibility of C. difficile, and to check stool studies if diarrhea continues. Zofran given to use for nausea and vomiting.

## 2023-11-01 ENCOUNTER — OFFICE VISIT (OUTPATIENT)
Dept: INTERNAL MEDICINE CLINIC | Facility: CLINIC | Age: 33
End: 2023-11-01
Payer: COMMERCIAL

## 2023-11-01 VITALS
HEART RATE: 100 BPM | WEIGHT: 115.4 LBS | SYSTOLIC BLOOD PRESSURE: 120 MMHG | HEIGHT: 60 IN | OXYGEN SATURATION: 99 % | DIASTOLIC BLOOD PRESSURE: 74 MMHG | BODY MASS INDEX: 22.65 KG/M2

## 2023-11-01 DIAGNOSIS — R53.82 CHRONIC FATIGUE: ICD-10-CM

## 2023-11-01 DIAGNOSIS — H66.90 ACUTE OTITIS MEDIA, UNSPECIFIED OTITIS MEDIA TYPE: Primary | ICD-10-CM

## 2023-11-01 DIAGNOSIS — N76.0 ACUTE VAGINITIS: ICD-10-CM

## 2023-11-01 PROBLEM — K52.9 GASTROENTERITIS: Status: RESOLVED | Noted: 2023-10-11 | Resolved: 2023-11-01

## 2023-11-01 PROBLEM — J06.9 UPPER RESPIRATORY TRACT INFECTION: Status: RESOLVED | Noted: 2022-04-11 | Resolved: 2023-11-01

## 2023-11-01 PROBLEM — J40 BRONCHITIS: Status: RESOLVED | Noted: 2023-04-03 | Resolved: 2023-11-01

## 2023-11-01 PROBLEM — Z20.822 EXPOSURE TO COVID-19 VIRUS: Status: RESOLVED | Noted: 2020-12-08 | Resolved: 2023-11-01

## 2023-11-01 PROBLEM — O35.9XX0 FETAL ABNORMALITY AFFECTING MANAGEMENT OF MOTHER, ANTEPARTUM: Status: RESOLVED | Noted: 2020-01-29 | Resolved: 2023-11-01

## 2023-11-01 PROBLEM — R09.81 NASAL CONGESTION: Status: RESOLVED | Noted: 2023-07-18 | Resolved: 2023-11-01

## 2023-11-01 PROBLEM — Z13.79 GENETIC SCREENING: Status: RESOLVED | Noted: 2019-12-09 | Resolved: 2023-11-01

## 2023-11-01 PROCEDURE — 99213 OFFICE O/P EST LOW 20 MIN: CPT | Performed by: INTERNAL MEDICINE

## 2023-11-01 RX ORDER — AMOXICILLIN AND CLAVULANATE POTASSIUM 875; 125 MG/1; MG/1
1 TABLET, FILM COATED ORAL EVERY 12 HOURS SCHEDULED
Qty: 14 TABLET | Refills: 0 | Status: SHIPPED | OUTPATIENT
Start: 2023-11-01 | End: 2023-11-08

## 2023-11-01 RX ORDER — NEOMYCIN SULFATE, POLYMYXIN B SULFATE AND HYDROCORTISONE 10; 3.5; 1 MG/ML; MG/ML; [USP'U]/ML
3 SUSPENSION/ DROPS AURICULAR (OTIC) 3 TIMES DAILY
COMMUNITY
Start: 2023-10-23 | End: 2023-11-01

## 2023-11-01 RX ORDER — FLUCONAZOLE 150 MG/1
150 TABLET ORAL ONCE
Qty: 1 TABLET | Refills: 1 | Status: SHIPPED | OUTPATIENT
Start: 2023-11-01 | End: 2023-11-01

## 2023-11-01 RX ORDER — CIPROFLOXACIN AND DEXAMETHASONE 3; 1 MG/ML; MG/ML
4 SUSPENSION/ DROPS AURICULAR (OTIC) 2 TIMES DAILY
Qty: 7.5 ML | Refills: 0 | Status: SHIPPED | OUTPATIENT
Start: 2023-11-01

## 2023-11-01 NOTE — PROGRESS NOTES
Name: Natalya Davidson      : 1990      MRN: [id number]  Encounter Provider: Marco Chan MD  Encounter Date: 2023   Encounter [facility]: [facility]    Assessment & Plan     1. Acute otitis media, unspecified otitis media type  -     amoxicillin-clavulanate (AUGMENTIN) 875-125 mg per tablet; Take 1 tablet by mouth every 12 (twelve) hours for 7 days  -     ciprofloxacin-dexamethasone (CIPRODEX) otic suspension; Administer 4 drops into the left ear 2 (two) times a day    2. Acute vaginitis  -     fluconazole (DIFLUCAN) 150 mg tablet; Take 1 tablet (150 mg total) by mouth once for 1 dose May repeat in a week    3. Chronic fatigue  -     TSH, 3rd generation with Free T4 reflex; Future      Depression Screening and Follow-up Plan: Patient was screened for depression during today's encounter. They screened negative with a PHQ-2 score of 0. Subjective      Started with left ear pain and itching a week ago. Mild initial improvement on cortisporin drop from urgent care. She denies hearing loss, ringing. She just started with cold symptoms. Review of Systems   Constitutional:  Positive for fatigue (chronic and would like thyroid checked). Negative for fever. HENT:  Positive for congestion and ear pain. Negative for sore throat. Respiratory:  Negative for cough. Hematological:  Positive for adenopathy.        Current Outpatient Medications on File Prior to Visit   Medication Sig   • Amitiza 8 MCG capsule Take 1 capsule (8 mcg total) by mouth 2 (two) times a day with meals   • Multiple Vitamin (multivitamin) tablet Take 1 tablet by mouth daily   • nitrofurantoin (MACRODANTIN) 50 mg capsule Take 1 tablet by mouth pre & post intercourse   • [DISCONTINUED] neomycin-polymyxin-hydrocortisone (CORTISPORIN) 0.35%-10,000 units/mL-1% otic suspension Administer 3 drops into ears Three times a day   • albuterol (ProAir HFA) 90 mcg/act inhaler Inhale 2 puffs every 6 (six) hours as needed for wheezing or shortness of breath   • ondansetron (ZOFRAN) 4 mg tablet Take 1 tablet (4 mg total) by mouth every 8 (eight) hours as needed for nausea or vomiting   • oxybutynin (DITROPAN) 5 mg tablet Take 1 tablet (5 mg total) by mouth 2 (two) times a day as needed (bladdder spasm) (Patient not taking: Reported on 9/7/2023)   • phenazopyridine (PYRIDIUM) 100 mg tablet Take 1 tablet (100 mg total) by mouth 3 (three) times a day as needed for bladder spasms (Patient not taking: Reported on 10/5/2022)       Objective     /74 (BP Location: Left arm, Patient Position: Sitting, Cuff Size: Standard)   Pulse 100   Ht 5' (1.524 m)   Wt 52.3 kg (115 lb 6.4 oz)   SpO2 99%   BMI 22.54 kg/m²     Physical Exam  Constitutional:       Appearance: She is well-developed. She is not ill-appearing. HENT:      Right Ear: Tympanic membrane, ear canal and external ear normal. There is no impacted cerumen. Left Ear: There is no impacted cerumen. Tympanic membrane is erythematous. Tympanic membrane is not perforated. Ears:      Comments: Erythematous ear canal on the left  Eyes:      Conjunctiva/sclera: Conjunctivae normal.   Cardiovascular:      Rate and Rhythm: Normal rate and regular rhythm. Heart sounds: Normal heart sounds. No murmur heard. Pulmonary:      Effort: Pulmonary effort is normal. No respiratory distress. Breath sounds: Normal breath sounds. No wheezing or rales. Abdominal:      Palpations: Abdomen is soft. Tenderness: There is no abdominal tenderness. Musculoskeletal:      Cervical back: Neck supple. Neurological:      Mental Status: She is alert and oriented to person, place, and time. Psychiatric:         Mood and Affect: Mood normal.         Behavior: Behavior normal.         Thought Content:  Thought content normal.         Judgment: Judgment normal.       Vanessa Caceres MD

## 2023-11-01 NOTE — LETTER
November 1, 2023     Patient: Mian Estevez  YOB: 1990  Date of Visit: 11/1/2023      To Whom it May Concern:    [place] is under my professional care. Lacey Enamoradonorybrian was seen in my office on 11/1/2023. She may return to work. If you have any questions or concerns, please don't hesitate to call.          Sincerely,          Madelyn Blackwell MD        CC: No Recipients

## 2023-11-02 ENCOUNTER — OFFICE VISIT (OUTPATIENT)
Dept: INTERNAL MEDICINE CLINIC | Facility: CLINIC | Age: 33
End: 2023-11-02
Payer: COMMERCIAL

## 2023-11-02 VITALS
WEIGHT: 115 LBS | HEIGHT: 60 IN | BODY MASS INDEX: 22.58 KG/M2 | SYSTOLIC BLOOD PRESSURE: 114 MMHG | DIASTOLIC BLOOD PRESSURE: 70 MMHG

## 2023-11-02 DIAGNOSIS — N39.0 RECURRENT UTI: ICD-10-CM

## 2023-11-02 DIAGNOSIS — Z00.00 ANNUAL PHYSICAL EXAM: Primary | ICD-10-CM

## 2023-11-02 DIAGNOSIS — N32.89 BLADDER SPASM: ICD-10-CM

## 2023-11-02 PROCEDURE — 99395 PREV VISIT EST AGE 18-39: CPT | Performed by: INTERNAL MEDICINE

## 2023-11-02 RX ORDER — PHENAZOPYRIDINE HYDROCHLORIDE 100 MG/1
100 TABLET, FILM COATED ORAL 3 TIMES DAILY PRN
Qty: 6 TABLET | Refills: 3
Start: 2023-11-02

## 2023-11-02 RX ORDER — NITROFURANTOIN MACROCRYSTALS 50 MG/1
CAPSULE ORAL
Qty: 60 CAPSULE | Refills: 0 | Status: SHIPPED | OUTPATIENT
Start: 2023-11-02

## 2023-11-02 RX ORDER — OXYBUTYNIN CHLORIDE 5 MG/1
5 TABLET ORAL 2 TIMES DAILY PRN
Qty: 30 TABLET | Refills: 1
Start: 2023-11-02

## 2023-11-02 NOTE — PROGRESS NOTES
[address]    NAME: Atul Wong  AGE: 34 y.o. SEX: female  : 1990     DATE: 2023     Assessment and Plan:     Problem List Items Addressed This Visit    None  Visit Diagnoses     Annual physical exam    -  Primary    Bladder spasm        Relevant Medications    oxybutynin (DITROPAN) 5 mg tablet    phenazopyridine (PYRIDIUM) 100 mg tablet    Recurrent UTI        Relevant Medications    nitrofurantoin (MACRODANTIN) 50 mg capsule            Immunizations and preventive care screenings were discussed with patient today. Appropriate education was printed on patient's after visit summary. Counseling:  Exercise: the importance of regular exercise/physical activity was discussed. Recommend exercise 3-5 times per week for at least 30 minutes. Return in about 1 year (around 2024) for Annual physical.     Chief Complaint:     Chief Complaint   Patient presents with   • Annual Exam      History of Present Illness:     Adult Annual Physical   Patient here for a comprehensive physical exam. The patient reports no problems. Diet and Physical Activity  Diet/Nutrition: well balanced diet. Exercise: walking. Depression Screening  PHQ-2/9 Depression Screening         General Health  Sleep: sleeps well. Hearing: normal - bilateral.  Vision: vision problems: distance vision is poorer . Dental: regular dental visits. /GYN Health  Last menstrual period: regular     Review of Systems:     Review of Systems   Constitutional:  Negative for fever and unexpected weight change. HENT:  Positive for ear pain (left and just started with Augmentin and ciprodex ear drop; better today). Respiratory:  Negative for cough and shortness of breath. Cardiovascular:  Negative for chest pain, palpitations and leg swelling.    Gastrointestinal:  Negative for abdominal pain, constipation, diarrhea, nausea and vomiting. Neurological:  Negative for dizziness and headaches.       Past Medical History:     Past Medical History:   Diagnosis Date   • 39 weeks gestation of pregnancy 12/09/2019   • Anxiety    • Bronchitis 04/03/2023   • Contraceptive use     LAST ASSESSED: [id number]   • Depression     d/c Buspar with preg - now started on Zoloft 25 mg 11/1/19   • Exposure to COVID-19 virus 12/08/2020   • External hemorrhoid, bleeding     LAST ASSESSED: [id number]   • Fetal abnormality affecting management of mother, antepartum 01/29/2020    Bilateral talipes equivarous  -Has established peds ortho @[facility] for after delivery   • Gallbladder attack    • Gastroenteritis 10/11/2023   • Genetic screening 12/09/2019   • Hematochezia     LAST ASSESSED: [id number]   • IBS (irritable bowel syndrome)    • Immunity status testing     LAST ASSESSED: [id number]   • Inflammatory bowel disease    • Methicillin resistant Staphylococcus aureus infection    • Migraine     prev on effexor for migraine & anxiety - d/c approx 1 yr ago   • Nasal congestion 07/18/2023   • Poison ivy dermatitis     LAST ASSESSED: [id number]   • Psychiatric disorder    • Shingles    • Skin abscess     RIGHT LEG   • Upper respiratory tract infection 04/11/2022   • Urinary frequency     LAST ASSESSED: [id number]   • Urinary tract infection    • UTI (urinary tract infection)     uses Macrobid after intercourse   • Vacuum extractor delivery, delivered 06/11/2020   • Varicella     childhood      Past Surgical History:     Past Surgical History:   Procedure Laterality Date   • TONSILLECTOMY      ONSET: 1997   • WISDOM TOOTH EXTRACTION        Social History:     Social History     Socioeconomic History   • Marital status: Single     Spouse name: None   • Number of children: None   • Years of education: None   • Highest education level: None   Occupational History   • None   Tobacco Use   • Smoking status: Former     Packs/day: 0.25     Years: 8.00     Total pack years: 2.00 Types: Cigarettes     Start date: 2011     Quit date: 10/31/2019     Years since quittin.0   • Smokeless tobacco: Never   Vaping Use   • Vaping Use: Never used   Substance and Sexual Activity   • Alcohol use: Not Currently     Comment: occasional   • Drug use: Never   • Sexual activity: Yes     Partners: Male     Birth control/protection: Condom Male   Other Topics Concern   • None   Social History Narrative    EXERCISING REGULARLY     Social Determinants of Health     Financial Resource Strain: Not on file   Food Insecurity: Not on file   Transportation Needs: Not on file   Physical Activity: Not on file   Stress: Not on file   Social Connections: Not on file   Intimate Partner Violence: Not on file   Housing Stability: Not on file      Family History:     Family History   Problem Relation Age of Onset   • Thyroid disease Mother         hypothyroid   • Arthritis Mother    • Thyroid disease Father    • Kidney cancer Father    • Drug abuse Brother    • Diabetes Maternal Grandmother         type 2   • Dementia Maternal Grandmother    • Cancer Maternal Grandfather         prostate   • Emphysema Paternal Grandmother    • Cancer Family         MALIGNANT NEOPLASM      Current Medications:     Current Outpatient Medications   Medication Sig Dispense Refill   • amoxicillin-clavulanate (AUGMENTIN) 875-125 mg per tablet Take 1 tablet by mouth every 12 (twelve) hours for 7 days 14 tablet 0   • ciprofloxacin-dexamethasone (CIPRODEX) otic suspension Administer 4 drops into the left ear 2 (two) times a day 7.5 mL 0   • Multiple Vitamin (multivitamin) tablet Take 1 tablet by mouth daily     • nitrofurantoin (MACRODANTIN) 50 mg capsule Take 1 tablet by mouth pre & post intercourse 60 capsule 0   • oxybutynin (DITROPAN) 5 mg tablet Take 1 tablet (5 mg total) by mouth 2 (two) times a day as needed (bladdder spasm) 30 tablet 1   • phenazopyridine (PYRIDIUM) 100 mg tablet Take 1 tablet (100 mg total) by mouth 3 (three) times a day as needed for bladder spasms 6 tablet 3   • albuterol (ProAir HFA) 90 mcg/act inhaler Inhale 2 puffs every 6 (six) hours as needed for wheezing or shortness of breath 8.5 g 0   • Amitiza 8 MCG capsule Take 1 capsule (8 mcg total) by mouth 2 (two) times a day with meals 60 capsule 5     No current facility-administered medications for this visit. Allergies:     No Known Allergies   Physical Exam:     /70 (BP Location: Left arm, Patient Position: Sitting, Cuff Size: Standard)   Ht 5' (1.524 m)   Wt 52.2 kg (115 lb)   BMI 22.46 kg/m²     Physical Exam  Constitutional:       General: She is not in acute distress. Appearance: She is well-developed. She is not ill-appearing, toxic-appearing or diaphoretic. HENT:      Right Ear: Hearing, tympanic membrane, ear canal and external ear normal. There is no impacted cerumen. Left Ear: Hearing and external ear normal. There is no impacted cerumen. Tympanic membrane is erythematous. Ears:      Comments: Ear canal in the left is not red, TM less dull  Eyes:      Conjunctiva/sclera: Conjunctivae normal.   Cardiovascular:      Rate and Rhythm: Normal rate and regular rhythm. Heart sounds: Normal heart sounds. No murmur heard. Pulmonary:      Effort: Pulmonary effort is normal. No respiratory distress. Breath sounds: Normal breath sounds. No stridor. No wheezing or rales. Abdominal:      General: There is no distension. Palpations: Abdomen is soft. There is no mass. Tenderness: There is no abdominal tenderness. There is no guarding or rebound. Musculoskeletal:      Cervical back: Neck supple. Right lower leg: No edema. Left lower leg: No edema. Neurological:      Mental Status: She is alert and oriented to person, place, and time. Psychiatric:         Mood and Affect: Mood normal.         Behavior: Behavior normal.         Thought Content:  Thought content normal.         Judgment: Judgment normal.          Emily Lisa Chang, y [address], [address]

## 2023-11-02 NOTE — PATIENT INSTRUCTIONS
Wellness Visit for Adults   AMBULATORY CARE:   A wellness visit  is when you see your healthcare provider to get screened for health problems. Your healthcare provider will also give you advice on how to stay healthy. Write down your questions so you remember to ask them. Ask your healthcare provider how often you should have a wellness visit. What happens at a wellness visit:  Your healthcare provider will ask about your health, and your family history of health problems. This includes high blood pressure, heart disease, and cancer. He or she will ask if you have symptoms that concern you, if you smoke, and about your mood. You may also be asked about your intake of medicines, supplements, food, and alcohol. Any of the following may be done: Your weight  will be checked. Your height may also be checked so your body mass index (BMI) can be calculated. Your BMI shows if you are at a healthy weight. Your blood pressure  and heart rate will be checked. Your temperature may also be checked. Blood and urine tests  may be done. Blood tests may be done to check your cholesterol levels. Abnormal cholesterol levels increase your risk for heart disease and stroke. You may also need a blood or urine test to check for diabetes if you are at increased risk. Urine tests may be done to look for signs of an infection or kidney disease. A physical exam  includes checking your heartbeat and lungs with a stethoscope. Your healthcare provider may also check your skin to look for sun damage. Screening tests  may be recommended. A screening test is done to check for diseases that may not cause symptoms. The screening tests you may need depend on your age, gender, family history, and lifestyle habits. For example, colorectal screening may be recommended if you are 48years old or older. Screening tests you need if you are a woman:   A Pap smear  is used to screen for cervical cancer.  Pap smears are usually done every 3 to 5 years depending on your age. You may need them more often if you have had abnormal Pap smear test results in the past. Ask your healthcare provider how often you should have a Pap smear. A mammogram  is an x-ray of your breasts to screen for breast cancer. Experts recommend mammograms every 2 years starting at age 48 years. You may need a mammogram at age 52 years or younger if you have an increased risk for breast cancer. Talk to your healthcare provider about when you should start having mammograms and how often you need them. Vaccines you may need:   Get an influenza vaccine  every year. The influenza vaccine protects you from the flu. Several types of viruses cause the flu. The viruses change over time, so new vaccines are made each year. Get a tetanus-diphtheria (Td) booster vaccine  every 10 years. This vaccine protects you against tetanus and diphtheria. Tetanus is a severe infection that may cause painful muscle spasms and lockjaw. Diphtheria is a severe bacterial infection that causes a thick covering in the back of your mouth and throat. Get a human papillomavirus (HPV) vaccine  if you are female and aged 23 to 32 or male 23 to 24 and never received it. This vaccine protects you from HPV infection. HPV is the most common infection spread by sexual contact. HPV may also cause vaginal, penile, and anal cancers. Get a pneumococcal vaccine  if you are aged 72 years or older. The pneumococcal vaccine is an injection given to protect you from pneumococcal disease. Pneumococcal disease is an infection caused by pneumococcal bacteria. The infection may cause pneumonia, meningitis, or an ear infection. Get a shingles vaccine  if you are 60 or older, even if you have had shingles before. The shingles vaccine is an injection to protect you from the varicella-zoster virus. This is the same virus that causes chickenpox.  Shingles is a painful rash that develops in people who had chickenpox or have been exposed to the virus. How to eat healthy:  My Plate is a model for planning healthy meals. It shows the types and amounts of foods that should go on your plate. Fruits and vegetables make up about half of your plate, and grains and protein make up the other half. A serving of dairy is included on the side of your plate. The amount of calories and serving sizes you need depends on your age, gender, weight, and height. Examples of healthy foods are listed below:  Eat a variety of vegetables  such as dark green, red, and orange vegetables. You can also include canned vegetables low in sodium (salt) and frozen vegetables without added butter or sauces. Eat a variety of fresh fruits , canned fruit in 100% juice, frozen fruit, and dried fruit. Include whole grains. At least half of the grains you eat should be whole grains. Examples include whole-wheat bread, wheat pasta, brown rice, and whole-grain cereals such as oatmeal.    Eat a variety of protein foods such as seafood (fish and shellfish), lean meat, and poultry without skin (turkey and chicken). Examples of lean meats include pork leg, shoulder, or tenderloin, and beef round, sirloin, tenderloin, and extra lean ground beef. Other protein foods include eggs and egg substitutes, beans, peas, soy products, nuts, and seeds. Choose low-fat dairy products such as skim or 1% milk or low-fat yogurt, cheese, and cottage cheese. Limit unhealthy fats  such as butter, hard margarine, and shortening. Exercise:  Exercise at least 30 minutes per day on most days of the week. Some examples of exercise include walking, biking, dancing, and swimming. You can also fit in more physical activity by taking the stairs instead of the elevator or parking farther away from stores. Include muscle strengthening activities 2 days each week. Regular exercise provides many health benefits.  It helps you manage your weight, and decreases your risk for type 2 diabetes, heart disease, stroke, and high blood pressure. Exercise can also help improve your mood. Ask your healthcare provider about the best exercise plan for you. General health and safety guidelines:   Do not smoke. Nicotine and other chemicals in cigarettes and cigars can cause lung damage. Ask your healthcare provider for information if you currently smoke and need help to quit. E-cigarettes or smokeless tobacco still contain nicotine. Talk to your healthcare provider before you use these products. Limit alcohol. A drink of alcohol is 12 ounces of beer, 5 ounces of wine, or 1½ ounces of liquor. Lose weight, if needed. Being overweight increases your risk of certain health conditions. These include heart disease, high blood pressure, type 2 diabetes, and certain types of cancer. Protect your skin. Do not sunbathe or use tanning beds. Use sunscreen with a SPF 15 or higher. Apply sunscreen at least 15 minutes before you go outside. Reapply sunscreen every 2 hours. Wear protective clothing, hats, and sunglasses when you are outside. Drive safely. Always wear your seatbelt. Make sure everyone in your car wears a seatbelt. A seatbelt can save your life if you are in an accident. Do not use your cell phone when you are driving. This could distract you and cause an accident. Pull over if you need to make a call or send a text message. Practice safe sex. Use latex condoms if are sexually active and have more than one partner. Your healthcare provider may recommend screening tests for sexually transmitted infections (STIs). Wear helmets, lifejackets, and protective gear. Always wear a helmet when you ride a bike or motorcycle, go skiing, or play sports that could cause a head injury. Wear protective equipment when you play sports. Wear a lifejacket when you are on a boat or doing water sports.     © Copyright [employer] 2023 Information is for End User's use only and may not be sold, redistributed or otherwise used for commercial purposes. The above information is an  only. It is not intended as medical advice for individual conditions or treatments. Talk to your doctor, nurse or pharmacist before following any medical regimen to see if it is safe and effective for you.

## 2023-11-09 ENCOUNTER — RA CDI HCC (OUTPATIENT)
Dept: OTHER | Facility: HOSPITAL | Age: 33
End: 2023-11-09

## 2023-11-10 ENCOUNTER — OFFICE VISIT (OUTPATIENT)
Dept: INTERNAL MEDICINE CLINIC | Facility: CLINIC | Age: 33
End: 2023-11-10
Payer: COMMERCIAL

## 2023-11-10 VITALS
HEART RATE: 68 BPM | OXYGEN SATURATION: 98 % | SYSTOLIC BLOOD PRESSURE: 112 MMHG | DIASTOLIC BLOOD PRESSURE: 72 MMHG | BODY MASS INDEX: 22.58 KG/M2 | WEIGHT: 115 LBS | HEIGHT: 60 IN

## 2023-11-10 DIAGNOSIS — H92.02 LEFT EAR PAIN: Primary | ICD-10-CM

## 2023-11-10 PROCEDURE — 99213 OFFICE O/P EST LOW 20 MIN: CPT | Performed by: INTERNAL MEDICINE

## 2023-11-10 RX ORDER — PREDNISONE 20 MG/1
20 TABLET ORAL 2 TIMES DAILY WITH MEALS
Qty: 10 TABLET | Refills: 0 | Status: SHIPPED | OUTPATIENT
Start: 2023-11-10 | End: 2023-11-15

## 2023-11-10 NOTE — PROGRESS NOTES
Name: Abigail Grubbs      : 1990      MRN: [id number]  Encounter Provider: Gary Robbins MD  Encounter Date: 11/10/2023   Encounter [facility]: [facility]     1. Left ear pain  -     predniSONE 20 mg tablet; Take 1 tablet (20 mg total) by mouth 2 (two) times a day with meals for 5 days  -     Ambulatory Referral to Otolaryngology; Future    Minimal erythema left TM  No improvement on Augmentin and Ciprodex ear drops  Suspect new viral illness  Will try short course of prednisone  See ENT if no improvement       Subjective      She was seen a week ago for left ear pain, started Augmentin and ciprofloxacin dexamethasone drops  No improvement of the ear pain and 2 days ago had vertigo, no hearing loss  She again has cold symptoms and suspects she caught it from a niece        Review of Systems   Constitutional:  Negative for fever. HENT:  Positive for ear pain. Negative for congestion and sore throat. Respiratory:  Negative for cough.         Current Outpatient Medications on File Prior to Visit   Medication Sig   • albuterol (ProAir HFA) 90 mcg/act inhaler Inhale 2 puffs every 6 (six) hours as needed for wheezing or shortness of breath   • Amitiza 8 MCG capsule Take 1 capsule (8 mcg total) by mouth 2 (two) times a day with meals   • Multiple Vitamin (multivitamin) tablet Take 1 tablet by mouth daily   • nitrofurantoin (MACRODANTIN) 50 mg capsule Take 1 tablet by mouth pre & post intercourse   • oxybutynin (DITROPAN) 5 mg tablet Take 1 tablet (5 mg total) by mouth 2 (two) times a day as needed (bladdder spasm)   • phenazopyridine (PYRIDIUM) 100 mg tablet Take 1 tablet (100 mg total) by mouth 3 (three) times a day as needed for bladder spasms   • ciprofloxacin-dexamethasone (CIPRODEX) otic suspension Administer 4 drops into the left ear 2 (two) times a day (Patient not taking: Reported on 11/10/2023)       Objective     /72 (BP Location: Left arm, Patient Position: Sitting, Cuff Size: Standard)   Pulse 68   Ht 5' (1.524 m)   Wt 52.2 kg (115 lb)   SpO2 98%   BMI 22.46 kg/m²     Physical Exam  Constitutional:       Appearance: She is well-developed. She is not ill-appearing. HENT:      Right Ear: Hearing and tympanic membrane normal.      Left Ear: Hearing normal. Tympanic membrane is erythematous. Mouth/Throat:      Pharynx: Posterior oropharyngeal erythema present. No oropharyngeal exudate. Eyes:      Conjunctiva/sclera: Conjunctivae normal.   Cardiovascular:      Rate and Rhythm: Normal rate and regular rhythm. Heart sounds: Normal heart sounds. No murmur heard. Pulmonary:      Effort: Pulmonary effort is normal. No respiratory distress. Breath sounds: Normal breath sounds. No wheezing or rales. Musculoskeletal:      Cervical back: Neck supple. Right lower leg: No edema. Left lower leg: No edema. Neurological:      Mental Status: She is alert and oriented to person, place, and time. Psychiatric:         Mood and Affect: Mood normal.         Behavior: Behavior normal.         Thought Content:  Thought content normal.         Judgment: Judgment normal.       Ermias Rivera MD

## 2023-11-15 ENCOUNTER — TELEPHONE (OUTPATIENT)
Dept: OBGYN CLINIC | Facility: CLINIC | Age: 33
End: 2023-11-15

## 2023-11-15 DIAGNOSIS — N39.0 URINARY TRACT INFECTION WITHOUT HEMATURIA, SITE UNSPECIFIED: Primary | ICD-10-CM

## 2023-11-15 DIAGNOSIS — R39.15 URINARY URGENCY: Primary | ICD-10-CM

## 2023-11-15 DIAGNOSIS — N76.0 ACUTE VAGINITIS: ICD-10-CM

## 2023-11-15 DIAGNOSIS — R30.0 DYSURIA: ICD-10-CM

## 2023-11-15 RX ORDER — FLUCONAZOLE 150 MG/1
TABLET ORAL
Qty: 2 TABLET | Refills: 0 | Status: CANCELLED | OUTPATIENT
Start: 2023-11-15 | End: 2023-11-18

## 2023-11-15 RX ORDER — NITROFURANTOIN 25; 75 MG/1; MG/1
100 CAPSULE ORAL 2 TIMES DAILY
Qty: 14 CAPSULE | Refills: 0 | Status: CANCELLED | OUTPATIENT
Start: 2023-11-15 | End: 2023-11-22

## 2023-11-15 RX ORDER — NITROFURANTOIN 25; 75 MG/1; MG/1
CAPSULE ORAL
Qty: 14 CAPSULE | Refills: 1 | Status: SHIPPED | OUTPATIENT
Start: 2023-11-15

## 2023-11-15 NOTE — TELEPHONE ENCOUNTER
Spoke with the patient today she has UTI symptoms we will start her on Macrobid 1 tablet p.o. twice daily for 7 days we will keep informed of her progress.

## 2023-11-15 NOTE — TELEPHONE ENCOUNTER
Patient having dysuria, voiding lesser amts,urgency since 11/13/2023 post intercourse, also vaginal discharge, thick "opaque white", no itching, no external irritation but feels internal burning unrelated to urination. Recent antibiotic & prednisone treatment for ear infection. Recommend UA, C&S - lab order placed in patient's chart for same ([facility]). Do you want to treat with [address]? If so, please sign off on prescriptions for same to [facility]([address]

## 2023-12-15 ENCOUNTER — TELEPHONE (OUTPATIENT)
Dept: OBGYN CLINIC | Facility: CLINIC | Age: 33
End: 2023-12-15

## 2023-12-15 DIAGNOSIS — B37.31 VAGINAL YEAST INFECTION: Primary | ICD-10-CM

## 2023-12-15 RX ORDER — FLUCONAZOLE 200 MG/1
TABLET ORAL
Qty: 2 TABLET | Refills: 2 | Status: SHIPPED | OUTPATIENT
Start: 2023-12-15 | End: 2023-12-16

## 2023-12-15 NOTE — PROGRESS NOTES
I spoke to the patient this morning, none complaint intense vaginal itching and discomfort. She tried Diflucan a week ago got some relief. Now she is miserable. I strongly urged the patient to go to urgent care center. She declined secondary to insurance issues. We will now treat with Diflucan 200 mg x 2 consecutive days. If she does not feel much better by tomorrow I strongly urged her to go to urgent care as soon as possible.

## 2023-12-15 NOTE — TELEPHONE ENCOUNTER
Patient had taken Macrobid for UTI symptoms 11/15/2023 (did not have U/A, C&S done) then used Monistat 3 treatment for vaginal itching & clear-white vaginal discharge which helped then symptoms recurred. Took Diflucan x 1 dose on 12/9/2023. Still having same discharge, not clumpy & vaginal itching. Also having abdominal bloating & tenderness. She does not have health insurance as of 11/30/2023. Informed will follow up with you if needs appointment & when.

## 2023-12-16 ENCOUNTER — HOSPITAL ENCOUNTER (EMERGENCY)
Facility: HOSPITAL | Age: 33
Discharge: HOME | End: 2023-12-16
Attending: EMERGENCY MEDICINE
Payer: COMMERCIAL

## 2023-12-16 ENCOUNTER — APPOINTMENT (EMERGENCY)
Dept: CT IMAGING | Facility: HOSPITAL | Age: 33
End: 2023-12-16
Payer: COMMERCIAL

## 2023-12-16 VITALS
HEART RATE: 105 BPM | RESPIRATION RATE: 18 BRPM | DIASTOLIC BLOOD PRESSURE: 85 MMHG | SYSTOLIC BLOOD PRESSURE: 117 MMHG | OXYGEN SATURATION: 98 % | TEMPERATURE: 98 F

## 2023-12-16 DIAGNOSIS — N76.0 VAGINITIS: Primary | ICD-10-CM

## 2023-12-16 DIAGNOSIS — R30.0 DYSURIA: ICD-10-CM

## 2023-12-16 LAB
ALBUMIN SERPL BCP-MCNC: 4.9 G/DL (ref 3.5–5)
ALP SERPL-CCNC: 49 U/L (ref 34–104)
ALT SERPL W P-5'-P-CCNC: 8 U/L (ref 7–52)
ANION GAP SERPL CALCULATED.3IONS-SCNC: 8 MMOL/L
AST SERPL W P-5'-P-CCNC: 13 U/L (ref 13–39)
BASOPHILS # BLD AUTO: 0.03 THOUSANDS/ÂΜL (ref 0–0.1)
BASOPHILS NFR BLD AUTO: 0 % (ref 0–1)
BILIRUB SERPL-MCNC: 0.73 MG/DL (ref 0.2–1)
BILIRUB UR QL STRIP: NEGATIVE
BUN SERPL-MCNC: 6 MG/DL (ref 5–25)
CALCIUM SERPL-MCNC: 9.5 MG/DL (ref 8.4–10.2)
CHLORIDE SERPL-SCNC: 105 MMOL/L (ref 96–108)
CLARITY UR: CLEAR
CO2 SERPL-SCNC: 25 MMOL/L (ref 21–32)
COLOR UR: COLORLESS
CREAT SERPL-MCNC: 0.74 MG/DL (ref 0.6–1.3)
EOSINOPHIL # BLD AUTO: 0.06 THOUSAND/ÂΜL (ref 0–0.61)
EOSINOPHIL NFR BLD AUTO: 1 % (ref 0–6)
ERYTHROCYTE [DISTWIDTH] IN BLOOD BY AUTOMATED COUNT: 12.5 % (ref 11.6–15.1)
EXT PREGNANCY TEST URINE: NEGATIVE
EXT. CONTROL: NORMAL
GFR SERPL CREATININE-BSD FRML MDRD: 108 ML/MIN/1.73SQ M
GLUCOSE SERPL-MCNC: 90 MG/DL (ref 65–140)
GLUCOSE UR STRIP-MCNC: NEGATIVE MG/DL
HCT VFR BLD AUTO: 41.6 % (ref 34.8–46.1)
HGB BLD-MCNC: 13.7 G/DL (ref 11.5–15.4)
HGB UR QL STRIP.AUTO: NEGATIVE
IMM GRANULOCYTES # BLD AUTO: 0.02 THOUSAND/UL (ref 0–0.2)
IMM GRANULOCYTES NFR BLD AUTO: 0 % (ref 0–2)
KETONES UR STRIP-MCNC: ABNORMAL MG/DL
LEUKOCYTE ESTERASE UR QL STRIP: NEGATIVE
LYMPHOCYTES # BLD AUTO: 1.24 THOUSANDS/ÂΜL (ref 0.6–4.47)
LYMPHOCYTES NFR BLD AUTO: 18 % (ref 14–44)
MCH RBC QN AUTO: 30.7 PG (ref 26.8–34.3)
MCHC RBC AUTO-ENTMCNC: 32.9 G/DL (ref 31.4–37.4)
MCV RBC AUTO: 93 FL (ref 82–98)
MONOCYTES # BLD AUTO: 0.48 THOUSAND/ÂΜL (ref 0.17–1.22)
MONOCYTES NFR BLD AUTO: 7 % (ref 4–12)
NEUTROPHILS # BLD AUTO: 4.99 THOUSANDS/ÂΜL (ref 1.85–7.62)
NEUTS SEG NFR BLD AUTO: 74 % (ref 43–75)
NITRITE UR QL STRIP: NEGATIVE
NRBC BLD AUTO-RTO: 0 /100 WBCS
PH UR STRIP.AUTO: 6.5 [PH]
PLATELET # BLD AUTO: 265 THOUSANDS/UL (ref 149–390)
PMV BLD AUTO: 9.5 FL (ref 8.9–12.7)
POTASSIUM SERPL-SCNC: 3.6 MMOL/L (ref 3.5–5.3)
PROT SERPL-MCNC: 7.6 G/DL (ref 6.4–8.4)
PROT UR STRIP-MCNC: NEGATIVE MG/DL
RBC # BLD AUTO: 4.46 MILLION/UL (ref 3.81–5.12)
SODIUM SERPL-SCNC: 138 MMOL/L (ref 135–147)
SP GR UR STRIP.AUTO: 1 (ref 1–1.03)
UROBILINOGEN UR STRIP-ACNC: <2 MG/DL
WBC # BLD AUTO: 6.82 THOUSAND/UL (ref 4.31–10.16)

## 2023-12-16 PROCEDURE — 81003 URINALYSIS AUTO W/O SCOPE: CPT | Performed by: EMERGENCY MEDICINE

## 2023-12-16 PROCEDURE — 99284 EMERGENCY DEPT VISIT MOD MDM: CPT | Performed by: EMERGENCY MEDICINE

## 2023-12-16 PROCEDURE — G1004 CDSM NDSC: HCPCS

## 2023-12-16 PROCEDURE — 87661 TRICHOMONAS VAGINALIS AMPLIF: CPT | Performed by: EMERGENCY MEDICINE

## 2023-12-16 PROCEDURE — 36415 COLL VENOUS BLD VENIPUNCTURE: CPT | Performed by: EMERGENCY MEDICINE

## 2023-12-16 PROCEDURE — 87660 TRICHOMONAS VAGIN DIR PROBE: CPT | Performed by: EMERGENCY MEDICINE

## 2023-12-16 PROCEDURE — 74177 CT ABD & PELVIS W/CONTRAST: CPT

## 2023-12-16 PROCEDURE — 87510 GARDNER VAG DNA DIR PROBE: CPT | Performed by: EMERGENCY MEDICINE

## 2023-12-16 PROCEDURE — 87563 M. GENITALIUM AMP PROBE: CPT | Performed by: EMERGENCY MEDICINE

## 2023-12-16 PROCEDURE — 85025 COMPLETE CBC W/AUTO DIFF WBC: CPT | Performed by: EMERGENCY MEDICINE

## 2023-12-16 PROCEDURE — 80053 COMPREHEN METABOLIC PANEL: CPT | Performed by: EMERGENCY MEDICINE

## 2023-12-16 PROCEDURE — 81025 URINE PREGNANCY TEST: CPT | Performed by: EMERGENCY MEDICINE

## 2023-12-16 PROCEDURE — 99284 EMERGENCY DEPT VISIT MOD MDM: CPT

## 2023-12-16 PROCEDURE — 87491 CHLMYD TRACH DNA AMP PROBE: CPT | Performed by: EMERGENCY MEDICINE

## 2023-12-16 PROCEDURE — 87591 N.GONORRHOEAE DNA AMP PROB: CPT | Performed by: EMERGENCY MEDICINE

## 2023-12-16 PROCEDURE — 87480 CANDIDA DNA DIR PROBE: CPT | Performed by: EMERGENCY MEDICINE

## 2023-12-16 RX ORDER — KETOROLAC TROMETHAMINE 30 MG/ML
15 INJECTION, SOLUTION INTRAMUSCULAR; INTRAVENOUS ONCE
Status: DISCONTINUED | OUTPATIENT
Start: 2023-12-16 | End: 2023-12-16 | Stop reason: HOSPADM

## 2023-12-16 RX ADMIN — IOHEXOL 70 ML: 350 INJECTION, SOLUTION INTRAVENOUS at 14:11

## 2023-12-16 NOTE — ED PROVIDER NOTES
History  Chief Complaint   Patient presents with    Possible UTI     UTI and yeast infection symptoms and flank pain, recently on antibiotics for a UTI       33-year-old female coming to ED for evaluation of persistent vaginal irritation, vaginal discharge, vaginal swelling, dysuria, urinary frequency, abdominal pain, flank pain, ongoing since November, for several weeks.  She has had multiple course of antibiotics for presumed UTI, in addition to medication for presumed yeast infection, she is taken 2 doses of Diflucan this week, spaced 3 days apart, last dose was 2 days ago.  She notes minimal improvement in her symptoms.  She went to urgent care yesterday and had a urinalysis done which was negative for UTI, also had a vaginosis probe and that was also reported to be negative for yeast infection or BV or trichomonas.      History provided by:  Patient   used: No        Prior to Admission Medications   Prescriptions Last Dose Informant Patient Reported? Taking?   Amitiza 8 MCG capsule   No No   Sig: Take 1 capsule (8 mcg total) by mouth 2 (two) times a day with meals   Multiple Vitamin (multivitamin) tablet 12/16/2023 Self Yes Yes   Sig: Take 1 tablet by mouth daily   albuterol (ProAir HFA) 90 mcg/act inhaler   No No   Sig: Inhale 2 puffs every 6 (six) hours as needed for wheezing or shortness of breath   ciprofloxacin-dexamethasone (CIPRODEX) otic suspension   No No   Sig: Administer 4 drops into the left ear 2 (two) times a day   Patient not taking: Reported on 11/10/2023   fluconazole (DIFLUCAN) 200 mg tablet   No No   Sig: Please take 1 tablet orally for 2 consecutive days   nitrofurantoin (MACROBID) 100 mg capsule   No No   Sig: Please take 1 capsule twice daily for 7 days.   nitrofurantoin (MACRODANTIN) 50 mg capsule   No No   Sig: Take 1 tablet by mouth pre & post intercourse   oxybutynin (DITROPAN) 5 mg tablet   No No   Sig: Take 1 tablet (5 mg total) by mouth 2 (two) times a day as  needed (bladdder spasm)   phenazopyridine (PYRIDIUM) 100 mg tablet   No No   Sig: Take 1 tablet (100 mg total) by mouth 3 (three) times a day as needed for bladder spasms      Facility-Administered Medications: None       Past Medical History:   Diagnosis Date    39 weeks gestation of pregnancy 12/09/2019    Anxiety     Bronchitis 04/03/2023    Contraceptive use     LAST ASSESSED: [id number]    Depression     d/c Buspar with preg - now started on Zoloft 25 mg 11/1/19    Exposure to COVID-19 virus 12/08/2020    External hemorrhoid, bleeding     LAST ASSESSED: 05MAR2014    Fetal abnormality affecting management of mother, antepartum 01/29/2020    Bilateral talipes equivarous  -[facility]    Gallbladder attack     Gastroenteritis 10/11/2023    Genetic screening 12/09/2019    Hematochezia     LAST ASSESSED: 13MAR2014    IBS (irritable bowel syndrome)     Immunity status testing     LAST ASSESSED: [id number]    Inflammatory bowel disease     Methicillin resistant Staphylococcus aureus infection     Migraine     prev on effexor for migraine & anxiety - d/c approx 1 yr ago    Nasal congestion 07/18/2023    Poison ivy dermatitis     LAST ASSESSED: [id number]    Psychiatric disorder     Shingles     Skin abscess     RIGHT LEG    Upper respiratory tract infection 04/11/2022    Urinary frequency     LAST ASSESSED: [id number]    Urinary tract infection     UTI (urinary tract infection)     uses Macrobid after intercourse    Vacuum extractor delivery, delivered 06/11/2020    Varicella     childhood       Past Surgical History:   Procedure Laterality Date    TONSILLECTOMY      ONSET: 1997    WISDOM TOOTH EXTRACTION         Family History   Problem Relation Age of Onset    Thyroid disease Mother         hypothyroid    Arthritis Mother     Thyroid disease Father     Kidney cancer Father     Drug abuse Brother     Diabetes Maternal Grandmother         type 2    Dementia Maternal Grandmother      Cancer Maternal Grandfather         prostate    Emphysema Paternal Grandmother     Cancer Family         MALIGNANT NEOPLASM     I have reviewed and agree with the history as documented.    E-Cigarette/Vaping    E-Cigarette Use Never User      E-Cigarette/Vaping Substances    Nicotine No     THC No     CBD No     Flavoring No     Other No     Unknown No      Social History     Tobacco Use    Smoking status: Former     Current packs/day: 0.00     Average packs/day: 0.3 packs/day for 8.6 years (2.1 ttl pk-yrs)     Types: Cigarettes     Start date: 2011     Quit date: 10/31/2019     Years since quittin.1    Smokeless tobacco: Never   Vaping Use    Vaping status: Never Used   Substance Use Topics    Alcohol use: Not Currently     Comment: occasional    Drug use: Never       Review of Systems   Genitourinary:  Positive for difficulty urinating, dysuria, flank pain, frequency, pelvic pain, urgency, vaginal discharge and vaginal pain.   All other systems reviewed and are negative.      Physical Exam  Physical Exam  Vitals and nursing note reviewed. Exam conducted with a chaperone present (DAVON Pugh present for the pelvic exam.).   Constitutional:       General: She is not in acute distress.     Appearance: Normal appearance. She is well-developed and normal weight. She is not ill-appearing, toxic-appearing or diaphoretic.   HENT:      Head: Normocephalic and atraumatic.      Right Ear: External ear normal.      Left Ear: External ear normal.      Nose: Nose normal.      Mouth/Throat:      Mouth: Mucous membranes are moist.      Pharynx: Oropharynx is clear.   Eyes:      Conjunctiva/sclera: Conjunctivae normal.   Cardiovascular:      Rate and Rhythm: Normal rate and regular rhythm.      Pulses: Normal pulses.      Heart sounds: Normal heart sounds.   Pulmonary:      Effort: Pulmonary effort is normal.      Breath sounds: Normal breath sounds.   Abdominal:      General: Abdomen is flat. Bowel sounds are normal. There  is no distension or abdominal bruit. There are no signs of injury.      Palpations: Abdomen is soft. There is no shifting dullness or mass.      Tenderness: There is abdominal tenderness. There is right CVA tenderness. There is no left CVA tenderness, guarding or rebound.      Hernia: No hernia is present.   Genitourinary:     General: Normal vulva.      Exam position: Supine.      Pubic Area: No rash or pubic lice.       Vagina: No signs of injury and foreign body. Vaginal discharge present. No erythema, tenderness, bleeding, lesions or prolapsed vaginal walls.      Cervix: Friability present. No cervical motion tenderness, discharge, lesion, erythema, cervical bleeding or eversion.      Uterus: Normal.       Adnexa: Right adnexa normal and left adnexa normal.      Comments: + whitish, thick vaginal discharge.  Musculoskeletal:         General: Normal range of motion.      Cervical back: Normal range of motion and neck supple.   Skin:     General: Skin is warm and dry.      Capillary Refill: Capillary refill takes less than 2 seconds.   Neurological:      General: No focal deficit present.      Mental Status: She is alert and oriented to person, place, and time. Mental status is at baseline.   Psychiatric:         Mood and Affect: Mood normal.         Behavior: Behavior normal.         Vital Signs  ED Triage Vitals [12/16/23 1102]   Temperature Pulse Respirations Blood Pressure SpO2   98 °F (36.7 °C) 105 18 117/85 98 %      Temp src Heart Rate Source Patient Position - Orthostatic VS BP Location FiO2 (%)   -- -- -- Right arm --      Pain Score       --           Vitals:    12/16/23 1102   BP: 117/85   Pulse: 105         Visual Acuity      ED Medications  Medications   iohexol (OMNIPAQUE) 350 MG/ML injection (MULTI-DOSE) 70 mL (70 mL Intravenous Given 12/16/23 1411)       Diagnostic Studies  Results Reviewed       Procedure Component Value Units Date/Time    Vaginosis DNA Probe [[id number]] Collected: 12/16/23 0177     Lab Status: In process Specimen: Genital from Vaginal Updated: 12/16/23 1324    Narrative:      The following orders were created for panel order Vaginosis DNA Probe.  Procedure                               Abnormality         Status                     ---------                               -----------         ------                     VAGINOSIS DNA PROBE (AFF...[[id number]]                      In process                   Please view results for these tests on the individual orders.    VAGINOSIS DNA PROBE (AFFIRM) [[id number]] Collected: 12/16/23 1318    Lab Status: In process Specimen: Genital from Vaginal Updated: 12/16/23 1324    Trichomonas vaginalis/Mycoplasma genitalium PCR [[id number]] Collected: 12/16/23 1318    Lab Status: In process Specimen: Genital from Urine, Clean Catch Updated: 12/16/23 1322    CBC and differential [[id number]] Collected: 12/16/23 1218    Lab Status: Final result Specimen: Blood from Arm, Right Updated: 12/16/23 1245     WBC 6.82 Thousand/uL      RBC 4.46 Million/uL      Hemoglobin 13.7 g/dL      Hematocrit 41.6 %      MCV 93 fL      MCH 30.7 pg      MCHC 32.9 g/dL      RDW 12.5 %      MPV 9.5 fL      Platelets 265 Thousands/uL      nRBC 0 /100 WBCs      Neutrophils Relative 74 %      Immat GRANS % 0 %      Lymphocytes Relative 18 %      Monocytes Relative 7 %      Eosinophils Relative 1 %      Basophils Relative 0 %      Neutrophils Absolute 4.99 Thousands/µL      Immature Grans Absolute 0.02 Thousand/uL      Lymphocytes Absolute 1.24 Thousands/µL      Monocytes Absolute 0.48 Thousand/µL      Eosinophils Absolute 0.06 Thousand/µL      Basophils Absolute 0.03 Thousands/µL     Comprehensive metabolic panel [[id number]] Collected: 12/16/23 1218    Lab Status: Final result Specimen: Blood from Arm, Right Updated: 12/16/23 1240     Sodium 138 mmol/L      Potassium 3.6 mmol/L      Chloride 105 mmol/L      CO2 25 mmol/L      ANION GAP 8 mmol/L      BUN 6 mg/dL      Creatinine 0.74 mg/dL       Glucose 90 mg/dL      Calcium 9.5 mg/dL      AST 13 U/L      ALT 8 U/L      Alkaline Phosphatase 49 U/L      Total Protein 7.6 g/dL      Albumin 4.9 g/dL      Total Bilirubin 0.73 mg/dL      eGFR 108 ml/min/1.73sq m     Narrative:      National Kid[employer] Disease Foundation guidelines for Chronic Kidney Disease (CKD):     Stage 1 with normal or high GFR (GFR > 90 mL/min/1.73 square meters)    Stage 2 Mild CKD (GFR = 60-89 mL/min/1.73 square meters)    Stage 3A Moderate CKD (GFR = 45-59 mL/min/1.73 square meters)    Stage 3B Moderate CKD (GFR = 30-44 mL/min/1.73 square meters)    Stage 4 Severe CKD (GFR = 15-29 mL/min/1.73 square meters)    Stage 5 End Stage CKD (GFR <15 mL/min/1.73 square meters)  Note: GFR calculation is accurate only with a steady state creatinine    UA w Reflex to Microscopic w Reflex to Culture [[id number]]  (Abnormal) Collected: 12/16/23 1218    Lab Status: Final result Specimen: Urine, Clean Catch Updated: 12/16/23 1228     Color, UA Colorless     Clarity, UA Clear     Specific Gravity, UA 1.002     pH, UA 6.5     Leukocytes, UA Negative     Nitrite, UA Negative     Protein, UA Negative mg/dl      Glucose, UA Negative mg/dl      Ketones, UA Trace mg/dl      Urobilinogen, UA <2.0 mg/dl      Bilirubin, UA Negative     Occult Blood, UA Negative    Chlamydia/GC amplified DNA by PCR [[id number]] Collected: 12/16/23 1218    Lab Status: In process Specimen: Urine, Other Updated: 12/16/23 1223    POCT pregnancy, urine [[id number]]  (Normal) Resulted: 12/16/23 1219    Lab Status: Final result Updated: 12/16/23 1219     EXT Preg Test, Ur Negative     Control Valid                   CT abdomen pelvis with contrast   Final Result by Valencia Reyes MD (12/16 1458)      No CT findings to explain patient's symptoms.            Workstation performed: [id number]                    Procedures  Procedures         ED Course                               SBIRT 22yo+      Flowsheet Row Most Recent Value    Initial Alcohol Screen: US AUDIT-C     1. How often do you have a drink containing alcohol? 0 Filed at: 12/16/2023 1103   2. How many drinks containing alcohol do you have on a typical day you are drinking?  0 Filed at: 12/16/2023 1103   3a. Male UNDER 65: How often do you have five or more drinks on one occasion? 0 Filed at: 12/16/2023 1103   3b. FEMALE Any Age, or MALE 65+: How often do you have 4 or more drinks on one occassion? 0 Filed at: 12/16/2023 1103   Audit-C Score 0 Filed at: 12/16/2023 1103   ALBARO: How many times in the past year have you...    Used an illegal drug or used a prescription medication for non-medical reasons? Never Filed at: 12/16/2023 1103                      Medical Decision Making  33-year-old female to the ED for evaluation of persistent dysuria, vaginal discharge, vaginal irritation, intermittent swelling to her labia.  The discharge is described as white in color, stringy in nature.  She has been treated with 2 doses of Diflucan already this week without much improvement.  She was previously on antibiotics which is what she thinks started this process, she has had symptoms for several weeks now.  She went to urgent care yesterday, and had a urinalysis and vaginosis probe which resulted negative.  She has not had a pelvic exam since symptoms started.  Diffuse abdominal tenderness on exam, right CVA tenderness.  Urinalysis, labs and CT abdomen pelvis send to explore other alternatives for her discomfort.  Pelvic exam: Normal external vaginal exam, she did have a moderate amount of white, vaginal discharge on speculum exam, this was cultured.  CT resulted normal.  Case discussed with OB/GYN, who recommend either 1 more dose of Diflucan, or vaginal suppositories such as miconazole or clotrimazole.  The patient refuses to take topical vaginal medications, prefer to go home and follow-up with her OB/GYN this week in the office.  Stable for discharge home.    Amount and/or Complexity of  Data Reviewed  External Data Reviewed: labs and notes.  Labs: ordered. Decision-making details documented in ED Course.  Radiology: ordered. Decision-making details documented in ED Course.    Risk  Prescription drug management.             Disposition  Final diagnoses:   Vaginitis   Dysuria     Time reflects when diagnosis was documented in both MDM as applicable and the Disposition within this note       Time User Action Codes Description Comment    12/16/2023  4:19 PM Venkata Springer Add [N76.0] Vaginitis     12/16/2023  4:19 PM Venkata Springer Add [R30.0] Dysuria           ED Disposition       ED Disposition   Discharge    Condition   Stable    Date/Time   Sat Dec 16, 2023 1619    Comment   Noreen Perrybeth Marcus discharge to home/self care.                   Follow-up Information       Follow up With Specialties Details Why Contact Info    Lea Reyes, MD Internal Medicine   [address] [place] [id number]  [phone]      Tanmay Chavez MD Obstetrics and Gynecology, Obstetrics, Gynecology   [address] [id number] [phone]              Discharge Medication List as of 12/16/2023  4:20 PM        CONTINUE these medications which have NOT CHANGED    Details   Multiple Vitamin (multivitamin) tablet Take 1 tablet by mouth daily, Historical Med      albuterol (ProAir HFA) 90 mcg/act inhaler Inhale 2 puffs every 6 (six) hours as needed for wheezing or shortness of breath, Starting Fri 4/7/2023, Normal      Amitiza 8 MCG capsule Take 1 capsule (8 mcg total) by mouth 2 (two) times a day with meals, Starting Tue 7/12/2022, Normal      ciprofloxacin-dexamethasone (CIPRODEX) otic suspension Administer 4 drops into the left ear 2 (two) times a day, Starting Wed 11/1/2023, Normal      fluconazole (DIFLUCAN) 200 mg tablet Please take 1 tablet orally for 2 consecutive days, Normal      nitrofurantoin (MACROBID) 100 mg capsule Please take 1 capsule twice daily for 7 days., Normal       nitrofurantoin (MACRODANTIN) 50 mg capsule Take 1 tablet by mouth pre & post intercourse, Normal      oxybutynin (DITROPAN) 5 mg tablet Take 1 tablet (5 mg total) by mouth 2 (two) times a day as needed (bladdder spasm), Starting Thu 11/2/2023, No Print      phenazopyridine (PYRIDIUM) 100 mg tablet Take 1 tablet (100 mg total) by mouth 3 (three) times a day as needed for bladder spasms, Starting Thu 11/2/2023, No Print             No discharge procedures on file.    PDMP Review       None            ED Provider  Electronically Signed by             Venkata Springer DO  12/17/23 0804

## 2023-12-17 LAB
C TRACH DNA SPEC QL NAA+PROBE: NEGATIVE
CANDIDA RRNA VAG QL PROBE: NOT DETECTED
G VAGINALIS RRNA GENITAL QL PROBE: DETECTED
N GONORRHOEA DNA SPEC QL NAA+PROBE: NEGATIVE
T VAGINALIS RRNA GENITAL QL PROBE: NOT DETECTED

## 2023-12-17 RX ORDER — METRONIDAZOLE 500 MG/1
500 TABLET ORAL EVERY 12 HOURS SCHEDULED
Qty: 14 TABLET | Refills: 0 | Status: SHIPPED | OUTPATIENT
Start: 2023-12-17 | End: 2023-12-24

## 2023-12-18 LAB
M GENITALIUM DNA SPEC QL NAA+PROBE: NEGATIVE
T VAGINALIS DNA SPEC QL NAA+PROBE: NEGATIVE

## 2023-12-21 ENCOUNTER — HOSPITAL ENCOUNTER (EMERGENCY)
Facility: HOSPITAL | Age: 33
Discharge: HOME | End: 2023-12-21
Attending: EMERGENCY MEDICINE
Payer: COMMERCIAL

## 2023-12-21 VITALS
SYSTOLIC BLOOD PRESSURE: 138 MMHG | TEMPERATURE: 98.2 F | OXYGEN SATURATION: 100 % | HEART RATE: 83 BPM | DIASTOLIC BLOOD PRESSURE: 76 MMHG | RESPIRATION RATE: 16 BRPM

## 2023-12-21 DIAGNOSIS — B37.31 VAGINAL YEAST INFECTION: Primary | ICD-10-CM

## 2023-12-21 LAB
BACTERIA UR QL AUTO: ABNORMAL /HPF
BILIRUB UR QL STRIP: NEGATIVE
CLARITY UR: CLEAR
COLOR UR: ABNORMAL
GLUCOSE UR STRIP-MCNC: NEGATIVE MG/DL
HGB UR QL STRIP.AUTO: ABNORMAL
KETONES UR STRIP-MCNC: ABNORMAL MG/DL
LEUKOCYTE ESTERASE UR QL STRIP: ABNORMAL
MUCOUS THREADS UR QL AUTO: ABNORMAL
NITRITE UR QL STRIP: NEGATIVE
NON-SQ EPI CELLS URNS QL MICRO: ABNORMAL /HPF
PH UR STRIP.AUTO: 6 [PH]
PROT UR STRIP-MCNC: NEGATIVE MG/DL
RBC #/AREA URNS AUTO: ABNORMAL /HPF
SP GR UR STRIP.AUTO: 1.01 (ref 1–1.03)
UROBILINOGEN UR STRIP-ACNC: <2 MG/DL
WBC #/AREA URNS AUTO: ABNORMAL /HPF

## 2023-12-21 PROCEDURE — 81514 NFCT DS BV&VAGINITIS DNA ALG: CPT

## 2023-12-21 PROCEDURE — 99284 EMERGENCY DEPT VISIT MOD MDM: CPT | Performed by: EMERGENCY MEDICINE

## 2023-12-21 PROCEDURE — 81001 URINALYSIS AUTO W/SCOPE: CPT

## 2023-12-21 PROCEDURE — 87491 CHLMYD TRACH DNA AMP PROBE: CPT

## 2023-12-21 PROCEDURE — 87563 M. GENITALIUM AMP PROBE: CPT

## 2023-12-21 PROCEDURE — 87661 TRICHOMONAS VAGINALIS AMPLIF: CPT

## 2023-12-21 PROCEDURE — 87591 N.GONORRHOEAE DNA AMP PROB: CPT

## 2023-12-21 PROCEDURE — 99283 EMERGENCY DEPT VISIT LOW MDM: CPT

## 2023-12-21 RX ORDER — FLUCONAZOLE 200 MG/1
200 TABLET ORAL DAILY
Qty: 7 TABLET | Refills: 0 | Status: SHIPPED | OUTPATIENT
Start: 2023-12-21 | End: 2023-12-28

## 2023-12-22 LAB
C GLABRATA DNA VAG QL NAA+PROBE: NEGATIVE
C KRUSEI DNA VAG QL NAA+PROBE: NEGATIVE
C TRACH DNA SPEC QL NAA+PROBE: NEGATIVE
CANDIDA SP 6 PNL VAG NAA+PROBE: NEGATIVE
M GENITALIUM DNA SPEC QL NAA+PROBE: NEGATIVE
N GONORRHOEA DNA SPEC QL NAA+PROBE: NEGATIVE
T VAGINALIS DNA SPEC QL NAA+PROBE: NEGATIVE
T VAGINALIS DNA VAG QL NAA+PROBE: NEGATIVE
VAGINOSIS/ITIS DNA PNL VAG PROBE+SIG AMP: NEGATIVE

## 2023-12-22 NOTE — DISCHARGE INSTRUCTIONS
Return to the Emergency Department sooner if increased bleeding, pain, fever, vomiting, difficulty breathing or urinating, weakness, dizziness.     Please follow-up with your OB/GYN team for continued evaluation management of your symptoms.

## 2023-12-22 NOTE — ED PROVIDER NOTES
"History  Chief Complaint   Patient presents with    Possible UTI     Pt reports seen here last week for UTI symptoms; tests and CT. Pt found to have BV; taking metronidazole at home. Reports today symptoms have returned. Pt's PCP thinks she may have yeast infection from abx.      Noreen is a 33-year-old female who presents to the emergency department with persistent vaginal discharge (white) while currently on metronidazole therapy for previously confirmed case of bacterial vaginosis.  Patient has been seen in the emergency department for evaluation by previous provider and states that she is \"very sensitive\" to medications as well as experiencing frequent episodes of vaginal infections that been followed and evaluated by her OB/GYN in the past.  Due to the resumption of symptoms that began today (12/21) patient wished to come to the emergency department for further evaluation of symptomology.    She denies any fevers, chills, dysuria, changes in bowel movements, fevers, rashes.  She states that she is concerned she has a yeast infection and has not taken Diflucan in a couple weeks.      History provided by:  Patient   used: No        Prior to Admission Medications   Prescriptions Last Dose Informant Patient Reported? Taking?   Amitiza 8 MCG capsule   No No   Sig: Take 1 capsule (8 mcg total) by mouth 2 (two) times a day with meals   Multiple Vitamin (multivitamin) tablet  Self Yes No   Sig: Take 1 tablet by mouth daily   albuterol (ProAir HFA) 90 mcg/act inhaler   No No   Sig: Inhale 2 puffs every 6 (six) hours as needed for wheezing or shortness of breath   ciprofloxacin-dexamethasone (CIPRODEX) otic suspension   No No   Sig: Administer 4 drops into the left ear 2 (two) times a day   Patient not taking: Reported on 11/10/2023   metroNIDAZOLE (FLAGYL) 500 mg tablet   No No   Sig: Take 1 tablet (500 mg total) by mouth every 12 (twelve) hours for 7 days   nitrofurantoin (MACROBID) 100 mg capsule   " No No   Sig: Please take 1 capsule twice daily for 7 days.   nitrofurantoin (MACRODANTIN) 50 mg capsule   No No   Sig: Take 1 tablet by mouth pre & post intercourse   oxybutynin (DITROPAN) 5 mg tablet   No No   Sig: Take 1 tablet (5 mg total) by mouth 2 (two) times a day as needed (bladdder spasm)   phenazopyridine (PYRIDIUM) 100 mg tablet   No No   Sig: Take 1 tablet (100 mg total) by mouth 3 (three) times a day as needed for bladder spasms      Facility-Administered Medications: None       Past Medical History:   Diagnosis Date    39 weeks gestation of pregnancy 12/09/2019    Anxiety     Bronchitis 04/03/2023    Contraceptive use     LAST ASSESSED: [id number]    Depression     d/c Buspar with preg - now started on Zoloft 25 mg 11/1/19    Exposure to COVID-19 virus 12/08/2020    External hemorrhoid, bleeding     LAST ASSESSED: 05MAR2014    Fetal abnormality affecting management of mother, antepartum 01/29/2020    Bilateral talipes equivarous  -Has established peds ortho @[facility] for after delivery    Gallbladder attack     Gastroenteritis 10/11/2023    Genetic screening 12/09/2019    Hematochezia     LAST ASSESSED: 13MAR2014    IBS (irritable bowel syndrome)     Immunity status testing     LAST ASSESSED: 02JUN2014    Inflammatory bowel disease     Methicillin resistant Staphylococcus aureus infection     Migraine     prev on effexor for migraine & anxiety - d/c approx 1 yr ago    Nasal congestion 07/18/2023    Poison ivy dermatitis     LAST ASSESSED: [id number]    Psychiatric disorder     Shingles     Skin abscess     RIGHT LEG    Upper respiratory tract infection 04/11/2022    Urinary frequency     LAST ASSESSED: [id number]    Urinary tract infection     UTI (urinary tract infection)     uses Macrobid after intercourse    Vacuum extractor delivery, delivered 06/11/2020    Varicella     childhood       Past Surgical History:   Procedure Laterality Date    TONSILLECTOMY      ONSET: 1997    WISDOM TOOTH EXTRACTION          Family History   Problem Relation Age of Onset    Thyroid disease Mother         hypothyroid    Arthritis Mother     Thyroid disease Father     Kidney cancer Father     Drug abuse Brother     Diabetes Maternal Grandmother         type 2    Dementia Maternal Grandmother     Cancer Maternal Grandfather         prostate    Emphysema Paternal Grandmother     Cancer Family         MALIGNANT NEOPLASM     I have reviewed and agree with the history as documented.    E-Cigarette/Vaping    E-Cigarette Use Never User      E-Cigarette/Vaping Substances    Nicotine No     THC No     CBD No     Flavoring No     Other No     Unknown No      Social History     Tobacco Use    Smoking status: Former     Current packs/day: 0.00     Average packs/day: 0.3 packs/day for 8.6 years (2.1 ttl pk-yrs)     Types: Cigarettes     Start date: 2011     Quit date: 10/31/2019     Years since quittin.1    Smokeless tobacco: Never   Vaping Use    Vaping status: Never Used   Substance Use Topics    Alcohol use: Not Currently     Comment: occasional    Drug use: Never        Review of Systems   Constitutional:  Negative for chills and fever.   HENT:  Negative for ear pain and sore throat.    Eyes:  Negative for pain and visual disturbance.   Respiratory:  Negative for cough and shortness of breath.    Cardiovascular:  Negative for chest pain and palpitations.   Gastrointestinal:  Negative for abdominal pain and vomiting.   Genitourinary:  Negative for dysuria and hematuria.   Musculoskeletal:  Negative for arthralgias and back pain.   Skin:  Negative for color change and rash.   Neurological:  Negative for seizures and syncope.   All other systems reviewed and are negative.      Physical Exam  ED Triage Vitals [23 1937]   Temperature Pulse Respirations Blood Pressure SpO2   98.2 °F (36.8 °C) 83 16 138/76 100 %      Temp Source Heart Rate Source Patient Position - Orthostatic VS BP Location FiO2 (%)   Oral Monitor Sitting Right arm  --      Pain Score       --             Orthostatic Vital Signs  Vitals:    12/21/23 1937   BP: 138/76   Pulse: 83   Patient Position - Orthostatic VS: Sitting       Physical Exam  Vitals and nursing note reviewed.   Constitutional:       General: She is not in acute distress.     Appearance: Normal appearance. She is well-developed. She is not ill-appearing.   HENT:      Head: Normocephalic and atraumatic.      Right Ear: External ear normal.      Left Ear: External ear normal.      Nose: Nose normal. No congestion.      Mouth/Throat:      Mouth: Mucous membranes are moist.      Pharynx: No oropharyngeal exudate or posterior oropharyngeal erythema.   Eyes:      Conjunctiva/sclera: Conjunctivae normal.      Pupils: Pupils are equal, round, and reactive to light.   Cardiovascular:      Rate and Rhythm: Normal rate and regular rhythm.      Heart sounds: No murmur heard.  Pulmonary:      Effort: Pulmonary effort is normal. No respiratory distress.      Breath sounds: Normal breath sounds.   Abdominal:      General: Abdomen is flat.      Palpations: Abdomen is soft.      Tenderness: There is no abdominal tenderness. There is no guarding or rebound.   Genitourinary:     Vagina: Vaginal discharge present.      Comments: White, mucousy discharge.  Musculoskeletal:         General: No swelling, deformity or signs of injury.      Cervical back: Normal range of motion and neck supple. No rigidity.   Skin:     General: Skin is warm and dry.      Capillary Refill: Capillary refill takes less than 2 seconds.      Coloration: Skin is not jaundiced.      Findings: No bruising or erythema.   Neurological:      Mental Status: She is alert and oriented to person, place, and time.   Psychiatric:         Mood and Affect: Mood normal.         ED Medications  Medications - No data to display    Diagnostic Studies  Results Reviewed       Procedure Component Value Units Date/Time    Molecular Vaginal Panel [[id number]] Collected: 12/21/23  2027    Lab Status: In process Specimen: Genital from Vaginal Updated: 12/21/23 2045    Urine Microscopic [[id number]]  (Abnormal) Collected: 12/21/23 2006    Lab Status: Final result Specimen: Urine, Clean Catch Updated: 12/21/23 2021     RBC, UA 1-2 /hpf      WBC, UA None Seen /hpf      Epithelial Cells Occasional /hpf      Bacteria, UA None Seen /hpf      MUCUS THREADS Moderate    UA (URINE) with reflex to Scope [[id number]]  (Abnormal) Collected: 12/21/23 2006    Lab Status: Final result Specimen: Urine, Clean Catch Updated: 12/21/23 2019     Color, UA Light Yellow     Clarity, UA Clear     Specific Gravity, UA 1.012     pH, UA 6.0     Leukocytes, UA Trace     Nitrite, UA Negative     Protein, UA Negative mg/dl      Glucose, UA Negative mg/dl      Ketones, UA 10 (1+) mg/dl      Urobilinogen, UA <2.0 mg/dl      Bilirubin, UA Negative     Occult Blood, UA Trace    Chlamydia/GC amplified DNA by PCR [[id number]] Collected: 12/21/23 2006    Lab Status: In process Specimen: Urine, Other Updated: 12/21/23 2009    Trichomonas vaginalis/Mycoplasma genitalium PCR [[id number]] Collected: 12/21/23 2006    Lab Status: In process Specimen: Urine, Clean Catch Updated: 12/21/23 2009                   No orders to display         Procedures  Procedures      ED Course                                       Medical Decision Making  Linda is a 33-year-old female who presents to the emergency department signs and symptoms that could be consistent with a yeast infection in the setting of antibiotic therapy currently for bacterial vaginosis.    Differential diagnosis including but not limited to: vaginitis, cervicitis, PID, UTI, TOA.    Based of initial symptoms, pelvic examination was conducted as documented in the physical exam section of this note as well as laboratory evaluations, urine analysis, and vaginal swab.  Patient was initiated on Diflucan therapy for treatment of presumed yeast infection and counseled to follow-up with  OB/GYN.  No cervical tenderness on examination and I have lower suspicion for pelvic inflammatory disease.  Patient was counseled on red flag symptoms that would warrant continued evaluation in the emergency department.  Patient was counseled to follow-up with OB/GYN as soon as possible and return to the emergency department for any worsening of symptoms.  Patient was agreeable with this plan.  Patient did not wish to remain till the resulting of the laboratory evaluations as they would take an extended period of time to come back and with follow-up with these results electronically.    Amount and/or Complexity of Data Reviewed  Labs: ordered.     Details: Laboratory evaluation is grossly unremarkable.    Risk  Prescription drug management.          Disposition  Final diagnoses:   Vaginal yeast infection     Time reflects when diagnosis was documented in both MDM as applicable and the Disposition within this note       Time User Action Codes Description Comment    12/21/2023  8:42 PM Lucio Ferrer Add [B37.31] Vaginal yeast infection           ED Disposition       ED Disposition   Discharge    Condition   Stable    Date/Time   Thu Dec 21, 2023  8:42 PM    Comment   Noreen Velazquez discharge to home/self care.                   Follow-up Information       Follow up With Specialties Details Why Contact Info Additional Information    Lea Reyes, MD Internal Medicine Schedule an appointment as soon as possible for a visit  As needed [address] [place] [id number]  [phone]       [facility] Emergency Department Emergency Medicine  As needed, If symptoms worsen [address] [place] [address]  [phone] Formerly [facility] Emergency Department, [address] [place] [address], [facility]            Discharge Medication List as of 12/21/2023  8:43 PM        START taking these medications    Details   fluconazole (DIFLUCAN) 200 mg  tablet Take 1 tablet (200 mg total) by mouth daily for 7 days, Starting Thu 12/21/2023, Until Thu 12/28/2023, Normal           CONTINUE these medications which have NOT CHANGED    Details   albuterol (ProAir HFA) 90 mcg/act inhaler Inhale 2 puffs every 6 (six) hours as needed for wheezing or shortness of breath, Starting Fri 4/7/2023, Normal      Amitiza 8 MCG capsule Take 1 capsule (8 mcg total) by mouth 2 (two) times a day with meals, Starting Tue 7/12/2022, Normal      ciprofloxacin-dexamethasone (CIPRODEX) otic suspension Administer 4 drops into the left ear 2 (two) times a day, Starting Wed 11/1/2023, Normal      metroNIDAZOLE (FLAGYL) 500 mg tablet Take 1 tablet (500 mg total) by mouth every 12 (twelve) hours for 7 days, Starting Sun 12/17/2023, Until Sun 12/24/2023, Normal      Multiple Vitamin (multivitamin) tablet Take 1 tablet by mouth daily, Historical Med      nitrofurantoin (MACROBID) 100 mg capsule Please take 1 capsule twice daily for 7 days., Normal      nitrofurantoin (MACRODANTIN) 50 mg capsule Take 1 tablet by mouth pre & post intercourse, Normal      oxybutynin (DITROPAN) 5 mg tablet Take 1 tablet (5 mg total) by mouth 2 (two) times a day as needed (bladdder spasm), Starting Thu 11/2/2023, No Print      phenazopyridine (PYRIDIUM) 100 mg tablet Take 1 tablet (100 mg total) by mouth 3 (three) times a day as needed for bladder spasms, Starting Thu 11/2/2023, No Print           No discharge procedures on file.    PDMP Review       None             ED Provider  Attending physically available and evaluated Noreen Velazquez. I managed the patient along with the ED Attending.    Electronically Signed by           Lucio Ferrer MD  12/21/23 4530

## 2023-12-23 ENCOUNTER — HOSPITAL ENCOUNTER (EMERGENCY)
Facility: HOSPITAL | Age: 33
Discharge: HOME | End: 2023-12-23
Attending: EMERGENCY MEDICINE | Admitting: EMERGENCY MEDICINE
Payer: COMMERCIAL

## 2023-12-23 ENCOUNTER — APPOINTMENT (EMERGENCY)
Dept: CT IMAGING | Facility: HOSPITAL | Age: 33
End: 2023-12-23
Payer: COMMERCIAL

## 2023-12-23 ENCOUNTER — APPOINTMENT (EMERGENCY)
Dept: ULTRASOUND IMAGING | Facility: HOSPITAL | Age: 33
End: 2023-12-23
Payer: COMMERCIAL

## 2023-12-23 VITALS
DIASTOLIC BLOOD PRESSURE: 67 MMHG | WEIGHT: 112.43 LBS | RESPIRATION RATE: 18 BRPM | SYSTOLIC BLOOD PRESSURE: 114 MMHG | TEMPERATURE: 98.9 F | HEART RATE: 62 BPM | BODY MASS INDEX: 21.96 KG/M2 | OXYGEN SATURATION: 98 %

## 2023-12-23 DIAGNOSIS — M54.9 BACK PAIN: ICD-10-CM

## 2023-12-23 DIAGNOSIS — N83.209 OVARIAN CYST: Primary | ICD-10-CM

## 2023-12-23 LAB
ALBUMIN SERPL BCP-MCNC: 4.6 G/DL (ref 3.5–5)
ALP SERPL-CCNC: 41 U/L (ref 34–104)
ALT SERPL W P-5'-P-CCNC: 8 U/L (ref 7–52)
ANION GAP SERPL CALCULATED.3IONS-SCNC: 8 MMOL/L
AST SERPL W P-5'-P-CCNC: 15 U/L (ref 13–39)
BASOPHILS # BLD AUTO: 0.05 THOUSANDS/ÂΜL (ref 0–0.1)
BASOPHILS NFR BLD AUTO: 1 % (ref 0–1)
BILIRUB DIRECT SERPL-MCNC: 0.07 MG/DL (ref 0–0.2)
BILIRUB SERPL-MCNC: 0.37 MG/DL (ref 0.2–1)
BILIRUB UR QL STRIP: NEGATIVE
BUN SERPL-MCNC: 3 MG/DL (ref 5–25)
CALCIUM SERPL-MCNC: 9.2 MG/DL (ref 8.4–10.2)
CHLORIDE SERPL-SCNC: 105 MMOL/L (ref 96–108)
CLARITY UR: CLEAR
CO2 SERPL-SCNC: 25 MMOL/L (ref 21–32)
COLOR UR: COLORLESS
CREAT SERPL-MCNC: 0.7 MG/DL (ref 0.6–1.3)
EOSINOPHIL # BLD AUTO: 0.02 THOUSAND/ÂΜL (ref 0–0.61)
EOSINOPHIL NFR BLD AUTO: 0 % (ref 0–6)
ERYTHROCYTE [DISTWIDTH] IN BLOOD BY AUTOMATED COUNT: 12.5 % (ref 11.6–15.1)
EXT PREGNANCY TEST URINE: NEGATIVE
EXT. CONTROL: NORMAL
GFR SERPL CREATININE-BSD FRML MDRD: 115 ML/MIN/1.73SQ M
GLUCOSE SERPL-MCNC: 111 MG/DL (ref 65–140)
GLUCOSE UR STRIP-MCNC: NEGATIVE MG/DL
HCT VFR BLD AUTO: 38.4 % (ref 34.8–46.1)
HGB BLD-MCNC: 12.5 G/DL (ref 11.5–15.4)
HGB UR QL STRIP.AUTO: NEGATIVE
IMM GRANULOCYTES # BLD AUTO: 0.02 THOUSAND/UL (ref 0–0.2)
IMM GRANULOCYTES NFR BLD AUTO: 0 % (ref 0–2)
KETONES UR STRIP-MCNC: NEGATIVE MG/DL
LEUKOCYTE ESTERASE UR QL STRIP: NEGATIVE
LIPASE SERPL-CCNC: 8 U/L (ref 11–82)
LYMPHOCYTES # BLD AUTO: 0.81 THOUSANDS/ÂΜL (ref 0.6–4.47)
LYMPHOCYTES NFR BLD AUTO: 11 % (ref 14–44)
MCH RBC QN AUTO: 30.1 PG (ref 26.8–34.3)
MCHC RBC AUTO-ENTMCNC: 32.6 G/DL (ref 31.4–37.4)
MCV RBC AUTO: 93 FL (ref 82–98)
MONOCYTES # BLD AUTO: 0.6 THOUSAND/ÂΜL (ref 0.17–1.22)
MONOCYTES NFR BLD AUTO: 8 % (ref 4–12)
NEUTROPHILS # BLD AUTO: 5.88 THOUSANDS/ÂΜL (ref 1.85–7.62)
NEUTS SEG NFR BLD AUTO: 80 % (ref 43–75)
NITRITE UR QL STRIP: NEGATIVE
NRBC BLD AUTO-RTO: 0 /100 WBCS
PH UR STRIP.AUTO: 6.5 [PH]
PLATELET # BLD AUTO: 240 THOUSANDS/UL (ref 149–390)
PMV BLD AUTO: 9.5 FL (ref 8.9–12.7)
POTASSIUM SERPL-SCNC: 3.4 MMOL/L (ref 3.5–5.3)
PROT SERPL-MCNC: 6.9 G/DL (ref 6.4–8.4)
PROT UR STRIP-MCNC: NEGATIVE MG/DL
RBC # BLD AUTO: 4.15 MILLION/UL (ref 3.81–5.12)
SODIUM SERPL-SCNC: 138 MMOL/L (ref 135–147)
SP GR UR STRIP.AUTO: 1 (ref 1–1.03)
UROBILINOGEN UR STRIP-ACNC: <2 MG/DL
WBC # BLD AUTO: 7.38 THOUSAND/UL (ref 4.31–10.16)

## 2023-12-23 PROCEDURE — 83690 ASSAY OF LIPASE: CPT | Performed by: EMERGENCY MEDICINE

## 2023-12-23 PROCEDURE — 81003 URINALYSIS AUTO W/O SCOPE: CPT | Performed by: EMERGENCY MEDICINE

## 2023-12-23 PROCEDURE — 96374 THER/PROPH/DIAG INJ IV PUSH: CPT

## 2023-12-23 PROCEDURE — G1004 CDSM NDSC: HCPCS

## 2023-12-23 PROCEDURE — 76830 TRANSVAGINAL US NON-OB: CPT

## 2023-12-23 PROCEDURE — 81025 URINE PREGNANCY TEST: CPT | Performed by: EMERGENCY MEDICINE

## 2023-12-23 PROCEDURE — 85025 COMPLETE CBC W/AUTO DIFF WBC: CPT | Performed by: EMERGENCY MEDICINE

## 2023-12-23 PROCEDURE — 99284 EMERGENCY DEPT VISIT MOD MDM: CPT

## 2023-12-23 PROCEDURE — 76856 US EXAM PELVIC COMPLETE: CPT

## 2023-12-23 PROCEDURE — 96375 TX/PRO/DX INJ NEW DRUG ADDON: CPT

## 2023-12-23 PROCEDURE — 99284 EMERGENCY DEPT VISIT MOD MDM: CPT | Performed by: EMERGENCY MEDICINE

## 2023-12-23 PROCEDURE — 80076 HEPATIC FUNCTION PANEL: CPT | Performed by: EMERGENCY MEDICINE

## 2023-12-23 PROCEDURE — 80048 BASIC METABOLIC PNL TOTAL CA: CPT | Performed by: EMERGENCY MEDICINE

## 2023-12-23 PROCEDURE — 36415 COLL VENOUS BLD VENIPUNCTURE: CPT | Performed by: EMERGENCY MEDICINE

## 2023-12-23 PROCEDURE — 74177 CT ABD & PELVIS W/CONTRAST: CPT

## 2023-12-23 PROCEDURE — 96361 HYDRATE IV INFUSION ADD-ON: CPT

## 2023-12-23 RX ORDER — ONDANSETRON 2 MG/ML
4 INJECTION INTRAMUSCULAR; INTRAVENOUS ONCE
Status: COMPLETED | OUTPATIENT
Start: 2023-12-23 | End: 2023-12-23

## 2023-12-23 RX ORDER — KETOROLAC TROMETHAMINE 30 MG/ML
15 INJECTION, SOLUTION INTRAMUSCULAR; INTRAVENOUS ONCE
Status: COMPLETED | OUTPATIENT
Start: 2023-12-23 | End: 2023-12-23

## 2023-12-23 RX ORDER — MORPHINE SULFATE 4 MG/ML
4 INJECTION, SOLUTION INTRAMUSCULAR; INTRAVENOUS ONCE
Status: COMPLETED | OUTPATIENT
Start: 2023-12-23 | End: 2023-12-23

## 2023-12-23 RX ORDER — NAPROXEN 500 MG/1
500 TABLET ORAL 2 TIMES DAILY WITH MEALS
Qty: 14 TABLET | Refills: 0 | Status: SHIPPED | OUTPATIENT
Start: 2023-12-23 | End: 2023-12-30

## 2023-12-23 RX ADMIN — ONDANSETRON 4 MG: 2 INJECTION INTRAMUSCULAR; INTRAVENOUS at 13:21

## 2023-12-23 RX ADMIN — MORPHINE SULFATE 4 MG: 4 INJECTION INTRAVENOUS at 15:34

## 2023-12-23 RX ADMIN — SODIUM CHLORIDE 1000 ML: 0.9 INJECTION, SOLUTION INTRAVENOUS at 13:20

## 2023-12-23 RX ADMIN — KETOROLAC TROMETHAMINE 15 MG: 30 INJECTION, SOLUTION INTRAMUSCULAR; INTRAVENOUS at 13:20

## 2023-12-23 RX ADMIN — IOHEXOL 75 ML: 350 INJECTION, SOLUTION INTRAVENOUS at 14:13

## 2023-12-24 ENCOUNTER — NURSE TRIAGE (OUTPATIENT)
Dept: OTHER | Facility: OTHER | Age: 33
End: 2023-12-24

## 2023-12-25 ENCOUNTER — NURSE TRIAGE (OUTPATIENT)
Dept: OTHER | Facility: OTHER | Age: 33
End: 2023-12-25

## 2023-12-25 NOTE — ED PROVIDER NOTES
History  Chief Complaint   Patient presents with    Pelvic Pain     Patient here for eval of continuous pelvic pain that has been consistent for 2 weeks. Pt states she was recently seen and finished abx as prescribed for BV. +frequent urination, +hematuria +lower back pain +Nausea +Chills.        Pelvic Pain  Associated symptoms: abdominal pain    Associated symptoms: no chest pain, no congestion, no cough, no diarrhea, no fever, no headaches, no nausea, no rash, no shortness of breath, no sore throat, no vomiting and no wheezing      Patient is a 33-year-old female presenting to emergency department with weeks of pelvic pain abdominal pain back pain dysuria hematuria nausea.  No chest pain or short of breath.  Has been evaluated multiple times for the same.  Concern for bacteremia.  No fever.  Has had normal blood work previously.  CAT scan reviewed previously with no acute findings.  No trauma.      Prior to Admission Medications   Prescriptions Last Dose Informant Patient Reported? Taking?   Amitiza 8 MCG capsule   No No   Sig: Take 1 capsule (8 mcg total) by mouth 2 (two) times a day with meals   Multiple Vitamin (multivitamin) tablet  Self Yes No   Sig: Take 1 tablet by mouth daily   albuterol (ProAir HFA) 90 mcg/act inhaler   No No   Sig: Inhale 2 puffs every 6 (six) hours as needed for wheezing or shortness of breath   ciprofloxacin-dexamethasone (CIPRODEX) otic suspension   No No   Sig: Administer 4 drops into the left ear 2 (two) times a day   Patient not taking: Reported on 11/10/2023   fluconazole (DIFLUCAN) 200 mg tablet   No No   Sig: Take 1 tablet (200 mg total) by mouth daily for 7 days   metroNIDAZOLE (FLAGYL) 500 mg tablet   No No   Sig: Take 1 tablet (500 mg total) by mouth every 12 (twelve) hours for 7 days   nitrofurantoin (MACROBID) 100 mg capsule   No No   Sig: Please take 1 capsule twice daily for 7 days.   nitrofurantoin (MACRODANTIN) 50 mg capsule   No No   Sig: Take 1 tablet by mouth pre &  post intercourse   oxybutynin (DITROPAN) 5 mg tablet   No No   Sig: Take 1 tablet (5 mg total) by mouth 2 (two) times a day as needed (bladdder spasm)   phenazopyridine (PYRIDIUM) 100 mg tablet   No No   Sig: Take 1 tablet (100 mg total) by mouth 3 (three) times a day as needed for bladder spasms      Facility-Administered Medications: None       Past Medical History:   Diagnosis Date    39 weeks gestation of pregnancy 12/09/2019    Anxiety     Bronchitis 04/03/2023    Contraceptive use     LAST ASSESSED: [id number]    Depression     d/c Buspar with preg - now started on Zoloft 25 mg 11/1/19    Exposure to COVID-19 virus 12/08/2020    External hemorrhoid, bleeding     LAST ASSESSED: 05MAR2014    Fetal abnormality affecting management of mother, antepartum 01/29/2020    Bilateral talipes equivarous  -Has established peds ortho @[facility] for after delivery    Gallbladder attack     Gastroenteritis 10/11/2023    Genetic screening 12/09/2019    Hematochezia     LAST ASSESSED: 13MAR2014    IBS (irritable bowel syndrome)     Immunity status testing     LAST ASSESSED: 02JUN2014    Inflammatory bowel disease     Methicillin resistant Staphylococcus aureus infection     Migraine     prev on effexor for migraine & anxiety - d/c approx 1 yr ago    Nasal congestion 07/18/2023    Poison ivy dermatitis     LAST ASSESSED: [id number]    Psychiatric disorder     Shingles     Skin abscess     RIGHT LEG    Upper respiratory tract infection 04/11/2022    Urinary frequency     LAST ASSESSED: 13APR2014    Urinary tract infection     UTI (urinary tract infection)     uses Macrobid after intercourse    Vacuum extractor delivery, delivered 06/11/2020    Varicella     childhood       Past Surgical History:   Procedure Laterality Date    TONSILLECTOMY      ONSET: 1997    WISDOM TOOTH EXTRACTION         Family History   Problem Relation Age of Onset    Thyroid disease Mother         hypothyroid    Arthritis Mother     Thyroid disease Father      Kidney cancer Father     Drug abuse Brother     Diabetes Maternal Grandmother         type 2    Dementia Maternal Grandmother     Cancer Maternal Grandfather         prostate    Emphysema Paternal Grandmother     Cancer Family         MALIGNANT NEOPLASM     I have reviewed and agree with the history as documented.    E-Cigarette/Vaping    E-Cigarette Use Never User      E-Cigarette/Vaping Substances    Nicotine No     THC No     CBD No     Flavoring No     Other No     Unknown No      Social History     Tobacco Use    Smoking status: Former     Current packs/day: 0.00     Average packs/day: 0.3 packs/day for 8.6 years (2.1 ttl pk-yrs)     Types: Cigarettes     Start date: 2011     Quit date: 10/31/2019     Years since quittin.1    Smokeless tobacco: Never   Vaping Use    Vaping status: Never Used   Substance Use Topics    Alcohol use: Not Currently     Comment: occasional    Drug use: Never       Review of Systems   Constitutional:  Negative for chills, diaphoresis and fever.   HENT:  Negative for congestion and sore throat.    Respiratory:  Negative for cough, shortness of breath, wheezing and stridor.    Cardiovascular:  Negative for chest pain, palpitations and leg swelling.   Gastrointestinal:  Positive for abdominal pain. Negative for blood in stool, diarrhea, nausea and vomiting.   Genitourinary:  Positive for pelvic pain. Negative for dysuria, frequency and urgency.   Musculoskeletal:  Positive for back pain. Negative for neck pain and neck stiffness.   Skin:  Negative for pallor and rash.   Neurological:  Negative for dizziness, syncope, weakness, light-headedness and headaches.   All other systems reviewed and are negative.      Physical Exam  Physical Exam  Vitals reviewed.   Constitutional:       Appearance: Normal appearance. She is well-developed.   HENT:      Head: Normocephalic and atraumatic.   Eyes:      Extraocular Movements: Extraocular movements intact.      Pupils: Pupils are equal,  round, and reactive to light.   Cardiovascular:      Rate and Rhythm: Normal rate and regular rhythm.      Heart sounds: Normal heart sounds.   Pulmonary:      Effort: Pulmonary effort is normal. No respiratory distress.      Breath sounds: Normal breath sounds.   Abdominal:      General: Bowel sounds are normal.      Palpations: Abdomen is soft.      Tenderness: There is no abdominal tenderness.   Musculoskeletal:         General: No swelling or tenderness. Normal range of motion.      Cervical back: Normal range of motion and neck supple.   Skin:     General: Skin is warm and dry.      Capillary Refill: Capillary refill takes less than 2 seconds.   Neurological:      General: No focal deficit present.      Mental Status: She is alert and oriented to person, place, and time.         Vital Signs  ED Triage Vitals [12/23/23 1213]   Temperature Pulse Respirations Blood Pressure SpO2   98.9 °F (37.2 °C) 96 20 130/73 99 %      Temp Source Heart Rate Source Patient Position - Orthostatic VS BP Location FiO2 (%)   Oral Monitor Sitting Right arm --      Pain Score       8           Vitals:    12/23/23 1530 12/23/23 1545 12/23/23 1600 12/23/23 1700   BP: 114/76 114/76 112/67 114/67   Pulse: 99 90 96 62   Patient Position - Orthostatic VS: Sitting Sitting Sitting Sitting         Visual Acuity      ED Medications  Medications   sodium chloride 0.9 % bolus 1,000 mL (0 mL Intravenous Stopped 12/23/23 1530)   ondansetron (ZOFRAN) injection 4 mg (4 mg Intravenous Given 12/23/23 1321)   ketorolac (TORADOL) injection 15 mg (15 mg Intravenous Given 12/23/23 1320)   iohexol (OMNIPAQUE) 350 MG/ML injection (MULTI-DOSE) 75 mL (75 mL Intravenous Given 12/23/23 1413)   morphine injection 4 mg (4 mg Intravenous Given 12/23/23 1534)       Diagnostic Studies  Results Reviewed       Procedure Component Value Units Date/Time    Basic metabolic panel [[id number]]  (Abnormal) Collected: 12/23/23 1322    Lab Status: Final result Specimen: Blood  from Line, Venous Updated: 12/23/23 1347     Sodium 138 mmol/L      Potassium 3.4 mmol/L      Chloride 105 mmol/L      CO2 25 mmol/L      ANION GAP 8 mmol/L      BUN 3 mg/dL      Creatinine 0.70 mg/dL      Glucose 111 mg/dL      Calcium 9.2 mg/dL      eGFR 115 ml/min/1.73sq m     Narrative:      National Kidney Disease Foundation guidelines for Chronic Kidney Disease (CKD):     Stage 1 with normal or high GFR (GFR > 90 mL/min/1.73 square meters)    Stage 2 Mild CKD (GFR = 60-89 mL/min/1.73 square meters)    Stage 3A Moderate CKD (GFR = 45-59 mL/min/1.73 square meters)    Stage 3B Moderate CKD (GFR = 30-44 mL/min/1.73 square meters)    Stage 4 Severe CKD (GFR = 15-29 mL/min/1.73 square meters)    Stage 5 End Stage CKD (GFR <15 mL/min/1.73 square meters)  Note: GFR calculation is accurate only with a steady state creatinine    Lipase [[id number]]  (Abnormal) Collected: 12/23/23 1322    Lab Status: Final result Specimen: Blood from Line, Venous Updated: 12/23/23 1347     Lipase 8 u/L     Hepatic function panel [[id number]]  (Normal) Collected: 12/23/23 1322    Lab Status: Final result Specimen: Blood from Line, Venous Updated: 12/23/23 1347     Total Bilirubin 0.37 mg/dL      Bilirubin, Direct 0.07 mg/dL      Alkaline Phosphatase 41 U/L      AST 15 U/L      ALT 8 U/L      Total Protein 6.9 g/dL      Albumin 4.6 g/dL     CBC and differential [[id number]]  (Abnormal) Collected: 12/23/23 1322    Lab Status: Final result Specimen: Blood from Line, Venous Updated: 12/23/23 1337     WBC 7.38 Thousand/uL      RBC 4.15 Million/uL      Hemoglobin 12.5 g/dL      Hematocrit 38.4 %      MCV 93 fL      MCH 30.1 pg      MCHC 32.6 g/dL      RDW 12.5 %      MPV 9.5 fL      Platelets 240 Thousands/uL      nRBC 0 /100 WBCs      Neutrophils Relative 80 %      Immat GRANS % 0 %      Lymphocytes Relative 11 %      Monocytes Relative 8 %      Eosinophils Relative 0 %      Basophils Relative 1 %      Neutrophils Absolute 5.88 Thousands/µL       Immature Grans Absolute 0.02 Thousand/uL      Lymphocytes Absolute 0.81 Thousands/µL      Monocytes Absolute 0.60 Thousand/µL      Eosinophils Absolute 0.02 Thousand/µL      Basophils Absolute 0.05 Thousands/µL     UA w Reflex to Microscopic w Reflex to Culture [[id number]] Collected: 12/23/23 1323    Lab Status: Final result Specimen: Urine, Clean Catch Updated: 12/23/23 1335     Color, UA Colorless     Clarity, UA Clear     Specific Gravity, UA 1.003     pH, UA 6.5     Leukocytes, UA Negative     Nitrite, UA Negative     Protein, UA Negative mg/dl      Glucose, UA Negative mg/dl      Ketones, UA Negative mg/dl      Urobilinogen, UA <2.0 mg/dl      Bilirubin, UA Negative     Occult Blood, UA Negative    POCT pregnancy, urine [[id number]]  (Normal) Resulted: 12/23/23 1315    Lab Status: Final result Updated: 12/23/23 1317     EXT Preg Test, Ur Negative     Control Valid                   US pelvis complete w transvaginal   Final Result by Mike Barrett MD (12/23 1835)      3.5 cm right ovarian cyst without suspicious features. No findings to suggest ovarian torsion                              Workstation performed: [id number]         CT abdomen pelvis with contrast   Final Result by Mike Barrett MD (12/23 1517)      3.6 cm right adnexal lesion is likely a physiologic cyst. Though there is no surrounding inflammation, this could be the cause of patient's pelvic pain, and pelvic ultrasound can be considered for further assessment.         Workstation performed: [id number]                    Procedures  Procedures         ED Course                               SBIRT 20yo+      Flowsheet Row Most Recent Value   Initial Alcohol Screen: US AUDIT-C     1. How often do you have a drink containing alcohol? 0 Filed at: 12/23/2023 1216   2. How many drinks containing alcohol do you have on a typical day you are drinking?  0 Filed at: 12/23/2023 1216   3b. FEMALE Any Age, or MALE 65+: How often do you have 4 or more  drinks on one occassion? 0 Filed at: 12/23/2023 1216   Audit-C Score 0 Filed at: 12/23/2023 1216   ALBARO: How many times in the past year have you...    Used an illegal drug or used a prescription medication for non-medical reasons? Never Filed at: 12/23/2023 1216                      Medical Decision Making  Differential includes pancreatitis, biliary colic, cholecystitis, choledocholithiasis, gastritis, PUD, ectopic pregnancy, UTI/pyelonephritis.  Has been tested for STDs already.  We will start with checking liver enzymes, electrolytes, kidney function, CBC, lipase, CT abdomen pelvis, symptomatic treatment      Cyst noted on ovary, will get ultrasound.    Discussed with patient at length.  She will follow-up with family doctor and gynecology.  Will return for any new or worsening symptoms.      Amount and/or Complexity of Data Reviewed  Labs: ordered.  Radiology: ordered.    Risk  Prescription drug management.             Disposition  Final diagnoses:   Ovarian cyst   Back pain     Time reflects when diagnosis was documented in both MDM as applicable and the Disposition within this note       Time User Action Codes Description Comment    12/23/2023  6:59 PM Carolina Koroma [N83.209] Ovarian cyst     12/23/2023  6:59 PM Carolina Koroma [M54.9] Back pain           ED Disposition       ED Disposition   Discharge    Condition   Stable    Date/Time   Sat Dec 23, 2023 1859    Comment   Noreen Velazquez discharge to home/self care.                   Follow-up Information       Follow up With Specialties Details Why Contact Info Additional Information    Lea Reyes, MD Internal Medicine Schedule an appointment as soon as possible for a visit   [address] [place] [address]  [phone]       [facility] Emergency Department Emergency Medicine  As needed, If symptoms worsen [facility] [address]  [phone] [facility] Emergency  Department, [address], [place], [place], [address]    [facility] Obstetrics and Gynecology Schedule an appointment as soon as possible for a visit   [address]  [address]  [facility] [address] [phone] [facility], [address], [place], [place], [id number] [phone]            Discharge Medication List as of 12/23/2023  7:04 PM        START taking these medications    Details   naproxen (Naprosyn) 500 mg tablet Take 1 tablet (500 mg total) by mouth 2 (two) times a day with meals for 7 days, Starting Sat 12/23/2023, Until Sat 12/30/2023, Normal           CONTINUE these medications which have NOT CHANGED    Details   albuterol (ProAir HFA) 90 mcg/act inhaler Inhale 2 puffs every 6 (six) hours as needed for wheezing or shortness of breath, Starting Fri 4/7/2023, Normal      Amitiza 8 MCG capsule Take 1 capsule (8 mcg total) by mouth 2 (two) times a day with meals, Starting Tue 7/12/2022, Normal      ciprofloxacin-dexamethasone (CIPRODEX) otic suspension Administer 4 drops into the left ear 2 (two) times a day, Starting Wed 11/1/2023, Normal      fluconazole (DIFLUCAN) 200 mg tablet Take 1 tablet (200 mg total) by mouth daily for 7 days, Starting Thu 12/21/2023, Until Thu 12/28/2023, Normal      metroNIDAZOLE (FLAGYL) 500 mg tablet Take 1 tablet (500 mg total) by mouth every 12 (twelve) hours for 7 days, Starting Sun 12/17/2023, Until Sun 12/24/2023, Normal      Multiple Vitamin (multivitamin) tablet Take 1 tablet by mouth daily, Historical Med      nitrofurantoin (MACROBID) 100 mg capsule Please take 1 capsule twice daily for 7 days., Normal      nitrofurantoin (MACRODANTIN) 50 mg capsule Take 1 tablet by mouth pre & post intercourse, Normal      oxybutynin (DITROPAN) 5 mg tablet Take 1 tablet (5 mg total) by mouth 2 (two) times a day as needed (bladdder spasm), Starting Thu 11/2/2023, No Print      phenazopyridine  (PYRIDIUM) 100 mg tablet Take 1 tablet (100 mg total) by mouth 3 (three) times a day as needed for bladder spasms, Starting Thu 11/2/2023, No Print                 PDMP Review       None            ED Provider  Electronically Signed by             Carolina Koroma MD  12/25/23 3723

## 2023-12-25 NOTE — TELEPHONE ENCOUNTER
"Regarding: guidance/ uti symptoms/ heavy bleeding  ----- Message from Bella Gilman sent at 12/24/2023  6:59 PM EST -----  \"I would like some guidance. I have been having UTI like symptoms. I went to the ER everything came back fine. Today I have heavy bleeding.\"    "

## 2023-12-25 NOTE — TELEPHONE ENCOUNTER
"Regarding: heavy bleeding/ ER visit  ----- Message from Bella Gilman sent at 12/25/2023 10:01 AM EST -----  \"I called in yesterday with heavy bleeding and was told if it continues to call in so that I can be seen by an OB at the ED.\"    "

## 2023-12-25 NOTE — TELEPHONE ENCOUNTER
"Reason for Disposition   [1] MILD  bleeding or SPOTTING AND [2] after procedure (e.g., biopsy) or pelvic examination (e.g., pap smear) AND [3] < 7 days    Answer Assessment - Initial Assessment Questions  1. AMOUNT: \"Describe the bleeding that you are having.\"     - SPOTTING: spotting, or pinkish / brownish mucous discharge; does not fill panti-liner or pad     - MILD:  less than 1 pad / hour; less than patient's usual menstrual bleeding    - MODERATE: 1-2 pads / hour; 1 menstrual cup every 6 hours; small-medium blood clots (e.g., pea, grape, small coin)    - SEVERE: soaking 2 or more pads/hour for 2 or more hours; 1 menstrual cup every 2 hours; bleeding not contained by pads or continuous red blood from vagina; large blood clots (e.g., golf ball, large coin)       Only comes out during urination and when wiping. Per pt, when wiping, \"stringing\" of blood comes out and it is a heavy amount.    2. ONSET: \"When did the bleeding begin?\" \"Is it continuing now?\"      Yesterday morning    3. MENSTRUAL PERIOD: \"When was the last normal menstrual period?\" \"How is this different than your period?\"      12/3-12/9    4. REGULARITY: \"How regular are your periods?\"      Not really regular - has been getting later and later    5. ABDOMINAL PAIN: \"Do you have any pain?\" \"How bad is the pain?\"  (e.g., Scale 1-10; mild, moderate, or severe)    - MILD (1-3): doesn't interfere with normal activities, abdomen soft and not tender to touch     - MODERATE (4-7): interferes with normal activities or awakens from sleep, tender to touch     - SEVERE (8-10): excruciating pain, doubled over, unable to do any normal activities       Cramping and tender to RLQ (ovarian cyst to right side) with radiation to back 6/10 - had pelvic ultrasound     6. PREGNANCY: \"Could you be pregnant?\" \"Are you sexually active?\" \"Did you recently give birth?\"      No    7. BREASTFEEDING: \"Are you breastfeeding?\"      No    8. HORMONES: \"Are you taking any hormone " "medications, prescription or OTC?\" (e.g., birth control pills, estrogen)      Denies    9. BLOOD THINNERS: \"Do you take any blood thinners?\" (e.g., Coumadin/warfarin, Pradaxa/dabigatran, aspirin)      No    10. CAUSE: \"What do you think is causing the bleeding?\" (e.g., recent gyn surgery, recent gyn procedure; known bleeding disorder, cervical cancer, polycystic ovarian disease, fibroids)          Two days ago - pelvic ultrasound done in ED    11. HEMODYNAMIC STATUS: \"Are you weak or feeling lightheaded?\" If Yes, ask: \"Can you stand and walk normally?\"         Yes - feeling weaker    12. OTHER SYMPTOMS: \"What other symptoms are you having with the bleeding?\" (e.g., passed tissue, vaginal discharge, fever, menstrual-type cramps)        Moderate to severe nausea    Protocols used: Vaginal Bleeding - Abnormal-ADULT-AH          Per patient, has been to ED at least two or three times to get evaluated. Triaged yesterday and calling back today to report continued heavy bleeding. Paged on call provider- per Dr. Bautista, CBC was normal and adequate, if bleeding is decreasing, patient should observe, rest and hydrate and call office tomorrow.    Care advice and recommendations given. Instructed what to monitor for and when to call back. Questions and concerns addressed. Patient verbalized understanding.  "

## 2023-12-25 NOTE — TELEPHONE ENCOUNTER
"Reason for Disposition  • [1] Bleeding or spotting between regular periods AND [2] occurs three cycles (3 months) or less this past year    Answer Assessment - Initial Assessment Questions  1. AMOUNT: \"Describe the bleeding that you are having.\"     - SPOTTING: spotting, or pinkish / brownish mucous discharge; does not fill panti-liner or pad     - MILD:  less than 1 pad / hour; less than patient's usual menstrual bleeding    - MODERATE: 1-2 pads / hour; 1 menstrual cup every 6 hours; small-medium blood clots (e.g., pea, grape, small coin)    - SEVERE: soaking 2 or more pads/hour for 2 or more hours; 1 menstrual cup every 2 hours; bleeding not contained by pads or continuous red blood from vagina; large blood clots (e.g., golf ball, large coin)     Hasn't worn anything all day today, hasn't been leaking on her underwear, doesn't happen until she urinates        Yesterday spotting on toilet paper   Abd and pelvic pain, back pain, pelvic tenderness, bladder tenderness  Went to ED yesterday  Pelvic US showed cyst on right ovary  This morning woke up first pee was just stringy, mucousy,  Has gone to ED 3x    2. ONSET: \"When did the bleeding begin?\" \"Is it continuing now?\"      A week ago    3. MENSTRUAL PERIOD: \"When was the last normal menstrual period?\" \"How is this different than your period?\"      First week of December    4. REGULARITY: \"How regular are your periods?\"      irregular      5. ABDOMINAL PAIN: \"Do you have any pain?\" \"How bad is the pain?\"  (e.g., Scale 1-10; mild, moderate, or severe)      - MODERATE (4-7): interferes with normal activities or awakens from sleep, tender to touch     - SEVERE (8-10): excruciating pain, doubled over, unable to do any normal activities         ER gave her naproxen 500mg at 545pm it has helped take the edge off, states she has intense back pain. States kidneys are fine based on CT scan    6. PREGNANCY: \"Could you be pregnant?\" \"Are you sexually active?\" \"Did you recently " "give birth?\"      Not pregnant    7. BREASTFEEDING: \"Are you breastfeeding?\"      No    8. HORMONES: \"Are you taking any hormone medications, prescription or OTC?\" (e.g., birth control pills, estrogen)      No   9. BLOOD THINNERS: \"Do you take any blood thinners?\" (e.g., Coumadin/warfarin, Pradaxa/dabigatran, aspirin)      No    10. CAUSE: \"What do you think is causing the bleeding?\" (e.g., recent gyn surgery, recent gyn procedure; known bleeding disorder, cervical cancer, polycystic ovarian disease, fibroids)          Right ovarian cyst    11. HEMODYNAMIC STATUS: \"Are you weak or feeling lightheaded?\" If Yes, ask: \"Can you stand and walk normally?\"         States she is \" not herself\" she told this to ED too, not eating, no appetite. Intense pain in back and abd area. Its been all day long states she hasn't had any relief    Protocols used: Vaginal Bleeding - Abnormal-ADULT-AH    "

## 2023-12-26 ENCOUNTER — OFFICE VISIT (OUTPATIENT)
Dept: OBGYN CLINIC | Facility: CLINIC | Age: 33
End: 2023-12-26
Payer: COMMERCIAL

## 2023-12-26 VITALS
SYSTOLIC BLOOD PRESSURE: 128 MMHG | HEIGHT: 60 IN | DIASTOLIC BLOOD PRESSURE: 76 MMHG | WEIGHT: 112 LBS | BODY MASS INDEX: 21.99 KG/M2

## 2023-12-26 DIAGNOSIS — N83.209 OVARIAN CYST: ICD-10-CM

## 2023-12-26 DIAGNOSIS — N93.9 ABNORMAL UTERINE BLEEDING (AUB): Primary | ICD-10-CM

## 2023-12-26 PROCEDURE — 99214 OFFICE O/P EST MOD 30 MIN: CPT | Performed by: OBSTETRICS & GYNECOLOGY

## 2023-12-26 RX ORDER — MEDROXYPROGESTERONE ACETATE 10 MG/1
10 TABLET ORAL DAILY
Qty: 10 TABLET | Refills: 1 | Status: SHIPPED | OUTPATIENT
Start: 2023-12-26

## 2023-12-26 NOTE — PATIENT INSTRUCTIONS
I explained to the patient and review with her and her mother agree the ultrasound findings.  I explained to them my examination which was mostly consistent with abnormal uterine bleeding.  I informed the both her and her mother that this is not a life-threatening situation.  We will start her on Provera 10 mg for 10 days.  Also was instructed to take naproxen 2 tablets every 6 hours for the next 3 days.  Will reevaluate on a daily basis.

## 2023-12-26 NOTE — PROGRESS NOTES
This is a 33-year-old white female well-known to me, she is a  1 P1 with a vaginal delivery approximately 4 years ago.  She is now complaining of continual bleeding and pelvic pain.  She has been seen in the emergency department over the weekend.  They did a CAT scan and an ultrasound.  All of which were negative.  There is a questionable ovarian cyst which was not large and seem to be physiological of no great significance.  The patient is complaining increasing backache discomfort.  She was advised to use naproxen over the weekend that she really has not done.  She is also complaining of heavy vaginal bleeding.    Pelvic exam  shows the external genitalia normal limits.  The vagina shows a presence of bleeding coming from the cervical os.  The uterus is anterior and its tender consistent with dysmenorrhea.  There is no cervical motion tenderness.  The adnexa is clear bilaterally.    The patient is very concerned that something more serious is wrong with her.  Her mother is with her.  She is having crying spells.    Impression after reviewing the CT scan and the ultrasound and pelvic examination I reassured the patient and her mother there is nothing nefarious was just threatening happening here.  I think she been a lot of stress dealing with the health of her daughter and her father's health and is now having some abnormal uterine bleeding.  There was a negative pregnancy test done in the emergency department.  It is my impression this is abnormal uterine bleeding 1 by increasing stress in her life.  She had also been treated recently for metronidazole and Diflucan for vaginal pain and discharge.  That problem has now been resolved.    Plan we will now start her on Provera 10 mg for 10 days.  I instructed the patient to be more aggressive using the proximal.  I will keep tabs on her everyday for the next 3 days.  If things change we will reevaluate.    Previsit charting 7 minutes.  Face-to-face 25 minutes.   Post charting 10 minutes.  Reviewing ultrasound and CT scan 5 minutes

## 2023-12-28 ENCOUNTER — APPOINTMENT (EMERGENCY)
Dept: VASCULAR ULTRASOUND | Facility: HOSPITAL | Age: 33
End: 2023-12-28
Payer: COMMERCIAL

## 2023-12-28 ENCOUNTER — HOSPITAL ENCOUNTER (EMERGENCY)
Facility: HOSPITAL | Age: 33
Discharge: HOME | End: 2023-12-28
Attending: EMERGENCY MEDICINE
Payer: COMMERCIAL

## 2023-12-28 ENCOUNTER — TELEPHONE (OUTPATIENT)
Dept: OBGYN CLINIC | Facility: CLINIC | Age: 33
End: 2023-12-28

## 2023-12-28 VITALS
OXYGEN SATURATION: 99 % | SYSTOLIC BLOOD PRESSURE: 136 MMHG | TEMPERATURE: 98.1 F | DIASTOLIC BLOOD PRESSURE: 85 MMHG | RESPIRATION RATE: 18 BRPM | HEART RATE: 110 BPM

## 2023-12-28 DIAGNOSIS — M79.601 RIGHT ARM PAIN: ICD-10-CM

## 2023-12-28 DIAGNOSIS — M79.604 RIGHT LEG PAIN: Primary | ICD-10-CM

## 2023-12-28 LAB
ALBUMIN SERPL BCP-MCNC: 4.8 G/DL (ref 3.5–5)
ALP SERPL-CCNC: 41 U/L (ref 34–104)
ALT SERPL W P-5'-P-CCNC: 8 U/L (ref 7–52)
ANION GAP SERPL CALCULATED.3IONS-SCNC: 7 MMOL/L
AST SERPL W P-5'-P-CCNC: 13 U/L (ref 13–39)
BASOPHILS # BLD AUTO: 0.02 THOUSANDS/ÂΜL (ref 0–0.1)
BASOPHILS NFR BLD AUTO: 0 % (ref 0–1)
BILIRUB SERPL-MCNC: 0.42 MG/DL (ref 0.2–1)
BUN SERPL-MCNC: 4 MG/DL (ref 5–25)
CALCIUM SERPL-MCNC: 9.3 MG/DL (ref 8.4–10.2)
CHLORIDE SERPL-SCNC: 104 MMOL/L (ref 96–108)
CO2 SERPL-SCNC: 27 MMOL/L (ref 21–32)
CREAT SERPL-MCNC: 0.7 MG/DL (ref 0.6–1.3)
EOSINOPHIL # BLD AUTO: 0.01 THOUSAND/ÂΜL (ref 0–0.61)
EOSINOPHIL NFR BLD AUTO: 0 % (ref 0–6)
ERYTHROCYTE [DISTWIDTH] IN BLOOD BY AUTOMATED COUNT: 12.9 % (ref 11.6–15.1)
GFR SERPL CREATININE-BSD FRML MDRD: 115 ML/MIN/1.73SQ M
GLUCOSE SERPL-MCNC: 108 MG/DL (ref 65–140)
HCT VFR BLD AUTO: 40 % (ref 34.8–46.1)
HGB BLD-MCNC: 13.2 G/DL (ref 11.5–15.4)
HOLD SPECIMEN: NORMAL
IMM GRANULOCYTES # BLD AUTO: 0.02 THOUSAND/UL (ref 0–0.2)
IMM GRANULOCYTES NFR BLD AUTO: 0 % (ref 0–2)
LYMPHOCYTES # BLD AUTO: 1.09 THOUSANDS/ÂΜL (ref 0.6–4.47)
LYMPHOCYTES NFR BLD AUTO: 16 % (ref 14–44)
MCH RBC QN AUTO: 30.8 PG (ref 26.8–34.3)
MCHC RBC AUTO-ENTMCNC: 33 G/DL (ref 31.4–37.4)
MCV RBC AUTO: 94 FL (ref 82–98)
MONOCYTES # BLD AUTO: 0.37 THOUSAND/ÂΜL (ref 0.17–1.22)
MONOCYTES NFR BLD AUTO: 5 % (ref 4–12)
NEUTROPHILS # BLD AUTO: 5.37 THOUSANDS/ÂΜL (ref 1.85–7.62)
NEUTS SEG NFR BLD AUTO: 79 % (ref 43–75)
NRBC BLD AUTO-RTO: 0 /100 WBCS
PLATELET # BLD AUTO: 229 THOUSANDS/UL (ref 149–390)
PMV BLD AUTO: 9.2 FL (ref 8.9–12.7)
POTASSIUM SERPL-SCNC: 3.8 MMOL/L (ref 3.5–5.3)
PROT SERPL-MCNC: 7.3 G/DL (ref 6.4–8.4)
RBC # BLD AUTO: 4.28 MILLION/UL (ref 3.81–5.12)
SODIUM SERPL-SCNC: 138 MMOL/L (ref 135–147)
WBC # BLD AUTO: 6.88 THOUSAND/UL (ref 4.31–10.16)

## 2023-12-28 PROCEDURE — 36415 COLL VENOUS BLD VENIPUNCTURE: CPT

## 2023-12-28 PROCEDURE — 93971 EXTREMITY STUDY: CPT | Performed by: INTERNAL MEDICINE

## 2023-12-28 PROCEDURE — 99285 EMERGENCY DEPT VISIT HI MDM: CPT

## 2023-12-28 PROCEDURE — 80053 COMPREHEN METABOLIC PANEL: CPT | Performed by: EMERGENCY MEDICINE

## 2023-12-28 PROCEDURE — 93971 EXTREMITY STUDY: CPT

## 2023-12-28 PROCEDURE — 99285 EMERGENCY DEPT VISIT HI MDM: CPT | Performed by: EMERGENCY MEDICINE

## 2023-12-28 PROCEDURE — NC001 PR NO CHARGE: Performed by: INTERNAL MEDICINE

## 2023-12-28 PROCEDURE — 85025 COMPLETE CBC W/AUTO DIFF WBC: CPT | Performed by: EMERGENCY MEDICINE

## 2023-12-28 NOTE — TELEPHONE ENCOUNTER
Spoke to the patient this morning.  She is on Provera for abnormal uterine bleeding.  Has been some improvement in the bleeding pattern.  She is now complaining of a 2 to 3-day duration of some discomfort in the calf.  No problem with walking.  No swelling.  She is concerned about the possibility of a DVT or blood clot.  I suggested to go to the emergency department to be evaluated and get a Doppler scan.

## 2023-12-28 NOTE — ED ATTENDING ATTESTATION
"12/28/2023  I, Stefan Blackman MD, saw and evaluated the patient. I have discussed the patient with the resident/non-physician practitioner and agree with the resident's/non-physician practitioner's findings, Plan of Care, and MDM as documented in the resident's/non-physician practitioner's note, except where noted. All available labs and Radiology studies were reviewed.  I was present for key portions of any procedure(s) performed by the resident/non-physician practitioner and I was immediately available to provide assistance.       At this point I agree with the current assessment done in the Emergency Department.  I have conducted an independent evaluation of this patient a history and physical is as follows:    S:  Chief Complaint   Patient presents with    Leg Pain     Pt. Reports being seen Friday for back/abd pain and diagnosed with a cyst, has had vaginal bleeding. Started on provera. Pt. Now reporting right upper leg pain, \"discomfort and heavy.\" Pt. Concerned about a blood clot. \"Right leg does not feel the same as the left.\"     Noreen is a 33 y.o. female who presents with the chief complaint of right leg and arm heaviness.  She noticed it yesterday.  She was recently started on provera for vaginal bleeding and is concerned that her symptoms may be dvt.  She has no personal history of dvt but her father has had several.  No significant swelling.   No shortness of breath or chest pain.  She is satting 100% on room air.  She does appear anxious.    O:  ED Triage Vitals   Temperature Pulse Respirations Blood Pressure SpO2   12/28/23 1239 12/28/23 1237 12/28/23 1237 12/28/23 1237 12/28/23 1237   98.1 °F (36.7 °C) (!) 110 18 136/85 99 %      Temp src Heart Rate Source Patient Position - Orthostatic VS BP Location FiO2 (%)   -- 12/28/23 1237 12/28/23 1237 -- --    Monitor Lying        Pain Score       --                Physical Exam  Vitals and nursing note reviewed.   Constitutional:       General: She is " in acute distress (mild).      Appearance: She is well-developed.   HENT:      Head: Normocephalic and atraumatic.   Eyes:      Extraocular Movements: Extraocular movements intact.      Pupils: Pupils are equal, round, and reactive to light.   Neck:      Vascular: No JVD.   Cardiovascular:      Rate and Rhythm: Normal rate and regular rhythm.      Heart sounds: Normal heart sounds. No murmur heard.     No friction rub. No gallop.   Pulmonary:      Effort: Pulmonary effort is normal. No respiratory distress.      Breath sounds: Normal breath sounds. No wheezing or rales.   Chest:      Chest wall: No tenderness.   Musculoskeletal:         General: Tenderness (mild left calf) present. Normal range of motion.      Cervical back: Normal range of motion.   Skin:     General: Skin is warm and dry.   Neurological:      General: No focal deficit present.      Mental Status: She is alert and oriented to person, place, and time.   Psychiatric:         Behavior: Behavior normal.         Thought Content: Thought content normal.         Judgment: Judgment normal.       A/P:  Background: 33 y.o. female presents with leg and arm pain/heaviness    Differential DX includes but is not limited to: paresthesias, anxiety, possible dvt    Plan: vascular studies, probable reassurance       ED Course     Labs Reviewed   CBC AND DIFFERENTIAL - Abnormal       Result Value Ref Range Status    WBC 6.88  4.31 - 10.16 Thousand/uL Final    RBC 4.28  3.81 - 5.12 Million/uL Final    Hemoglobin 13.2  11.5 - 15.4 g/dL Final    Hematocrit 40.0  34.8 - 46.1 % Final    MCV 94  82 - 98 fL Final    MCH 30.8  26.8 - 34.3 pg Final    MCHC 33.0  31.4 - 37.4 g/dL Final    RDW 12.9  11.6 - 15.1 % Final    MPV 9.2  8.9 - 12.7 fL Final    Platelets 229  149 - 390 Thousands/uL Final    nRBC 0  /100 WBCs Final    Neutrophils Relative 79 (*) 43 - 75 % Final    Immat GRANS % 0  0 - 2 % Final    Lymphocytes Relative 16  14 - 44 % Final    Monocytes Relative 5  4 - 12  % Final    Eosinophils Relative 0  0 - 6 % Final    Basophils Relative 0  0 - 1 % Final    Neutrophils Absolute 5.37  1.85 - 7.62 Thousands/µL Final    Immature Grans Absolute 0.02  0.00 - 0.20 Thousand/uL Final    Lymphocytes Absolute 1.09  0.60 - 4.47 Thousands/µL Final    Monocytes Absolute 0.37  0.17 - 1.22 Thousand/µL Final    Eosinophils Absolute 0.01  0.00 - 0.61 Thousand/µL Final    Basophils Absolute 0.02  0.00 - 0.10 Thousands/µL Final   COMPREHENSIVE METABOLIC PANEL - Abnormal    Sodium 138  135 - 147 mmol/L Final    Potassium 3.8  3.5 - 5.3 mmol/L Final    Chloride 104  96 - 108 mmol/L Final    CO2 27  21 - 32 mmol/L Final    ANION GAP 7  mmol/L Final    BUN 4 (*) 5 - 25 mg/dL Final    Creatinine 0.70  0.60 - 1.30 mg/dL Final    Comment: Standardized to IDMS reference method    Glucose 108  65 - 140 mg/dL Final    Comment: If the patient is fasting, the ADA then defines impaired fasting glucose as > 100 mg/dL and diabetes as > or equal to 123 mg/dL.    Calcium 9.3  8.4 - 10.2 mg/dL Final    AST 13  13 - 39 U/L Final    ALT 8  7 - 52 U/L Final    Comment: Specimen collection should occur prior to Sulfasalazine administration due to the potential for falsely depressed results.     Alkaline Phosphatase 41  34 - 104 U/L Final    Total Protein 7.3  6.4 - 8.4 g/dL Final    Albumin 4.8  3.5 - 5.0 g/dL Final    Total Bilirubin 0.42  0.20 - 1.00 mg/dL Final    Comment: Use of this assay is not recommended for patients undergoing treatment with eltrombopag due to the potential for falsely elevated results.  N-acetyl-p-benzoquinone imine (metabolite of Acetaminophen) will generate erroneously low results in samples for patients that have taken an overdose of Acetaminophen.    eGFR 115  ml/min/1.73sq m Final    Narrative:     [employer] guidelines for Chronic Kidney Disease (CKD):     Stage 1 with normal or high GFR (GFR > 90 mL/min/1.73 square meters)    Stage 2 Mild CKD (GFR = 60-89  mL/min/1.73 square meters)    Stage 3A Moderate CKD (GFR = 45-59 mL/min/1.73 square meters)    Stage 3B Moderate CKD (GFR = 30-44 mL/min/1.73 square meters)    Stage 4 Severe CKD (GFR = 15-29 mL/min/1.73 square meters)    Stage 5 End Stage CKD (GFR <15 mL/min/1.73 square meters)  Note: GFR calculation is accurate only with a steady state creatinine   RAINBOW DRAW    Narrative:     The following orders were created for panel order Dwarf draw.  Procedure                               Abnormality         Status                     ---------                               -----------         ------                     Light Blue Top on hold[[id number]]                           Final result               Gold top on hold[[id number]]                                 Final result               Green / Black tube on hold[[id number]]                       Final result                 Please view results for these tests on the individual orders.   LIGHT BLUE TOP   GOLD TOP ON HOLD   GREEN / BLACK TUBE ON HOLD    Extra Tube on hold   Final     VAS upper limb venous duplex scan, unilateral/limited   Final Result      VAS lower limb venous duplex study, unilateral/limited   Final Result        Medications - No data to display      Critical Care Time  Procedures  Time reflects when diagnosis was documented in both MDM as applicable and the Disposition within this note       Time User Action Codes Description Comment    12/28/2023  3:00 PM Marie Yanez [M79.604] Right leg pain     12/28/2023  3:00 PM Marie Yanez [M79.601] Right arm pain           ED Disposition       ED Disposition   Discharge    Condition   Stable    Date/Time   Thu Dec 28, 2023  3:01 PM    Comment   Noreen Velazquez discharge to home/self care.                   Follow-up Information       Follow up With Specialties Details Why Contact Info    Lea Reyes, MD Internal Medicine In 2 days  [address] [place] [address]  [phone]        [place]  In 2 days

## 2023-12-28 NOTE — ED PROVIDER NOTES
"History  Chief Complaint   Patient presents with    Leg Pain     Pt. Reports being seen Friday for back/abd pain and diagnosed with a cyst, has had vaginal bleeding. Started on provera. Pt. Now reporting right upper leg pain, \"discomfort and heavy.\" Pt. Concerned about a blood clot. \"Right leg does not feel the same as the left.\"     HPI    (Noreen Velazquez) Noreen Velazquez is a 33 y.o. female who identifies as female presents to the emergency department on December 28, 2023 for R leg pain onset yesterday. Pain is constant, described as \"throbbing, pulsating\", located in R leg, increased warmth and swelling. She also had mild symptoms in her L leg but is completely resolved today. Pt is also reporting R arm \"heaviness\" that started today. She recently was started on Provera and Dr. Chavez is managing closely. Patient denies fever, chills, chest pain, SOB, nausea, vomiting, abdominal pain, diarrhea, bowel or urinary changes, or any other complaint at this time.    Is a smoker, family history of blood clots.    Allergies include:  No Known Allergies      Immunizations:  Immunization History   Administered Date(s) Administered    Influenza, injectable, quadrivalent, preservative free 0.5 mL 11/20/2019    Tdap 08/13/2013, 04/27/2020    Tuberculin Skin Test-PPD Intradermal 08/13/2013, 06/02/2014     Immunizations Reviewed.    Prior to Admission Medications   Prescriptions Last Dose Informant Patient Reported? Taking?   Amitiza 8 MCG capsule   No No   Sig: Take 1 capsule (8 mcg total) by mouth 2 (two) times a day with meals   Patient not taking: Reported on 12/26/2023   Multiple Vitamin (multivitamin) tablet  Self Yes No   Sig: Take 1 tablet by mouth daily   albuterol (ProAir HFA) 90 mcg/act inhaler   No No   Sig: Inhale 2 puffs every 6 (six) hours as needed for wheezing or shortness of breath   ciprofloxacin-dexamethasone (CIPRODEX) otic suspension   No No   Sig: Administer 4 drops into the left ear 2 " (two) times a day   Patient not taking: Reported on 11/10/2023   fluconazole (DIFLUCAN) 200 mg tablet   No No   Sig: Take 1 tablet (200 mg total) by mouth daily for 7 days   Patient not taking: Reported on 12/26/2023   medroxyPROGESTERone (PROVERA) 10 mg tablet   No No   Sig: Take 1 tablet (10 mg total) by mouth daily   naproxen (Naprosyn) 500 mg tablet   No No   Sig: Take 1 tablet (500 mg total) by mouth 2 (two) times a day with meals for 7 days   nitrofurantoin (MACROBID) 100 mg capsule   No No   Sig: Please take 1 capsule twice daily for 7 days.   Patient not taking: Reported on 12/26/2023   nitrofurantoin (MACRODANTIN) 50 mg capsule   No No   Sig: Take 1 tablet by mouth pre & post intercourse   Patient not taking: Reported on 12/26/2023   oxybutynin (DITROPAN) 5 mg tablet   No No   Sig: Take 1 tablet (5 mg total) by mouth 2 (two) times a day as needed (bladdder spasm)   Patient not taking: Reported on 12/26/2023   phenazopyridine (PYRIDIUM) 100 mg tablet   No No   Sig: Take 1 tablet (100 mg total) by mouth 3 (three) times a day as needed for bladder spasms   Patient not taking: Reported on 12/26/2023      Facility-Administered Medications: None       Past Medical History:   Diagnosis Date    39 weeks gestation of pregnancy 12/09/2019    Anxiety     Bronchitis 04/03/2023    Contraceptive use     LAST ASSESSED: [id number]    Depression     d/c Buspar with preg - now started on Zoloft 25 mg 11/1/19    Exposure to COVID-19 virus 12/08/2020    External hemorrhoid, bleeding     LAST ASSESSED: 05MAR2014    Fetal abnormality affecting management of mother, antepartum 01/29/2020    Bilateral talipes equivarous  -Has established peds ortho @[facility] for after delivery    Gallbladder attack     Gastroenteritis 10/11/2023    Genetic screening 12/09/2019    Hematochezia     LAST ASSESSED: 13MAR2014    IBS (irritable bowel syndrome)     Immunity status testing     LAST ASSESSED: 02JUN2014    Inflammatory bowel disease      Methicillin resistant Staphylococcus aureus infection     Migraine     prev on effexor for migraine & anxiety - d/c approx 1 yr ago    Nasal congestion 2023    Poison ivy dermatitis     LAST ASSESSED: [id number]    Psychiatric disorder     Shingles     Skin abscess     RIGHT LEG    Upper respiratory tract infection 2022    Urinary frequency     LAST ASSESSED: [id number]    Urinary tract infection     UTI (urinary tract infection)     uses Macrobid after intercourse    Vacuum extractor delivery, delivered 2020    Varicella     childhood       Past Surgical History:   Procedure Laterality Date    TONSILLECTOMY      ONSET:     WISDOM TOOTH EXTRACTION         Family History   Problem Relation Age of Onset    Thyroid disease Mother         hypothyroid    Arthritis Mother     Thyroid disease Father     Kidney cancer Father     Drug abuse Brother     Diabetes Maternal Grandmother         type 2    Dementia Maternal Grandmother     Cancer Maternal Grandfather         prostate    Emphysema Paternal Grandmother     Cancer Family         MALIGNANT NEOPLASM     I have reviewed and agree with the history as documented.    E-Cigarette/Vaping    E-Cigarette Use Never User      E-Cigarette/Vaping Substances    Nicotine No     THC No     CBD No     Flavoring No     Other No     Unknown No      Social History     Tobacco Use    Smoking status: Former     Current packs/day: 0.00     Average packs/day: 0.3 packs/day for 8.6 years (2.1 ttl pk-yrs)     Types: Cigarettes     Start date: 2011     Quit date: 10/31/2019     Years since quittin.1    Smokeless tobacco: Never   Vaping Use    Vaping status: Never Used   Substance Use Topics    Alcohol use: Not Currently     Comment: occasional    Drug use: Never        Review of Systems   Constitutional:  Negative for chills and fever.   HENT:  Negative for ear pain and sore throat.    Eyes:  Negative for pain and visual disturbance.   Respiratory:  Negative for cough  and shortness of breath.    Cardiovascular:  Negative for chest pain and palpitations.   Gastrointestinal:  Negative for abdominal pain and vomiting.   Genitourinary:  Negative for dysuria and hematuria.   Musculoskeletal:  Positive for myalgias (R arm and leg). Negative for arthralgias and back pain.   Skin:  Negative for color change and rash.   Neurological:  Negative for seizures and syncope.   All other systems reviewed and are negative.      Physical Exam  ED Triage Vitals   Temperature Pulse Respirations Blood Pressure SpO2   12/28/23 1239 12/28/23 1237 12/28/23 1237 12/28/23 1237 12/28/23 1237   98.1 °F (36.7 °C) (!) 110 18 136/85 99 %      Temp src Heart Rate Source Patient Position - Orthostatic VS BP Location FiO2 (%)   -- 12/28/23 1237 12/28/23 1237 -- --    Monitor Lying        Pain Score       --                    Orthostatic Vital Signs  Vitals:    12/28/23 1237   BP: 136/85   Pulse: (!) 110   Patient Position - Orthostatic VS: Lying       Physical Exam  Vitals and nursing note reviewed.   Constitutional:       General: She is not in acute distress.     Appearance: She is well-developed. She is not ill-appearing.      Comments: Appears anxious   HENT:      Head: Normocephalic and atraumatic.   Eyes:      Conjunctiva/sclera: Conjunctivae normal.   Cardiovascular:      Rate and Rhythm: Normal rate and regular rhythm.      Pulses: Normal pulses.      Heart sounds: Normal heart sounds. No murmur heard.     No friction rub. No gallop.   Pulmonary:      Effort: Pulmonary effort is normal. No respiratory distress.      Breath sounds: Normal breath sounds. No stridor. No wheezing, rhonchi or rales.   Chest:      Chest wall: No tenderness.   Abdominal:      Palpations: Abdomen is soft.      Tenderness: There is no abdominal tenderness. There is no guarding or rebound.   Musculoskeletal:         General: No swelling, tenderness, deformity or signs of injury. Normal range of motion.      Cervical back: Neck  supple.      Right lower leg: No edema.      Left lower leg: No edema.   Skin:     General: Skin is warm and dry.      Capillary Refill: Capillary refill takes less than 2 seconds.   Neurological:      Mental Status: She is alert.      Comments: Patient is speaking clearly in complete sentences.  Patient is answering appropriately and able follow commands.  Patient is moving all four extremities spontaneously.  No facial droop.      Psychiatric:         Mood and Affect: Mood normal.         ED Medications  Medications - No data to display    Diagnostic Studies  Results Reviewed       Procedure Component Value Units Date/Time    Hilliards draw [[id number]] Collected: 12/28/23 1244    Lab Status: Final result Specimen: Blood from Arm, Right Updated: 12/28/23 1401    Narrative:      The following orders were created for panel order Hilliards draw.  Procedure                               Abnormality         Status                     ---------                               -----------         ------                     Light Blue Top on hold[[id number]]                           Final result               Gold top on hold[[id number]]                                 Final result               Green / Black tube on hold[[id number]]                       Final result                 Please view results for these tests on the individual orders.    Comprehensive metabolic panel [[id number]]  (Abnormal) Collected: 12/28/23 1244    Lab Status: Final result Specimen: Blood from Arm, Right Updated: 12/28/23 1312     Sodium 138 mmol/L      Potassium 3.8 mmol/L      Chloride 104 mmol/L      CO2 27 mmol/L      ANION GAP 7 mmol/L      BUN 4 mg/dL      Creatinine 0.70 mg/dL      Glucose 108 mg/dL      Calcium 9.3 mg/dL      AST 13 U/L      ALT 8 U/L      Alkaline Phosphatase 41 U/L      Total Protein 7.3 g/dL      Albumin 4.8 g/dL      Total Bilirubin 0.42 mg/dL      eGFR 115 ml/min/1.73sq m     Narrative:      National Kidney Disease  Foundation guidelines for Chronic Kidney Disease (CKD):     Stage 1 with normal or high GFR (GFR > 90 mL/min/1.73 square meters)    Stage 2 Mild CKD (GFR = 60-89 mL/min/1.73 square meters)    Stage 3A Moderate CKD (GFR = 45-59 mL/min/1.73 square meters)    Stage 3B Moderate CKD (GFR = 30-44 mL/min/1.73 square meters)    Stage 4 Severe CKD (GFR = 15-29 mL/min/1.73 square meters)    Stage 5 End Stage CKD (GFR <15 mL/min/1.73 square meters)  Note: GFR calculation is accurate only with a steady state creatinine    CBC and differential [[id number]]  (Abnormal) Collected: 12/28/23 1244    Lab Status: Final result Specimen: Blood from Arm, Right Updated: 12/28/23 1301     WBC 6.88 Thousand/uL      RBC 4.28 Million/uL      Hemoglobin 13.2 g/dL      Hematocrit 40.0 %      MCV 94 fL      MCH 30.8 pg      MCHC 33.0 g/dL      RDW 12.9 %      MPV 9.2 fL      Platelets 229 Thousands/uL      nRBC 0 /100 WBCs      Neutrophils Relative 79 %      Immat GRANS % 0 %      Lymphocytes Relative 16 %      Monocytes Relative 5 %      Eosinophils Relative 0 %      Basophils Relative 0 %      Neutrophils Absolute 5.37 Thousands/µL      Immature Grans Absolute 0.02 Thousand/uL      Lymphocytes Absolute 1.09 Thousands/µL      Monocytes Absolute 0.37 Thousand/µL      Eosinophils Absolute 0.01 Thousand/µL      Basophils Absolute 0.02 Thousands/µL                    VAS lower limb venous duplex study, unilateral/limited    (Results Pending)   VAS upper limb venous duplex scan, unilateral/limited    (Results Pending)         Procedures  Procedures      ED Course  ED Course as of 12/28/23 1506   Thu Dec 28, 2023   1420 DDx includes but not limited to: DVT, musculoskeletal,     1502 Per tech, negative for DVT is RUE and RLE   1502 Symptoms are likely side effect of her new medication/anxiety. Workup negative for DVT, no further workup indicated at this time. Notified pt official US read will be on [employer].    Discussed results with patient  and plan for discharge with outpatient follow up. Instructed patient to return to ED for new or worsening symptoms. Patient voices understanding and agrees with plan. No other concerns at this time.                                         Medical Decision Making  Amount and/or Complexity of Data Reviewed  Labs: ordered.        See ED course for MDM.    Disposition  Final diagnoses:   Right leg pain   Right arm pain     Time reflects when diagnosis was documented in both MDM as applicable and the Disposition within this note       Time User Action Codes Description Comment    12/28/2023  3:00 PM Marie Yanez [M79.604] Right leg pain     12/28/2023  3:00 PM Marie Yanez [M79.601] Right arm pain           ED Disposition       ED Disposition   Discharge    Condition   Stable    Date/Time   Thu Dec 28, 2023  3:01 PM    Comment   Noreen Velazquez discharge to home/self care.                   Follow-up Information       Follow up With Specialties Details Why Contact Info    Lea Reyes, MD Internal Medicine In 2 days  [address] [place] [address]  [phone]      Dr. Chavez  In 2 days              Discharge Medication List as of 12/28/2023  3:02 PM        CONTINUE these medications which have NOT CHANGED    Details   albuterol (ProAir HFA) 90 mcg/act inhaler Inhale 2 puffs every 6 (six) hours as needed for wheezing or shortness of breath, Starting Fri 4/7/2023, Normal      Amitiza 8 MCG capsule Take 1 capsule (8 mcg total) by mouth 2 (two) times a day with meals, Starting Tue 7/12/2022, Normal      ciprofloxacin-dexamethasone (CIPRODEX) otic suspension Administer 4 drops into the left ear 2 (two) times a day, Starting Wed 11/1/2023, Normal      fluconazole (DIFLUCAN) 200 mg tablet Take 1 tablet (200 mg total) by mouth daily for 7 days, Starting Thu 12/21/2023, Until Thu 12/28/2023, Normal      medroxyPROGESTERone (PROVERA) 10 mg tablet Take 1 tablet (10 mg total) by mouth daily, Starting Tue  12/26/2023, Normal      Multiple Vitamin (multivitamin) tablet Take 1 tablet by mouth daily, Historical Med      naproxen (Naprosyn) 500 mg tablet Take 1 tablet (500 mg total) by mouth 2 (two) times a day with meals for 7 days, Starting Sat 12/23/2023, Until Sat 12/30/2023, Normal      nitrofurantoin (MACROBID) 100 mg capsule Please take 1 capsule twice daily for 7 days., Normal      nitrofurantoin (MACRODANTIN) 50 mg capsule Take 1 tablet by mouth pre & post intercourse, Normal      oxybutynin (DITROPAN) 5 mg tablet Take 1 tablet (5 mg total) by mouth 2 (two) times a day as needed (bladdder spasm), Starting Thu 11/2/2023, No Print      phenazopyridine (PYRIDIUM) 100 mg tablet Take 1 tablet (100 mg total) by mouth 3 (three) times a day as needed for bladder spasms, Starting Thu 11/2/2023, No Print           No discharge procedures on file.    PDMP Review       None             ED Provider  Attending physically available and evaluated Noreen Velazquez. I managed the patient along with the ED Attending.    Electronically Signed by           Marie Yanez MD  12/28/23 6243

## 2023-12-28 NOTE — TELEPHONE ENCOUNTER
"Patient would like to speak to you regarding follow up call from yesterday and symptoms she was having with leg pain and now arm feeling \"funny\". Wondering if could be side effect from medication you prescribed for her abnormal bleeding.     # [phone]  " Family

## 2023-12-29 ENCOUNTER — TELEPHONE (OUTPATIENT)
Dept: INTERNAL MEDICINE CLINIC | Facility: CLINIC | Age: 33
End: 2023-12-29

## 2023-12-29 NOTE — TELEPHONE ENCOUNTER
Patient is requesting a virtual ED follow up visit asap (last ED visit was yesterday). I wanted to double check with you that a same day would be ok to use next week? She has been in the ED several times and has concerns on anxiety and she did report she is working with OB but wanted to speak with you on things they are unable to help her with.     Please advise

## 2023-12-30 ENCOUNTER — NURSE TRIAGE (OUTPATIENT)
Dept: OTHER | Facility: OTHER | Age: 33
End: 2023-12-30

## 2023-12-30 NOTE — TELEPHONE ENCOUNTER
"Reason for Disposition  • [1] Symptoms of anxiety or panic AND [2] has not been evaluated for this by physician    Answer Assessment - Initial Assessment Questions  1. CONCERN: \"What happened that made you call today?\"      I am having such high anxiety.  I have chest tightness, palpitations, \"head rush\"  2. ANXIETY SYMPTOM SCREENING: \"Can you describe how you have been feeling?\"  (e.g., tense, restless, panicky, anxious, keyed up, trouble sleeping, trouble concentrating)      I am scared, worried, pacing, restless, constantly checking my heart, and I am not \"mentally right\"   3. ONSET: \"How long have you been feeling this way?\"      Anxiety started awhile ago with all of my medical problems, and then they put me on Provera, and that seems to have increased it.  I talked with Dr Chavez today and he told me no more Provera, so I am not going to take it anymore, but I am still very anxious   4. RECURRENT: \"Have you felt this way before?\"  If Yes, ask: \"What happened that time?\" \"What helped these feelings go away in the past?\"       I did have anxiety previously and was on Lexapro for over 10 years.   I haven't been on anything in a long time, but I probably should be.  Anxiety is not new for me.   5. RISK OF HARM - SUICIDAL IDEATION:  \"Do you ever have thoughts of hurting or killing yourself?\"  (e.g., yes, no, no but preoccupation with thoughts about death)    - INTENT:  \"Do you have thoughts of hurting or killing yourself right NOW?\" (e.g., yes, no, N/A)    - PLAN: \"Do you have a specific plan for how you would do this?\" (e.g., gun, knife, overdose, no plan, N/A)      No thought sof self harm   6. RISK OF HARM - HOMICIDAL IDEATION:  \"Do you ever have thoughts of hurting or killing someone else?\"  (e.g., yes, no, no but preoccupation with thoughts about death)    - INTENT:  \"Do you have thoughts of hurting or killing someone right NOW?\" (e.g., yes, no, N/A)    - PLAN: \"Do you have a specific plan for how you would do " "this?\" (e.g., gun, knife, no plan, N/A)       No thoughts of hurting others   7. FUNCTIONAL IMPAIRMENT: \"How have things been going for you overall? Have you had more difficulty than usual doing your normal daily activities?\"  (e.g., better, same, worse; self-care, school, work, interactions)      I am home full time and I am not myself, but I am mostly doing my regular things.  I am having crying spells because I am so scared about my medical problems.  I did have a day in bed because I was having so much abd pain and severe menstrual bleeding.   8. SUPPORT: \"Who is with you now?\" \"Who do you live with?\" \"Do you have family or friends who you can talk to?\"       I do have support.  My fiancee and parents are here   9. THERAPIST: \"Do you have a counselor or therapist? Name?\"      I did see someone in the past.  I saw the therapist at Dr Reyes' office   10. STRESSORS: \"Has there been any new stress or recent changes in your life?\"        The biggest stressor now is my medical problems.    11. CAFFEINE USE: \"Do you drink caffeinated beverages, and how much each day?\" (e.g., coffee, tea, kirti)        I do have coffee in the morning, but that's all   12. ALCOHOL USE OR SUBSTANCE USE (DRUG USE): \"Do you drink alcohol or use any illegal drugs?\"        No alcohol recently with everything going on   13. OTHER SYMPTOMS: \"Do you have any other physical symptoms right now?\" (e.g., chest pain, palpitations, difficulty breathing, fever)        palpitations  14. PREGNANCY: \"Is there any chance you are pregnant?\" \"When was your last menstrual period?\"        LMP was 12/3-12/9 but other gyn issues go back to November.  I have had agonizing pain with my periods and heavy bleeding.  I started the Provera, which helped the bleeding but everything else got worse    Protocols used: Anxiety and Panic Attack-ADULT-AH    "

## 2023-12-30 NOTE — TELEPHONE ENCOUNTER
"Regarding: severe panic attack/ medication reaction/ heart racing  ----- Message from Bella Gilman sent at 12/30/2023  9:39 AM EST -----  \"I am having severe panic attacks. I was told that it may be from the PROVERA that I was put on for severe uterus bleeding. I have been in and out of the ED 5 times this week. My heart is racing and I am having heart palpations.\"    "

## 2023-12-30 NOTE — TELEPHONE ENCOUNTER
"Pt called reporting high anxiety and likely panic attack.  Pt states she was having severe cramping and heavy bleeding with her periods which initiated her feelings of anxiety as she sought answers for this change in her health.  Her OB/GYN prescribed Provera, but while that helped with medical symptoms, it seemed to increase her anxiety.  Pt spoke with Dr Chavez this morning, who determined that Provera was causing increased anxiety for pt, and he recommended stopping use of Provera.   Pt took Provera yesterday, but will not be taking any further doses per this recommendation.  Pt states her anxiety remains very high as she worries about her health.  She is having palpitations, pacing, restlessness, chest tightness and says she is \"not mentally right\".  Pt denies SI/HI.  Per pt, Dr Chavez advised pt not to go to ER as he believed this was not cardiac in nature but driven by anxiety.  Pt asking if she can be started on an anti-anxiety medication now.  Advice offered per protocol.  Advised pt that initiating anti-anxiety medication would require a physical exam , and also that most anxiety medications are controlled substances.  Pt will attempt measures to decrease anxiety.  Pt checked vital signs during call and BP is 130/87 and HR is 96.  Pt will call back if needed.   "

## 2024-01-01 ENCOUNTER — OFFICE VISIT (OUTPATIENT)
Dept: URGENT CARE | Age: 34
End: 2024-01-01
Payer: COMMERCIAL

## 2024-01-01 VITALS
RESPIRATION RATE: 18 BRPM | DIASTOLIC BLOOD PRESSURE: 82 MMHG | HEART RATE: 92 BPM | SYSTOLIC BLOOD PRESSURE: 142 MMHG | TEMPERATURE: 98.4 F | OXYGEN SATURATION: 98 %

## 2024-01-01 DIAGNOSIS — B37.0 THRUSH: Primary | ICD-10-CM

## 2024-01-01 PROCEDURE — 99213 OFFICE O/P EST LOW 20 MIN: CPT

## 2024-01-01 NOTE — PATIENT INSTRUCTIONS
Will treat thrush with nystatin 4 times a day for 2 weeks.     Brush your teeth, gums, and tongue after you eat and before you go to sleep.     Use a toothbrush with soft bristles.    Tylenol/Motrin for any pain or discomfort.    Follow up with PCP in 3-5 days.  Proceed to ER if symptoms worsen.

## 2024-01-01 NOTE — PROGRESS NOTES
[facility]        NAME: Noreen Velazquez is a 33 y.o. female  : 1990    MRN: [id number]  DATE: 2024  TIME: 1:47 PM    Assessment and Plan   Thrush [B37.0]  1. Thrush          Patient Instructions   Will treat thrush with nystatin 4 times a day for 2 weeks.     Brush your teeth, gums, and tongue after you eat and before you go to sleep.     Use a toothbrush with soft bristles.    Tylenol/Motrin for any pain or discomfort.    Follow up with PCP in 3-5 days.  Proceed to ER if symptoms worsen.    Chief Complaint     Chief Complaint   Patient presents with    Thrush     Patient has been on several round of antiobotics and the last medication she has noticed that her mouth is very dry and she does not feel quite right with the back of her throat area         History of Present Illness       Sore Throat   This is a new problem. The current episode started yesterday. The problem has been gradually worsening. There has been no fever. The pain is mild. Pertinent negatives include no coughing or shortness of breath. Associated symptoms comments: Dry mouth/throat.       Review of Systems   Review of Systems   Constitutional:  Negative for chills, diaphoresis, fatigue and fever.   HENT:  Positive for sore throat.         White coating at the back of tongue   Respiratory: Negative.  Negative for cough, shortness of breath and wheezing.    Cardiovascular: Negative.  Negative for chest pain and palpitations.     Current Medications       Current Outpatient Medications:     albuterol (ProAir HFA) 90 mcg/act inhaler, Inhale 2 puffs every 6 (six) hours as needed for wheezing or shortness of breath, Disp: 8.5 g, Rfl: 0    medroxyPROGESTERone (PROVERA) 10 mg tablet, Take 1 tablet (10 mg total) by mouth daily, Disp: 10 tablet, Rfl: 1    Multiple Vitamin (multivitamin) tablet, Take 1 tablet by mouth daily, Disp: , Rfl:     naproxen (Naprosyn) 500 mg tablet, Take 1 tablet (500 mg total) by mouth 2 (two)  times a day with meals for 7 days, Disp: 14 tablet, Rfl: 0    Current Allergies     Allergies as of 01/01/2024    (No Known Allergies)            The following portions of the patient's history were reviewed and updated as appropriate: allergies, current medications, past family history, past medical history, past social history, past surgical history and problem list.     Past Medical History:   Diagnosis Date    39 weeks gestation of pregnancy 12/09/2019    Anxiety     Bronchitis 04/03/2023    Contraceptive use     LAST ASSESSED: [id number]    Depression     d/c Buspar with preg - now started on Zoloft 25 mg 11/1/19    Exposure to COVID-19 virus 12/08/2020    External hemorrhoid, bleeding     LAST ASSESSED: 05MAR2014    Fetal abnormality affecting management of mother, antepartum 01/29/2020    Bilateral talipes equivarous  -Has established peds ortho @[facility] for after delivery    Gallbladder attack     Gastroenteritis 10/11/2023    Genetic screening 12/09/2019    Hematochezia     LAST ASSESSED: 13MAR2014    IBS (irritable bowel syndrome)     Immunity status testing     LAST ASSESSED: [id number]    Inflammatory bowel disease     Methicillin resistant Staphylococcus aureus infection     Migraine     prev on effexor for migraine & anxiety - d/c approx 1 yr ago    Nasal congestion 07/18/2023    Poison ivy dermatitis     LAST ASSESSED: [id number]    Psychiatric disorder     Shingles     Skin abscess     RIGHT LEG    Upper respiratory tract infection 04/11/2022    Urinary frequency     LAST ASSESSED: [id number]    Urinary tract infection     UTI (urinary tract infection)     uses Macrobid after intercourse    Vacuum extractor delivery, delivered 06/11/2020    Varicella     childhood       Past Surgical History:   Procedure Laterality Date    TONSILLECTOMY      ONSET: 1997    WISDOM TOOTH EXTRACTION         Family History   Problem Relation Age of Onset    Thyroid disease Mother         hypothyroid    Arthritis Mother      Thyroid disease Father     Kidney cancer Father     Drug abuse Brother     Diabetes Maternal Grandmother         type 2    Dementia Maternal Grandmother     Cancer Maternal Grandfather         prostate    Emphysema Paternal Grandmother     Cancer Family         MALIGNANT NEOPLASM         Medications have been verified.        Objective   /82 (BP Location: Right arm, Patient Position: Sitting, Cuff Size: Standard)   Pulse 92   Temp 98.4 °F (36.9 °C)   Resp 18   LMP 12/28/2023   SpO2 98%        Physical Exam     Physical Exam  Vitals and nursing note reviewed.   Constitutional:       General: She is not in acute distress.     Appearance: Normal appearance. She is not ill-appearing, toxic-appearing or diaphoretic.   HENT:      Head: Normocephalic.      Mouth/Throat:      Mouth: Mucous membranes are moist.     Cardiovascular:      Rate and Rhythm: Normal rate and regular rhythm.      Pulses: Normal pulses.      Heart sounds: Normal heart sounds. No murmur heard.  Pulmonary:      Effort: Pulmonary effort is normal. No respiratory distress.      Breath sounds: Normal breath sounds. No stridor. No wheezing, rhonchi or rales.   Chest:      Chest wall: No tenderness.   Neurological:      Mental Status: She is alert.   Psychiatric:         Mood and Affect: Mood is anxious. Affect is tearful.

## 2024-01-03 ENCOUNTER — OFFICE VISIT (OUTPATIENT)
Dept: OBGYN CLINIC | Facility: CLINIC | Age: 34
End: 2024-01-03
Payer: COMMERCIAL

## 2024-01-03 ENCOUNTER — TELEPHONE (OUTPATIENT)
Dept: OBGYN CLINIC | Facility: CLINIC | Age: 34
End: 2024-01-03

## 2024-01-03 VITALS
DIASTOLIC BLOOD PRESSURE: 78 MMHG | HEIGHT: 60 IN | WEIGHT: 105 LBS | SYSTOLIC BLOOD PRESSURE: 138 MMHG | BODY MASS INDEX: 20.62 KG/M2

## 2024-01-03 DIAGNOSIS — R10.2 PELVIC PAIN: ICD-10-CM

## 2024-01-03 DIAGNOSIS — N83.201 CYST OF RIGHT OVARY: Primary | ICD-10-CM

## 2024-01-03 DIAGNOSIS — N93.8 DUB (DYSFUNCTIONAL UTERINE BLEEDING): ICD-10-CM

## 2024-01-03 PROCEDURE — 99214 OFFICE O/P EST MOD 30 MIN: CPT | Performed by: PHYSICIAN ASSISTANT

## 2024-01-03 RX ORDER — FLUTICASONE PROPIONATE 50 MCG
1 SPRAY, SUSPENSION (ML) NASAL DAILY
COMMUNITY
Start: 2023-11-17 | End: 2024-01-18 | Stop reason: ALTCHOICE

## 2024-01-03 NOTE — TELEPHONE ENCOUNTER
Patient being treated for oral thrush, now complaining of urgency, and burning with urination and some back spasms.  Unsure if starting with urinary symptoms.

## 2024-01-03 NOTE — TELEPHONE ENCOUNTER
Patient started with urinary urgency & dysuria, back spasms last night.  Being treated currently with Nystatin mouth wash for oral thrush.  Was treated for UTI sx in 11/2023 (did not have U/A, C&S).  Had U/A 12/21/2023 when seen in ED.

## 2024-01-04 ENCOUNTER — OFFICE VISIT (OUTPATIENT)
Dept: INTERNAL MEDICINE CLINIC | Facility: CLINIC | Age: 34
End: 2024-01-04
Payer: COMMERCIAL

## 2024-01-04 VITALS
HEIGHT: 60 IN | DIASTOLIC BLOOD PRESSURE: 70 MMHG | BODY MASS INDEX: 20.62 KG/M2 | WEIGHT: 105 LBS | SYSTOLIC BLOOD PRESSURE: 120 MMHG

## 2024-01-04 DIAGNOSIS — H92.02 LEFT EAR PAIN: ICD-10-CM

## 2024-01-04 DIAGNOSIS — B37.0 THRUSH, ORAL: ICD-10-CM

## 2024-01-04 DIAGNOSIS — N93.9 ABNORMAL UTERINE BLEEDING (AUB): ICD-10-CM

## 2024-01-04 DIAGNOSIS — R63.4 WEIGHT LOSS: ICD-10-CM

## 2024-01-04 DIAGNOSIS — F41.1 GENERALIZED ANXIETY DISORDER: Primary | ICD-10-CM

## 2024-01-04 PROCEDURE — 99214 OFFICE O/P EST MOD 30 MIN: CPT | Performed by: INTERNAL MEDICINE

## 2024-01-04 RX ORDER — ESCITALOPRAM OXALATE 10 MG/1
10 TABLET ORAL DAILY
Qty: 30 TABLET | Refills: 2 | Status: SHIPPED | OUTPATIENT
Start: 2024-01-04 | End: 2024-01-08

## 2024-01-04 RX ORDER — ALPRAZOLAM 0.25 MG/1
0.25 TABLET ORAL 2 TIMES DAILY PRN
Qty: 30 TABLET | Refills: 0 | Status: SHIPPED | OUTPATIENT
Start: 2024-01-04

## 2024-01-04 NOTE — PROGRESS NOTES
Name: Noreen Velazquez      : 1990      MRN: [id number]  Encounter Provider: Lea Reyes, MD  Encounter Date: 2024   [facility]    Assessment & Plan     1. Generalized anxiety disorder  -     ALPRAZolam (XANAX) 0.25 mg tablet; Take 1 tablet (0.25 mg total) by mouth 2 (two) times a day as needed for anxiety    2. Weight loss  -     CBC and differential; Future  -     Comprehensive metabolic panel    3. Left ear pain  -     Ambulatory Referral to Otolaryngology; Future    4. Abnormal uterine bleeding (AUB)    5. Thrush, oral       Multiple symptoms compounded by anxiety  Obtain labs including TSH ordered by gyn  Patient provided reassurance  Agrees to starting SSRI for anxiety. She was on Lexapro before  Limited use of Xanax discussed  Chronic left ear pain and recommended to see ENT  Subjective      Here with her fiancee  She has been increasingly anxious about various symptoms. She has had multiple ER visits.   She was on Augmentin, otic cipro and dexamethasone, prednisone for an ear infection in November   She then c/o UTI symptoms and treated with Macrobid  Symptoms persisted and eventually diagnosed with BV and treated with metronidazole  Abd CT showed a right ovarian cyst, likely physiologic  Last ER visit was for right leg pain  Doppler negative for DVT  Urgent care visit for thrush and placed on Nystatin which is helping  Appetite is poor, +weight loss in the past week  She has chest pain palpitations as well          Review of Systems   Constitutional:  Positive for unexpected weight change (weight loss).   HENT:  Positive for ear pain.    Cardiovascular:  Positive for chest pain and palpitations.   Genitourinary:  Positive for menstrual problem and vaginal bleeding.   Musculoskeletal:  Positive for back pain.   Psychiatric/Behavioral:  Positive for dysphoric mood. The patient is nervous/anxious.        Current Outpatient Medications on File Prior  to Visit   Medication Sig    albuterol (ProAir HFA) 90 mcg/act inhaler Inhale 2 puffs every 6 (six) hours as needed for wheezing or shortness of breath    fluticasone (FLONASE) 50 mcg/act nasal spray 1 spray into each nostril daily    miconazole 2 % cream Apply 1 application. topically 2 (two) times a day    Multiple Vitamin (multivitamin) tablet Take 1 tablet by mouth daily    nystatin (MYCOSTATIN) 500,000 units/5 mL suspension Apply 5 mL (500,000 Units total) to the mouth or throat 4 (four) times a day for 14 days    medroxyPROGESTERone (PROVERA) 10 mg tablet Take 1 tablet (10 mg total) by mouth daily    naproxen (Naprosyn) 500 mg tablet Take 1 tablet (500 mg total) by mouth 2 (two) times a day with meals for 7 days (Patient not taking: Reported on 1/4/2024)       Objective     /70 (BP Location: Left arm, Patient Position: Sitting, Cuff Size: Standard)   Ht 5' (1.524 m)   Wt 47.6 kg (105 lb)   LMP 12/28/2023   BMI 20.51 kg/m²     Physical Exam  Constitutional:       Appearance: She is well-developed. She is not ill-appearing.   HENT:      Right Ear: External ear normal. Tympanic membrane is not scarred.      Left Ear: External ear normal. Tympanic membrane is scarred.      Mouth/Throat:      Pharynx: No oropharyngeal exudate or posterior oropharyngeal erythema.      Comments: Some thrush in the posterior tongue  Eyes:      Conjunctiva/sclera: Conjunctivae normal.   Cardiovascular:      Rate and Rhythm: Normal rate and regular rhythm.      Heart sounds: Normal heart sounds. No murmur heard.  Pulmonary:      Effort: Pulmonary effort is normal. No respiratory distress.      Breath sounds: Normal breath sounds. No wheezing or rales.   Abdominal:      General: There is no distension.      Palpations: Abdomen is soft. There is no mass.      Tenderness: There is no abdominal tenderness. There is no guarding or rebound.   Musculoskeletal:      Cervical back: Neck supple.      Right lower leg: No edema.      Left  lower leg: No edema.   Neurological:      Mental Status: She is alert and oriented to person, place, and time.   Psychiatric:         Mood and Affect: Mood is anxious.         Behavior: Behavior normal.         Thought Content: Thought content normal.         Judgment: Judgment normal.       Lea Reyes, MD

## 2024-01-05 ENCOUNTER — HOSPITAL ENCOUNTER (EMERGENCY)
Facility: HOSPITAL | Age: 34
Discharge: HOME | End: 2024-01-05
Attending: EMERGENCY MEDICINE | Admitting: EMERGENCY MEDICINE
Payer: COMMERCIAL

## 2024-01-05 ENCOUNTER — APPOINTMENT (OUTPATIENT)
Dept: LAB | Age: 34
End: 2024-01-05
Payer: COMMERCIAL

## 2024-01-05 ENCOUNTER — NURSE TRIAGE (OUTPATIENT)
Age: 34
End: 2024-01-05

## 2024-01-05 VITALS
DIASTOLIC BLOOD PRESSURE: 82 MMHG | HEART RATE: 87 BPM | OXYGEN SATURATION: 100 % | SYSTOLIC BLOOD PRESSURE: 133 MMHG | RESPIRATION RATE: 16 BRPM | TEMPERATURE: 98.1 F

## 2024-01-05 DIAGNOSIS — B37.0 ORAL THRUSH: ICD-10-CM

## 2024-01-05 DIAGNOSIS — N93.8 DUB (DYSFUNCTIONAL UTERINE BLEEDING): ICD-10-CM

## 2024-01-05 DIAGNOSIS — R63.4 WEIGHT LOSS: ICD-10-CM

## 2024-01-05 DIAGNOSIS — R59.1 LYMPHADENOPATHY: Primary | ICD-10-CM

## 2024-01-05 DIAGNOSIS — R10.2 PELVIC PAIN: ICD-10-CM

## 2024-01-05 DIAGNOSIS — R53.82 CHRONIC FATIGUE: ICD-10-CM

## 2024-01-05 LAB
ALBUMIN SERPL BCP-MCNC: 5 G/DL (ref 3.5–5)
ALP SERPL-CCNC: 43 U/L (ref 34–104)
ALT SERPL W P-5'-P-CCNC: 10 U/L (ref 7–52)
ANION GAP SERPL CALCULATED.3IONS-SCNC: 5 MMOL/L
AST SERPL W P-5'-P-CCNC: 16 U/L (ref 13–39)
BASOPHILS # BLD AUTO: 0.07 THOUSANDS/ÂΜL (ref 0–0.1)
BASOPHILS NFR BLD AUTO: 1 % (ref 0–1)
BILIRUB SERPL-MCNC: 0.61 MG/DL (ref 0.2–1)
BUN SERPL-MCNC: 10 MG/DL (ref 5–25)
CALCIUM SERPL-MCNC: 9.9 MG/DL (ref 8.4–10.2)
CHLORIDE SERPL-SCNC: 105 MMOL/L (ref 96–108)
CO2 SERPL-SCNC: 29 MMOL/L (ref 21–32)
CREAT SERPL-MCNC: 0.69 MG/DL (ref 0.6–1.3)
EOSINOPHIL # BLD AUTO: 0.1 THOUSAND/ÂΜL (ref 0–0.61)
EOSINOPHIL NFR BLD AUTO: 2 % (ref 0–6)
ERYTHROCYTE [DISTWIDTH] IN BLOOD BY AUTOMATED COUNT: 12.8 % (ref 11.6–15.1)
FSH SERPL-ACNC: 6.9 MIU/ML
GFR SERPL CREATININE-BSD FRML MDRD: 116 ML/MIN/1.73SQ M
GLUCOSE SERPL-MCNC: 114 MG/DL (ref 65–140)
HCT VFR BLD AUTO: 43.3 % (ref 34.8–46.1)
HGB BLD-MCNC: 14 G/DL (ref 11.5–15.4)
IMM GRANULOCYTES # BLD AUTO: 0.01 THOUSAND/UL (ref 0–0.2)
IMM GRANULOCYTES NFR BLD AUTO: 0 % (ref 0–2)
LH SERPL-ACNC: 6.4 MIU/ML
LYMPHOCYTES # BLD AUTO: 1.34 THOUSANDS/ÂΜL (ref 0.6–4.47)
LYMPHOCYTES NFR BLD AUTO: 22 % (ref 14–44)
MCH RBC QN AUTO: 30.9 PG (ref 26.8–34.3)
MCHC RBC AUTO-ENTMCNC: 32.3 G/DL (ref 31.4–37.4)
MCV RBC AUTO: 96 FL (ref 82–98)
MONOCYTES # BLD AUTO: 0.5 THOUSAND/ÂΜL (ref 0.17–1.22)
MONOCYTES NFR BLD AUTO: 8 % (ref 4–12)
NEUTROPHILS # BLD AUTO: 4.09 THOUSANDS/ÂΜL (ref 1.85–7.62)
NEUTS SEG NFR BLD AUTO: 67 % (ref 43–75)
NRBC BLD AUTO-RTO: 0 /100 WBCS
PLATELET # BLD AUTO: 274 THOUSANDS/UL (ref 149–390)
PMV BLD AUTO: 10.2 FL (ref 8.9–12.7)
POTASSIUM SERPL-SCNC: 4.5 MMOL/L (ref 3.5–5.3)
PROT SERPL-MCNC: 7.4 G/DL (ref 6.4–8.4)
RBC # BLD AUTO: 4.53 MILLION/UL (ref 3.81–5.12)
SODIUM SERPL-SCNC: 139 MMOL/L (ref 135–147)
TSH SERPL DL<=0.05 MIU/L-ACNC: 1.33 UIU/ML (ref 0.45–4.5)
WBC # BLD AUTO: 6.11 THOUSAND/UL (ref 4.31–10.16)

## 2024-01-05 PROCEDURE — 99283 EMERGENCY DEPT VISIT LOW MDM: CPT

## 2024-01-05 PROCEDURE — 83001 ASSAY OF GONADOTROPIN (FSH): CPT

## 2024-01-05 PROCEDURE — 85025 COMPLETE CBC W/AUTO DIFF WBC: CPT

## 2024-01-05 PROCEDURE — 83002 ASSAY OF GONADOTROPIN (LH): CPT

## 2024-01-05 PROCEDURE — 36415 COLL VENOUS BLD VENIPUNCTURE: CPT | Performed by: INTERNAL MEDICINE

## 2024-01-05 PROCEDURE — 80053 COMPREHEN METABOLIC PANEL: CPT | Performed by: INTERNAL MEDICINE

## 2024-01-05 PROCEDURE — 84443 ASSAY THYROID STIM HORMONE: CPT

## 2024-01-05 PROCEDURE — 99284 EMERGENCY DEPT VISIT MOD MDM: CPT | Performed by: EMERGENCY MEDICINE

## 2024-01-05 RX ORDER — CLOTRIMAZOLE 10 MG/1
10 LOZENGE ORAL; TOPICAL
Qty: 35 TABLET | Refills: 0 | Status: SHIPPED | OUTPATIENT
Start: 2024-01-12 | End: 2024-01-18 | Stop reason: ALTCHOICE

## 2024-01-05 RX ORDER — CLOTRIMAZOLE 10 MG/1
10 LOZENGE ORAL; TOPICAL
Qty: 35 TABLET | Refills: 0 | Status: SHIPPED | OUTPATIENT
Start: 2024-01-12 | End: 2024-01-05

## 2024-01-05 NOTE — ED ATTENDING ATTESTATION
1/5/2024  I, Miguel Angel Shrestha DO, saw and evaluated the patient. I have discussed the patient with the resident/non-physician practitioner and agree with the resident's/non-physician practitioner's findings, Plan of Care, and MDM as documented in the resident's/non-physician practitioner's note, except where noted. All available labs and Radiology studies were reviewed.  I was present for key portions of any procedure(s) performed by the resident/non-physician practitioner and I was immediately available to provide assistance.       At this point I agree with the current assessment done in the Emergency Department.  I have conducted an independent evaluation of this patient a history and physical is as follows:        33-year-old female, over the past 3 months has been treated with multiple rounds of antibiotics and steroids for ear infections, yeast infections, bacterial vaginosis and UTIs.,  Was diagnosed with thrush last week, has been taking nystatin but admits that she has not been taking it correctly, instead of 4 times a day she has been taking it about 2 times a day.  This morning said her throat was really dry and thought her tongue was swollen called her PCP who advised she come to the emergency department.,  No difficulty breathing but does admit to a large amount of anxiety we spent a large time talking about her thoughts, she is very nervous that she has a developing throat cancer.        Review of Systems   Constitutional: Negative for fever.   Respiratory: Negative for chest tightness and shortness of breath.    Cardiovascular: Negative for chest pain.   Skin: Negative for rash.   Neurological: Negative for dizziness, light-headedness and headaches.       Physical Exam  Vitals reviewed.   Constitutional:       General: She is not in acute distress.     Appearance: She is well-developed. She is not diaphoretic.   HENT:      Head: Normocephalic and atraumatic.      Right Ear: External ear normal.       Left Ear: External ear normal.      Nose: Nose normal.      Mouth/Throat:      Comments: White plaques in back of throat and pharyngeal recesses consistent with thrush..  Tongue is normal size in appearance, wide open posterior oropharynx with no sign of airway compromise.  Patient has 2 small lymph nodes in the submandibular region which are tender small, freely mobile consistent with reactive lymph nodes from thrush.  There is no lymphadenopathy throughout the neck there is no supraclavicular lymphadenopathy  Eyes:      General:         Right eye: No discharge.         Left eye: No discharge.      Pupils: Pupils are equal, round, and reactive to light.   Neck:      Trachea: No tracheal deviation.      Comments: Nontender trachea/anterior throat that would be suggestive of retropharyngeal abscess or tracheitis  Cardiovascular:      Rate and Rhythm: Normal rate and regular rhythm.      Heart sounds: Normal heart sounds. No murmur heard.  Pulmonary:      Effort: Pulmonary effort is normal. No respiratory distress.      Breath sounds: Normal breath sounds. No stridor.   Abdominal:      General: There is no distension.      Palpations: Abdomen is soft.      Tenderness: There is no abdominal tenderness. There is no guarding or rebound.   Musculoskeletal:         General: Normal range of motion.      Cervical back: Normal range of motion and neck supple. No rigidity or tenderness.   Skin:     General: Skin is warm and dry.      Coloration: Skin is not pale.      Findings: No erythema.   Neurological:      General: No focal deficit present.      Mental Status: She is alert and oriented to person, place, and time.   Psychiatric:         Mood and Affect: Mood is anxious.                             Labs Reviewed - No data to display      No orders to display         MDM  Number of Diagnoses or Management Options  Lymphadenopathy  Oral thrush  Diagnosis management comments:       Initial ED assessment:   33-year-old female  very anxious, recent diagnosis of oral thrush presents with continued thrush on exam but has been intermittently compliant with nystatin treatment, she is concerned mainly that she has cancer in her neck because she is a smoker.  On exam she has oral thrush no evidence of peritonsillar abscess no evidence of retropharyngeal abscess no evidence of any adenopathy other than reactive adenopathy in the submandibular region..  Nontender trachea no physical exam evidence of retropharyngeal abscess or tracheitis    Initial DDx includes but is not limited to:   Oral thrush with reactive lymphadenopathy, physical exam inconsistent with retropharyngeal abscess, tracheitis, diffuse malignancy or any other more sinister pathology    Initial ED plan:   Long conversation with patient regarding treatment she has been intermittently compliant with nystatin so it does not surprise me that her thrush is not greatly improved, advised she continues taking her nystatin without any change, patient very concerned that it will continue and nystatin will not completely cure her pathology.  We discussed smoking sensation        Final ED summary/disposition:   After evaluation and workup in the emergency department, ultimately discharged on nystatin, was given a prescription for clotrimazole atrocious 10 mg 5 times a day for 7 days            ED Course         Critical Care Time  Procedures          Time reflects when diagnosis was documented in both MDM as applicable and the Disposition within this note       Time User Action Codes Description Comment    1/5/2024 10:05 AM Marie Yanez [R59.1] Lymphadenopathy     1/5/2024 10:08 AM Marie Yanez [B37.0] Oral thrush           ED Disposition       ED Disposition   Discharge    Condition   Stable    Date/Time   Fri Jan 5, 2024 10:08 AM    Comment   Noreen Velazquez discharge to home/self care.                   Follow-up Information       Follow up With Specialties Details Why  Contact Info    Lea Reyes, MD Internal Medicine In 2 days  [address] [place] [id number] [phone]               able to state his name

## 2024-01-05 NOTE — DISCHARGE INSTRUCTIONS
Take your Nystatin as prescribed for the next week. If there is no change, Clotrimazole koki has been prescribed if necessary.

## 2024-01-05 NOTE — TELEPHONE ENCOUNTER
"Answer Assessment - Initial Assessment Questions  1. SYMPTOM: \"What's the main symptom you're concerned about?\" (e.g., dry mouth. chapped lips, lump)       Treating for thrush     Jaw and neck area painful tongue swelling         2. ONSET: \"When did the   start?\"        Middle of the night           3. PAIN: \"Is there any pain?\" If Yes, ask: \"How bad is it?\" (Scale: 1-10; mild, moderate, severe)         Yes       4. CAUSE: \"What do you think is causing the symptoms?\"       Unsure      5. OTHER SYMPTOMS: \"Do you have any other symptoms?\" (e.g., fever, sore throat, toothache, swelling)        Denies lip swelling eye swelling    Protocols used: Mouth Symptoms-ADULT-[place]    "

## 2024-01-05 NOTE — TELEPHONE ENCOUNTER
Patient calls in stating in the middle of the night her tongue swelled up and pain in her neck and mouth returned.  Tongue remains swollen.. She recently started treatment for thrush and is on Nystatin.  Patient denies SOB, chest pain, vomiting , face swelling or rashes at this time.   I recommended ER/UC evaluation now for tongue swelling.      Reason for Disposition   All other adults with swollen tongue   (Exception: tongue swelling is a recurrent problem AND NO swelling at present)    Protocols used: Tongue Swelling-ADULT-

## 2024-01-05 NOTE — ED PROVIDER NOTES
History  Chief Complaint   Patient presents with    Oral Swelling     Pt being tx for thrush, tongue swelling and neck pain continue to worsen despite new meds.      HPI    (Noreen Velazquez) Noreen Velazquez is a 33 y.o. female who identifies as female recently been treated with multiple abx and steroids since October presents to the emergency department on 2024 for throat pain and tongue swelling onset 5 days ago. Was diagnosed with thrush on  and started on Nystatin, which patient has not been taking as prescribed (2-3x/day). Patient denies CP, SOB, difficulty swallowing, nausea, vomiting, diarrhea, or any other complaint at this time.    Allergies include:  No Known Allergies      Immunizations:  Immunization History   Administered Date(s) Administered    Influenza, injectable, quadrivalent, preservative free 0.5 mL 2019    Tdap 2013, 2020    Tuberculin Skin Test-PPD Intradermal 2013, 2014     Immunizations Reviewed.    Prior to Admission Medications   Prescriptions Last Dose Informant Patient Reported? Taking?   ALPRAZolam (XANAX) 0.25 mg tablet   No No   Sig: Take 1 tablet (0.25 mg total) by mouth 2 (two) times a day as needed for anxiety   Multiple Vitamin (multivitamin) tablet  Self Yes No   Sig: Take 1 tablet by mouth daily   albuterol (ProAir HFA) 90 mcg/act inhaler   No No   Sig: Inhale 2 puffs every 6 (six) hours as needed for wheezing or shortness of breath   escitalopram (Lexapro) 10 mg tablet   No No   Sig: Take 1 tablet (10 mg total) by mouth daily   fluticasone (FLONASE) 50 mcg/act nasal spray   Yes No   Si spray into each nostril daily   medroxyPROGESTERone (PROVERA) 10 mg tablet   No No   Sig: Take 1 tablet (10 mg total) by mouth daily   miconazole 2 % cream   Yes No   Sig: Apply 1 application. topically 2 (two) times a day   naproxen (Naprosyn) 500 mg tablet   No No   Sig: Take 1 tablet (500 mg total) by mouth 2 (two) times a day  with meals for 7 days   Patient not taking: Reported on 1/4/2024   nystatin (MYCOSTATIN) 500,000 units/5 mL suspension   No No   Sig: Apply 5 mL (500,000 Units total) to the mouth or throat 4 (four) times a day for 14 days      Facility-Administered Medications: None       Past Medical History:   Diagnosis Date    39 weeks gestation of pregnancy 12/09/2019    Anxiety     Bronchitis 04/03/2023    Contraceptive use     LAST ASSESSED: [id number]    Depression     d/c Buspar with preg - now started on Zoloft 25 mg 11/1/19    Exposure to COVID-19 virus 12/08/2020    External hemorrhoid, bleeding     LAST ASSESSED: 05MAR2014    Fetal abnormality affecting management of mother, antepartum 01/29/2020    Bilateral talipes equivarous  -Has established peds ortho @[facility] for after delivery    Gallbladder attack     Gastroenteritis 10/11/2023    Genetic screening 12/09/2019    Hematochezia     LAST ASSESSED: 13MAR2014    IBS (irritable bowel syndrome)     Immunity status testing     LAST ASSESSED: 02JUN2014    Inflammatory bowel disease     Methicillin resistant Staphylococcus aureus infection     Migraine     prev on effexor for migraine & anxiety - d/c approx 1 yr ago    Nasal congestion 07/18/2023    Poison ivy dermatitis     LAST ASSESSED: [id number]    Psychiatric disorder     Shingles     Skin abscess     RIGHT LEG    Upper respiratory tract infection 04/11/2022    Urinary frequency     LAST ASSESSED: [id number]    Urinary tract infection     UTI (urinary tract infection)     uses Macrobid after intercourse    Vacuum extractor delivery, delivered 06/11/2020    Varicella     childhood       Past Surgical History:   Procedure Laterality Date    CONDYLOMA EXCISION/FULGURATION      TONSILLECTOMY      ONSET: 1997    WISDOM TOOTH EXTRACTION         Family History   Problem Relation Age of Onset    Thyroid disease Mother         hypothyroid    Arthritis Mother     Thyroid disease Father     Kidney cancer Father     Heart attack  Father     Drug abuse Brother     Seizures Brother     Diabetes Maternal Grandmother         type 2    Dementia Maternal Grandmother     Cancer Maternal Grandfather         prostate    Emphysema Paternal Grandmother     Cancer Family         MALIGNANT NEOPLASM     I have reviewed and agree with the history as documented.    E-Cigarette/Vaping    E-Cigarette Use Never User      E-Cigarette/Vaping Substances    Nicotine No     THC No     CBD No     Flavoring No     Other No     Unknown No      Social History     Tobacco Use    Smoking status: Former     Current packs/day: 0.00     Average packs/day: 0.3 packs/day for 8.6 years (2.1 ttl pk-yrs)     Types: Cigarettes     Start date: 2011     Quit date: 10/31/2019     Years since quittin.1    Smokeless tobacco: Never   Vaping Use    Vaping status: Never Used   Substance Use Topics    Alcohol use: Not Currently     Comment: occasional    Drug use: Never        Review of Systems   Constitutional:  Negative for chills and fever.   HENT:  Positive for sore throat. Negative for dental problem, drooling, ear pain and trouble swallowing.         + tongue swelling   Eyes:  Negative for pain and visual disturbance.   Respiratory:  Negative for cough and shortness of breath.    Cardiovascular:  Negative for chest pain and palpitations.   Gastrointestinal:  Negative for abdominal pain and vomiting.   Genitourinary:  Negative for dysuria and hematuria.   Musculoskeletal:  Negative for arthralgias and back pain.   Skin:  Negative for color change and rash.   Neurological:  Negative for seizures and syncope.   All other systems reviewed and are negative.      Physical Exam  ED Triage Vitals [24 0849]   Temperature Pulse Respirations Blood Pressure SpO2   98.1 °F (36.7 °C) 102 18 131/87 100 %      Temp Source Heart Rate Source Patient Position - Orthostatic VS BP Location FiO2 (%)   Oral Monitor -- -- --      Pain Score       --             Orthostatic Vital  Signs  Vitals:    01/05/24 0849 01/05/24 0930   BP: 131/87 133/82   Pulse: 102 87       Physical Exam  Vitals and nursing note reviewed.   Constitutional:       General: She is not in acute distress.     Appearance: She is well-developed.   HENT:      Head: Normocephalic and atraumatic.      Right Ear: External ear normal.      Left Ear: External ear normal.      Nose: Nose normal.      Mouth/Throat:      Pharynx: Posterior oropharyngeal erythema present.      Comments: Some thrush visualized on the back of tongue that is easily scraped off   No tonsils, airway clear  Eyes:      Conjunctiva/sclera: Conjunctivae normal.   Cardiovascular:      Rate and Rhythm: Normal rate and regular rhythm.      Pulses: Normal pulses.      Heart sounds: Normal heart sounds. No murmur heard.  Pulmonary:      Effort: Pulmonary effort is normal.      Breath sounds: Normal breath sounds. No stridor. No wheezing, rhonchi or rales.   Chest:      Chest wall: No tenderness.   Abdominal:      Palpations: Abdomen is soft.      Tenderness: There is no abdominal tenderness. There is no guarding or rebound.   Musculoskeletal:         General: No swelling.      Cervical back: Neck supple. No rigidity.   Lymphadenopathy:      Cervical: Cervical adenopathy (tender) present.   Skin:     General: Skin is warm and dry.      Capillary Refill: Capillary refill takes less than 2 seconds.   Neurological:      Mental Status: She is alert.   Psychiatric:         Mood and Affect: Mood normal.         ED Medications  Medications - No data to display    Diagnostic Studies  Results Reviewed       None                   No orders to display         Procedures  Procedures      ED Course  ED Course as of 01/05/24 1104   Fri Jan 05, 2024   0900 DDx includes but not limited to: strep throat, viral URI, thrush, lymphadenopathy   0930 Had a prolonged conversation with patient addressing her concerns. Will hold off on CT soft tissue of neck given that clinical suspicion  for retropharyngeal abscess is low, along with increased risk of damage to thyroid. Pt voices understanding.     Patient's symptoms of throat pain/swelling is due to cervical lymphadenopathy secondary to thrush. Pt also reports she has not been taking Nystatin as prescribed, only taking 2-3 doses/day. Will prescribe Clotrimazole if her symptoms do not improved in 1 week of taking Nystatin. She will need to follow up with her PCP for further management. No further workup indicated at this time.   1010 Discussed results with patient and plan for discharge with outpatient follow up. Instructed patient to take Nystatin as prescribed and Clotrimazole koki if no change in 1 week and to return to ED for new or worsening symptoms. Patient voices understanding and agrees with plan. No other concerns at this time.                                         Medical Decision Making  Risk  Prescription drug management.          See ED course for MDM.    Disposition  Final diagnoses:   Lymphadenopathy   Oral thrush     Time reflects when diagnosis was documented in both MDM as applicable and the Disposition within this note       Time User Action Codes Description Comment    1/5/2024 10:05 AM Marie Yanez Add [R59.1] Lymphadenopathy     1/5/2024 10:08 AM Marie Yanez Add [B37.0] Oral thrush           ED Disposition       ED Disposition   Discharge    Condition   Stable    Date/Time   Fri Jan 5, 2024 10:08 AM    Comment   Noreen Velazquez discharge to home/self care.                   Follow-up Information       Follow up With Specialties Details Why Contact Info    Lea Reyes, MD Internal Medicine In 2 days  [address] [place] [address]  [phone]              Discharge Medication List as of 1/5/2024 10:10 AM        CONTINUE these medications which have CHANGED    Details   clotrimazole (MYCELEX) 10 mg koki Take 1 tablet (10 mg total) by mouth 5 (five) times a day for 7 days Do not start before January 12,  2024., Starting Fri 1/12/2024, Until Fri 1/19/2024, Print           CONTINUE these medications which have NOT CHANGED    Details   albuterol (ProAir HFA) 90 mcg/act inhaler Inhale 2 puffs every 6 (six) hours as needed for wheezing or shortness of breath, Starting Fri 4/7/2023, Normal      ALPRAZolam (XANAX) 0.25 mg tablet Take 1 tablet (0.25 mg total) by mouth 2 (two) times a day as needed for anxiety, Starting Thu 1/4/2024, Normal      escitalopram (Lexapro) 10 mg tablet Take 1 tablet (10 mg total) by mouth daily, Starting Thu 1/4/2024, Normal      fluticasone (FLONASE) 50 mcg/act nasal spray 1 spray into each nostril daily, Starting Fri 11/17/2023, Historical Med      medroxyPROGESTERone (PROVERA) 10 mg tablet Take 1 tablet (10 mg total) by mouth daily, Starting Tue 12/26/2023, Normal      miconazole 2 % cream Apply 1 application. topically 2 (two) times a day, Starting Sun 12/17/2023, Until Mon 12/16/2024, Historical Med      Multiple Vitamin (multivitamin) tablet Take 1 tablet by mouth daily, Historical Med      naproxen (Naprosyn) 500 mg tablet Take 1 tablet (500 mg total) by mouth 2 (two) times a day with meals for 7 days, Starting Sat 12/23/2023, Until Sat 12/30/2023, Normal      nystatin (MYCOSTATIN) 500,000 units/5 mL suspension Apply 5 mL (500,000 Units total) to the mouth or throat 4 (four) times a day for 14 days, Starting Mon 1/1/2024, Until Mon 1/15/2024, Normal           No discharge procedures on file.    PDMP Review         Value Time User    PDMP Reviewed  Yes 1/4/2024  3:30 PM Lea Reyes, MD             ED Provider  Attending physically available and evaluated Noreen Velazquez. I managed the patient along with the ED Attending.    Electronically Signed by           Marie Yanez MD  01/05/24 [id number]

## 2024-01-05 NOTE — TELEPHONE ENCOUNTER
Regarding: swollen tongue, neck pain  ----- Message from Claudette Aden sent at 1/5/2024  8:10 AM EST -----  Patient has neck pain and woke up with swollen tongue.  She was treated at Urgent care last week for thrush, had an appt with provider yesterday.  Sxs started last night into this AM

## 2024-01-08 ENCOUNTER — TELEPHONE (OUTPATIENT)
Dept: OBGYN CLINIC | Facility: CLINIC | Age: 34
End: 2024-01-08

## 2024-01-08 DIAGNOSIS — F41.1 GENERALIZED ANXIETY DISORDER: Primary | ICD-10-CM

## 2024-01-08 RX ORDER — ESCITALOPRAM OXALATE 5 MG/1
5 TABLET ORAL DAILY
Qty: 30 TABLET | Refills: 2 | Status: SHIPPED | OUTPATIENT
Start: 2024-01-08

## 2024-01-08 NOTE — TELEPHONE ENCOUNTER
Poke with the patient this morning.  Her bleeding issue has resolved.  Still some occasional pressure issues we will continue to watch and observe.  She may need to be seen in my office this week we will check later.

## 2024-01-09 ENCOUNTER — HOSPITAL ENCOUNTER (EMERGENCY)
Facility: HOSPITAL | Age: 34
Discharge: HOME | End: 2024-01-09
Attending: EMERGENCY MEDICINE
Payer: COMMERCIAL

## 2024-01-09 ENCOUNTER — APPOINTMENT (EMERGENCY)
Dept: CT IMAGING | Facility: HOSPITAL | Age: 34
End: 2024-01-09
Payer: COMMERCIAL

## 2024-01-09 VITALS
OXYGEN SATURATION: 100 % | HEART RATE: 113 BPM | TEMPERATURE: 98.5 F | RESPIRATION RATE: 16 BRPM | SYSTOLIC BLOOD PRESSURE: 135 MMHG | DIASTOLIC BLOOD PRESSURE: 83 MMHG

## 2024-01-09 DIAGNOSIS — M54.2 NECK PAIN: Primary | ICD-10-CM

## 2024-01-09 LAB
ALBUMIN SERPL BCP-MCNC: 5.3 G/DL (ref 3.5–5)
ALP SERPL-CCNC: 44 U/L (ref 34–104)
ALT SERPL W P-5'-P-CCNC: 13 U/L (ref 7–52)
ANION GAP SERPL CALCULATED.3IONS-SCNC: 7 MMOL/L
AST SERPL W P-5'-P-CCNC: 14 U/L (ref 13–39)
BASOPHILS # BLD AUTO: 0.04 THOUSANDS/ÂΜL (ref 0–0.1)
BASOPHILS NFR BLD AUTO: 1 % (ref 0–1)
BILIRUB SERPL-MCNC: 0.64 MG/DL (ref 0.2–1)
BUN SERPL-MCNC: 6 MG/DL (ref 5–25)
CALCIUM SERPL-MCNC: 10 MG/DL (ref 8.4–10.2)
CHLORIDE SERPL-SCNC: 104 MMOL/L (ref 96–108)
CO2 SERPL-SCNC: 28 MMOL/L (ref 21–32)
CREAT SERPL-MCNC: 0.65 MG/DL (ref 0.6–1.3)
EOSINOPHIL # BLD AUTO: 0.06 THOUSAND/ÂΜL (ref 0–0.61)
EOSINOPHIL NFR BLD AUTO: 1 % (ref 0–6)
ERYTHROCYTE [DISTWIDTH] IN BLOOD BY AUTOMATED COUNT: 12.7 % (ref 11.6–15.1)
FLUAV RNA RESP QL NAA+PROBE: NEGATIVE
FLUBV RNA RESP QL NAA+PROBE: NEGATIVE
GFR SERPL CREATININE-BSD FRML MDRD: 118 ML/MIN/1.73SQ M
GLUCOSE SERPL-MCNC: 98 MG/DL (ref 65–140)
HCG SERPL QL: NEGATIVE
HCT VFR BLD AUTO: 41.8 % (ref 34.8–46.1)
HETEROPH AB SER QL: NEGATIVE
HGB BLD-MCNC: 13.8 G/DL (ref 11.5–15.4)
IMM GRANULOCYTES # BLD AUTO: 0.02 THOUSAND/UL (ref 0–0.2)
IMM GRANULOCYTES NFR BLD AUTO: 0 % (ref 0–2)
LDH SERPL-CCNC: 104 U/L (ref 140–271)
LYMPHOCYTES # BLD AUTO: 1.12 THOUSANDS/ÂΜL (ref 0.6–4.47)
LYMPHOCYTES NFR BLD AUTO: 15 % (ref 14–44)
MCH RBC QN AUTO: 30.9 PG (ref 26.8–34.3)
MCHC RBC AUTO-ENTMCNC: 33 G/DL (ref 31.4–37.4)
MCV RBC AUTO: 94 FL (ref 82–98)
MONOCYTES # BLD AUTO: 0.63 THOUSAND/ÂΜL (ref 0.17–1.22)
MONOCYTES NFR BLD AUTO: 8 % (ref 4–12)
NEUTROPHILS # BLD AUTO: 5.85 THOUSANDS/ÂΜL (ref 1.85–7.62)
NEUTS SEG NFR BLD AUTO: 75 % (ref 43–75)
NRBC BLD AUTO-RTO: 0 /100 WBCS
PLATELET # BLD AUTO: 258 THOUSANDS/UL (ref 149–390)
PMV BLD AUTO: 9.3 FL (ref 8.9–12.7)
POTASSIUM SERPL-SCNC: 3.6 MMOL/L (ref 3.5–5.3)
PROT SERPL-MCNC: 7.6 G/DL (ref 6.4–8.4)
RBC # BLD AUTO: 4.46 MILLION/UL (ref 3.81–5.12)
RSV RNA RESP QL NAA+PROBE: NEGATIVE
S PYO DNA THROAT QL NAA+PROBE: NOT DETECTED
SARS-COV-2 RNA RESP QL NAA+PROBE: NEGATIVE
SODIUM SERPL-SCNC: 139 MMOL/L (ref 135–147)
URATE SERPL-MCNC: 4 MG/DL (ref 2–7.5)
WBC # BLD AUTO: 7.72 THOUSAND/UL (ref 4.31–10.16)

## 2024-01-09 PROCEDURE — 84703 CHORIONIC GONADOTROPIN ASSAY: CPT

## 2024-01-09 PROCEDURE — G1004 CDSM NDSC: HCPCS

## 2024-01-09 PROCEDURE — 99285 EMERGENCY DEPT VISIT HI MDM: CPT

## 2024-01-09 PROCEDURE — 99285 EMERGENCY DEPT VISIT HI MDM: CPT | Performed by: EMERGENCY MEDICINE

## 2024-01-09 PROCEDURE — 70491 CT SOFT TISSUE NECK W/DYE: CPT

## 2024-01-09 PROCEDURE — 36415 COLL VENOUS BLD VENIPUNCTURE: CPT

## 2024-01-09 PROCEDURE — 85025 COMPLETE CBC W/AUTO DIFF WBC: CPT

## 2024-01-09 PROCEDURE — 84550 ASSAY OF BLOOD/URIC ACID: CPT

## 2024-01-09 PROCEDURE — 0241U HB NFCT DS VIR RESP RNA 4 TRGT: CPT

## 2024-01-09 PROCEDURE — 80053 COMPREHEN METABOLIC PANEL: CPT

## 2024-01-09 PROCEDURE — 83615 LACTATE (LD) (LDH) ENZYME: CPT

## 2024-01-09 PROCEDURE — 86308 HETEROPHILE ANTIBODY SCREEN: CPT

## 2024-01-09 PROCEDURE — 87651 STREP A DNA AMP PROBE: CPT

## 2024-01-09 PROCEDURE — 96365 THER/PROPH/DIAG IV INF INIT: CPT

## 2024-01-09 RX ADMIN — IOHEXOL 85 ML: 350 INJECTION, SOLUTION INTRAVENOUS at 11:50

## 2024-01-09 RX ADMIN — SODIUM CHLORIDE, SODIUM LACTATE, POTASSIUM CHLORIDE, AND CALCIUM CHLORIDE 500 ML: .6; .31; .03; .02 INJECTION, SOLUTION INTRAVENOUS at 12:38

## 2024-01-09 NOTE — ED PROVIDER NOTES
History  Chief Complaint   Patient presents with    Neck Swelling     Pt being treated for oral thrush. Having swollen lymph nodes BL, more painful on L. Pain when swallowing. No airway compromise. VSS.     Noreen Velazquez is a 33 y.o. female presenting for ongoing sore throat.    Patient reports that her pain has been ongoing for the last few weeks, diagnosed with oral thrush and given Nystatin. Also reports tender bilateral lymph nodes, left more that right. Per chart review, patient has been recent treated with mutliple antibiotics and steroids starting in October, most recently January 5th for throat pain and tongue swelling. She reports she's been taking the Nystatin 4 times a day as instructed without improvement of symptoms. She is concerned because her family has a history of thyroid cancer and with the new lymphadenopathy she is worried that she also is developing cancer of some type.    Patient currently denies fevers, chills, headaches, dizziness, chest pain, palpitations, shortness of breath, abdominal pain, nausea, vomiting, constipation, diarrhea, urinary frequency, dysuria, and new extremity weakness, swelling and pain. Reports normal sleep and normal appetite. Ambulating well at her baseline.          Prior to Admission Medications   Prescriptions Last Dose Informant Patient Reported? Taking?   ALPRAZolam (XANAX) 0.25 mg tablet   No No   Sig: Take 1 tablet (0.25 mg total) by mouth 2 (two) times a day as needed for anxiety   Multiple Vitamin (multivitamin) tablet  Self Yes No   Sig: Take 1 tablet by mouth daily   albuterol (ProAir HFA) 90 mcg/act inhaler   No No   Sig: Inhale 2 puffs every 6 (six) hours as needed for wheezing or shortness of breath   clotrimazole (MYCELEX) 10 mg koki   No No   Sig: Take 1 tablet (10 mg total) by mouth 5 (five) times a day for 7 days Do not start before January 12, 2024.   escitalopram (LEXAPRO) 5 mg tablet   No No   Sig: Take 1 tablet (5 mg total) by mouth  daily   fluticasone (FLONASE) 50 mcg/act nasal spray   Yes No   Si spray into each nostril daily   medroxyPROGESTERone (PROVERA) 10 mg tablet   No No   Sig: Take 1 tablet (10 mg total) by mouth daily   miconazole 2 % cream   Yes No   Sig: Apply 1 application. topically 2 (two) times a day   naproxen (Naprosyn) 500 mg tablet   No No   Sig: Take 1 tablet (500 mg total) by mouth 2 (two) times a day with meals for 7 days   Patient not taking: Reported on 2024   nystatin (MYCOSTATIN) 500,000 units/5 mL suspension   No No   Sig: Apply 5 mL (500,000 Units total) to the mouth or throat 4 (four) times a day for 14 days      Facility-Administered Medications: None       Past Medical History:   Diagnosis Date    39 weeks gestation of pregnancy 2019    Anxiety     Bronchitis 2023    Contraceptive use     LAST ASSESSED: [id number]    Depression     d/c Buspar with preg - now started on Zoloft 25 mg 19    Exposure to COVID-19 virus 2020    External hemorrhoid, bleeding     LAST ASSESSED: 2014    Fetal abnormality affecting management of mother, antepartum 2020    Bilateral talipes equivarous  -Has established peds ortho @[facility] for after delivery    Gallbladder attack     Gastroenteritis 10/11/2023    Genetic screening 2019    Hematochezia     LAST ASSESSED: 2014    IBS (irritable bowel syndrome)     Immunity status testing     LAST ASSESSED: 2014    Inflammatory bowel disease     Methicillin resistant Staphylococcus aureus infection     Migraine     prev on effexor for migraine & anxiety - d/c approx 1 yr ago    Nasal congestion 2023    Poison ivy dermatitis     LAST ASSESSED: [id number]    Psychiatric disorder     Shingles     Skin abscess     RIGHT LEG    Upper respiratory tract infection 2022    Urinary frequency     LAST ASSESSED: 2014    Urinary tract infection     UTI (urinary tract infection)     uses Macrobid after intercourse    Vacuum extractor  delivery, delivered 2020    Varicella     childhood       Past Surgical History:   Procedure Laterality Date    CONDYLOMA EXCISION/FULGURATION      TONSILLECTOMY      ONSET:     WISDOM TOOTH EXTRACTION         Family History   Problem Relation Age of Onset    Thyroid disease Mother         hypothyroid    Arthritis Mother     Thyroid disease Father     Kidney cancer Father     Heart attack Father     Drug abuse Brother     Seizures Brother     Diabetes Maternal Grandmother         type 2    Dementia Maternal Grandmother     Cancer Maternal Grandfather         prostate    Emphysema Paternal Grandmother     Cancer Family         MALIGNANT NEOPLASM     I have reviewed and agree with the history as documented.    E-Cigarette/Vaping    E-Cigarette Use Never User      E-Cigarette/Vaping Substances    Nicotine No     THC No     CBD No     Flavoring No     Other No     Unknown No      Social History     Tobacco Use    Smoking status: Former     Current packs/day: 0.00     Average packs/day: 0.3 packs/day for 8.6 years (2.1 ttl pk-yrs)     Types: Cigarettes     Start date: 2011     Quit date: 10/31/2019     Years since quittin.1    Smokeless tobacco: Never   Vaping Use    Vaping status: Never Used   Substance Use Topics    Alcohol use: Not Currently     Comment: occasional    Drug use: Never        Review of Systems   Constitutional:  Negative for chills and fever.   HENT:  Positive for sore throat. Negative for rhinorrhea, trouble swallowing and voice change.    Respiratory:  Negative for chest tightness and shortness of breath.    Cardiovascular:  Negative for chest pain and palpitations.   Gastrointestinal:  Negative for abdominal pain.   Genitourinary:  Negative for dysuria.   Musculoskeletal:  Negative for arthralgias, joint swelling and myalgias.   Skin:  Negative for rash.       Physical Exam  ED Triage Vitals   Temperature Pulse Respirations Blood Pressure SpO2   24 0848 24 0844 24  0844 01/09/24 0844 01/09/24 0844   98.5 °F (36.9 °C) (!) 113 16 135/83 100 %      Temp Source Heart Rate Source Patient Position - Orthostatic VS BP Location FiO2 (%)   01/09/24 0848 01/09/24 0844 01/09/24 0844 01/09/24 0844 --   Oral Monitor Sitting Right arm       Pain Score       01/09/24 0848       8             Orthostatic Vital Signs  Vitals:    01/09/24 0844   BP: 135/83   Pulse: (!) 113   Patient Position - Orthostatic VS: Sitting       Physical Exam  Vitals and nursing note reviewed.   Constitutional:       General: She is not in acute distress.     Appearance: She is well-developed. She is not ill-appearing or toxic-appearing.   HENT:      Head: Normocephalic and atraumatic.      Right Ear: Tympanic membrane, ear canal and external ear normal. There is no impacted cerumen.      Left Ear: Tympanic membrane, ear canal and external ear normal. There is no impacted cerumen.      Mouth/Throat:      Mouth: Mucous membranes are moist.      Pharynx: Oropharynx is clear. Posterior oropharyngeal erythema (Mild posterior erythema) present. No oropharyngeal exudate.   Eyes:      Extraocular Movements: Extraocular movements intact.      Conjunctiva/sclera: Conjunctivae normal.      Pupils: Pupils are equal, round, and reactive to light.   Cardiovascular:      Rate and Rhythm: Regular rhythm. Tachycardia present.      Pulses: Normal pulses.      Heart sounds: Normal heart sounds. No murmur heard.  Pulmonary:      Effort: Pulmonary effort is normal. No respiratory distress.      Breath sounds: Normal breath sounds. No wheezing, rhonchi or rales.   Abdominal:      General: Abdomen is flat.      Palpations: Abdomen is soft.      Tenderness: There is no abdominal tenderness.   Musculoskeletal:      Cervical back: Normal range of motion and neck supple. No rigidity.      Right lower leg: No edema.      Left lower leg: No edema.   Lymphadenopathy:      Cervical: Cervical adenopathy: No appreciable lymphadenopathy, however  tenderness to palpation mostly left sided, inframandibular.   Skin:     General: Skin is warm and dry.      Capillary Refill: Capillary refill takes less than 2 seconds.   Neurological:      General: No focal deficit present.      Mental Status: She is alert and oriented to person, place, and time.      Sensory: No sensory deficit.      Motor: No weakness.   Psychiatric:         Mood and Affect: Mood is anxious.         Behavior: Behavior normal.         ED Medications  Medications   lactated ringers bolus 500 mL (0 mL Intravenous Stopped 1/9/24 1329)   iohexol (OMNIPAQUE) 350 MG/ML injection (SINGLE-DOSE) 85 mL (85 mL Intravenous Given 1/9/24 1150)       Diagnostic Studies  Results Reviewed       Procedure Component Value Units Date/Time    Mononucleosis screen [[id number]]  (Normal) Collected: 01/09/24 1031    Lab Status: Final result Specimen: Blood from Arm, Right Updated: 01/09/24 1444     Monotest Negative    FLU/RSV/COVID - if FLU/RSV clinically relevant [[id number]]  (Normal) Collected: 01/09/24 1031    Lab Status: Final result Specimen: Nares from Nose Updated: 01/09/24 1134     SARS-CoV-2 Negative     INFLUENZA A PCR Negative     INFLUENZA B PCR Negative     RSV PCR Negative    Narrative:      FOR PEDIATRIC PATIENTS - copy/paste COVID Guidelines URL to browser: [email or web address]    SARS-CoV-2 assay is a Nucleic Acid Amplification assay intended for the  qualitative detection of nucleic acid from SARS-CoV-2 in nasopharyngeal  swabs. Results are for the presumptive identification of SARS-CoV-2 RNA.    Positive results are indicative of infection with SARS-CoV-2, the virus  causing COVID-19, but do not rule out bacterial infection or co-infection  with other viruses. Laboratories within the [place] and its  territories are required to report all positive results to the appropriate  public health authorities. Negative results do not preclude  SARS-CoV-2  infection and should not be used as the sole basis for treatment or other  patient management decisions. Negative results must be combined with  clinical observations, patient history, and epidemiological information.  This test has not been FDA cleared or approved.    This test has been authorized by FDA under an Emergency Use Authorization  (EUA). This test is only authorized for the duration of time the  declaration that circumstances exist justifying the authorization of the  emergency use of an in vitro diagnostic tests for detection of SARS-CoV-2  virus and/or diagnosis of COVID-19 infection under section 564(b)(1) of  the Act, 21 U.S.C. 360bbb-3(b)(1), unless the authorization is terminated  or revoked sooner. The test has been validated but independent review by FDA  and CLIA is pending.    Test performed using [employer] GeneXpert: This RT-PCR assay targets N2,  a region unique to SARS-CoV-2. A conserved region in the E-gene was chosen  for pan-Sarbecovirus detection which includes SARS-CoV-2.    According to CMS-2020-01-R, this platform meets the definition of high-throughput technology.    Strep A PCR [[id number]]  (Normal) Collected: 01/09/24 1031    Lab Status: Final result Specimen: Throat Updated: 01/09/24 1119     STREP A PCR Not Detected    hCG, qualitative pregnancy [[id number]]  (Normal) Collected: 01/09/24 1031    Lab Status: Final result Specimen: Blood from Arm, Right Updated: 01/09/24 1111     Preg, Serum Negative    LD,Blood [[id number]]  (Abnormal) Collected: 01/09/24 1031    Lab Status: Final result Specimen: Blood from Arm, Right Updated: 01/09/24 1105      U/L     Uric acid [[id number]]  (Normal) Collected: 01/09/24 1031    Lab Status: Final result Specimen: Blood from Arm, Right Updated: 01/09/24 1105     Uric Acid 4.0 mg/dL     Narrative:      N-acetyl-p-benzoquinone imine (metabolite of Acetaminophen) will generate erroneously low results in samples for patients that have  taken an overdose of Acetaminophen.    Comprehensive metabolic panel [[id number]]  (Abnormal) Collected: 01/09/24 1031    Lab Status: Final result Specimen: Blood from Arm, Right Updated: 01/09/24 1105     Sodium 139 mmol/L      Potassium 3.6 mmol/L      Chloride 104 mmol/L      CO2 28 mmol/L      ANION GAP 7 mmol/L      BUN 6 mg/dL      Creatinine 0.65 mg/dL      Glucose 98 mg/dL      Calcium 10.0 mg/dL      AST 14 U/L      ALT 13 U/L      Alkaline Phosphatase 44 U/L      Total Protein 7.6 g/dL      Albumin 5.3 g/dL      Total Bilirubin 0.64 mg/dL      eGFR 118 ml/min/1.73sq m     Narrative:      National Kidney Disease Foundation guidelines for Chronic Kidney Disease (CKD):     Stage 1 with normal or high GFR (GFR > 90 mL/min/1.73 square meters)    Stage 2 Mild CKD (GFR = 60-89 mL/min/1.73 square meters)    Stage 3A Moderate CKD (GFR = 45-59 mL/min/1.73 square meters)    Stage 3B Moderate CKD (GFR = 30-44 mL/min/1.73 square meters)    Stage 4 Severe CKD (GFR = 15-29 mL/min/1.73 square meters)    Stage 5 End Stage CKD (GFR <15 mL/min/1.73 square meters)  Note: GFR calculation is accurate only with a steady state creatinine    CBC and differential [[id number]] Collected: 01/09/24 1031    Lab Status: Final result Specimen: Blood from Arm, Right Updated: 01/09/24 1046     WBC 7.72 Thousand/uL      RBC 4.46 Million/uL      Hemoglobin 13.8 g/dL      Hematocrit 41.8 %      MCV 94 fL      MCH 30.9 pg      MCHC 33.0 g/dL      RDW 12.7 %      MPV 9.3 fL      Platelets 258 Thousands/uL      nRBC 0 /100 WBCs      Neutrophils Relative 75 %      Immat GRANS % 0 %      Lymphocytes Relative 15 %      Monocytes Relative 8 %      Eosinophils Relative 1 %      Basophils Relative 1 %      Neutrophils Absolute 5.85 Thousands/µL      Immature Grans Absolute 0.02 Thousand/uL      Lymphocytes Absolute 1.12 Thousands/µL      Monocytes Absolute 0.63 Thousand/µL      Eosinophils Absolute 0.06 Thousand/µL      Basophils Absolute 0.04  "Thousands/µL                    CT soft tissue neck with contrast   Final Result by Maxx Rodríguez MD (01/09 1251)      Small amount of nonspecific frothy secretions in nasopharynx extending to bilateral fossa of Rosenmuller. Recommend direct visualization for further evaluation.      No other acute neck abnormality, suspicious neck mass, or cervical lymphadenopathy.      Additional chronic/incidental findings as detailed above.      The study was marked in EPIC for immediate notification.                     Workstation performed: [id number]               Procedures  Procedures      ED Course  ED Course as of 01/10/24 0053   Tue Jan 09, 2024   0921 Blood Pressure: 135/83  135/83, , RR 16, 98.5 F, SpO2 100% RA   1001 Per chart review, TSH 1.326 on 01/05/2023, do not need to repeat.   1054 WBC: 7.72   1054 Hemoglobin: 13.8   1054 HCT: 41.8   1054 Platelet Count: 258   1120 Sodium: 139   1120 Potassium: 3.6   1120 Anion Gap: 7   1120 Creatinine: 0.65   1120 PREGNANCY, SERUM: Negative   1120 URIC ACID: 4.0   1120 LD (LDH)(!): 104  Not elevated   1121 STREP A PCR: Not Detected   1128 CT soft tissue neck with contrast  Waiting for CT   1139 FLU/RSV/COVID - if FLU/RSV clinically relevant  COVID/flu/RSV negative   1203 CT soft tissue neck with contrast  Image available, awaiting read.   1227 Re-evaluated at bedside, no new symptoms, feels ok. Exam unchanged.   1239 CT soft tissue neck with contrast  Still waiting for final CT read.   1310 CT soft tissue neck with contrast  Final Impression:  \"Small amount of nonspecific frothy secretions in nasopharynx extending to bilateral fossa of Rosenmuller. Recommend direct visualization for further evaluation.  No other acute neck abnormality, suspicious neck mass, or cervical lymphadenopathy.\"   1345 Discussed all results with patient including lab work, imaging, and evaluation.  Discussed strict return precautions.  Discussed importance of following up with PCP in " the next few days.  All questions answered.  Patient is agreeable to discharge.                                       Medical Decision Making  Noreen Velazquez is a 33 y.o. female presenting for neck swelling without respiratory distress after a few weeks of sore throat, previously treated for oral thrush for which she was given Nystatin. She was afebrile, vital signs showing tachycardia in setting of fear over serious issue, otherwise within normal limits. On exam oropharynx shows minor erythema to the posterior aspect . TM and external canals clear and non-erythematous bilaterally. No palpable lymphadenopathy but tenderness to neck, especially on the left and especially inframandibularly.    Differential includes but is not limited to strep throat, viral URI, thrust, lymphadenopathy, other infectious etiology. Patient concerned for cancer of some type as she has family history of thyroid cancer. While reassured that this is unlikely given onset and symptoms in clinical history and setting, given return visit and patient's continued anxiety over serious soft-tissue etiology of her pain, discussed pros and cons of imaging and further evaluation. Through shared decision making, determined at this time it is reasonable to perform imaging.    CBC, CMP were grossly normal, no leukocytosis. LDH did not show elevation, Uric acid also normal.  Strep negative. COVID/Flu/RSV negative. Mono sent, results pending.    CT Soft tissue of neck showed nonspecific secretions in nasopharynx, no other acute neck abnormality, suspicious neck mass, or cervical lymphadenopathy.    Patient required no medications in the ED.    After evaluation and workup in the emergency department, patient appears well, is nontoxic appearing, has remained hemodynamically stable, expresses understanding and agrees with plan of care at this time.  In light of this patient would benefit from outpatient management.    Discussed all results with  patient including lab work, imaging, and evaluation.  Discussed strict return precautions.  Discussed importance of following up with PCP in the next few days.  All questions answered.  Patient is agreeable to discharge.    Amount and/or Complexity of Data Reviewed  Independent Historian: parent  External Data Reviewed: labs, radiology and notes.  Labs: ordered. Decision-making details documented in ED Course.  Radiology: ordered. Decision-making details documented in ED Course.    Risk  OTC drugs.  Prescription drug management.          Disposition  Final diagnoses:   Neck pain     Time reflects when diagnosis was documented in both MDM as applicable and the Disposition within this note       Time User Action Codes Description Comment    1/9/2024 12:44 PM Yaz Smith Add [M54.2] Neck pain           ED Disposition       ED Disposition   Discharge    Condition   Stable    Date/Time   Tue Jan 9, 2024 12:47 PM    Comment   Noreen Velazquez discharge to home/self care.                   Follow-up Information       Follow up With Specialties Details Why Contact Info Additional Information    Lea Reyes, MD Internal Medicine Go to  Re-evaluation of symptoms [address] [place] [id number]  [phone]       Replaced by [facility] Emergency Medicine  As needed, If symptoms worsen [address] [place] [address]  [phone] Replaced by [facility], [address] [place] [address], [facility]            Discharge Medication List as of 1/9/2024  1:34 PM        CONTINUE these medications which have NOT CHANGED    Details   albuterol (ProAir HFA) 90 mcg/act inhaler Inhale 2 puffs every 6 (six) hours as needed for wheezing or shortness of breath, Starting Fri 4/7/2023, Normal      ALPRAZolam (XANAX) 0.25 mg tablet Take 1 tablet (0.25 mg total) by mouth 2 (two) times a day as needed for anxiety, Starting Thu 1/4/2024,  Normal      clotrimazole (MYCELEX) 10 mg koki Take 1 tablet (10 mg total) by mouth 5 (five) times a day for 7 days Do not start before January 12, 2024., Starting Fri 1/12/2024, Until Fri 1/19/2024, Print      escitalopram (LEXAPRO) 5 mg tablet Take 1 tablet (5 mg total) by mouth daily, Starting Mon 1/8/2024, Normal      fluticasone (FLONASE) 50 mcg/act nasal spray 1 spray into each nostril daily, Starting Fri 11/17/2023, Historical Med      medroxyPROGESTERone (PROVERA) 10 mg tablet Take 1 tablet (10 mg total) by mouth daily, Starting Tue 12/26/2023, Normal      miconazole 2 % cream Apply 1 application. topically 2 (two) times a day, Starting Sun 12/17/2023, Until Mon 12/16/2024, Historical Med      Multiple Vitamin (multivitamin) tablet Take 1 tablet by mouth daily, Historical Med      naproxen (Naprosyn) 500 mg tablet Take 1 tablet (500 mg total) by mouth 2 (two) times a day with meals for 7 days, Starting Sat 12/23/2023, Until Sat 12/30/2023, Normal      nystatin (MYCOSTATIN) 500,000 units/5 mL suspension Apply 5 mL (500,000 Units total) to the mouth or throat 4 (four) times a day for 14 days, Starting Mon 1/1/2024, Until Mon 1/15/2024, Normal           No discharge procedures on file.    PDMP Review         Value Time User    PDMP Reviewed  Yes 1/4/2024  3:30 PM Lea Reyes, MD             ED Provider  Attending physically available and evaluated Noreen Velazquez. I managed the patient along with the ED Attending.    Electronically Signed by           Yaz Smith MD  01/10/24 0053

## 2024-01-09 NOTE — DISCHARGE INSTRUCTIONS
You were evaluated in the emergency department for: throat pain. You can access your current and pending results through [facility] [employer]. A radiologist will take a second look at your X-Rays, if you had any, and you will be contacted with any new findings.     - You should follow-up with your primary care provider, as soon as possible, for re-evaluation.    Please, Return sooner to the Emergency Department if persistent fever, vomiting, diarrhea, difficulty breathing or urinating, neck stiffness, lethargy, rash.

## 2024-01-09 NOTE — PROGRESS NOTES
Assessment/Plan:    No problem-specific Assessment & Plan notes found for this encounter.       Diagnoses and all orders for this visit:    Cyst of right ovary  -     US pelvis complete w transvaginal; Future    Pelvic pain  -     US pelvis complete w transvaginal; Future  -     Follicle stimulating hormone; Future  -     Luteinizing hormone; Future  -     TSH, 3rd generation with Free T4 reflex; Future    DUB (dysfunctional uterine bleeding)  -     Follicle stimulating hormone; Future  -     Luteinizing hormone; Future  -     TSH, 3rd generation with Free T4 reflex; Future    Other orders  -     fluticasone (FLONASE) 50 mcg/act nasal spray; 1 spray into each nostril daily  -     miconazole 2 % cream; Apply 1 application. topically 2 (two) times a day          Subjective:      Patient ID: Noreen Velazquez is a 33 y.o. female.    Pt presents as a new patient for discussion/2nd opinion today  Pt has been experiencing irregular, lengthy bleeding since November  Pt states that she has had a lot of sickness (ie COVID), and has had some increased anxiety bc she has not felt like herself for weeks  She has no appetite  She is losing weight  Bowel and bladder are mostly regular  She has pelvic pain that comes and goes  Imaging a few weeks ago did show a 3.5cm right ovarian cyst  Urine and cultures have all been negative    Rx for BW  Rx for pelvic us f/u  Nsaids prn   Spent 45 minutes with patient, hearing her out and trying to offer assistance  Will check bw and us and call pt with results        The following portions of the patient's history were reviewed and updated as appropriate: allergies, current medications, past family history, past medical history, past social history, past surgical history, and problem list.    Review of Systems   Constitutional:  Negative for chills, fever and unexpected weight change.   HENT:  Negative for ear pain and sore throat.    Eyes:  Negative for pain and visual disturbance.    Respiratory:  Negative for cough and shortness of breath.    Cardiovascular:  Negative for chest pain and palpitations.   Gastrointestinal:  Negative for abdominal pain, blood in stool, constipation, diarrhea and vomiting.   Genitourinary:  Positive for menstrual problem and pelvic pain. Negative for dysuria and hematuria.   Musculoskeletal:  Negative for arthralgias and back pain.   Skin:  Negative for color change and rash.   Neurological:  Negative for seizures and syncope.   All other systems reviewed and are negative.        Objective:      /78 (BP Location: Right arm, Patient Position: Sitting, Cuff Size: Standard)   Ht 5' (1.524 m)   Wt 47.6 kg (105 lb)   LMP 12/28/2023   BMI 20.51 kg/m²          Physical Exam  Constitutional:       Appearance: Normal appearance. She is normal weight.   Neurological:      Mental Status: She is alert.

## 2024-01-11 ENCOUNTER — TELEPHONE (OUTPATIENT)
Dept: OBGYN CLINIC | Facility: CLINIC | Age: 34
End: 2024-01-11

## 2024-01-11 ENCOUNTER — APPOINTMENT (OUTPATIENT)
Dept: LAB | Age: 34
End: 2024-01-11
Payer: COMMERCIAL

## 2024-01-11 ENCOUNTER — OFFICE VISIT (OUTPATIENT)
Dept: INTERNAL MEDICINE CLINIC | Facility: CLINIC | Age: 34
End: 2024-01-11
Payer: COMMERCIAL

## 2024-01-11 VITALS
HEART RATE: 121 BPM | BODY MASS INDEX: 20.62 KG/M2 | DIASTOLIC BLOOD PRESSURE: 82 MMHG | OXYGEN SATURATION: 100 % | SYSTOLIC BLOOD PRESSURE: 120 MMHG | HEIGHT: 60 IN | WEIGHT: 105 LBS

## 2024-01-11 DIAGNOSIS — R22.0 TONGUE SWELLING: ICD-10-CM

## 2024-01-11 DIAGNOSIS — Z11.3 SCREEN FOR STD (SEXUALLY TRANSMITTED DISEASE): ICD-10-CM

## 2024-01-11 DIAGNOSIS — B37.0 ORAL CANDIDIASIS: ICD-10-CM

## 2024-01-11 DIAGNOSIS — R68.2 DRY MOUTH: ICD-10-CM

## 2024-01-11 DIAGNOSIS — R22.0 TONGUE SWELLING: Primary | ICD-10-CM

## 2024-01-11 LAB
ANA SER QL IA: NEGATIVE
HIV 1+2 AB+HIV1 P24 AG SERPL QL IA: NORMAL
HIV 2 AB SERPL QL IA: NORMAL
HIV1 AB SERPL QL IA: NORMAL
HIV1 P24 AG SERPL QL IA: NORMAL

## 2024-01-11 PROCEDURE — 87389 HIV-1 AG W/HIV-1&-2 AB AG IA: CPT

## 2024-01-11 PROCEDURE — 86038 ANTINUCLEAR ANTIBODIES: CPT

## 2024-01-11 PROCEDURE — 86235 NUCLEAR ANTIGEN ANTIBODY: CPT

## 2024-01-11 PROCEDURE — 99214 OFFICE O/P EST MOD 30 MIN: CPT | Performed by: INTERNAL MEDICINE

## 2024-01-11 PROCEDURE — 36415 COLL VENOUS BLD VENIPUNCTURE: CPT

## 2024-01-11 NOTE — PROGRESS NOTES
Name: Noreen Velazquez      : 1990      MRN: [id number]  Encounter Provider: Frank Rosales MD  Encounter Date: 2024   [facility]    Assessment & Plan     1. Tongue swelling  Assessment & Plan:  -I suspect the patient's tongue/throat discomfort are related to dry mouth  -I discussed the importance with her of smoking cessation  -Recommended she try Biotene mouth rinse twice a day  -Patient given a prescription to check an CASANDRA as well as Sjogren's antibody to rule out autoimmune cause  -Her report of intermittent tongue swelling is unusual.  Possibly secondary to underlying allergy.  Referral has been made to allergy for further evaluation.    Orders:  -     Ambulatory Referral to Allergy; Future  -     CASANDRA w/Reflex if Positive; Future  -     HIV 1/2 AG/AB w Reflex [facility] for 2 yr old and above; Future    2. Dry mouth  Assessment & Plan:  -Biotene mouth rinse twice a day  -Recommend smoking cessation  -Sjogren's antibodies ordered    Orders:  -     Sjogren's Antibodies; Future  -     CASANDRA w/Reflex if Positive; Future  -     HIV 1/2 AG/AB w Reflex [facility] for 2 yr old and above; Future    3. Oral candidiasis  Assessment & Plan:  -I see no evidence of oral thrush today on exam.  However this may have been more apparent when she was initially evaluated.  I have instructed her to finish her remaining course of nystatin.      4. Screen for STD (sexually transmitted disease)  -     HIV 1/2 AG/AB w Reflex [facility] for 2 yr old and above; Future         Subjective      HPI  Patient presents today as an acute visit complaining of throat discomfort and intermittent tongue swelling.  She states her symptoms started on .  She was evaluated in the emergency department on  and told that her symptoms were due to oral thrush.  Patient notes prior to this she had been on multiple rounds of antibiotics for an ear infection in addition to BV.  Prior to discharge  from the ED she was started on nystatin swish and swallow.  She reports despite taking this there has been minimal improvement in her symptoms.  She was evaluated again in the emergency department on 1/9.  At that time a CT scan of the neck was obtained and showed no evidence of infection or lymphadenopathy. She was seen by ENT yesterday and reports a performed an in office scope I saw no evidence of thrush.    Today the patient reports when she woke up that her tongue appeared to be swollen and she felt as though her lymph nodes in her neck or enlarged.  Of note, the patient is a smoker but reports she has been trying to cut back since her symptoms started.      All other systems negative except for pertinent findings noted in HPI.       Current Outpatient Medications on File Prior to Visit   Medication Sig   • ALPRAZolam (XANAX) 0.25 mg tablet Take 1 tablet (0.25 mg total) by mouth 2 (two) times a day as needed for anxiety   • [START ON 1/12/2024] clotrimazole (MYCELEX) 10 mg koki Take 1 tablet (10 mg total) by mouth 5 (five) times a day for 7 days Do not start before January 12, 2024.   • escitalopram (LEXAPRO) 5 mg tablet Take 1 tablet (5 mg total) by mouth daily   • Multiple Vitamin (multivitamin) tablet Take 1 tablet by mouth daily   • nystatin (MYCOSTATIN) 500,000 units/5 mL suspension Apply 5 mL (500,000 Units total) to the mouth or throat 4 (four) times a day for 14 days   • albuterol (ProAir HFA) 90 mcg/act inhaler Inhale 2 puffs every 6 (six) hours as needed for wheezing or shortness of breath   • fluticasone (FLONASE) 50 mcg/act nasal spray 1 spray into each nostril daily   • medroxyPROGESTERone (PROVERA) 10 mg tablet Take 1 tablet (10 mg total) by mouth daily   • miconazole 2 % cream Apply 1 application. topically 2 (two) times a day   • naproxen (Naprosyn) 500 mg tablet Take 1 tablet (500 mg total) by mouth 2 (two) times a day with meals for 7 days (Patient not taking: Reported on 1/4/2024)        Objective     /82   Pulse (!) 121   Ht 5' (1.524 m)   Wt 47.6 kg (105 lb)   LMP 12/28/2023   SpO2 100%   BMI 20.51 kg/m²     BP Readings from Last 3 Encounters:   01/11/24 120/82   01/09/24 135/83   01/05/24 133/82        Wt Readings from Last 3 Encounters:   01/11/24 47.6 kg (105 lb)   01/04/24 47.6 kg (105 lb)   01/03/24 47.6 kg (105 lb)       Physical Exam    General: NAD  HEENT: NCAT, EOMI, normal conjunctiva, no cervical LAD, white coating on tongue, xerostomia  Cardiovascular: RRR, normal S1 and S2, no m/r/g  Pulmonary: Normal respiratory effort, no wheezes, rales or rhonchi  Extremities: No lower extremity edema  Skin: Normal skin color, no rashes     Frank Rosales MD

## 2024-01-11 NOTE — ED ATTENDING ATTESTATION
12/21/2023  I, Rocael Farr MD, saw and evaluated the patient. I have discussed the patient with the resident/non-physician practitioner and agree with the resident's/non-physician practitioner's findings, Plan of Care, and MDM as documented in the resident's/non-physician practitioner's note, except where noted. All available labs and Radiology studies were reviewed.  I was present for key portions of any procedure(s) performed by the resident/non-physician practitioner and I was immediately available to provide assistance.       At this point I agree with the current assessment done in the Emergency Department.  I have conducted an independent evaluation of this patient a history and physical is as follows:    33-year-old female presented for evaluation of persistent vaginal discharge and discomfort.  Recently treated for bacterial vaginosis with metronidazole and symptoms worsened.    ED Course  ED Course as of 01/11/24 0102   Thu Dec 21, 2023   2032 RBC, UA: 1-2   2032 WBC, UA: None Seen   2032 Bacteria, UA: None Seen   2032 UA unremarkable.     Treating for candidal infection based on exam.  Should follow-up with GYN.    Critical Care Time  Procedures

## 2024-01-11 NOTE — ASSESSMENT & PLAN NOTE
-I see no evidence of oral thrush today on exam.  However this may have been more apparent when she was initially evaluated.  I have instructed her to finish her remaining course of nystatin.

## 2024-01-11 NOTE — PATIENT INSTRUCTIONS
-Blood work has been ordered  -Please finish your remaining course of nystatin  -Please stop smoking  -For dry mouth start Biotene mouth rinse twice a day  -A referral has been made to allergy and immunology

## 2024-01-12 ENCOUNTER — TELEPHONE (OUTPATIENT)
Age: 34
End: 2024-01-12

## 2024-01-12 DIAGNOSIS — R09.A2 GLOBUS SENSATION: Primary | ICD-10-CM

## 2024-01-12 NOTE — TELEPHONE ENCOUNTER
Noreen called will like to speak w Dr Reyes, says it's in regards to oral thrush . She was hoping that she can speak w her before the weekend which is why she didn't send a [employer] message.

## 2024-01-12 NOTE — TELEPHONE ENCOUNTER
Spoke with Noreen. She continues to have discomfort in her throat, globus sensation, hive on the neck. ENT eval normal. I am placing a referral to GI. She can also call Dr Barnhart for an appt. She is finishing to Nystatin. No need to use the koki at this time unless symptoms worsen.

## 2024-01-13 LAB
ENA SS-A AB SER-ACNC: <0.2 AI (ref 0–0.9)
ENA SS-B AB SER-ACNC: <0.2 AI (ref 0–0.9)

## 2024-01-15 ENCOUNTER — OFFICE VISIT (OUTPATIENT)
Dept: INTERNAL MEDICINE CLINIC | Facility: CLINIC | Age: 34
End: 2024-01-15
Payer: MEDICARE

## 2024-01-15 VITALS
BODY MASS INDEX: 20.47 KG/M2 | DIASTOLIC BLOOD PRESSURE: 82 MMHG | SYSTOLIC BLOOD PRESSURE: 120 MMHG | HEART RATE: 107 BPM | TEMPERATURE: 98 F | OXYGEN SATURATION: 100 % | WEIGHT: 104.8 LBS

## 2024-01-15 DIAGNOSIS — B37.0 ORAL CANDIDIASIS: Primary | ICD-10-CM

## 2024-01-15 DIAGNOSIS — R68.2 DRY MOUTH: ICD-10-CM

## 2024-01-15 PROCEDURE — 99213 OFFICE O/P EST LOW 20 MIN: CPT | Performed by: INTERNAL MEDICINE

## 2024-01-15 NOTE — PROGRESS NOTES
Name: Noreen Velazquez      : 1990      MRN: [id number]  Encounter Provider: Pradeep Rodrigez MD  Encounter Date: 1/15/2024   [facility]: [facility]    Assessment & Plan     1. Oral candidiasis  Assessment & Plan:  Just completing treatment with nystatin.  She does reports some irritation in back of throat.  She is going to see GI for evaluation for sensation of lump in throat, and pain in back of throat.    I recommend evaluation with GI as scheduled.  Discussed with patient possibility of globus sensation of having the sensation of a lump in the back of the throat related to anxiety      2. Dry mouth  Assessment & Plan:  Pt using Biotene.             Subjective     I am seeing patient in coverage today for an acute issue for her PCP.  Chart reviewed with visits at the urgent care , Dr. Reyes , ED , ED , ENT January 10, Dr. Rosales .    Pt has been completing treatment for oral thrush.  Pt  noticed some white coating in the morning in the back or her throat and wanted to be seen.  No GERD symptoms, no problems swallowing      Review of Systems   Constitutional:  Negative for chills and fever.   HENT:  Positive for postnasal drip and sore throat. Negative for trouble swallowing and voice change.    Cardiovascular:  Positive for palpitations.   Neurological:  Positive for headaches.       Past Medical History:   Diagnosis Date   • 39 weeks gestation of pregnancy 2019   • Anxiety    • Bronchitis 2023   • Contraceptive use     LAST ASSESSED: 2013   • Depression     d/c Buspar with preg - now started on Zoloft 25 mg 19   • Exposure to COVID-19 virus 2020   • External hemorrhoid, bleeding     LAST ASSESSED: 2014   • Fetal abnormality affecting management of mother, antepartum 2020    Bilateral talipes equivarous  -[facility]   • Gallbladder attack    •  Gastroenteritis 10/11/2023   • Genetic screening 2019   • Hematochezia     LAST ASSESSED: 2014   • IBS (irritable bowel syndrome)    • Immunity status testing     LAST ASSESSED: 2014   • Inflammatory bowel disease    • Methicillin resistant Staphylococcus aureus infection    • Migraine     prev on effexor for migraine & anxiety - d/c approx 1 yr ago   • Nasal congestion 2023   • Poison ivy dermatitis     LAST ASSESSED: 2015   • Psychiatric disorder    • Shingles    • Skin abscess     RIGHT LEG   • Upper respiratory tract infection 2022   • Urinary frequency     LAST ASSESSED: 2014   • Urinary tract infection    • UTI (urinary tract infection)     uses Macrobid after intercourse   • Vacuum extractor delivery, delivered 2020   • Varicella     childhood     Past Surgical History:   Procedure Laterality Date   • CONDYLOMA EXCISION/FULGURATION     • TONSILLECTOMY      ONSET:    • WISDOM TOOTH EXTRACTION       Family History   Problem Relation Age of Onset   • Thyroid disease Mother         hypothyroid   • Arthritis Mother    • Thyroid disease Father    • Kidney cancer Father    • Heart attack Father    • Drug abuse Brother    • Seizures Brother    • Diabetes Maternal Grandmother         type 2   • Dementia Maternal Grandmother    • Cancer Maternal Grandfather         prostate   • Emphysema Paternal Grandmother    • Cancer Family         MALIGNANT NEOPLASM     Social History     Socioeconomic History   • Marital status: Single     Spouse name: None   • Number of children: None   • Years of education: None   • Highest education level: None   Occupational History   • None   Tobacco Use   • Smoking status: Former     Current packs/day: 0.00     Average packs/day: 0.3 packs/day for 8.6 years (2.1 ttl pk-yrs)     Types: Cigarettes     Start date: 2011     Quit date: 10/31/2019     Years since quittin.2   • Smokeless tobacco: Never   Vaping Use   • Vaping status: Never  Used   Substance and Sexual Activity   • Alcohol use: Not Currently     Comment: occasional   • Drug use: Never   • Sexual activity: Yes     Partners: Male     Birth control/protection: Condom Male   Other Topics Concern   • None   Social History Narrative    EXERCISING REGULARLY     Social Determinants of Health     Financial Resource Strain: Not on file   Food Insecurity: Not on file   Transportation Needs: Not on file   Physical Activity: Not on file   Stress: Not on file   Social Connections: Not on file   Intimate Partner Violence: Not on file   Housing Stability: Not on file     Current Outpatient Medications on File Prior to Visit   Medication Sig   • ALPRAZolam (XANAX) 0.25 mg tablet Take 1 tablet (0.25 mg total) by mouth 2 (two) times a day as needed for anxiety   • escitalopram (LEXAPRO) 5 mg tablet Take 1 tablet (5 mg total) by mouth daily   • Multiple Vitamin (multivitamin) tablet Take 1 tablet by mouth daily   • albuterol (ProAir HFA) 90 mcg/act inhaler Inhale 2 puffs every 6 (six) hours as needed for wheezing or shortness of breath   • clotrimazole (MYCELEX) 10 mg koki Take 1 tablet (10 mg total) by mouth 5 (five) times a day for 7 days Do not start before January 12, 2024. (Patient not taking: Reported on 1/15/2024)   • fluticasone (FLONASE) 50 mcg/act nasal spray 1 spray into each nostril daily   • medroxyPROGESTERone (PROVERA) 10 mg tablet Take 1 tablet (10 mg total) by mouth daily   • miconazole 2 % cream Apply 1 application. topically 2 (two) times a day   • naproxen (Naprosyn) 500 mg tablet Take 1 tablet (500 mg total) by mouth 2 (two) times a day with meals for 7 days (Patient not taking: Reported on 1/4/2024)   • nystatin (MYCOSTATIN) 500,000 units/5 mL suspension Apply 5 mL (500,000 Units total) to the mouth or throat 4 (four) times a day for 14 days (Patient not taking: Reported on 1/15/2024)     No Known Allergies  Immunization History   Administered Date(s) Administered   • Influenza,  injectable, quadrivalent, preservative free 0.5 mL 11/20/2019   • Tdap 08/13/2013, 04/27/2020   • Tuberculin Skin Test-PPD Intradermal 08/13/2013, 06/02/2014       Objective     /82   Pulse (!) 107   Temp 98 °F (36.7 °C) (Tympanic)   Wt 47.5 kg (104 lb 12.8 oz)   LMP 12/28/2023   SpO2 100%   BMI 20.47 kg/m²     Physical Exam  Vitals reviewed.   Constitutional:       General: She is not in acute distress.     Appearance: Normal appearance.   HENT:      Mouth/Throat:      Mouth: Mucous membranes are moist.      Pharynx: Oropharynx is clear. Uvula midline. Posterior oropharyngeal erythema (slight) present. No oropharyngeal exudate or uvula swelling.      Comments: No white coating seen on tongue  Lymphadenopathy:      Cervical: No cervical adenopathy.   Skin:     Coloration: Skin is not jaundiced or pale.   Neurological:      Mental Status: She is alert.       Pradeep Rodrigez MD

## 2024-01-15 NOTE — ASSESSMENT & PLAN NOTE
Just completing treatment with nystatin.  She does reports some irritation in back of throat.  She is going to see GI for evaluation for sensation of lump in throat, and pain in back of throat.    I recommend evaluation with GI as scheduled.  Discussed with patient possibility of globus sensation of having the sensation of a lump in the back of the throat related to anxiety

## 2024-01-15 NOTE — PATIENT INSTRUCTIONS
Problem List Items Addressed This Visit          Digestive    Dry mouth     Pt using Biotene.         Oral candidiasis - Primary     Just completing treatment with nystatin.  She does reports some irritation in back of throat.  She is going to see GI for evaluation for sensation of lump in throat, and pain in back of throat.    I recommend evaluation with GI as scheduled.  Discussed with patient possibility of globus sensation of having the sensation of a lump in the back of the throat related to anxiety

## 2024-01-16 NOTE — ED ATTENDING ATTESTATION
1/9/2024  ILev MD, saw and evaluated the patient. I have discussed the patient with the resident/non-physician practitioner and agree with the resident's/non-physician practitioner's findings, Plan of Care, and MDM as documented in the resident's/non-physician practitioner's note, except where noted. All available labs and Radiology studies were reviewed.  I was present for key portions of any procedure(s) performed by the resident/non-physician practitioner and I was immediately available to provide assistance.       At this point I agree with the current assessment done in the Emergency Department.  I have conducted an independent evaluation of this patient a history and physical is as follows:see h and p above     ED Course  ED Course as of 01/16/24 1340   Tue Jan 09, 2024   0931 ER MD NOTE- RECENT LABS/ TESTING- ER CHART REVIEWED BY ER MD    1110 ER MD NOTE-  LABS REVIEWED AND COMPARED BY ER MD TO PREVIOUS- NO SIGN CHANGES   1308 Er md n   1322 Er md note- pt seen and thoroughly evaluated by er md- case d/w er resident- all studies reviewed by er md - 33 yr female with approx 2 months of = sore throat throat/ dysphagia--  was on several antibx-- steroids   now being tx for oral candidasis for approx 1 week -- still c/o  dysphagia- tender lymph nodes on neck - no other comps- avss- mild tacyh cardia- pulse ox 100 % on ra- interpretation is normal- normal tm/s- mild whiteness fo posterior tongue- normal posterior oropharyngeal exam - no posterior cervical/articular/ anterior cervical/ supraclavicular /axillary / inguinal  lymph nodes- rrr s1/s2/cta-b/soft nt/nd- no hsm - normal non focal neuro exam          Critical Care Time  Procedures

## 2024-01-17 NOTE — PROGRESS NOTES
[facility] - Outpatient Consultation New Patient  Noreen Velazquez 33 y.o. female MRN: [id number]  Encounter: [id number]          ASSESSMENT AND PLAN:    1.  Globus sensation  2.  GERD  3.  Unintentional weight loss  Patient had several infections and health issues from October to December 2023 for which she took steroids and several antibiotics.  After taking Flagyl she began to notice abnormalities/discomfort in her tongue and then on January 1 she woke up with severe pain in her throat and neck and her tongue was white.  She was treated for oral thrush with no improvement.  Has now been evaluated by ENT with no signs of oral thrush after completing treatment with nystatin and nasolaryngoscopy was consistent with LPR.  She continues with discomfort in her tongue, burning pain in her throat, globus sensation, and frequent belching.  She denies any heartburn, reflux, abdominal pain, bloating, changes in bowel habits, constipation, diarrhea, black or bloody bowel movements.  She does admit to recent weight loss of about 6 pounds in the time span of about 1 week (typically weighs 112 pounds, currently 106).  She has had a normal CASANDRA screen, Sjogren's antibody screen and has tested negative for HIV, flu, RSV, COVID, strep, and mononucleosis.  CT neck 1/9/2024 normal other than frothy secretions in nasopharynx.    -Start omeprazole 40 mg once daily in the morning as ordered by ENT.  -Start famotidine 40 mg before bed as ordered by ENT.  -Discussed GERD diet and lifestyle modifications and provided handout.  -Schedule EGD to rule out reflux esophagitis, esophageal stricture or ring, esophageal candidiasis, PUD. Discussed what to expect in procedure and risks/benefits including risk of bleeding, infection, or perforation. Patient understands and agrees to proceed with procedure. No further questions/concerns at present.       Follow up 6  weeks.    ________________________________________________________    HPI:  Noreen Velazquez is a 33-year-old female with history of IBS, migraines, recent oral thrush status post treatment with nystatin, and anxiety who presents with sore throat.    Patient had several infections and health issues from October to December 2023 for which she took Augmentin, steroids, Diflucan, nystatin swish and swallow, Macrobid, metronidazole, and Provera.  After taking Flagyl she began to notice abnormalities in her tongue and then on January 1 she woke up with severe pain in her throat and neck and her tongue was white.  She was treated for oral thrush and has now been evaluated by ENT with no signs of oral thrush after completing treatment with nystatin, however she continues with discomfort in her tongue and significant burning pain in her throat as well as a globus sensation and frequent belching.  When she was seen by ENT, she was told that they suspect LPR and that they plan to start a PPI and H2-RA.  She denies any heartburn, reflux, abdominal pain, bloating, changes in bowel habits, constipation, diarrhea, black or bloody bowel movements.  She does admit to recent weight loss of about 6 pounds in the time span of about 1 week (typically weighs 112 pounds, currently 106).  She has had a normal CASANDRA screen, Sjogren's antibody screen and has tested negative for HIV, flu, RSV, COVID, strep, and mononucleosis.  CT neck 1/9/2024 normal other than frothy secretions in nasopharynx.      Answers submitted by the patient for this visit:  Abdominal Pain Questionnaire (Submitted on 1/15/2024)  Chief Complaint: Abdominal pain  Chronicity: recurrent  Onset: 1 to 4 weeks ago  Onset quality: sudden  Frequency: daily  Episode duration: 5 Hours  Progression since onset: waxing and waning  Pain location: RUQ, epigastric region  Pain - numeric: 6/10  Pain quality: cramping, sharp  Radiates to: RUQ, periumbilical region,  back  anorexia: Yes  arthralgias: No  belching: Yes  constipation: Yes  diarrhea: Yes - when taking Nystatin, but now resolved  dysuria: No  fever: No  flatus: No  frequency: No  headaches: Yes  hematochezia: No  hematuria: No  melena: No  myalgias: No  nausea: No  weight loss: Yes  vomiting: No  Aggravated by: being still, certain positions  Relieved by: activity, certain positions, movement  Diagnostic workup: CT scan      REVIEW OF SYSTEMS:  10 point ROS reviewed and negative, except as above    Historical Information   Past Medical History:   Diagnosis Date    39 weeks gestation of pregnancy 12/09/2019    Anxiety     Bronchitis 04/03/2023    Contraceptive use     LAST ASSESSED: [id number]    Depression     d/c Buspar with preg - now started on Zoloft 25 mg 11/1/19    Exposure to COVID-19 virus 12/08/2020    External hemorrhoid, bleeding     LAST ASSESSED: [id number]    Fetal abnormality affecting management of mother, antepartum 01/29/2020    Bilateral talipes equivarous  -Has established peds ortho @[facility] for after delivery    Gallbladder attack     Gastroenteritis 10/11/2023    Genetic screening 12/09/2019    Hematochezia     LAST ASSESSED: 13MAR2014    IBS (irritable bowel syndrome)     Immunity status testing     LAST ASSESSED: [id number]    Inflammatory bowel disease     Methicillin resistant Staphylococcus aureus infection     Migraine     prev on effexor for migraine & anxiety - d/c approx 1 yr ago    Nasal congestion 07/18/2023    Poison ivy dermatitis     LAST ASSESSED: [id number]    Psychiatric disorder     Shingles     Skin abscess     RIGHT LEG    Upper respiratory tract infection 04/11/2022    Urinary frequency     LAST ASSESSED: [id number]    Urinary tract infection     UTI (urinary tract infection)     uses Macrobid after intercourse    Vacuum extractor delivery, delivered 06/11/2020    Varicella     childhood     Past Surgical History:   Procedure Laterality Date    CONDYLOMA EXCISION/FULGURATION       TONSILLECTOMY      ONSET:     WISDOM TOOTH EXTRACTION       Social History   Social History     Substance and Sexual Activity   Alcohol Use Not Currently    Comment: occasional     Social History     Substance and Sexual Activity   Drug Use Never     Social History     Tobacco Use   Smoking Status Former    Current packs/day: 0.00    Average packs/day: 0.3 packs/day for 8.6 years (2.1 ttl pk-yrs)    Types: Cigarettes    Start date: 2011    Quit date: 10/31/2019    Years since quittin.2   Smokeless Tobacco Never     Family History   Problem Relation Age of Onset    Thyroid disease Mother         hypothyroid    Arthritis Mother     Thyroid disease Father     Kidney cancer Father     Heart attack Father     Drug abuse Brother     Seizures Brother     Diabetes Maternal Grandmother         type 2    Dementia Maternal Grandmother     Cancer Maternal Grandfather         prostate    Emphysema Paternal Grandmother     Cancer Family         MALIGNANT NEOPLASM       Meds/Allergies       Current Outpatient Medications:     albuterol (ProAir HFA) 90 mcg/act inhaler    ALPRAZolam (XANAX) 0.25 mg tablet    clotrimazole (MYCELEX) 10 mg koki    escitalopram (LEXAPRO) 5 mg tablet    fluticasone (FLONASE) 50 mcg/act nasal spray    medroxyPROGESTERone (PROVERA) 10 mg tablet    miconazole 2 % cream    Multiple Vitamin (multivitamin) tablet    naproxen (Naprosyn) 500 mg tablet    No Known Allergies        Objective   Wt Readings from Last 3 Encounters:   01/15/24 47.5 kg (104 lb 12.8 oz)   24 47.6 kg (105 lb)   24 47.6 kg (105 lb)     Temp Readings from Last 3 Encounters:   01/15/24 98 °F (36.7 °C) (Tympanic)   24 98.5 °F (36.9 °C) (Oral)   24 98.1 °F (36.7 °C) (Oral)     BP Readings from Last 3 Encounters:   01/15/24 120/82   24 120/82   24 135/83     Pulse Readings from Last 3 Encounters:   01/15/24 (!) 107   24 (!) 121   24 (!) 113        PHYSICAL EXAM:     Physical  Exam  Vitals reviewed.   Constitutional:       General: She is not in acute distress.     Appearance: She is well-developed.   HENT:      Head: Normocephalic and atraumatic.   Eyes:      Conjunctiva/sclera: Conjunctivae normal.   Cardiovascular:      Rate and Rhythm: Normal rate and regular rhythm.      Heart sounds: No murmur heard.  Pulmonary:      Effort: Pulmonary effort is normal. No respiratory distress.      Breath sounds: Normal breath sounds.   Abdominal:      General: Bowel sounds are normal. There is no distension.      Palpations: Abdomen is soft.      Tenderness: There is abdominal tenderness in the epigastric area. There is no guarding.   Musculoskeletal:         General: No swelling.      Cervical back: Neck supple.   Skin:     General: Skin is warm and dry.   Neurological:      Mental Status: She is alert.   Psychiatric:         Mood and Affect: Mood normal.             Lab Results:   No visits with results within 1 Day(s) from this visit.   Latest known visit with results is:   Appointment on 01/11/2024   Component Date Value    SS-A (RO) Ab 01/11/2024 <0.2     SS-B (LA) Ab 01/11/2024 <0.2     HIV-1 p24 Antigen 01/11/2024 Non-Reactive     HIV-1 Antibody 01/11/2024 Non-Reactive     HIV-2 Antibody 01/11/2024 Non-Reactive     HIV Ag-Ab 5th Gen 01/11/2024 Non-Reactive     CASANDRA 01/11/2024 Negative        Lab Results   Component Value Date    WBC 7.72 01/09/2024    HGB 13.8 01/09/2024    HCT 41.8 01/09/2024    MCV 94 01/09/2024     01/09/2024       Lab Results   Component Value Date     (L) 04/05/2014    SODIUM 139 01/09/2024    K 3.6 01/09/2024     01/09/2024    CO2 28 01/09/2024    ANIONGAP 6 04/05/2014    AGAP 7 01/09/2024    BUN 6 01/09/2024    CREATININE 0.65 01/09/2024    GLUC 98 01/09/2024    GLUF 82 04/09/2019    CALCIUM 10.0 01/09/2024    AST 14 01/09/2024    ALT 13 01/09/2024    ALKPHOS 44 01/09/2024    PROT 7.4 04/05/2014    TP 7.6 01/09/2024    BILITOT 0.39 04/05/2014     TBILI 0.64 01/09/2024    EGFR 118 01/09/2024         Radiology Results:   CT soft tissue neck with contrast    Result Date: 1/9/2024  Narrative: CT NECK WITH CONTRAST INDICATION:   Throat pain, swelling. COMPARISON: CT head without contrast 3/28/2019. CT head, CT Max face sinus, and CT cervical spine without contrast 10/27/2011. TECHNIQUE:  Axial, sagittal, and coronal 2D reformatted images were created from the axial source data and submitted for interpretation. Radiation dose length product (DLP) for this visit:  432 mGy-cm .  This examination, like all CT scans performed in the [facility], was performed utilizing techniques to minimize radiation dose exposure, including the use of iterative reconstruction and automated exposure control. IV Contrast:  85 mL of iohexol (OMNIPAQUE) IMAGE QUALITY:  Diagnostic. FINDINGS: VISUALIZED BRAIN PARENCHYMA:  No acute intracranial pathology of the visualized brain parenchyma. VISUALIZED ORBITS: Normal visualized orbits. PARANASAL SINUSES: Normal visualized paranasal sinuses. NASAL CAVITY AND NASOPHARYNX: Small amount of nonspecific frothy secretions in nasopharynx extending to bilateral fossa of Rosenmuller. No nasopharyngeal mass. Nasal cavity is normal. SUPRAHYOID NECK: Dental amalgam with beam hardening artifact slightly limits evaluation of anterior oral cavity; otherwise, normal visualized oral cavity. Accessory salivary gland in left sublingual space, unchanged. Normal tongue base, tonsillar fossa and epiglottis. INFRAHYOID NECK:  Aryepiglottic folds and piriform sinuses are normal.  Normal glottis and subglottic airway. THYROID GLAND:  Unremarkable. PAROTID AND SUBMANDIBULAR GLANDS:  Normal. LYMPH NODES:  No pathologic or enlarged adenopathy. VASCULAR STRUCTURES:  Normal enhancement of the cervical vasculature. THORACIC INLET: Minimal biapical fibrotic change. No focal consolidation in visualized upper lung zones. BONY STRUCTURES: No acute fracture or  destructive osseous lesion.     Impression: Small amount of nonspecific frothy secretions in nasopharynx extending to bilateral fossa of Rosenmuller. Recommend direct visualization for further evaluation. No other acute neck abnormality, suspicious neck mass, or cervical lymphadenopathy. Additional chronic/incidental findings as detailed above. The study was marked in EPIC for immediate notification. Workstation performed: [id number]     VAS lower limb venous duplex study, unilateral/limited    Result Date: 12/28/2023  Narrative:  [facility] REPORT CLINICAL: Indications: Patient presents with shooting pain down the back of the legs for the past few days (right > left). Operative History: Patient denies any cardiovascular procedures Risk Factors The patient has no history of DVT.   CONCLUSION:  Impression: RIGHT LOWER LIMB No evidence of acute or chronic deep vein thrombosis . No evidence of superficial thrombophlebitis noted. Doppler evaluation shows a normal response to augmentation maneuvers. Popliteal, posterior tibial and anterior tibial arterial Doppler waveform's are triphasic.  LEFT LOWER LIMB LIMITED Evaluation shows no evidence of thrombus in the common femoral vein. Doppler evaluation shows a normal response to augmentation maneuvers.  Technical findings were given to Dr. Marie Yanez via tiger text.  SIGNATURE: Electronically Signed by: JOSE MANUEL ROUSSEAU MD [facility] on 2023-12-28 04:16:54 PM    VAS upper limb venous duplex scan, unilateral/limited    Result Date: 12/28/2023  Narrative:  [facility] REPORT CLINICAL: Indications:  Patient presents with right upper extremity weakness over the past few days. Operative History: Patient denies any cardiovascular procedures Risk Factors The patient has family history of DVT.   CONCLUSION:  Impression RIGHT UPPER LIMB: No evidence of acute or chronic deep vein thrombosis. No evidence of superficial thrombophlebitis noted. Doppler evaluation  shows a normal response to augmentation maneuvers.  LEFT UPPER LIMB LIMITED: Evaluation shows no evidence of thrombus in the internal jugular vein, subclavian vein, and the innominate vein.  Technical findings were given to Dr. Marie Yanez via tiger text.  SIGNATURE: Electronically Signed by: JOSE MANUEL ROUSSEAU MD [facility] on 2023-12-28 04:16:41 PM    US pelvis complete w transvaginal    Result Date: 12/23/2023  Narrative: PELVIC ULTRASOUND, COMPLETE INDICATION:  The patient is 33 years old.  pelvic pain, ovarian cyst, eval for torsion. COMPARISON: Pelvic ultrasound July 22, 2021. Correlation with CT abdomen/pelvis December 23, 2023 at 14:11 TECHNIQUE:   Transabdominal pelvic ultrasound was performed in sagittal and transverse planes with a curvilinear transducer.  Additional transvaginal imaging was performed to better evaluate the endometrium and ovaries.  Imaging included volumetric sweeps as well as traditional still imaging technique. FINDINGS: UTERUS: The uterus is anteverted in position, measuring 9.7 x 2.8 x 6.1 cm. The uterus has a normal contour and echotexture. The cervix appears within normal limits. ENDOMETRIUM: The endometrial echo complex has an AP caliber of 5.0 mm. Its appearance is within normal limits for age and cycle and shows no filling defects. OVARIES/ADNEXA: Right ovary:  4.7 x 3.7 x 3.1 cm. 28.2 mL. Left ovary:  2.8 x 2.3 x 1.9 cm. 6.7 mL. Ovarian Doppler flow is within normal limits. 3.6 x 2.5 x 3.2 cm unilocular anechoic right ovarian cyst without vascularity or papillary excrescence. OTHER: No free fluid or loculated fluid collections.     Impression: 3.5 cm right ovarian cyst without suspicious features. No findings to suggest ovarian torsion Workstation performed: [id number]     CT abdomen pelvis with contrast    Result Date: 12/23/2023  Narrative: CT ABDOMEN AND PELVIS WITH IV CONTRAST INDICATION:   Abdominal pain, acute, nonlocalized back pain,pelvic pain, nausea, dysuria.  COMPARISON: CT abdomen pelvis December 16, 2023 TECHNIQUE:  CT examination of the abdomen and pelvis was performed. Multiplanar 2D reformatted images were created from the source data. This examination, like all CT scans performed in the [facility], was performed utilizing techniques to minimize radiation dose exposure, including the use of iterative reconstruction and automated exposure control. Radiation dose length product (DLP) for this visit:  442 mGy-cm IV Contrast:  75 mL of iohexol (OMNIPAQUE) Enteric Contrast:  Enteric contrast was not administered. FINDINGS: ABDOMEN LOWER CHEST:  No clinically significant abnormality identified in the visualized lower chest. LIVER/BILIARY TREE:  Unremarkable. GALLBLADDER:  No calcified gallstones. No pericholecystic inflammatory change. SPLEEN:  Unremarkable. PANCREAS:  Unremarkable. ADRENAL GLANDS:  Unremarkable. KIDNEYS/URETERS:  Unremarkable. No hydronephrosis. STOMACH AND BOWEL:  Unremarkable. APPENDIX:  No findings to suggest appendicitis. ABDOMINOPELVIC CAVITY:  No ascites.  No pneumoperitoneum.  No lymphadenopathy. VESSELS:  Unremarkable for patient's age. PELVIS REPRODUCTIVE ORGANS: 3.6 cm right adnexal lesion with average Hounsfield units of 20, and is likely ovarian given the probable claw sign on series 601, image 74. There is no surrounding inflammation. URINARY BLADDER:  Unremarkable. ABDOMINAL WALL/INGUINAL REGIONS:  Unremarkable. OSSEOUS STRUCTURES:  No acute fracture or destructive osseous lesion.     Impression: 3.6 cm right adnexal lesion is likely a physiologic cyst. Though there is no surrounding inflammation, this could be the cause of patient's pelvic pain, and pelvic ultrasound can be considered for further assessment. Workstation performed: [id number]       Prior Procedure Results/Reports   Last EGD 4/10/2014  Normal     Last colonoscopy 4/10/2014  Normal       Unruly Robin PA-C   Available on [facility]

## 2024-01-18 ENCOUNTER — OFFICE VISIT (OUTPATIENT)
Dept: GASTROENTEROLOGY | Facility: CLINIC | Age: 34
End: 2024-01-18
Payer: MEDICARE

## 2024-01-18 ENCOUNTER — TELEPHONE (OUTPATIENT)
Dept: GASTROENTEROLOGY | Facility: CLINIC | Age: 34
End: 2024-01-18

## 2024-01-18 VITALS
WEIGHT: 106.4 LBS | HEART RATE: 97 BPM | HEIGHT: 60 IN | DIASTOLIC BLOOD PRESSURE: 81 MMHG | TEMPERATURE: 97.2 F | BODY MASS INDEX: 20.89 KG/M2 | OXYGEN SATURATION: 99 % | SYSTOLIC BLOOD PRESSURE: 116 MMHG

## 2024-01-18 DIAGNOSIS — R09.A2 GLOBUS SENSATION: Primary | ICD-10-CM

## 2024-01-18 DIAGNOSIS — R63.4 UNINTENTIONAL WEIGHT LOSS: ICD-10-CM

## 2024-01-18 DIAGNOSIS — K21.9 GASTROESOPHAGEAL REFLUX DISEASE, UNSPECIFIED WHETHER ESOPHAGITIS PRESENT: ICD-10-CM

## 2024-01-18 PROCEDURE — 99244 OFF/OP CNSLTJ NEW/EST MOD 40: CPT | Performed by: DIETITIAN, REGISTERED

## 2024-01-18 NOTE — PATIENT INSTRUCTIONS
GERD (Gastroesophageal Reflux Disease)   WHAT YOU NEED TO KNOW:   What is gastroesophageal reflux disease (GERD)?  GERD is reflux that happens more than 2 times a week for a few weeks. Reflux means acid and food in your stomach back up into your esophagus. GERD can cause other health problems over time if it is not treated.       What causes GERD?  GERD often happens because the lower muscle (sphincter) of the esophagus does not close properly. The sphincter normally opens to let food into the stomach. It then closes to keep food and stomach acid in the stomach. If the sphincter does not close properly, stomach acid and food back up (reflux) into the esophagus. The following may increase your risk for GERD:  Certain foods such as spicy foods, chocolate, foods that contain caffeine, peppermint, and fried foods    Hiatal hernia    Certain medicines such as calcium channel blockers (used to treat high blood pressure), allergy medicines, sedatives, or antidepressants    Pregnancy, obesity, or scleroderma    Lying down after a meal    Drinking alcohol or smoking cigarettes    What are the signs and symptoms of GERD?   Heartburn (burning pain in your chest)    Pain after meals that spreads to your neck, jaw, or shoulder    Pain that gets better when you change positions    Bitter or acid taste in your mouth    A dry cough    Trouble swallowing or pain with swallowing    Hoarseness or a sore throat    Burping or hiccups    Feeling full soon after you start eating    How is GERD diagnosed?  Your healthcare provider will ask about your symptoms and when they started. Tell your provider about other medical conditions you have, your eating habits, and your activities. You may also need any of the following:  An esophageal pH test  measures the amount of stomach acid that reaches your esophagus. A small probe is used to check the amount. The probe may stay attached to the catheter. If so, the catheter is taped to your nose to  hold it in place. Sometimes the probe is wireless, so the catheter is removed after the probe is placed.         An endoscopy  is a procedure used to look at the inside of your esophagus and stomach. An endoscope is a bendable tube with a light and camera on the end. Your healthcare provider may remove a small sample of tissue and send it to a lab for tests.         X-ray  pictures may be taken of your stomach and intestines (bowel). You may be given a chalky liquid to drink before the pictures are taken. This liquid helps your stomach and intestines show up better on the x-rays.    Pressure and function  tests of your esophagus can help find problems such as a hiatal hernia.    How is GERD treated?   Medicines  are used to decrease stomach acid. Medicine may also be used to help your lower esophageal sphincter and stomach contract (tighten) more.    Surgery  is done to wrap the upper part of the stomach around the esophageal sphincter. This will strengthen the sphincter and prevent reflux.    How can I manage GERD?       Do not have foods or drinks that may increase heartburn.  These include chocolate, peppermint, fried or fatty foods, drinks that contain caffeine, or carbonated drinks (soda). Other foods include spicy foods, onions, tomatoes, and tomato-based foods. Do not have foods or drinks that can irritate your esophagus, such as citrus fruits, juices, and alcohol.    Do not eat large meals.  When you eat a lot of food at one time, your stomach needs more acid to digest it. Eat 6 small meals each day instead of 3 large ones, and eat slowly. Do not eat meals 2 to 3 hours before bedtime.    Elevate the head of your bed.  Place 6-inch blocks under the head of your bed frame. You may also use more than one pillow under your head and shoulders while you sleep.    Maintain a healthy weight.  If you are overweight, weight loss may help relieve symptoms of GERD.    Do not smoke.  Smoking weakens the lower esophageal  sphincter and increases the risk of GERD. Ask your healthcare provider for information if you currently smoke and need help to quit. E-cigarettes or smokeless tobacco still contain nicotine. Talk to your healthcare provider before you use these products.    Do not put pressure on your abdomen.  Pressure pushes acid up into your esophagus. Do not wear clothing that is tight around your waist. Do not bend over. Bend at the knees if you need to pick something up.  Call your local emergency number (911 in the ) if:   You have severe chest pain and sudden trouble breathing.      When should I seek immediate care?   You have trouble breathing after you vomit.    You have trouble swallowing, or pain with swallowing.    Your bowel movements are black, bloody, or tarry-looking.    Your vomit looks like coffee grounds or has blood in it.    When should I call my doctor?   You feel full and cannot burp or vomit.    You vomit large amounts, or you vomit often.    You are losing weight without trying.    Your symptoms get worse or do not improve with treatment.    You have questions or concerns about your condition or care.    CARE AGREEMENT:   You have the right to help plan your care. Learn about your health condition and how it may be treated. Discuss treatment options with your healthcare providers to decide what care you want to receive. You always have the right to refuse treatment. The above information is an  only. It is not intended as medical advice for individual conditions or treatments. Talk to your doctor, nurse or pharmacist before following any medical regimen to see if it is safe and effective for you.  © Copyright [employer] 2023 Information is for End User's use only and may not be sold, redistributed or otherwise used for commercial purposes.

## 2024-01-18 NOTE — TELEPHONE ENCOUNTER
Scheduled date of 02/02/24 (as of today) EGd  Physician performing: Dr. brannon  [facility] of procedure:  [facility]  Instructions given to patient:  EGD  Clearances: N/a

## 2024-01-19 ENCOUNTER — ANESTHESIA (OUTPATIENT)
Dept: ANESTHESIOLOGY | Facility: HOSPITAL | Age: 34
End: 2024-01-19

## 2024-01-19 ENCOUNTER — ANESTHESIA EVENT (OUTPATIENT)
Dept: ANESTHESIOLOGY | Facility: HOSPITAL | Age: 34
End: 2024-01-19

## 2024-01-23 ENCOUNTER — NURSE TRIAGE (OUTPATIENT)
Age: 34
End: 2024-01-23

## 2024-01-23 DIAGNOSIS — K21.9 GASTROESOPHAGEAL REFLUX DISEASE, UNSPECIFIED WHETHER ESOPHAGITIS PRESENT: ICD-10-CM

## 2024-01-23 DIAGNOSIS — R09.A2 GLOBUS SENSATION: Primary | ICD-10-CM

## 2024-01-23 RX ORDER — FAMOTIDINE 20 MG/1
20 TABLET, FILM COATED ORAL 2 TIMES DAILY PRN
Qty: 60 TABLET | Refills: 3 | Status: SHIPPED | OUTPATIENT
Start: 2024-01-23

## 2024-01-23 NOTE — TELEPHONE ENCOUNTER
Message sent to patient on MyChart.  If tongue swelling is affecting breathing, talking, eating - I recommend the ED. Otherwise, I instructed to continue PPI and increase H2-blocker to BID dosing and to proceed w/ EGD scheduled for next week.

## 2024-01-23 NOTE — TELEPHONE ENCOUNTER
"Please advise  Last OV: 1/18/24   Hx: globus sensation, GERD, Unintentional wgt loss    Pt calling in, reports tongue swelling that is more constant the last week with globus sensation. She explains she has a history of tongue swelling and it would happen occasionally but now is constant.   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     She does not have SOB or trouble breathing, her throat does not feel like it is closing.     Pt has been to the ED a few times for same symptoms and they give her benadryl and tell her to follow up with specialist. She has appt with allergist 2/21/24 but does not think this is allergy related.     Pt asking if Unruly RIZO has any recommendations for her? I did explain ENT would be the ones to ask for advise but I will send message to Unruly.     I did provided ED precautions as well.   Answer Assessment - Initial Assessment Questions  1. ONSET: \"When did the swelling start?\" (e.g., minutes, hours, days)      Ongoing for the last month but is constant now   2. LOCATION: \"What part of the tongue is swollen?\"      Whole tongue         4. CAUSE: \"What do you think is causing the tongue swelling?\" (e.g., hx of angioedema, allergies)      LPR?   5. RECURRENT SYMPTOM: \"Have you had tongue swelling before?\" If Yes, ask: \"When was the last time?\" \"What happened that time?\"      Yes   6. OTHER SYMPTOMS: \"Do you have any other symptoms?\" (e.g., difficulty breathing, facial swelling)      No    Protocols used: Tongue Swelling-ADULT-AH    "

## 2024-01-24 DIAGNOSIS — R09.A2 GLOBUS SENSATION: ICD-10-CM

## 2024-01-24 DIAGNOSIS — K21.9 GASTROESOPHAGEAL REFLUX DISEASE, UNSPECIFIED WHETHER ESOPHAGITIS PRESENT: Primary | ICD-10-CM

## 2024-01-24 RX ORDER — SUCRALFATE ORAL 1 G/10ML
1 SUSPENSION ORAL
Qty: 1200 ML | Refills: 2 | Status: SHIPPED | OUTPATIENT
Start: 2024-01-24

## 2024-01-25 ENCOUNTER — TELEPHONE (OUTPATIENT)
Dept: GYNECOLOGY | Facility: CLINIC | Age: 34
End: 2024-01-25

## 2024-01-25 ENCOUNTER — OFFICE VISIT (OUTPATIENT)
Dept: INTERNAL MEDICINE CLINIC | Facility: CLINIC | Age: 34
End: 2024-01-25
Payer: MEDICARE

## 2024-01-25 VITALS
SYSTOLIC BLOOD PRESSURE: 110 MMHG | WEIGHT: 104.4 LBS | HEIGHT: 60 IN | BODY MASS INDEX: 20.5 KG/M2 | DIASTOLIC BLOOD PRESSURE: 72 MMHG

## 2024-01-25 DIAGNOSIS — R22.0 TONGUE SWELLING: Primary | ICD-10-CM

## 2024-01-25 DIAGNOSIS — F41.1 GENERALIZED ANXIETY DISORDER: ICD-10-CM

## 2024-01-25 PROBLEM — B37.0 ORAL CANDIDIASIS: Status: RESOLVED | Noted: 2024-01-11 | Resolved: 2024-01-25

## 2024-01-25 PROBLEM — Z11.3 SCREEN FOR STD (SEXUALLY TRANSMITTED DISEASE): Status: RESOLVED | Noted: 2024-01-11 | Resolved: 2024-01-25

## 2024-01-25 PROCEDURE — 99214 OFFICE O/P EST MOD 30 MIN: CPT | Performed by: INTERNAL MEDICINE

## 2024-01-25 NOTE — PROGRESS NOTES
Name: Noreen Velazquez      : 1990      MRN: [id number]  Encounter Provider: Lea Reyes, MD  Encounter Date: 2024   [facility]    Assessment & Plan     1. Tongue swelling    2. Generalized anxiety disorder    Unclear etiology for globus sensation/sensation of throat and tongue swelling. Symptoms are out of proportion to reflux although she has improved on omeprazole 40mg and famotidine 20 mg BID.Considering possibility of a food allergy. She is scheduled for an EGD. She may need a pH manometry. F/U with the allergist as scheduled. Continue ulcer free diet. Continue to avoid NSAIDs. Patient provided reassurance. I still recommend she start the Lexapro for the anxiety.          Subjective      Here for a follow up  Continues to have sensation of throat and tongue swelling especially when she eats salty food. This has improved with omeprazole 40mg and famotidine 20mg BID prescribed by ENT for LPR. She also saw GI and is scheduled for an EGD. She did not start the Carafate.   To recap, she presented with ear pain in October and was dx with otitis media. She was on a few rounds of antibiotics and a steroid. This was followed by thrush which was treated with Nystatin. The film on her tongue improved but she started to experience swollen neck glands/throat/tongue. ENT exam has been normal.   She is concerned about cancer in her throat, thrush down her esophagus.             Review of Systems   HENT:  Positive for trouble swallowing. Negative for congestion, postnasal drip and rhinorrhea.    Respiratory:  Negative for cough.    Gastrointestinal:  Negative for abdominal pain.   Psychiatric/Behavioral:  The patient is nervous/anxious.        Current Outpatient Medications on File Prior to Visit   Medication Sig   • famotidine (PEPCID) 20 mg tablet Take 1 tablet (20 mg total) by mouth 2 (two) times a day as needed for indigestion (globus sensation, tongue  discomfort)   • Multiple Vitamin (multivitamin) tablet Take 1 tablet by mouth daily   • omeprazole (PriLOSEC) 40 MG capsule Take 1 capsule (40 mg total) by mouth daily before breakfast (Patient taking differently: Take 40 mg by mouth daily before breakfast In the morning before breakfast)   • albuterol (ProAir HFA) 90 mcg/act inhaler Inhale 2 puffs every 6 (six) hours as needed for wheezing or shortness of breath   • ALPRAZolam (XANAX) 0.25 mg tablet Take 1 tablet (0.25 mg total) by mouth 2 (two) times a day as needed for anxiety (Patient not taking: Reported on 1/18/2024)   • escitalopram (LEXAPRO) 5 mg tablet Take 1 tablet (5 mg total) by mouth daily (Patient not taking: Reported on 1/18/2024)   • miconazole 2 % cream Apply 1 application. topically 2 (two) times a day   • sucralfate (CARAFATE) 1 g/10 mL suspension Take 10 mL (1 g total) by mouth 4 (four) times a day (with meals and at bedtime)       Objective     /72 (BP Location: Left arm, Patient Position: Sitting, Cuff Size: Standard)   Ht 5' (1.524 m)   Wt 47.4 kg (104 lb 6.4 oz)   LMP 12/28/2023   BMI 20.39 kg/m²     Physical Exam  Constitutional:       Appearance: She is well-developed. She is not ill-appearing.   HENT:      Mouth/Throat:      Pharynx: No posterior oropharyngeal erythema.   Eyes:      Conjunctiva/sclera: Conjunctivae normal.   Cardiovascular:      Rate and Rhythm: Normal rate and regular rhythm.      Heart sounds: Normal heart sounds. No murmur heard.  Pulmonary:      Effort: Pulmonary effort is normal. No respiratory distress.      Breath sounds: Normal breath sounds. No wheezing or rales.   Abdominal:      General: There is no distension.      Palpations: Abdomen is soft. There is no mass.      Tenderness: There is no abdominal tenderness. There is no guarding or rebound.   Musculoskeletal:      Cervical back: Neck supple.   Neurological:      Mental Status: She is alert and oriented to person, place, and time.   Psychiatric:          Mood and Affect: Mood normal.         Behavior: Behavior normal.         Thought Content: Thought content normal.         Judgment: Judgment normal.       Lea Reyes, MD

## 2024-01-25 NOTE — TELEPHONE ENCOUNTER
The patient called because she said that we called and left a message about putting her on a birth control pill. She was calling back because she wanted to make sure that the birth control would be safe to take with the new medications she was put on. She started taking famotidine and omeprazole. She said she would like to speak to you about the birth control that you wanted to put her on.     The patient also called again and mentioned that she is having clear vaginal discharge since she saw you last and she has not had her period when she was suppose to get it at the start of the month. She has no pelvic pain or vaginal irritation.     This patient was seen at [facility].

## 2024-01-26 DIAGNOSIS — Z30.011 ENCOUNTER FOR INITIAL PRESCRIPTION OF CONTRACEPTIVE PILLS: Primary | ICD-10-CM

## 2024-01-26 NOTE — TELEPHONE ENCOUNTER
The patient would like to try Jessi and was told to call in two weeks if her period didn't start. Please sign off on script.

## 2024-01-30 ENCOUNTER — APPOINTMENT (OUTPATIENT)
Dept: LAB | Age: 34
End: 2024-01-30
Payer: MEDICARE

## 2024-01-30 ENCOUNTER — OFFICE VISIT (OUTPATIENT)
Dept: OBGYN CLINIC | Facility: CLINIC | Age: 34
End: 2024-01-30
Payer: MEDICARE

## 2024-01-30 VITALS
HEIGHT: 60 IN | DIASTOLIC BLOOD PRESSURE: 70 MMHG | BODY MASS INDEX: 20.26 KG/M2 | SYSTOLIC BLOOD PRESSURE: 104 MMHG | WEIGHT: 103.2 LBS

## 2024-01-30 DIAGNOSIS — R59.9 SWOLLEN LYMPH NODES: ICD-10-CM

## 2024-01-30 DIAGNOSIS — N63.10 MASS OF RIGHT BREAST, UNSPECIFIED QUADRANT: Primary | ICD-10-CM

## 2024-01-30 PROCEDURE — 86618 LYME DISEASE ANTIBODY: CPT

## 2024-01-30 PROCEDURE — 86617 LYME DISEASE ANTIBODY: CPT

## 2024-01-30 PROCEDURE — 99214 OFFICE O/P EST MOD 30 MIN: CPT | Performed by: OBSTETRICS & GYNECOLOGY

## 2024-01-30 PROCEDURE — 36415 COLL VENOUS BLD VENIPUNCTURE: CPT

## 2024-01-30 RX ORDER — DROSPIRENONE AND ETHINYL ESTRADIOL 0.02-3(28)
1 KIT ORAL DAILY
Qty: 90 TABLET | Refills: 3 | Status: SHIPPED | OUTPATIENT
Start: 2024-01-30 | End: 2024-02-07

## 2024-01-30 NOTE — PATIENT INSTRUCTIONS
The patient was informed of benign findings of her breast, we will order an ultrasound of the right breast to make sure not missing anything.  We also ordered a Lyme test.  She was reassured that her inguinal and pelvic nodes are benign.  She will be informed the results of above studies.

## 2024-01-30 NOTE — PROGRESS NOTES
This is a 33-year-old white female she is a  1 para 1.  She is seen today in my office for possible lymph nodes in the groin and possible cyst in the right breast.  She is also to request to be tested for Lyme's disease was of a variety of symptoms which were all sort of vague.    Examination of the right breast was essentially benign there was a small amount of thickness but I was not impressed.  The patient insisted at [facility].  As per her request we have ordered an ultrasound of the right breast.  The area of concern is approximate the 4 o'clock position approximately 3 cm from the right nipple.    Her concern about lymph nodes in her inguinal area were benign.  There were no palpable lymph nodes of concern she was reassured.  No    Impression questionable cyst of the right breast we will order an ultrasound.  We will also order a Lyme disease titer for her.  She will be informed.  She was reassured there is no area of concern in her inguinal or pelvic lymph nodes.

## 2024-01-31 ENCOUNTER — TELEPHONE (OUTPATIENT)
Dept: OBGYN CLINIC | Facility: CLINIC | Age: 34
End: 2024-01-31

## 2024-01-31 ENCOUNTER — TELEPHONE (OUTPATIENT)
Dept: GASTROENTEROLOGY | Facility: MEDICAL CENTER | Age: 34
End: 2024-01-31

## 2024-01-31 ENCOUNTER — OFFICE VISIT (OUTPATIENT)
Dept: INTERNAL MEDICINE CLINIC | Facility: CLINIC | Age: 34
End: 2024-01-31
Payer: MEDICARE

## 2024-01-31 VITALS
DIASTOLIC BLOOD PRESSURE: 70 MMHG | WEIGHT: 105 LBS | SYSTOLIC BLOOD PRESSURE: 110 MMHG | BODY MASS INDEX: 20.51 KG/M2 | HEART RATE: 99 BPM | OXYGEN SATURATION: 99 %

## 2024-01-31 DIAGNOSIS — R63.4 UNINTENTIONAL WEIGHT LOSS: ICD-10-CM

## 2024-01-31 DIAGNOSIS — L65.9 HAIR LOSS: ICD-10-CM

## 2024-01-31 DIAGNOSIS — M25.50 POLYARTHRALGIA: ICD-10-CM

## 2024-01-31 DIAGNOSIS — R89.9 ABNORMAL LABORATORY TEST RESULT: Primary | ICD-10-CM

## 2024-01-31 DIAGNOSIS — K21.9 LARYNGOPHARYNGEAL REFLUX (LPR): Primary | ICD-10-CM

## 2024-01-31 LAB
B BURGDOR IGG SERPL QL IA: NEGATIVE
B BURGDOR IGG+IGM SER QL IA: POSITIVE
B BURGDOR IGM SERPL QL IA: NEGATIVE

## 2024-01-31 PROCEDURE — 99214 OFFICE O/P EST MOD 30 MIN: CPT | Performed by: INTERNAL MEDICINE

## 2024-01-31 NOTE — ASSESSMENT & PLAN NOTE
-Recommended implementing high-calorie snacks such as not butters and dairy free protein shakes  -Given her unintentional weight loss we will obtain a CT of the chest for completion since she is already undergone imaging of her abdomen and pelvis in December

## 2024-01-31 NOTE — TELEPHONE ENCOUNTER
Patient viewing Lyme results in MyChart from 1/30/2024 - total AB (+), but reflex IgG & IgM both negative.  Called [facility]'s micro lab - informed may be false positive - can reorder Lyme Total AB with reflex to IgG/IgM in 7-14 days for confirmation.

## 2024-01-31 NOTE — ASSESSMENT & PLAN NOTE
-Continue Pepcid twice daily and PPI as prescribed  -Agree with upper EGD with GI for further evaluation

## 2024-01-31 NOTE — TELEPHONE ENCOUNTER
Pt called wanting to know if there is any earlier appointments available, I did call went endo and was told if there is any cancellations they will be reaching out thanks

## 2024-01-31 NOTE — ASSESSMENT & PLAN NOTE
-Cause unclear  -Repeat Lyme testing in 2 weeks since her previous test appears it may have been a false positive  -Patient is awaiting on a referral from her gynecologist to see rheumatology at [facility].  She states she is going to request to see Dr. Fitzgerald.

## 2024-01-31 NOTE — PROGRESS NOTES
Name: Noreen Velazquez      : 1990      MRN: [id number]  Encounter Provider: Frank Rosales MD  Encounter Date: 2024   Encounter department: [facility]    Assessment & Plan     1. Laryngopharyngeal reflux (LPR)  Assessment & Plan:  -Continue Pepcid twice daily and PPI as prescribed  -Agree with upper EGD with GI for further evaluation        2. Unintentional weight loss  Assessment & Plan:  -Recommended implementing high-calorie snacks such as not butters and dairy free protein shakes  -Given her unintentional weight loss we will obtain a CT of the chest for completion since she is already undergone imaging of her abdomen and pelvis in December    Orders:  -     CT chest wo contrast; Future; Expected date: 2024    3. Polyarthralgia  Assessment & Plan:  -Cause unclear  -Repeat Lyme testing in 2 weeks since her previous test appears it may have been a false positive  -Patient is awaiting on a referral from her gynecologist to see rheumatology at [facility].  She states she is going to request to see Dr. Fitzgerald.    Orders:  -     Lyme Total AB W Reflex to IGM/IGG; Future; Expected date: 2024    4. Hair loss  Assessment & Plan:  -Likely multifactorial given her increased stress/anxiety and weight loss  -Recent TSH normal.  Check iron studies and zinc level.    Orders:  -     Iron Panel (Includes Ferritin, Iron Sat%, Iron, and TIBC); Future  -     Zinc; Future         Subjective      HPI  Patient presents today for chronic follow-up.  Since her last visit with her PCP on  she has been diagnosed with LPR which was felt to be the cause of her globus sensation.  She is scheduled for an upper EGD with GI this Friday for further evaluation.  She is concerned that she continues to lose weight.  She has lost a little over 10 pounds unintentionally over the past year.    She also continues to complain of generalized joint pain.  She notified her gynecologist of  this and Lyme testing was ordered.  Her total Lyme antibody came back positive however her reflex testing for Lyme IgG and IgM was negative.  It appears her gynecology office contacted our micro lab and they were told that this is most likely a false positive.  They recommended repeating the test in 1 to 2 weeks.    Lastly she complains of hair loss.  She states this has been an issue over the past 2 to 3 weeks.      All other systems negative except for pertinent findings noted in HPI.       Current Outpatient Medications on File Prior to Visit   Medication Sig   • famotidine (PEPCID) 20 mg tablet Take 1 tablet (20 mg total) by mouth 2 (two) times a day as needed for indigestion (globus sensation, tongue discomfort)   • Multiple Vitamin (multivitamin) tablet Take 1 tablet by mouth daily   • omeprazole (PriLOSEC) 40 MG capsule Take 1 capsule (40 mg total) by mouth daily before breakfast   • albuterol (ProAir HFA) 90 mcg/act inhaler Inhale 2 puffs every 6 (six) hours as needed for wheezing or shortness of breath   • ALPRAZolam (XANAX) 0.25 mg tablet Take 1 tablet (0.25 mg total) by mouth 2 (two) times a day as needed for anxiety (Patient not taking: Reported on 1/30/2024)   • drospirenone-ethinyl estradiol (EYAD) 3-0.02 MG per tablet Take 1 tablet by mouth daily (Patient not taking: Reported on 1/31/2024)   • escitalopram (LEXAPRO) 5 mg tablet Take 1 tablet (5 mg total) by mouth daily (Patient not taking: Reported on 1/18/2024)   • miconazole 2 % cream Apply 1 application. topically 2 (two) times a day (Patient not taking: Reported on 1/30/2024)   • sucralfate (CARAFATE) 1 g/10 mL suspension Take 10 mL (1 g total) by mouth 4 (four) times a day (with meals and at bedtime) (Patient not taking: Reported on 1/30/2024)       Objective     /70 (BP Location: Left arm, Patient Position: Sitting, Cuff Size: Standard)   Pulse 99   Wt 47.6 kg (105 lb)   LMP 12/03/2023 (Exact Date)   SpO2 99%   BMI 20.51 kg/m²     BP  Readings from Last 3 Encounters:   01/31/24 110/70   01/30/24 104/70   01/25/24 110/72        Wt Readings from Last 3 Encounters:   01/31/24 47.6 kg (105 lb)   01/31/24 46.7 kg (103 lb)   01/30/24 46.8 kg (103 lb 3.2 oz)       Physical Exam    General: NAD  HEENT: NCAT, EOMI, normal conjunctiva  Cardiovascular: RRR, normal S1 and S2, no m/r/g  Pulmonary: Normal respiratory effort, no wheezes, rales or rhonchi  GI: Soft, nontender, nondistended, normoactive bowel sounds  MSK: Normal bulk and tone  Extremities: No lower extremity edema  Skin: Normal skin color, no rashes     Frank Rosales MD

## 2024-01-31 NOTE — ASSESSMENT & PLAN NOTE
-Likely multifactorial given her increased stress/anxiety and weight loss  -Recent TSH normal.  Check iron studies and zinc level.

## 2024-02-01 ENCOUNTER — APPOINTMENT (OUTPATIENT)
Dept: LAB | Age: 34
End: 2024-02-01
Payer: MEDICARE

## 2024-02-01 ENCOUNTER — TELEPHONE (OUTPATIENT)
Dept: OBGYN CLINIC | Facility: CLINIC | Age: 34
End: 2024-02-01

## 2024-02-01 DIAGNOSIS — R53.83 OTHER FATIGUE: Primary | ICD-10-CM

## 2024-02-01 DIAGNOSIS — R10.2 PELVIC PAIN: ICD-10-CM

## 2024-02-01 DIAGNOSIS — L65.9 HAIR LOSS: ICD-10-CM

## 2024-02-01 LAB
FERRITIN SERPL-MCNC: 51 NG/ML (ref 11–307)
IRON SATN MFR SERPL: 21 % (ref 15–50)
IRON SERPL-MCNC: 77 UG/DL (ref 50–212)
TIBC SERPL-MCNC: 362 UG/DL (ref 250–450)
UIBC SERPL-MCNC: 285 UG/DL (ref 155–355)

## 2024-02-01 PROCEDURE — 83550 IRON BINDING TEST: CPT

## 2024-02-01 PROCEDURE — 36415 COLL VENOUS BLD VENIPUNCTURE: CPT

## 2024-02-01 PROCEDURE — 84630 ASSAY OF ZINC: CPT

## 2024-02-01 PROCEDURE — 82728 ASSAY OF FERRITIN: CPT

## 2024-02-01 PROCEDURE — 83540 ASSAY OF IRON: CPT

## 2024-02-01 RX ORDER — SODIUM CHLORIDE 9 MG/ML
125 INJECTION, SOLUTION INTRAVENOUS CONTINUOUS
Status: CANCELLED | OUTPATIENT
Start: 2024-02-01

## 2024-02-01 NOTE — TELEPHONE ENCOUNTER
I spoke with the patient today about her most recent Lyme test there may be a false positive we have to repeat the antibody screen in 2 weeks.

## 2024-02-01 NOTE — TELEPHONE ENCOUNTER
"Pt called at 0930 today (02/01/2024) requesting Michelle to send her a referral to [facility].  Pt stated that this was discussed in her appointment.  Pt would like the referral to be put into \"mychart,\" for her to access, as well as a call back when this task is completed.  Pt stated that we can leave a detailed message on her cell phone, [phone].    "

## 2024-02-02 ENCOUNTER — ANESTHESIA EVENT (OUTPATIENT)
Dept: GASTROENTEROLOGY | Facility: MEDICAL CENTER | Age: 34
End: 2024-02-02

## 2024-02-02 ENCOUNTER — ANESTHESIA (OUTPATIENT)
Dept: GASTROENTEROLOGY | Facility: MEDICAL CENTER | Age: 34
End: 2024-02-02

## 2024-02-02 ENCOUNTER — HOSPITAL ENCOUNTER (OUTPATIENT)
Dept: GASTROENTEROLOGY | Facility: MEDICAL CENTER | Age: 34
Setting detail: OUTPATIENT SURGERY
End: 2024-02-02
Payer: MEDICARE

## 2024-02-02 VITALS
RESPIRATION RATE: 20 BRPM | TEMPERATURE: 98.3 F | SYSTOLIC BLOOD PRESSURE: 109 MMHG | OXYGEN SATURATION: 100 % | HEART RATE: 86 BPM | DIASTOLIC BLOOD PRESSURE: 61 MMHG

## 2024-02-02 DIAGNOSIS — R09.A2 GLOBUS SENSATION: ICD-10-CM

## 2024-02-02 DIAGNOSIS — K21.9 GASTROESOPHAGEAL REFLUX DISEASE, UNSPECIFIED WHETHER ESOPHAGITIS PRESENT: ICD-10-CM

## 2024-02-02 DIAGNOSIS — R63.4 UNINTENTIONAL WEIGHT LOSS: ICD-10-CM

## 2024-02-02 LAB
EXT PREGNANCY TEST URINE: NEGATIVE
EXT. CONTROL: NORMAL

## 2024-02-02 PROCEDURE — 88305 TISSUE EXAM BY PATHOLOGIST: CPT | Performed by: PATHOLOGY

## 2024-02-02 PROCEDURE — 81025 URINE PREGNANCY TEST: CPT | Performed by: ANESTHESIOLOGY

## 2024-02-02 PROCEDURE — 43239 EGD BIOPSY SINGLE/MULTIPLE: CPT | Performed by: INTERNAL MEDICINE

## 2024-02-02 RX ORDER — SODIUM CHLORIDE 9 MG/ML
125 INJECTION, SOLUTION INTRAVENOUS CONTINUOUS
Status: DISCONTINUED | OUTPATIENT
Start: 2024-02-02 | End: 2024-02-06 | Stop reason: HOSPADM

## 2024-02-02 RX ORDER — LIDOCAINE HYDROCHLORIDE 20 MG/ML
INJECTION, SOLUTION EPIDURAL; INFILTRATION; INTRACAUDAL; PERINEURAL AS NEEDED
Status: DISCONTINUED | OUTPATIENT
Start: 2024-02-02 | End: 2024-02-02

## 2024-02-02 RX ORDER — PROPOFOL 10 MG/ML
INJECTION, EMULSION INTRAVENOUS CONTINUOUS PRN
Status: DISCONTINUED | OUTPATIENT
Start: 2024-02-02 | End: 2024-02-02

## 2024-02-02 RX ADMIN — PROPOFOL 200 MG: 10 INJECTION, EMULSION INTRAVENOUS at 14:39

## 2024-02-02 RX ADMIN — LIDOCAINE HYDROCHLORIDE 100 MG: 20 INJECTION, SOLUTION EPIDURAL; INFILTRATION; INTRACAUDAL at 14:37

## 2024-02-02 RX ADMIN — SODIUM CHLORIDE 125 ML/HR: 0.9 INJECTION, SOLUTION INTRAVENOUS at 14:27

## 2024-02-02 RX ADMIN — PROPOFOL 50 MG: 10 INJECTION, EMULSION INTRAVENOUS at 14:41

## 2024-02-02 RX ADMIN — PROPOFOL 130 MCG/KG/MIN: 10 INJECTION, EMULSION INTRAVENOUS at 14:37

## 2024-02-02 NOTE — ANESTHESIA PREPROCEDURE EVALUATION
Procedure:  EGD    Relevant Problems   CARDIO   (+) Chronic migraine without aura without status migrainosus, not intractable      GI/HEPATIC   (+) Gastroesophageal reflux disease      NEURO/PSYCH   (+) Chronic migraine without aura without status migrainosus, not intractable   (+) Generalized anxiety disorder      Digestive   (+) Irritable bowel syndrome        Physical Exam    Airway    Mallampati score: I  TM Distance: >3 FB  Neck ROM: full     Dental   No notable dental hx     Cardiovascular  Rhythm: regular, Rate: normal, Cardiovascular exam normal    Pulmonary   Breath sounds clear to auscultation    Other Findings  post-pubertal.      Anesthesia Plan  ASA Score- 2     Anesthesia Type- IV sedation with anesthesia with ASA Monitors.         Additional Monitors:     Airway Plan:            Plan Factors-    Chart reviewed.   Existing labs reviewed. Patient summary reviewed.                  Induction-     Postoperative Plan-     Informed Consent- Anesthetic plan and risks discussed with patient.

## 2024-02-02 NOTE — ANESTHESIA POSTPROCEDURE EVALUATION
Post-Op Assessment Note    CV Status:  Stable    Pain management: adequate       Mental Status:  Alert and awake   Hydration Status:  Euvolemic   PONV Controlled:  Controlled   Airway Patency:  Patent     Post Op Vitals Reviewed: Yes    No anethesia notable event occurred.    Staff: Anesthesiologist               /61 (02/02/24 1458)    Temp      Pulse 86 (02/02/24 1458)   Resp 20 (02/02/24 1458)    SpO2 100 % (02/02/24 1458)

## 2024-02-02 NOTE — TELEPHONE ENCOUNTER
Lm patient's as re: lab results & recommended follow up to have repeat testing in 7-14 days as recommended by lab/JSW.  Lab order placed in patient's chart.

## 2024-02-02 NOTE — H&P
History and Physical -  [facility]  Noreen Velazquez 33 y.o. female MRN: [id number]                  HPI: Noreen Velazquez is a 33 y.o. year old female who presents for reflux, weight loss, and globus sensation.      REVIEW OF SYSTEMS: Per the HPI, and otherwise unremarkable.    Historical Information   Past Medical History:   Diagnosis Date    39 weeks gestation of pregnancy 12/09/2019    Anxiety     Bronchitis 04/03/2023    Contraceptive use     LAST ASSESSED: [id number]    Depression     d/c Buspar with preg - now started on Zoloft 25 mg 11/1/19    Exposure to COVID-19 virus 12/08/2020    External hemorrhoid, bleeding     LAST ASSESSED: [id number]    Fetal abnormality affecting management of mother, antepartum 01/29/2020    Bilateral talipes equivarous  -[facility]    Gallbladder attack     Gastroenteritis 10/11/2023    Genetic screening 12/09/2019    Hematochezia     LAST ASSESSED: 13MAR2014    IBS (irritable bowel syndrome)     Immunity status testing     LAST ASSESSED: [id number]    Inflammatory bowel disease     Lactose intolerance     Methicillin resistant Staphylococcus aureus infection     Migraine     prev on effexor for migraine & anxiety - d/c approx 1 yr ago    Nasal congestion 07/18/2023    Oral candidiasis     Poison ivy dermatitis     LAST ASSESSED: [id number]    Psychiatric disorder     Screen for STD (sexually transmitted disease)     Shingles     Skin abscess     RIGHT LEG    Upper respiratory tract infection 04/11/2022    Urinary frequency     LAST ASSESSED: [id number]    Urinary tract infection     UTI (urinary tract infection)     uses Macrobid after intercourse    Vacuum extractor delivery, delivered 06/11/2020    Varicella     childhood     Past Surgical History:   Procedure Laterality Date    COLONOSCOPY      CONDYLOMA EXCISION/FULGURATION      TONSILLECTOMY      ONSET: 1997    UPPER GASTROINTESTINAL ENDOSCOPY       WISDOM TOOTH EXTRACTION       Social History   Social History     Substance and Sexual Activity   Alcohol Use Not Currently     Social History     Substance and Sexual Activity   Drug Use Never     Social History     Tobacco Use   Smoking Status Some Days    Current packs/day: 0.00    Average packs/day: 0.2 packs/day for 10.2 years (2.5 ttl pk-yrs)    Types: Cigarettes    Start date: 2011    Last attempt to quit: 10/31/2019    Years since quittin.2   Smokeless Tobacco Never     Family History   Problem Relation Age of Onset    Thyroid disease Mother         hypothyroid    Arthritis Mother     Hypothyroidism Mother     Thyroid disease Father     Kidney cancer Father     Heart attack Father     Cancer Father         Kidney    Hypertension Father     Hypothyroidism Father         Hyper    Drug abuse Brother     Seizures Brother     Diabetes Maternal Grandmother         type 2    Dementia Maternal Grandmother     Cancer Maternal Grandfather         prostate    Emphysema Paternal Grandmother     Cancer Family         MALIGNANT NEOPLASM    Early death Maternal Aunt         Cancer    Early death Maternal Uncle         Heart attack       Meds/Allergies       Current Outpatient Medications:     famotidine (PEPCID) 20 mg tablet    Multiple Vitamin (multivitamin) tablet    omeprazole (PriLOSEC) 40 MG capsule    albuterol (ProAir HFA) 90 mcg/act inhaler    ALPRAZolam (XANAX) 0.25 mg tablet    drospirenone-ethinyl estradiol (EYAD) 3-0.02 MG per tablet    escitalopram (LEXAPRO) 5 mg tablet    miconazole 2 % cream    sucralfate (CARAFATE) 1 g/10 mL suspension    Current Facility-Administered Medications:     sodium chloride 0.9 % infusion, 125 mL/hr, Intravenous, Continuous    No Known Allergies    Objective     LMP 2023 (Exact Date)       PHYSICAL EXAM    Gen: NAD  Head: NCAT  CV: RRR  CHEST: Clear  ABD: soft, NT/ND  EXT: no edema      ASSESSMENT/PLAN:  This is a 33 y.o. year old female here for her endoscopy, and  she is stable and optimized for her procedure.

## 2024-02-05 ENCOUNTER — TELEPHONE (OUTPATIENT)
Dept: OBGYN CLINIC | Facility: CLINIC | Age: 34
End: 2024-02-05

## 2024-02-05 DIAGNOSIS — Z30.011 INITIATION OF OCP (BCP): Primary | ICD-10-CM

## 2024-02-05 NOTE — TELEPHONE ENCOUNTER
Patient called, she saw you recently on 1/30 and was given a script for Jessi. She said you went over two different birth controls. Jessi and Lo Loestrin. She states she looked up Jessi over the weekend and read some information that she doesn't like. She would like to know if you can send in the Lo Loestrin instead for her. She has her period now and would like to start the medication today

## 2024-02-06 PROCEDURE — 88305 TISSUE EXAM BY PATHOLOGIST: CPT | Performed by: PATHOLOGY

## 2024-02-07 ENCOUNTER — OFFICE VISIT (OUTPATIENT)
Dept: INTERNAL MEDICINE CLINIC | Facility: CLINIC | Age: 34
End: 2024-02-07
Payer: MEDICARE

## 2024-02-07 VITALS
SYSTOLIC BLOOD PRESSURE: 122 MMHG | WEIGHT: 103.2 LBS | BODY MASS INDEX: 20.15 KG/M2 | OXYGEN SATURATION: 99 % | DIASTOLIC BLOOD PRESSURE: 78 MMHG | HEART RATE: 68 BPM

## 2024-02-07 DIAGNOSIS — R07.89 CHEST WALL PAIN: Primary | ICD-10-CM

## 2024-02-07 DIAGNOSIS — I49.8 SINUS ARRHYTHMIA SEEN ON ELECTROCARDIOGRAM: ICD-10-CM

## 2024-02-07 DIAGNOSIS — M79.7 FIBROMYALGIA: ICD-10-CM

## 2024-02-07 DIAGNOSIS — R63.4 UNINTENTIONAL WEIGHT LOSS: ICD-10-CM

## 2024-02-07 PROCEDURE — 99214 OFFICE O/P EST MOD 30 MIN: CPT | Performed by: INTERNAL MEDICINE

## 2024-02-07 NOTE — ASSESSMENT & PLAN NOTE
-Appreciate rheumatology eval.  -X-ray imaging of the SI joints and knees were unrevealing.  -CRP, ESR, rheumatoid factor and beta-2 microglobulin were normal.  Per patient SPEP, lysozyme and ACE level are still pending.  -Discussed with the patient a trial of Cymbalta as I believe this would benefit her from a fibromyalgia standpoint in addition to her anxiety.  Patient wishes to consider this further and will notify our office if she would like to start this or not.

## 2024-02-07 NOTE — PROGRESS NOTES
Name: Noreen Velazquez      : 1990      MRN: [id number]  Encounter Provider: Frank Rosales MD  Encounter Date: 2024   Encounter department: [facility]    Assessment & Plan     1. Chest wall pain  Assessment & Plan:  -Cardiac w/u negative  -Pain musculoskeletal in etiology.  Possibly related to fibromyalgia or costochondritis.  -Recommended a trial of heat and/or cold compresses.      2. Fibromyalgia  Assessment & Plan:  -Appreciate rheumatology eval.  -X-ray imaging of the SI joints and knees were unrevealing.  -CRP, ESR, rheumatoid factor and beta-2 microglobulin were normal.  Per patient SPEP, lysozyme and ACE level are still pending.  -Discussed with the patient a trial of Cymbalta as I believe this would benefit her from a fibromyalgia standpoint in addition to her anxiety.  Patient wishes to consider this further and will notify our office if she would like to start this or not.      3. Unintentional weight loss  Assessment & Plan:  -Weight stable  -CT chest that was previously ordered is pending.  Prior CT abdomen/pelvis revealed no acute pathology.  -Continue to monitor      4. Sinus arrhythmia seen on electrocardiogram  Assessment & Plan:  -Unchanged from her previous EKG in .  Notify patient that this is typically a benign condition that does not warrant any additional cardiac workup.             Subjective      HPI  Patient presents today for post ED follow-up.  She was seen in the emergency department on  complaining of chest wall pain.  Her labs were only notable for a slightly low potassium level of 3.4.  Her troponins and D-dimer were negative.  Chest x-ray was obtained and showed no evidence of acute cardiopulmonary pathology.  EKG was obtained and notable for sinus arrhythmia with right axis deviation.  This is unchanged when compared to her EKG from .  Patient states she was told by the ED physician and her rheumatologist to stop her  Pepcid and omeprazole.    She also underwent an upper EGD last Friday with GI.  Her upper endoscopy was unremarkable.  Her biopsies were negative.     She has also been seen by rheumatology.  She was given a diagnosis of fibromyalgia.  It was recommended that she follow-up with her PCP to discuss treatment options in the form of TCA or SNRI.  A CRP, ESR, rheumatoid factor and beta-2 microglobulin were checked and found to be normal. SPEP, lysozyme and ACE level still pending. Repeat Lyme testing is negative.      Through rheumatology she also underwent x-ray imaging of the knees and SI joints which were normal in addition to an ultrasound of the neck which showed no evidence of lymphadenopathy.      All other systems negative except for pertinent findings noted in HPI.       Current Outpatient Medications on File Prior to Visit   Medication Sig   • famotidine (PEPCID) 20 mg tablet Take 1 tablet (20 mg total) by mouth 2 (two) times a day as needed for indigestion (globus sensation, tongue discomfort)   • Multiple Vitamin (multivitamin) tablet Take 1 tablet by mouth daily   • Norethin-Eth Estrad-Fe Biphas 1 MG-10 MCG / 10 MCG TABS Take 1 tablet by mouth daily   • omeprazole (PriLOSEC) 40 MG capsule Take 1 capsule (40 mg total) by mouth daily before breakfast   • albuterol (ProAir HFA) 90 mcg/act inhaler Inhale 2 puffs every 6 (six) hours as needed for wheezing or shortness of breath   • ALPRAZolam (XANAX) 0.25 mg tablet Take 1 tablet (0.25 mg total) by mouth 2 (two) times a day as needed for anxiety (Patient not taking: Reported on 1/30/2024)   • escitalopram (LEXAPRO) 5 mg tablet Take 1 tablet (5 mg total) by mouth daily (Patient not taking: Reported on 1/18/2024)   • [DISCONTINUED] drospirenone-ethinyl estradiol (EYAD) 3-0.02 MG per tablet Take 1 tablet by mouth daily (Patient not taking: Reported on 1/31/2024)   • [DISCONTINUED] miconazole 2 % cream Apply 1 application. topically 2 (two) times a day (Patient not  taking: Reported on 1/30/2024)   • [DISCONTINUED] sucralfate (CARAFATE) 1 g/10 mL suspension Take 10 mL (1 g total) by mouth 4 (four) times a day (with meals and at bedtime) (Patient not taking: Reported on 1/30/2024)       Objective     /78 (BP Location: Left arm, Patient Position: Sitting, Cuff Size: Large)   Pulse 68   Wt 46.8 kg (103 lb 3.2 oz)   LMP 12/03/2023 (Exact Date)   SpO2 99%   BMI 20.15 kg/m²     BP Readings from Last 3 Encounters:   02/07/24 122/78   02/02/24 109/61   01/31/24 110/70        Wt Readings from Last 3 Encounters:   02/07/24 46.8 kg (103 lb 3.2 oz)   01/31/24 47.6 kg (105 lb)   01/31/24 46.7 kg (103 lb)       Physical Exam    General: NAD  HEENT: NCAT, EOMI, normal conjunctiva  Cardiovascular: RRR, normal S1 and S2, no m/r/g  Pulmonary: Normal respiratory effort, no wheezes, rales or rhonchi  MSK: Normal bulk and tone, tenderness to palpation over the left chest wall  Extremities: No lower extremity edema  Skin: Normal skin color, no rashes     Frank Rosales MD

## 2024-02-07 NOTE — ASSESSMENT & PLAN NOTE
-Weight stable  -CT chest that was previously ordered is pending.  Prior CT abdomen/pelvis revealed no acute pathology.  -Continue to monitor

## 2024-02-07 NOTE — ASSESSMENT & PLAN NOTE
-Unchanged from her previous EKG in 2022.  Notify patient that this is typically a benign condition that does not warrant any additional cardiac workup.

## 2024-02-07 NOTE — ASSESSMENT & PLAN NOTE
-Cardiac w/u negative  -Pain musculoskeletal in etiology.  Possibly related to fibromyalgia or costochondritis.  -Recommended a trial of heat and/or cold compresses.

## 2024-02-08 DIAGNOSIS — R10.11 RUQ PAIN: Primary | ICD-10-CM

## 2024-02-09 ENCOUNTER — HOSPITAL ENCOUNTER (OUTPATIENT)
Dept: RADIOLOGY | Facility: HOSPITAL | Age: 34
Discharge: HOME | End: 2024-02-09
Payer: MEDICARE

## 2024-02-09 DIAGNOSIS — K21.9 GASTROESOPHAGEAL REFLUX DISEASE, UNSPECIFIED WHETHER ESOPHAGITIS PRESENT: ICD-10-CM

## 2024-02-09 DIAGNOSIS — R09.A2 GLOBUS SENSATION: Primary | ICD-10-CM

## 2024-02-09 DIAGNOSIS — R10.11 RUQ PAIN: ICD-10-CM

## 2024-02-09 PROCEDURE — 76705 ECHO EXAM OF ABDOMEN: CPT

## 2024-02-09 RX ORDER — PANTOPRAZOLE SODIUM 20 MG/1
20 TABLET, DELAYED RELEASE ORAL DAILY
Qty: 30 TABLET | Refills: 3 | Status: SHIPPED | OUTPATIENT
Start: 2024-02-09

## 2024-02-10 LAB — ZINC SERPL-MCNC: 84 UG/DL (ref 44–115)

## 2024-02-12 ENCOUNTER — HOSPITAL ENCOUNTER (OUTPATIENT)
Dept: RADIOLOGY | Age: 34
Discharge: HOME | End: 2024-02-12
Payer: MEDICARE

## 2024-02-12 DIAGNOSIS — R63.4 UNINTENTIONAL WEIGHT LOSS: ICD-10-CM

## 2024-02-12 PROCEDURE — G1004 CDSM NDSC: HCPCS

## 2024-02-12 PROCEDURE — 71250 CT THORAX DX C-: CPT

## 2024-02-15 ENCOUNTER — OFFICE VISIT (OUTPATIENT)
Dept: INTERNAL MEDICINE CLINIC | Facility: CLINIC | Age: 34
End: 2024-02-15
Payer: MEDICARE

## 2024-02-15 VITALS
OXYGEN SATURATION: 98 % | BODY MASS INDEX: 20.62 KG/M2 | DIASTOLIC BLOOD PRESSURE: 74 MMHG | WEIGHT: 105.6 LBS | SYSTOLIC BLOOD PRESSURE: 128 MMHG | HEART RATE: 105 BPM

## 2024-02-15 DIAGNOSIS — B35.4 TINEA CORPORIS: Primary | ICD-10-CM

## 2024-02-15 PROCEDURE — 99213 OFFICE O/P EST LOW 20 MIN: CPT | Performed by: INTERNAL MEDICINE

## 2024-02-15 RX ORDER — CLOTRIMAZOLE 1 %
CREAM (GRAM) TOPICAL 2 TIMES DAILY
Qty: 60 G | Refills: 0 | Status: SHIPPED | OUTPATIENT
Start: 2024-02-15 | End: 2024-03-14

## 2024-02-15 NOTE — PROGRESS NOTES
Name: Noreen Velazquez      : 1990      MRN: [id number]  Encounter Provider: Frank Rosales MD  Encounter Date: 2/15/2024   [facility]: [facility]    Assessment & Plan     1. Tinea corporis  -     clotrimazole (LOTRIMIN) 1 % cream; Apply topically 2 (two) times a day for 28 days    -Patient has a faintly erythematous ovoid patch on the left forearm which appears to be in the early stages of development.  She has been given a prescription for clotrimazole to apply twice a day over the next 4 weeks.       Subjective      HPI  Patient presents today as an acute visit.  She complains of a rash that she feels is due to ringworm.  She states she took her daughter to see her pediatrician today she was diagnosed with ringworm and started on clotrimazole.  She complains of multiple spots on her arms in addition to her left calf that she is concerned may be due to ringworm.      All other systems negative except for pertinent findings noted in HPI.       Current Outpatient Medications on File Prior to Visit   Medication Sig   • Multiple Vitamin (multivitamin) tablet Take 1 tablet by mouth daily   • Norethin-Eth Estrad-Fe Biphas 1 MG-10 MCG / 10 MCG TABS Take 1 tablet by mouth daily   • pantoprazole (PROTONIX) 20 mg tablet Take 1 tablet (20 mg total) by mouth daily   • albuterol (ProAir HFA) 90 mcg/act inhaler Inhale 2 puffs every 6 (six) hours as needed for wheezing or shortness of breath   • ALPRAZolam (XANAX) 0.25 mg tablet Take 1 tablet (0.25 mg total) by mouth 2 (two) times a day as needed for anxiety (Patient not taking: Reported on 2024)   • escitalopram (LEXAPRO) 5 mg tablet Take 1 tablet (5 mg total) by mouth daily (Patient not taking: Reported on 2024)   • famotidine (PEPCID) 20 mg tablet Take 1 tablet (20 mg total) by mouth 2 (two) times a day as needed for indigestion (globus sensation, tongue discomfort) (Patient not taking: Reported on 2024)        Objective     /74 (BP Location: Left arm, Patient Position: Sitting, Cuff Size: Standard)   Pulse 105   Wt 47.9 kg (105 lb 9.6 oz)   LMP 12/03/2023 (Exact Date)   SpO2 98%   BMI 20.62 kg/m²     BP Readings from Last 3 Encounters:   02/15/24 128/74   02/07/24 122/78   02/02/24 109/61        Wt Readings from Last 3 Encounters:   02/15/24 47.9 kg (105 lb 9.6 oz)   02/08/24 46.7 kg (103 lb)   02/07/24 46.8 kg (103 lb 3.2 oz)       Physical Exam    General: NAD  HEENT: NCAT, EOMI, normal conjunctiva  Cardiovascular: RRR, normal S1 and S2, no m/r/g  Pulmonary: Normal respiratory effort, no wheezes, rales or rhonchi  Extremities: No lower extremity edema  Skin: Faintly erythematous patch on left forearm     Frank Rosales MD

## 2024-02-19 DIAGNOSIS — R10.11 RUQ PAIN: Primary | ICD-10-CM

## 2024-02-19 DIAGNOSIS — K82.4 GALLBLADDER POLYP: ICD-10-CM

## 2024-02-20 ENCOUNTER — CONSULT (OUTPATIENT)
Dept: SURGERY | Facility: CLINIC | Age: 34
End: 2024-02-20
Payer: MEDICARE

## 2024-02-20 VITALS
SYSTOLIC BLOOD PRESSURE: 121 MMHG | HEART RATE: 105 BPM | BODY MASS INDEX: 20.62 KG/M2 | DIASTOLIC BLOOD PRESSURE: 68 MMHG | WEIGHT: 105 LBS | HEIGHT: 60 IN

## 2024-02-20 DIAGNOSIS — K82.4 GALLBLADDER POLYP: ICD-10-CM

## 2024-02-20 DIAGNOSIS — R10.11 RUQ PAIN: ICD-10-CM

## 2024-02-20 PROCEDURE — 99203 OFFICE O/P NEW LOW 30 MIN: CPT | Performed by: SURGERY

## 2024-02-20 RX ORDER — SUCRALFATE 1 G/1
1 TABLET ORAL 3 TIMES DAILY
Qty: 90 TABLET | Refills: 0 | Status: SHIPPED | OUTPATIENT
Start: 2024-02-20 | End: 2024-03-21

## 2024-02-20 NOTE — PROGRESS NOTES
Assessment/Plan: Patient presents with daily right upper quadrant abdominal pain with radiation into her back.  Her discomfort is constant and dull with periods of exacerbation.  Is been present over the last 2 months.  She feels that her pain is worse with fried or fatty foods.  Antacids are not helpful.  She also complains of bloating, gas, nausea (which is significant (, and belching.  She has a history for irritable bowel syndrome expressed in chronic constipation.  She was on Amitiza in the past.    She does have some mild high risk factors for peptic ulcer disease.  This includes stress, smoking 3 cigarettes/day.  She denies aspirin excess or alcohol use.  An upper endoscopy in January was normal.  CAT scan in December was normal.  There is no evidence for constipation.    An ultrasound was obtained demonstrating a 5 mm gallbladder polyp.  This should not caused any abdominal symptoms.  I explained that the polyp may be a small gallstone, but then a small gallstone should also not be causing symptoms.    I asked her to trial Carafate as this works for both acid and bile gastritis.  A HIDA scan to assess ejection fraction is ordered.  There may be an underlying element of motility disorder-and therefore a nuclear gastric emptying test is ordered in case the HIDA scan is normal.  All questions were answered.  Patient agrees.    Diagnoses and all orders for this visit:    RUQ pain  -     Ambulatory Referral to General Surgery    Gallbladder polyp  -     Ambulatory Referral to General Surgery        Subjective:      Patient ID: Noreen Velazquez is a 33 y.o. female.    Patient presents for gallbladder consult.  States she has had RUQ pain daily for 2 months.  The pain is worse when she eats fatty foods.  Ultrasound RUQ 2/9/2024   IMPRESSION:  1. Stable appearance of gallbladder polyp measuring 5 mm. Per current consensus recommendations, no further follow-up is needed for polyps of this size/morphology.  2.  Gallbladder is incomplete distended, limiting evaluation. No sonographic evidence of shadowing gallstones or cholecystitis.         Abdominal Pain  The current episode started more than 1 month ago. The problem occurs daily. The problem has been unchanged. The pain is located in the RUQ. The quality of the pain is sharp and dull. The abdominal pain radiates to the back. Associated symptoms include belching, constipation and nausea. The pain is aggravated by eating. The pain is relieved by Nothing. She has tried antacids for the symptoms. The treatment provided no relief. Prior diagnostic workup includes ultrasound.       The following portions of the patient's history were reviewed and updated as appropriate:     She  has a past medical history of 39 weeks gestation of pregnancy (12/09/2019), Abnormal ECG, Anxiety, Bronchitis (04/03/2023), Contraceptive use, Depression, Exposure to COVID-19 virus (12/08/2020), External hemorrhoid, bleeding, Fetal abnormality affecting management of mother, antepartum (01/29/2020), Gallbladder attack, Gastroenteritis (10/11/2023), Genetic screening (12/09/2019), Hematochezia, IBS (irritable bowel syndrome), Immunity status testing, Inflammatory bowel disease, Lactose intolerance, Methicillin resistant Staphylococcus aureus infection, Migraine, Nasal congestion (07/18/2023), Oral candidiasis, Poison ivy dermatitis, Psychiatric disorder, Screen for STD (sexually transmitted disease), Shingles, Skin abscess, Upper respiratory tract infection (04/11/2022), Urinary frequency, Urinary tract infection, UTI (urinary tract infection), Vacuum extractor delivery, delivered (06/11/2020), and Varicella.  She  has a past surgical history that includes Tonsillectomy; [place] tooth extraction; Condyloma excision/fulguration; Upper gastrointestinal endoscopy; and Colonoscopy.  Her family history includes Arthritis in her mother; Cancer in her family, father, and maternal grandfather; Dementia in her  maternal grandmother; Diabetes in her maternal grandmother; Drug abuse in her brother; Early death in her maternal aunt and maternal uncle; Emphysema in her paternal grandmother; Heart attack in her father; Hypertension in her father; Hypothyroidism in her father and mother; Kidney cancer in her father; Seizures in her brother; Thyroid disease in her father and mother.  She  reports that she has been smoking cigarettes. She started smoking about 12 years ago. She has a 2.5 pack-year smoking history. She has never used smokeless tobacco. She reports that she does not currently use alcohol. She reports that she does not use drugs.  Current Outpatient Medications   Medication Sig Dispense Refill    albuterol (ProAir HFA) 90 mcg/act inhaler Inhale 2 puffs every 6 (six) hours as needed for wheezing or shortness of breath 8.5 g 0    ALPRAZolam (XANAX) 0.25 mg tablet Take 1 tablet (0.25 mg total) by mouth 2 (two) times a day as needed for anxiety (Patient not taking: Reported on 1/30/2024) 30 tablet 0    clotrimazole (LOTRIMIN) 1 % cream Apply topically 2 (two) times a day for 28 days 60 g 0    escitalopram (LEXAPRO) 5 mg tablet Take 1 tablet (5 mg total) by mouth daily (Patient not taking: Reported on 1/18/2024) 30 tablet 2    famotidine (PEPCID) 20 mg tablet Take 1 tablet (20 mg total) by mouth 2 (two) times a day as needed for indigestion (globus sensation, tongue discomfort) 60 tablet 3    Multiple Vitamin (multivitamin) tablet Take 1 tablet by mouth daily      Norethin-Eth Estrad-Fe Biphas 1 MG-10 MCG / 10 MCG TABS Take 1 tablet by mouth daily 90 tablet 4    pantoprazole (PROTONIX) 20 mg tablet Take 1 tablet (20 mg total) by mouth daily 30 tablet 3     No current facility-administered medications for this visit.     She has No Known Allergies..    Review of Systems   Constitutional: Negative.  Negative for activity change.   HENT: Negative.     Eyes: Negative.    Respiratory: Negative.     Cardiovascular: Negative.     Gastrointestinal:  Positive for abdominal distention, abdominal pain, constipation and nausea.   Endocrine: Negative.    Genitourinary: Negative.    Musculoskeletal: Negative.    Skin: Negative.    Allergic/Immunologic: Negative.    Neurological: Negative.    Psychiatric/Behavioral:  Negative for agitation, behavioral problems and confusion. The patient is not nervous/anxious.          Objective:      /68   Pulse 105   Ht 5' (1.524 m)   Wt 47.6 kg (105 lb)   LMP 12/03/2023 (Exact Date)   BMI 20.51 kg/m²          Physical Exam  Constitutional:       Appearance: She is well-developed.   HENT:      Head: Atraumatic.   Eyes:      Extraocular Movements: Extraocular movements intact.   Skin:     General: Skin is warm and dry.

## 2024-02-26 ENCOUNTER — OFFICE VISIT (OUTPATIENT)
Dept: INTERNAL MEDICINE CLINIC | Facility: CLINIC | Age: 34
End: 2024-02-26
Payer: MEDICARE

## 2024-02-26 VITALS
WEIGHT: 105.8 LBS | HEART RATE: 66 BPM | HEIGHT: 60 IN | BODY MASS INDEX: 20.77 KG/M2 | SYSTOLIC BLOOD PRESSURE: 110 MMHG | DIASTOLIC BLOOD PRESSURE: 70 MMHG | OXYGEN SATURATION: 99 %

## 2024-02-26 DIAGNOSIS — R10.11 RUQ PAIN: Primary | ICD-10-CM

## 2024-02-26 DIAGNOSIS — K58.1 IRRITABLE BOWEL SYNDROME WITH CONSTIPATION: ICD-10-CM

## 2024-02-26 DIAGNOSIS — K82.4 GALLBLADDER POLYP: ICD-10-CM

## 2024-02-26 PROBLEM — K21.9 GASTROESOPHAGEAL REFLUX DISEASE: Status: RESOLVED | Noted: 2024-01-31 | Resolved: 2024-02-26

## 2024-02-26 PROBLEM — R22.0 TONGUE SWELLING: Status: RESOLVED | Noted: 2024-01-11 | Resolved: 2024-02-26

## 2024-02-26 PROBLEM — R63.4 UNINTENTIONAL WEIGHT LOSS: Status: RESOLVED | Noted: 2024-01-31 | Resolved: 2024-02-26

## 2024-02-26 PROBLEM — R07.89 CHEST WALL PAIN: Status: RESOLVED | Noted: 2024-02-07 | Resolved: 2024-02-26

## 2024-02-26 PROCEDURE — 99214 OFFICE O/P EST MOD 30 MIN: CPT | Performed by: INTERNAL MEDICINE

## 2024-02-26 NOTE — ASSESSMENT & PLAN NOTE
Non specific abdominal pain, mostly in the RUQ recently  Ongoing work up r/o gall bladder dysfunction and gastroparesis  Her bowel movements are normal although she was usually constipated that was relieved by Amitiza  I recommended she consider an antidepressant like amitriptyline. This will also help with her anxiety  She will complete her GI work up first, at least get the HIDA scan before starting the amitriptyline  She will try Carafate for ehr current symptoms

## 2024-02-26 NOTE — PROGRESS NOTES
Name: Noreen Velazquez      : 1990      MRN: [id number]  Encounter Provider: Lea Reyes, MD  Encounter Date: 2024   Enc[facility] department: [facility]    Assessment & Plan     1. RUQ pain    2. Gallbladder polyp    3. Irritable bowel syndrome with constipation  Assessment & Plan:  Non specific abdominal pain, mostly in the RUQ recently  Ongoing work up r/o gall bladder dysfunction and gastroparesis  Her bowel movements are normal although she was usually constipated that was relieved by Amitiza  I recommended she consider an antidepressant like amitriptyline. This will also help with her anxiety  She will complete her GI work up first, at least get the HIDA scan before starting the amitriptyline  She will try Carafate for ehr current symptoms             Subjective      Here for a follow up  C/o constant RUQ pain with nausea, worse with meals. She also gets easily bloated.   Stopped PPI and famotidine 3 weeks ago since it was not helping , she does not have reflux disease, and might have had side effects from the medications  Gall bladder polyp on US and she saw general surgery  She was prescribed Carafate and recommended to get a HIDA scan, gastric emptying test  Her bowel movement are normal. Her weight is stable.           Review of Systems   Constitutional:  Negative for unexpected weight change.   Gastrointestinal:  Positive for abdominal pain. Negative for constipation and diarrhea.       Current Outpatient Medications on File Prior to Visit   Medication Sig   • clotrimazole (LOTRIMIN) 1 % cream Apply topically 2 (two) times a day for 28 days   • Multiple Vitamin (multivitamin) tablet Take 1 tablet by mouth daily   • Norethin-Eth Estrad-Fe Biphas 1 MG-10 MCG / 10 MCG TABS Take 1 tablet by mouth daily   • ALPRAZolam (XANAX) 0.25 mg tablet Take 1 tablet (0.25 mg total) by mouth 2 (two) times a day as needed for anxiety (Patient not taking: Reported on 2024)   •  sucralfate (CARAFATE) 1 g tablet Take 1 tablet (1 g total) by mouth 3 (three) times a day (Patient taking differently: Take 1 g by mouth 3 (three) times a day Has not started yet.)   • [DISCONTINUED] pantoprazole (PROTONIX) 20 mg tablet Take 1 tablet (20 mg total) by mouth daily       Objective     /70 (BP Location: Left arm, Patient Position: Sitting, Cuff Size: Standard)   Pulse 66   Ht 5' (1.524 m)   Wt 48 kg (105 lb 12.8 oz)   LMP 12/03/2023 (Exact Date)   SpO2 99%   BMI 20.66 kg/m²     Physical Exam  Constitutional:       Appearance: She is well-developed. She is not ill-appearing.   Eyes:      Conjunctiva/sclera: Conjunctivae normal.   Cardiovascular:      Rate and Rhythm: Normal rate and regular rhythm.      Heart sounds: Normal heart sounds. No murmur heard.  Pulmonary:      Effort: Pulmonary effort is normal. No respiratory distress.      Breath sounds: Normal breath sounds. No wheezing or rales.   Abdominal:      General: There is no distension.      Palpations: Abdomen is soft. There is no mass.      Tenderness: There is abdominal tenderness in the right upper quadrant, right lower quadrant, epigastric area and suprapubic area. There is no guarding or rebound.   Musculoskeletal:      Cervical back: Neck supple.      Right lower leg: No edema.      Left lower leg: No edema.   Neurological:      Mental Status: She is alert and oriented to person, place, and time.   Psychiatric:         Mood and Affect: Mood normal.         Behavior: Behavior normal.         Thought Content: Thought content normal.         Judgment: Judgment normal.       Lea Reyes, MD

## 2024-02-29 ENCOUNTER — HOSPITAL ENCOUNTER (OUTPATIENT)
Dept: ULTRASOUND IMAGING | Facility: CLINIC | Age: 34
Discharge: HOME | End: 2024-02-29
Payer: MEDICARE

## 2024-02-29 ENCOUNTER — HOSPITAL ENCOUNTER (OUTPATIENT)
Dept: MAMMOGRAPHY | Facility: CLINIC | Age: 34
Discharge: HOME | End: 2024-02-29
Payer: MEDICARE

## 2024-02-29 DIAGNOSIS — N63.10 MASS OF RIGHT BREAST, UNSPECIFIED QUADRANT: ICD-10-CM

## 2024-02-29 PROCEDURE — 76642 ULTRASOUND BREAST LIMITED: CPT

## 2024-02-29 PROCEDURE — G0279 TOMOSYNTHESIS, MAMMO: HCPCS

## 2024-02-29 PROCEDURE — 77066 DX MAMMO INCL CAD BI: CPT

## 2024-03-01 ENCOUNTER — HOSPITAL ENCOUNTER (OUTPATIENT)
Dept: RADIOLOGY | Facility: HOSPITAL | Age: 34
Discharge: HOME | End: 2024-03-01
Attending: SURGERY
Payer: MEDICARE

## 2024-03-01 ENCOUNTER — APPOINTMENT (OUTPATIENT)
Dept: RADIOLOGY | Age: 34
End: 2024-03-01
Payer: MEDICARE

## 2024-03-01 DIAGNOSIS — R10.11 RUQ PAIN: ICD-10-CM

## 2024-03-01 PROCEDURE — 78227 HEPATOBIL SYST IMAGE W/DRUG: CPT

## 2024-03-01 PROCEDURE — G1004 CDSM NDSC: HCPCS

## 2024-03-01 PROCEDURE — A9537 TC99M MEBROFENIN: HCPCS

## 2024-03-01 RX ORDER — SINCALIDE 5 UG/5ML
0.02 INJECTION, POWDER, LYOPHILIZED, FOR SOLUTION INTRAVENOUS ONCE
Status: COMPLETED | OUTPATIENT
Start: 2024-03-01 | End: 2024-03-01

## 2024-03-01 RX ORDER — WATER 10 ML/10ML
INJECTION INTRAMUSCULAR; INTRAVENOUS; SUBCUTANEOUS
Status: DISPENSED
Start: 2024-03-01 | End: 2024-03-01

## 2024-03-01 RX ORDER — SINCALIDE 5 UG/5ML
INJECTION, POWDER, LYOPHILIZED, FOR SOLUTION INTRAVENOUS
Status: DISPENSED
Start: 2024-03-01 | End: 2024-03-01

## 2024-03-01 RX ADMIN — SINCALIDE 1 MCG: 5 INJECTION, POWDER, LYOPHILIZED, FOR SOLUTION INTRAVENOUS at 09:37

## 2024-03-01 NOTE — RESULT ENCOUNTER NOTE
The HIDA scan is normal.  I do not believe that your discomfort is secondary to your gallbladder.  Continue with the Carafate as this may help with bile reflux.  Please schedule the nuclear gastric emptying test as planned.  There may be an element of motility disorder.  I will contact you after this stomach emptying test has been completed.  Best regards.

## 2024-03-07 ENCOUNTER — OFFICE VISIT (OUTPATIENT)
Dept: INTERNAL MEDICINE CLINIC | Facility: CLINIC | Age: 34
End: 2024-03-07
Payer: MEDICARE

## 2024-03-07 VITALS
SYSTOLIC BLOOD PRESSURE: 124 MMHG | WEIGHT: 107.4 LBS | BODY MASS INDEX: 20.98 KG/M2 | HEART RATE: 84 BPM | OXYGEN SATURATION: 99 % | DIASTOLIC BLOOD PRESSURE: 82 MMHG

## 2024-03-07 DIAGNOSIS — F41.1 GENERALIZED ANXIETY DISORDER: Primary | ICD-10-CM

## 2024-03-07 DIAGNOSIS — F41.0 PANIC ATTACK: ICD-10-CM

## 2024-03-07 PROCEDURE — 99214 OFFICE O/P EST MOD 30 MIN: CPT | Performed by: INTERNAL MEDICINE

## 2024-03-07 RX ORDER — ESCITALOPRAM OXALATE 5 MG/1
5 TABLET ORAL DAILY
Qty: 60 TABLET | Refills: 0 | Status: SHIPPED | OUTPATIENT
Start: 2024-03-07

## 2024-03-07 NOTE — PROGRESS NOTES
Name: Noreen Velazquez      : 1990      MRN: [id number]  Encounter Provider: Frank Rosales MD  Encounter Date: 3/7/2024   Encounter department: [facility]    Assessment & Plan     1. Generalized anxiety disorder  -     escitalopram (LEXAPRO) 5 mg tablet; Take 1 tablet (5 mg total) by mouth daily    2. Panic attack  -     escitalopram (LEXAPRO) 5 mg tablet; Take 1 tablet (5 mg total) by mouth daily       -Today the patient has acute worsening of her chronic anxiety.  Her current symptoms are likely attributable to her uncontrolled anxiety.  She is willing to go back on another SSRI.  She has been given a prescription for Lexapro 5 mg daily.  Keep scheduled follow-up with PCP in 2 months to assess response.  She may use Xanax as needed for flares.    Subjective      HPI  Patient presents today complaining of acute worsening of her chronic anxiety. She states she had a massive panic attack over the weekend.  She also states that when she is anxious she noticed that her heart rate has been elevated and has gone as high as the 130s.  The patient was recently seen in the ED on  at [facility] for complaints of palpitations with chest wall pain and her cardiac workup at the time was negative.  During her past several visits the need to start medication for her anxiety has been broached but the patient has been hesitant.  She states today that she is now ready to start something.  She was on Lexapro in the past and feels as though she had good results with it and states she is willing to try it again.      All other systems negative except for pertinent findings noted in HPI.       Current Outpatient Medications on File Prior to Visit   Medication Sig   • clotrimazole (LOTRIMIN) 1 % cream Apply topically 2 (two) times a day for 28 days   • cyclobenzaprine (FLEXERIL) 5 mg tablet Take 1 tablet (5 mg total) by mouth 3 (three) times a day as needed for muscle spasms   •  Multiple Vitamin (multivitamin) tablet Take 1 tablet by mouth daily   • Norethin-Eth Estrad-Fe Biphas 1 MG-10 MCG / 10 MCG TABS Take 1 tablet by mouth daily   • sucralfate (CARAFATE) 1 g tablet Take 1 tablet (1 g total) by mouth 3 (three) times a day (Patient taking differently: Take 1 g by mouth 3 (three) times a day Has not started yet.)   • ALPRAZolam (XANAX) 0.25 mg tablet Take 1 tablet (0.25 mg total) by mouth 2 (two) times a day as needed for anxiety (Patient not taking: Reported on 1/30/2024)       Objective     /82 (BP Location: Left arm, Patient Position: Sitting, Cuff Size: Standard)   Pulse 84   Wt 48.7 kg (107 lb 6.4 oz)   LMP 02/03/2024 (Exact Date)   SpO2 99%   BMI 20.98 kg/m²     BP Readings from Last 3 Encounters:   03/07/24 124/82   02/26/24 110/70   02/20/24 121/68        Wt Readings from Last 3 Encounters:   03/07/24 48.7 kg (107 lb 6.4 oz)   03/05/24 47.6 kg (105 lb)   02/26/24 48 kg (105 lb 12.8 oz)       Physical Exam    General: NAD  HEENT: NCAT, EOMI, normal conjunctiva  Cardiovascular: RRR, normal S1 and S2, no m/r/g  Pulmonary: Normal respiratory effort, no wheezes, rales or rhonchi  Extremities: No lower extremity edema  Skin: Normal skin color, no rashes     Frank Rosales MD

## 2024-04-11 ENCOUNTER — NURSE TRIAGE (OUTPATIENT)
Age: 34
End: 2024-04-11

## 2024-04-11 NOTE — TELEPHONE ENCOUNTER
Placed call to patient, left a non detailed message to return our call. Third attempt at contacting patient. Fantoot message sent to patient.

## 2024-04-11 NOTE — TELEPHONE ENCOUNTER
Regarding: yeast infection/ uti  ----- Message from Mir Little sent at 4/11/2024  8:17 AM EDT -----  Patient experiencing UTI/yeast infections symptoms since Saturday morning. Having burning with urination, irritation and discharge with bowel movements.

## 2024-04-11 NOTE — TELEPHONE ENCOUNTER
Patient returned call. Tried to connect with CTS but none were available. I let patient know that someone will call her back.

## 2024-04-23 ENCOUNTER — NURSE TRIAGE (OUTPATIENT)
Age: 34
End: 2024-04-23

## 2024-04-23 NOTE — TELEPHONE ENCOUNTER
"Pt continues to have vaginal symptoms after completing course of treatment for UTI/Vaginal symptoms. Seen by UC yesterday and swabbed - returned negative (BV or Yeast). Treated with Clinda and completed course of treatment. Pt states feels some burning after urination, along with cramping while urinating, and back pain. Does not vaginal discharge, but only after passing a BM. Denies odor to vaginal discharge or vaginal area, denies fever. Asking to be seen by Dr. Chavez. RN scheduled pt to be seen this week with provider. No further questions at this time.       Reason for Disposition   Patient wants to be seen    Answer Assessment - Initial Assessment Questions  1. SYMPTOM: \"What's the main symptom you're concerned about?\" (e.g., pain, itching, dryness)      Burning after urinating, cramping with urinating, back pain, vaginal discharge when pt has a bowel movement and pushing.   2. LOCATION: \"Where is the  s/s  located?\" (e.g., inside/outside, left/right)      Inside  3. ONSET: \"When did the  s/s  start?\"      Ongoing  4. PAIN: \"Is there any pain?\" If Yes, ask: \"How bad is it?\" (Scale: 1-10; mild, moderate, severe)      4/10  5. ITCHING: \"Is there any itching?\" If Yes, ask: \"How bad is it?\" (Scale: 1-10; mild, moderate, severe)      Denies  6. CAUSE: \"What do you think is causing the discharge?\" \"Have you had the same problem before? What happened then?\"      Unsure if BV is lingering  7. OTHER SYMPTOMS: \"Do you have any other symptoms?\" (e.g., fever, itching, vaginal bleeding, pain with urination, injury to genital area, vaginal foreign body)      Denies  8. PREGNANCY: \"Is there any chance you are pregnant?\" \"When was your last menstrual period?\"      Denies    Protocols used: Vaginal Symptoms-ADULT-[place]    "

## 2024-04-25 ENCOUNTER — OFFICE VISIT (OUTPATIENT)
Dept: OBGYN CLINIC | Facility: CLINIC | Age: 34
End: 2024-04-25
Payer: MEDICARE

## 2024-04-25 VITALS — SYSTOLIC BLOOD PRESSURE: 116 MMHG | DIASTOLIC BLOOD PRESSURE: 84 MMHG | BODY MASS INDEX: 21.29 KG/M2 | WEIGHT: 109 LBS

## 2024-04-25 DIAGNOSIS — N89.8 VAGINAL DISCHARGE: Primary | ICD-10-CM

## 2024-04-25 PROCEDURE — 99213 OFFICE O/P EST LOW 20 MIN: CPT | Performed by: OBSTETRICS & GYNECOLOGY

## 2024-04-25 NOTE — PROGRESS NOTES
This is a 34-year-old white female, she is a  1 para 1.  With 1 vaginal delivery approximately 4 years ago.  She still having recurrent problems with vaginal discharge generalized discomfort.  She was recently seen at [facility].  She had a positive Gardnerella.  They gave her antibiotics.  She still complaining of vaginal irritation.    Speculum examination is actually benign.  No odor no discharge.  Has been a variety of antibiotics over the years.  After discussion with the patient we have now decided to go a different direction.  We are going to go to the JESSICA, this is a new oral probiotic to reduce likelihood of symptoms of recurrent after treating BV or VVC.  The patient is excited about this.  Information in order forms were given to her.  She will keep me informed.  We will hold off on any Antibiotics and see what she does.  She was instructed to take 1 tablet orally for 15 consecutive days each month.      Impression the patient has symptoms of recurrent BV.  She has been on multiple antibiotics in the past.  We have decided not to culture at this time.  We decided to not use antibiotics.  We are going to go with a oral probiotic for 15 days.  She will let me know how she does.

## 2024-04-25 NOTE — PATIENT INSTRUCTIONS
Because of the patient's history of recurrent BV and VVC symptoms.  We have decided not to do a culture at this time and not to do any antibiotics.  Will get a go to the probiotic called JESSICA this is not a prescription medication.  The patient was given information and a order sheet.  She will take 1 tablet orally for 15 consecutive days each month.  She will keep me informed of her progress.

## 2024-05-05 PROBLEM — F17.200 TOBACCO DEPENDENCE: Status: ACTIVE | Noted: 2024-05-05

## 2024-05-05 PROBLEM — R13.14 PHARYNGOESOPHAGEAL DYSPHAGIA: Status: ACTIVE | Noted: 2024-05-05

## 2024-05-05 PROBLEM — K21.9 GASTROESOPHAGEAL REFLUX DISEASE WITHOUT ESOPHAGITIS: Status: ACTIVE | Noted: 2024-05-05

## 2024-05-05 PROBLEM — K44.9 HIATAL HERNIA: Status: ACTIVE | Noted: 2024-05-05

## 2024-05-05 PROBLEM — R49.0 DYSPHONIA: Status: ACTIVE | Noted: 2024-05-05

## 2024-05-05 PROBLEM — R09.A2 GLOBUS SENSATION: Status: ACTIVE | Noted: 2024-05-05

## 2024-05-15 ENCOUNTER — OFFICE VISIT (OUTPATIENT)
Dept: OBGYN CLINIC | Facility: CLINIC | Age: 34
End: 2024-05-15
Payer: MEDICARE

## 2024-05-15 VITALS — BODY MASS INDEX: 22.07 KG/M2 | WEIGHT: 113 LBS | DIASTOLIC BLOOD PRESSURE: 72 MMHG | SYSTOLIC BLOOD PRESSURE: 118 MMHG

## 2024-05-15 DIAGNOSIS — N89.8 VAGINAL DISCHARGE: Primary | ICD-10-CM

## 2024-05-15 PROCEDURE — 99213 OFFICE O/P EST LOW 20 MIN: CPT | Performed by: OBSTETRICS & GYNECOLOGY

## 2024-05-15 NOTE — PATIENT INSTRUCTIONS
The patient was informed of negative findings.  Will see the results of the culture.  She will start the CLAIRVEE today.  She will call me next week with a progress report.  Will await the results of the culture.

## 2024-05-15 NOTE — PROGRESS NOTES
This is a 34-year-old white female well-known to me still having recurrent bouts of burning issues in the vagina.  We have ordered a vaginal probiotic which she is will start today for 15 days.    Examination of the external genitalia normal limits the vagina is clean there is no odor there is normal physiological discharge.  I was not impressed with what we saw.  As per patient request we did a vaginal culture she is convinced she has BV again.    Impression the patient has a recurrent history of BV and vaginal issues.  I think a probiotic with regularly using called CLAIRVEE is going to be helpful for her and she knows how to take it with 15 consecutive days each month.  She will call me next week with a progress report.

## 2024-05-16 LAB
BV BACTERIA RRNA VAG QL NAA+PROBE: NEGATIVE
C GLABRATA RNA VAG QL NAA+PROBE: NOT DETECTED
CANDIDA RRNA VAG QL PROBE: NOT DETECTED
T VAGINALIS RRNA SPEC QL NAA+PROBE: NOT DETECTED

## 2024-05-28 ENCOUNTER — OFFICE VISIT (OUTPATIENT)
Dept: INTERNAL MEDICINE CLINIC | Facility: CLINIC | Age: 34
End: 2024-05-28
Payer: MEDICARE

## 2024-05-28 VITALS
WEIGHT: 128 LBS | OXYGEN SATURATION: 98 % | HEART RATE: 83 BPM | RESPIRATION RATE: 18 BRPM | DIASTOLIC BLOOD PRESSURE: 60 MMHG | SYSTOLIC BLOOD PRESSURE: 90 MMHG | BODY MASS INDEX: 25 KG/M2

## 2024-05-28 DIAGNOSIS — K58.1 IRRITABLE BOWEL SYNDROME WITH CONSTIPATION: ICD-10-CM

## 2024-05-28 DIAGNOSIS — F41.1 GENERALIZED ANXIETY DISORDER: ICD-10-CM

## 2024-05-28 DIAGNOSIS — G89.29 CHRONIC PELVIC PAIN IN FEMALE: Primary | ICD-10-CM

## 2024-05-28 DIAGNOSIS — R10.2 CHRONIC PELVIC PAIN IN FEMALE: Primary | ICD-10-CM

## 2024-05-28 PROCEDURE — 99214 OFFICE O/P EST MOD 30 MIN: CPT | Performed by: INTERNAL MEDICINE

## 2024-05-28 RX ORDER — LUBIPROSTONE 8 UG/1
8 CAPSULE, GELATIN COATED ORAL 2 TIMES DAILY WITH MEALS
Qty: 60 CAPSULE | Refills: 5 | Status: SHIPPED | OUTPATIENT
Start: 2024-05-28

## 2024-05-28 NOTE — PROGRESS NOTES
Ambulatory Visit  Name: Noreen Velazquez      : 1990      MRN: [id number]  Encounter Provider: Lea Reyes, MD  Encounter Date: 2024   [facility]    Assessment & Plan   1. Chronic pelvic pain in female  Comments:  endometriosis? interstitial cystitis?  She is due for a f/u US of the right ovarian cyst and recommended to schedule  consider amitriptyline  Orders:  -     Ambulatory Referral to Urogynecology; Future  2. Irritable bowel syndrome with constipation  Assessment & Plan:  Adding to the chronic pelvic pain  Agrees to resuming Amitiza  Consider amitriptyline    Orders:  -     Amitiza 8 MCG capsule; Take 1 capsule (8 mcg total) by mouth 2 (two) times a day with meals  3. Generalized anxiety disorder  Assessment & Plan:  She would like to continue Lexapro at this time           History of Present Illness     Recurrent BV, most recent swab and urinalysis negative  Continues to have white non foul smelling vaginal discharge, burning after urinating, pelvic pain, bloating  Regular menses, sometimes a few days late  Normal cystoscopy in ,CT, ultrasound showing a right ovarian cyst only  Symptoms so severe sometimes   She takes Tylenol for back pain  Aleve does not help  +chronic constipation      Review of Systems   Gastrointestinal:  Positive for constipation.   Genitourinary:  Positive for dysuria, menstrual problem, pelvic pain and vaginal discharge.   Musculoskeletal:  Positive for back pain.     Past Medical History:   Diagnosis Date   • 39 weeks gestation of pregnancy 2019   • Abnormal ECG    • Anxiety    • Bronchitis 2023   • Chest wall pain    • Contraceptive use     LAST ASSESSED: 2013   • Depression     d/c Buspar with preg - now started on Zoloft 25 mg 19   • Exposure to COVID-19 virus 2020   • External hemorrhoid, bleeding     LAST ASSESSED: 2014   • Fetal abnormality affecting management of mother,  antepartum 01/29/2020    Bilateral talipes equivarous  -Has established peds ortho @[facility] for after delivery   • Gallbladder attack    • Gastroenteritis 10/11/2023   • Gastroesophageal reflux disease    • Genetic screening 12/09/2019   • Hematochezia     LAST ASSESSED: 13MAR2014   • IBS (irritable bowel syndrome)    • Immunity status testing     LAST ASSESSED: 02JUN2014   • Inflammatory bowel disease    • Lactose intolerance    • Laryngopharyngeal reflux (LPR)    • Methicillin resistant Staphylococcus aureus infection    • Migraine     prev on effexor for migraine & anxiety - d/c approx 1 yr ago   • Nasal congestion 07/18/2023   • Oral candidiasis    • Poison ivy dermatitis     LAST ASSESSED: 21JUL2015   • Psychiatric disorder    • Screen for STD (sexually transmitted disease)    • Shingles    • Skin abscess     RIGHT LEG   • Tongue swelling    • Unintentional weight loss    • Upper respiratory tract infection 04/11/2022   • Urinary frequency     LAST ASSESSED: 13APR2014   • Urinary tract infection    • UTI (urinary tract infection)     uses Macrobid after intercourse   • Vacuum extractor delivery, delivered 06/11/2020   • Varicella     childhood     Past Surgical History:   Procedure Laterality Date   • COLONOSCOPY     • CONDYLOMA EXCISION/FULGURATION     • TONSILLECTOMY      ONSET: 1997   • UPPER GASTROINTESTINAL ENDOSCOPY     • WISDOM TOOTH EXTRACTION       Family History   Problem Relation Age of Onset   • Arthritis Mother    • Hypothyroidism Mother    • Thyroid disease Mother    • Heart attack Father    • Cancer Father         Kidney   • Hypertension Father    • Hypothyroidism Father         Hyper   • Thyroid disease Father    • Drug abuse Brother    • Seizures Brother    • No Known Problems Brother    • No Known Problems Brother    • Diabetes Maternal Grandmother         type 2   • Dementia Maternal Grandmother    • Cancer Maternal Grandfather         prostate   • Emphysema Paternal Grandmother    • Early death  Maternal Aunt         Cancer   • Early death Maternal Uncle         Heart attack   • Cancer Family         MALIGNANT NEOPLASM   • No Known Problems Daughter      Social History     Tobacco Use   • Smoking status: Former     Current packs/day: 0.00     Average packs/day: 0.2 packs/day for 10.2 years (2.5 ttl pk-yrs)     Types: Cigarettes     Start date: 2011     Quit date: 10/31/2019     Years since quittin.5   • Smokeless tobacco: Never   Vaping Use   • Vaping status: Never Used   Substance and Sexual Activity   • Alcohol use: Not Currently   • Drug use: Never   • Sexual activity: Yes     Partners: Male     Birth control/protection: Condom Male     Current Outpatient Medications on File Prior to Visit   Medication Sig   • ALPRAZolam (XANAX) 0.25 mg tablet Take 1 tablet (0.25 mg total) by mouth 2 (two) times a day as needed for anxiety   • escitalopram (LEXAPRO) 5 mg tablet Take 1 tablet (5 mg total) by mouth daily   • Multiple Vitamin (multivitamin) tablet Take 1 tablet by mouth daily     No Known Allergies  Immunization History   Administered Date(s) Administered   • Influenza, injectable, quadrivalent, preservative free 0.5 mL 2019   • Tdap 2013, 2020   • Tuberculin Skin Test-PPD Intradermal 2013, 2014     Objective     BP 90/60 (BP Location: Left arm, Patient Position: Sitting, Cuff Size: Standard)   Pulse 83   Resp 18   Wt 58.1 kg (128 lb)   LMP 2024 (Exact Date)   SpO2 98%   BMI 25.00 kg/m²     Physical Exam  Constitutional:       General: She is not in acute distress.     Appearance: She is well-developed. She is not ill-appearing, toxic-appearing or diaphoretic.   Eyes:      Conjunctiva/sclera: Conjunctivae normal.   Cardiovascular:      Rate and Rhythm: Normal rate and regular rhythm.      Heart sounds: Normal heart sounds. No murmur heard.  Pulmonary:      Effort: Pulmonary effort is normal. No respiratory distress.      Breath sounds: Normal breath sounds.  No stridor. No wheezing or rales.   Abdominal:      General: There is no distension.      Palpations: Abdomen is soft. There is no mass.      Tenderness: There is abdominal tenderness in the suprapubic area. There is no guarding or rebound.   Musculoskeletal:      Cervical back: Neck supple.      Right lower leg: No edema.      Left lower leg: No edema.   Neurological:      Mental Status: She is alert and oriented to person, place, and time.   Psychiatric:         Mood and Affect: Mood normal.         Behavior: Behavior normal.         Thought Content: Thought content normal.         Judgment: Judgment normal.       Administrative Statements

## 2024-06-10 DIAGNOSIS — F41.1 GENERALIZED ANXIETY DISORDER: ICD-10-CM

## 2024-06-10 DIAGNOSIS — F41.0 PANIC ATTACK: ICD-10-CM

## 2024-06-10 RX ORDER — ESCITALOPRAM OXALATE 5 MG/1
5 TABLET ORAL DAILY
Qty: 90 TABLET | Refills: 0 | Status: SHIPPED | OUTPATIENT
Start: 2024-06-10

## 2024-06-11 ENCOUNTER — TELEPHONE (OUTPATIENT)
Age: 34
End: 2024-06-11

## 2024-06-11 NOTE — TELEPHONE ENCOUNTER
Pt states [facility] did not fill her Amitiza 8 MCG capsule due to her needing a prior auth. Pt would like to be notified when it has been sent to pharmacy.

## 2024-06-13 ENCOUNTER — HOSPITAL ENCOUNTER (EMERGENCY)
Facility: HOSPITAL | Age: 34
Discharge: HOME | End: 2024-06-13
Attending: EMERGENCY MEDICINE
Payer: MEDICARE

## 2024-06-13 VITALS
HEART RATE: 89 BPM | SYSTOLIC BLOOD PRESSURE: 151 MMHG | TEMPERATURE: 97.9 F | OXYGEN SATURATION: 100 % | DIASTOLIC BLOOD PRESSURE: 72 MMHG | RESPIRATION RATE: 20 BRPM

## 2024-06-13 DIAGNOSIS — R10.9 ABDOMINAL PAIN: Primary | ICD-10-CM

## 2024-06-13 DIAGNOSIS — K59.00 CONSTIPATION: ICD-10-CM

## 2024-06-13 LAB
ALBUMIN SERPL BCP-MCNC: 4.7 G/DL (ref 3.5–5)
ALP SERPL-CCNC: 46 U/L (ref 34–104)
ALT SERPL W P-5'-P-CCNC: 11 U/L (ref 7–52)
ANION GAP SERPL CALCULATED.3IONS-SCNC: 8 MMOL/L (ref 4–13)
AST SERPL W P-5'-P-CCNC: 14 U/L (ref 13–39)
BASOPHILS # BLD AUTO: 0.04 THOUSANDS/ÂΜL (ref 0–0.1)
BASOPHILS NFR BLD AUTO: 1 % (ref 0–1)
BILIRUB SERPL-MCNC: 0.37 MG/DL (ref 0.2–1)
BILIRUB UR QL STRIP: NEGATIVE
BUN SERPL-MCNC: 9 MG/DL (ref 5–25)
CALCIUM SERPL-MCNC: 9.4 MG/DL (ref 8.4–10.2)
CHLORIDE SERPL-SCNC: 103 MMOL/L (ref 96–108)
CLARITY UR: CLEAR
CO2 SERPL-SCNC: 26 MMOL/L (ref 21–32)
COLOR UR: COLORLESS
CREAT SERPL-MCNC: 0.78 MG/DL (ref 0.6–1.3)
EOSINOPHIL # BLD AUTO: 0.21 THOUSAND/ÂΜL (ref 0–0.61)
EOSINOPHIL NFR BLD AUTO: 3 % (ref 0–6)
ERYTHROCYTE [DISTWIDTH] IN BLOOD BY AUTOMATED COUNT: 12.6 % (ref 11.6–15.1)
EXT PREGNANCY TEST URINE: NEGATIVE
GFR SERPL CREATININE-BSD FRML MDRD: 100 ML/MIN/1.73SQ M
GLUCOSE SERPL-MCNC: 87 MG/DL (ref 65–140)
GLUCOSE UR STRIP-MCNC: NEGATIVE MG/DL
HCT VFR BLD AUTO: 40.9 % (ref 34.8–46.1)
HGB BLD-MCNC: 13.3 G/DL (ref 11.5–15.4)
HGB UR QL STRIP.AUTO: NEGATIVE
IMM GRANULOCYTES # BLD AUTO: 0.02 THOUSAND/UL (ref 0–0.2)
IMM GRANULOCYTES NFR BLD AUTO: 0 % (ref 0–2)
KETONES UR STRIP-MCNC: NEGATIVE MG/DL
LEUKOCYTE ESTERASE UR QL STRIP: NEGATIVE
LIPASE SERPL-CCNC: 12 U/L (ref 11–82)
LYMPHOCYTES # BLD AUTO: 1.69 THOUSANDS/ÂΜL (ref 0.6–4.47)
LYMPHOCYTES NFR BLD AUTO: 22 % (ref 14–44)
MCH RBC QN AUTO: 30.6 PG (ref 26.8–34.3)
MCHC RBC AUTO-ENTMCNC: 32.5 G/DL (ref 31.4–37.4)
MCV RBC AUTO: 94 FL (ref 82–98)
MONOCYTES # BLD AUTO: 0.53 THOUSAND/ÂΜL (ref 0.17–1.22)
MONOCYTES NFR BLD AUTO: 7 % (ref 4–12)
NEUTROPHILS # BLD AUTO: 5.09 THOUSANDS/ÂΜL (ref 1.85–7.62)
NEUTS SEG NFR BLD AUTO: 67 % (ref 43–75)
NITRITE UR QL STRIP: NEGATIVE
NRBC BLD AUTO-RTO: 0 /100 WBCS
PH UR STRIP.AUTO: 6.5 [PH]
PLATELET # BLD AUTO: 251 THOUSANDS/UL (ref 149–390)
PMV BLD AUTO: 9.2 FL (ref 8.9–12.7)
POTASSIUM SERPL-SCNC: 3.3 MMOL/L (ref 3.5–5.3)
PROT SERPL-MCNC: 7.1 G/DL (ref 6.4–8.4)
PROT UR STRIP-MCNC: NEGATIVE MG/DL
RBC # BLD AUTO: 4.35 MILLION/UL (ref 3.81–5.12)
SODIUM SERPL-SCNC: 137 MMOL/L (ref 135–147)
SP GR UR STRIP.AUTO: 1 (ref 1–1.03)
UROBILINOGEN UR STRIP-ACNC: <2 MG/DL
WBC # BLD AUTO: 7.58 THOUSAND/UL (ref 4.31–10.16)

## 2024-06-13 PROCEDURE — 96361 HYDRATE IV INFUSION ADD-ON: CPT

## 2024-06-13 PROCEDURE — 36415 COLL VENOUS BLD VENIPUNCTURE: CPT

## 2024-06-13 PROCEDURE — 80053 COMPREHEN METABOLIC PANEL: CPT

## 2024-06-13 PROCEDURE — 83690 ASSAY OF LIPASE: CPT

## 2024-06-13 PROCEDURE — 81025 URINE PREGNANCY TEST: CPT

## 2024-06-13 PROCEDURE — 96374 THER/PROPH/DIAG INJ IV PUSH: CPT

## 2024-06-13 PROCEDURE — 81003 URINALYSIS AUTO W/O SCOPE: CPT

## 2024-06-13 PROCEDURE — 85025 COMPLETE CBC W/AUTO DIFF WBC: CPT

## 2024-06-13 PROCEDURE — 99285 EMERGENCY DEPT VISIT HI MDM: CPT | Performed by: EMERGENCY MEDICINE

## 2024-06-13 PROCEDURE — 99284 EMERGENCY DEPT VISIT MOD MDM: CPT

## 2024-06-13 RX ORDER — ONDANSETRON 2 MG/ML
4 INJECTION INTRAMUSCULAR; INTRAVENOUS ONCE
Status: COMPLETED | OUTPATIENT
Start: 2024-06-13 | End: 2024-06-13

## 2024-06-13 RX ADMIN — ONDANSETRON 4 MG: 2 INJECTION INTRAMUSCULAR; INTRAVENOUS at 19:27

## 2024-06-13 RX ADMIN — SODIUM CHLORIDE 1000 ML: 0.9 INJECTION, SOLUTION INTRAVENOUS at 19:27

## 2024-06-13 NOTE — ED PROVIDER NOTES
History  Chief Complaint   Patient presents with    Abdominal Pain     Patient states that she has been having ABD pain since Sunday, does have IBS and thought that's what it was. Today she is having sharp pain on L side which is not normal for her IBS. Not having normal bowel movements. Did an enema, some relief. +nausea, no vomiting.      Patient is a 34-year-old female with history of IBS-C who presented to the ED for abdominal pain.  Patient stated that she initially started having the abdominal pain on Sunday and since then has gotten worse.  Pain is described as waxing and waning, 2/10 to 8/10, diffuse but worst in the epigastric region.  In addition, patient describes occasional intense pain to left of epigastrium.  He states that her constipation issues have recently gotten worse and she has not had a regular bowel movement for several days.  He stated that she did try a Fleet enema couple hours ago but that she only had .  She also endorses a few instances of tenesmus in the last couple days.  She also endorses sensation of rectal cramping. On evaluation, patient was well appearing and in no acute distress.        Prior to Admission Medications   Prescriptions Last Dose Informant Patient Reported? Taking?   ALPRAZolam (XANAX) 0.25 mg tablet  Self No No   Sig: Take 1 tablet (0.25 mg total) by mouth 2 (two) times a day as needed for anxiety   Amitiza 8 MCG capsule   No No   Sig: Take 1 capsule (8 mcg total) by mouth 2 (two) times a day with meals   Multiple Vitamin (multivitamin) tablet  Self Yes No   Sig: Take 1 tablet by mouth daily   escitalopram (LEXAPRO) 5 mg tablet   No No   Sig: Take 1 tablet (5 mg total) by mouth daily      Facility-Administered Medications: None       Past Medical History:   Diagnosis Date    39 weeks gestation of pregnancy 12/09/2019    Abnormal ECG     Anxiety     Bronchitis 04/03/2023    Chest wall pain     Contraceptive use     LAST ASSESSED: 03MAY2013    Depression     d/c  Buspar with preg - now started on Zoloft 25 mg 11/1/19    Exposure to COVID-19 virus 12/08/2020    External hemorrhoid, bleeding     LAST ASSESSED: 05MAR2014    Fetal abnormality affecting management of mother, antepartum 01/29/2020    Bilateral talipes equivarous  -Has established peds ortho @[facility] for after delivery    Gallbladder attack     Gastroenteritis 10/11/2023    Gastroesophageal reflux disease     Genetic screening 12/09/2019    Hematochezia     LAST ASSESSED: 13MAR2014    IBS (irritable bowel syndrome)     Immunity status testing     LAST ASSESSED: 02JUN2014    Inflammatory bowel disease     Lactose intolerance     Laryngopharyngeal reflux (LPR)     Methicillin resistant Staphylococcus aureus infection     Migraine     prev on effexor for migraine & anxiety - d/c approx 1 yr ago    Nasal congestion 07/18/2023    Oral candidiasis     Poison ivy dermatitis     LAST ASSESSED: [id number]    Psychiatric disorder     Screen for STD (sexually transmitted disease)     Shingles     Skin abscess     RIGHT LEG    Tongue swelling     Unintentional weight loss     Upper respiratory tract infection 04/11/2022    Urinary frequency     LAST ASSESSED: 13APR2014    Urinary tract infection     UTI (urinary tract infection)     uses Macrobid after intercourse    Vacuum extractor delivery, delivered 06/11/2020    Varicella     childhood       Past Surgical History:   Procedure Laterality Date    COLONOSCOPY      CONDYLOMA EXCISION/FULGURATION      TONSILLECTOMY      ONSET: 1997    UPPER GASTROINTESTINAL ENDOSCOPY      WISDOM TOOTH EXTRACTION         Family History   Problem Relation Age of Onset    Arthritis Mother     Hypothyroidism Mother     Thyroid disease Mother     Heart attack Father     Cancer Father         Kidney    Hypertension Father     Hypothyroidism Father         Hyper    Thyroid disease Father     Drug abuse Brother     Seizures Brother     No Known Problems Brother     No Known Problems Brother     Diabetes  Maternal Grandmother         type 2    Dementia Maternal Grandmother     Cancer Maternal Grandfather         prostate    Emphysema Paternal Grandmother     Early death Maternal Aunt         Cancer    Early death Maternal Uncle         Heart attack    Cancer Family         MALIGNANT NEOPLASM    No Known Problems Daughter      I have reviewed and agree with the history as documented.    E-Cigarette/Vaping    E-Cigarette Use Never User      E-Cigarette/Vaping Substances    Nicotine No     THC No     CBD No     Flavoring No     Other No     Unknown No      Social History     Tobacco Use    Smoking status: Former     Current packs/day: 0.00     Average packs/day: 0.2 packs/day for 10.2 years (2.5 ttl pk-yrs)     Types: Cigarettes     Start date: 2011     Quit date: 10/31/2019     Years since quittin.6    Smokeless tobacco: Never   Vaping Use    Vaping status: Never Used   Substance Use Topics    Alcohol use: Not Currently    Drug use: Never        Review of Systems   Constitutional:  Negative for chills, fatigue and fever.   Respiratory:  Negative for cough and shortness of breath.    Cardiovascular:  Negative for chest pain and palpitations.   Gastrointestinal:  Positive for abdominal pain, constipation and nausea. Negative for vomiting.   Genitourinary:  Negative for dysuria, frequency and urgency.   Musculoskeletal: Negative.    Skin:  Negative for color change and pallor.   Neurological:  Negative for headaches.   Psychiatric/Behavioral: Negative.         Physical Exam  ED Triage Vitals [24]   Temperature Pulse Respirations Blood Pressure SpO2   97.9 °F (36.6 °C) 89 20 151/72 100 %      Temp Source Heart Rate Source Patient Position - Orthostatic VS BP Location FiO2 (%)   Oral Monitor Sitting Right arm --      Pain Score       --             Orthostatic Vital Signs  Vitals:    24 1857   BP: 151/72   Pulse: 89   Patient Position - Orthostatic VS: Sitting       Physical Exam  Vitals and nursing  note reviewed.   Constitutional:       Appearance: She is well-developed.   HENT:      Head: Normocephalic and atraumatic.   Cardiovascular:      Rate and Rhythm: Normal rate and regular rhythm.      Heart sounds: Normal heart sounds.   Pulmonary:      Effort: Pulmonary effort is normal.      Breath sounds: Normal breath sounds.   Abdominal:      General: Abdomen is flat. Bowel sounds are normal.      Palpations: Abdomen is soft.      Tenderness: There is generalized abdominal tenderness.   Skin:     General: Skin is warm and dry.      Capillary Refill: Capillary refill takes less than 2 seconds.   Neurological:      General: No focal deficit present.      Mental Status: She is alert and oriented to person, place, and time.   Psychiatric:         Mood and Affect: Mood normal.         Behavior: Behavior normal.         ED Medications  Medications   sodium chloride 0.9 % bolus 1,000 mL (0 mL Intravenous Stopped 6/13/24 2155)   ondansetron (ZOFRAN) injection 4 mg (4 mg Intravenous Given 6/13/24 1927)       Diagnostic Studies  Results Reviewed       Procedure Component Value Units Date/Time    Comprehensive metabolic panel [[id number]]  (Abnormal) Collected: 06/13/24 1927    Lab Status: Final result Specimen: Blood from Arm, Right Updated: 06/13/24 1954     Sodium 137 mmol/L      Potassium 3.3 mmol/L      Chloride 103 mmol/L      CO2 26 mmol/L      ANION GAP 8 mmol/L      BUN 9 mg/dL      Creatinine 0.78 mg/dL      Glucose 87 mg/dL      Calcium 9.4 mg/dL      AST 14 U/L      ALT 11 U/L      Alkaline Phosphatase 46 U/L      Total Protein 7.1 g/dL      Albumin 4.7 g/dL      Total Bilirubin 0.37 mg/dL      eGFR 100 ml/min/1.73sq m     Narrative:      National Kid[employer] Disease Foundation guidelines for Chronic Kidney Disease (CKD):     Stage 1 with normal or high GFR (GFR > 90 mL/min/1.73 square meters)    Stage 2 Mild CKD (GFR = 60-89 mL/min/1.73 square meters)    Stage 3A Moderate CKD (GFR = 45-59 mL/min/1.73 square  meters)    Stage 3B Moderate CKD (GFR = 30-44 mL/min/1.73 square meters)    Stage 4 Severe CKD (GFR = 15-29 mL/min/1.73 square meters)    Stage 5 End Stage CKD (GFR <15 mL/min/1.73 square meters)  Note: GFR calculation is accurate only with a steady state creatinine    Lipase [[id number]]  (Normal) Collected: 06/13/24 1927    Lab Status: Final result Specimen: Blood from Arm, Right Updated: 06/13/24 1954     Lipase 12 u/L     UA w Reflex to Microscopic w Reflex to Culture [[id number]]  (Abnormal) Collected: 06/13/24 1927    Lab Status: Final result Specimen: Urine, Clean Catch Updated: 06/13/24 1939     Color, UA Colorless     Clarity, UA Clear     Specific Gravity, UA 1.001     pH, UA 6.5     Leukocytes, UA Negative     Nitrite, UA Negative     Protein, UA Negative mg/dl      Glucose, UA Negative mg/dl      Ketones, UA Negative mg/dl      Urobilinogen, UA <2.0 mg/dl      Bilirubin, UA Negative     Occult Blood, UA Negative    CBC and differential [[id number]] Collected: 06/13/24 1927    Lab Status: Final result Specimen: Blood from Arm, Right Updated: 06/13/24 1937     WBC 7.58 Thousand/uL      RBC 4.35 Million/uL      Hemoglobin 13.3 g/dL      Hematocrit 40.9 %      MCV 94 fL      MCH 30.6 pg      MCHC 32.5 g/dL      RDW 12.6 %      MPV 9.2 fL      Platelets 251 Thousands/uL      nRBC 0 /100 WBCs      Segmented % 67 %      Immature Grans % 0 %      Lymphocytes % 22 %      Monocytes % 7 %      Eosinophils Relative 3 %      Basophils Relative 1 %      Absolute Neutrophils 5.09 Thousands/µL      Absolute Immature Grans 0.02 Thousand/uL      Absolute Lymphocytes 1.69 Thousands/µL      Absolute Monocytes 0.53 Thousand/µL      Eosinophils Absolute 0.21 Thousand/µL      Basophils Absolute 0.04 Thousands/µL     POCT pregnancy, urine [[id number]]  (Normal) Resulted: 06/13/24 1928    Lab Status: Final result Updated: 06/13/24 1928     EXT Preg Test, Ur Negative     Control --                   No orders to display          Procedures  Procedures      ED Course                                       Medical Decision Making  Patient is a 34-year-old female with history of IBS-C who presented to the ED for abdominal pain.  Patient stated that she initially started having the abdominal pain on Sunday and since then has gotten worse.  Pain is described as waxing and waning, 2/10 to 8/10, diffuse but worst in the epigastric region.  In addition, patient describes occasional intense pain to left of epigastrium.  He states that her constipation issues have recently gotten worse and she has not had a regular bowel movement for several days.  He stated that she did try a Fleet enema couple hours ago but that she only had .  She also endorses a few instances of tenesmus in the last couple days.  She also endorses sensation of rectal cramping. On evaluation, patient was well appearing and in no acute distress.    Ddx includes but is not limited to patient's known IBS, constipation, cholelithiasis, cholecystitis, pancreatitis, appendicitis, UTI/pyelonephritis, ectopic pregnancy.  Patient had unremarkable CBC, CMP, lipase. POCT pregnancy test negative. UA negative. Given the patient's normal labs as well as fairly unremarkable abdominal exam and known history of IBS and constipation preceding abdominal pain, the pain is likely secondary to IBS and constipation. Patient was counseled on this and also to follow up with her GI doctor. Patient counseled and discharged with explicit return precautions.     Amount and/or Complexity of Data Reviewed  Labs: ordered.    Risk  Prescription drug management.          Disposition  Final diagnoses:   Abdominal pain   Constipation     Time reflects when diagnosis was documented in both MDM as applicable and the Disposition within this note       Time User Action Codes Description Comment    6/13/2024 10:33 PM Lev Clements [R10.9] Abdominal pain     6/13/2024 10:34 PM Lev Clements [K59.00]  Constipation           ED Disposition       ED Disposition   Discharge    Condition   Stable    Date/Time   Thu Jun 13, 2024 10:33 PM    Comment   Noreen Aguilera Ronniebella discharge to home/self care.                   Follow-up Information    None         Discharge Medication List as of 6/13/2024 10:35 PM        CONTINUE these medications which have NOT CHANGED    Details   ALPRAZolam (XANAX) 0.25 mg tablet Take 1 tablet (0.25 mg total) by mouth 2 (two) times a day as needed for anxiety, Starting Thu 1/4/2024, Normal      Amitiza 8 MCG capsule Take 1 capsule (8 mcg total) by mouth 2 (two) times a day with meals, Starting Tue 5/28/2024, Normal      escitalopram (LEXAPRO) 5 mg tablet Take 1 tablet (5 mg total) by mouth daily, Starting Mon 6/10/2024, Normal      Multiple Vitamin (multivitamin) tablet Take 1 tablet by mouth daily, Historical Med           No discharge procedures on file.    PDMP Review         Value Time User    PDMP Reviewed  Yes 1/4/2024  3:30 PM Lea Reyes, MD             ED Provider  Attending physically available and evaluated Noreen Cancholaparrisbella. I managed the patient along with the ED Attending.    Electronically Signed by           Lev Clements MD  06/14/24 6718

## 2024-06-13 NOTE — TELEPHONE ENCOUNTER
Pt called to get prior auth status on med. Informed her PA has not been submitted and it can take 7 to 21 business days to complete.

## 2024-06-13 NOTE — ED ATTENDING ATTESTATION
6/13/2024  [place] DO, saw and evaluated the patient. I have discussed the patient with the resident/non-physician practitioner and agree with the resident's/non-physician practitioner's findings, Plan of Care, and MDM as documented in the resident's/non-physician practitioner's note, except where noted. All available labs and Radiology studies were reviewed.  I was present for key portions of any procedure(s) performed by the resident/non-physician practitioner and I was immediately available to provide assistance.       At this point I agree with the current assessment done in the Emergency Department.  I have conducted an independent evaluation of this patient a history and physical is as follows:    Patient is a 34-year-old female with history of IBS with constipation who presents with abdominal pain.  Patient states that she was previously seeing GI and was on Amitiza.  She states that she was taken off of the medication while she was pregnant and has not been able to restart due to insurance issues.  She has upper abdominal discomfort and constipation that is very similar to previous IBS symptoms.  However she began having pain left of the umbilicus 2 days ago which is new.  She states that it is worse when standing up.  She also had transient pain in her rectum prior to having a bowel movement.  She has been constipated recently and used an enema and was only able to pass a small amount of stool.  She denies vomiting, fever, chills, dysuria, hematuria.    On exam, she is in no acute distress.  Heart is regular rate and rhythm.  Breath sounds normal.  Abdomen is soft, mild tenderness to palpation in the upper abdomen.  Difficult to reproduce left mid abdominal pain but patient was able to locate a pinpoint area of tenderness.  No lower abdominal tenderness on palpation.  No rebound or guarding.    Labs reveal mild hypokalemia. History consistent with constipation. Do not suspect acute surgical  "process including but not limited to acute cholecystitis, acute appendicitis, SBO, mesenteric ischemia, AAA, vascular dissection, vascular occlusion, perforated viscus, ectopic pregnancy. Do not suspect intrathoracic cause of abdominal pain. Symptoms improved and patient is tolerating po. Patient advised to return to ED if symptoms worsen or persist. Patient also advised to follow up with PCP.     Portions of the above record have been created with voice recognition software.  Occasional wrong word or \"sound alike\" substitutions may have occurred due to the inherent limitations of voice recognition software.  Read the chart carefully and recognize, using context, where substitutions may have occurred.      ED Course         Critical Care Time  Procedures      "

## 2024-06-14 ENCOUNTER — TRANSITIONAL CARE MANAGEMENT (OUTPATIENT)
Dept: INTERNAL MEDICINE CLINIC | Facility: CLINIC | Age: 34
End: 2024-06-14

## 2024-06-14 ENCOUNTER — APPOINTMENT (OUTPATIENT)
Dept: RADIOLOGY | Age: 34
End: 2024-06-14
Payer: MEDICARE

## 2024-06-14 DIAGNOSIS — R10.9 LEFT SIDED ABDOMINAL PAIN: ICD-10-CM

## 2024-06-14 DIAGNOSIS — K59.00 CONSTIPATION, UNSPECIFIED CONSTIPATION TYPE: ICD-10-CM

## 2024-06-14 DIAGNOSIS — R10.9 LEFT SIDED ABDOMINAL PAIN: Primary | ICD-10-CM

## 2024-06-14 PROCEDURE — 74022 RADEX COMPL AQT ABD SERIES: CPT

## 2024-06-14 NOTE — DISCHARGE INSTRUCTIONS
Please come back to the ER if you are having new or worsening symptoms including worsening abdominal pain, intractable nausea/vomiting, inability to tolerate food/fluids.     Take Miralax daily as per the bottle instructions and reattempt enema for constipation.    Please follow-up with your GI doctor.

## 2024-06-18 ENCOUNTER — OFFICE VISIT (OUTPATIENT)
Dept: INTERNAL MEDICINE CLINIC | Facility: CLINIC | Age: 34
End: 2024-06-18
Payer: MEDICARE

## 2024-06-18 VITALS
HEART RATE: 80 BPM | SYSTOLIC BLOOD PRESSURE: 114 MMHG | DIASTOLIC BLOOD PRESSURE: 68 MMHG | OXYGEN SATURATION: 98 % | WEIGHT: 113.4 LBS | BODY MASS INDEX: 22.15 KG/M2

## 2024-06-18 DIAGNOSIS — M54.42 BILATERAL LOW BACK PAIN WITH BILATERAL SCIATICA, UNSPECIFIED CHRONICITY: Primary | ICD-10-CM

## 2024-06-18 DIAGNOSIS — M54.41 BILATERAL LOW BACK PAIN WITH BILATERAL SCIATICA, UNSPECIFIED CHRONICITY: Primary | ICD-10-CM

## 2024-06-18 DIAGNOSIS — K58.1 IRRITABLE BOWEL SYNDROME WITH CONSTIPATION: ICD-10-CM

## 2024-06-18 PROCEDURE — 99496 TRANSJ CARE MGMT HIGH F2F 7D: CPT | Performed by: INTERNAL MEDICINE

## 2024-06-18 NOTE — PATIENT INSTRUCTIONS
-Use Miralax as needed for constipation   -Contact GI to see if they have Amitiza samples   -A referral has been made to see physical therapy for your low back pain   -Apply a heating pad to your low back as needed   -Consider OTC Salonpas patches as needed for pain

## 2024-06-18 NOTE — ASSESSMENT & PLAN NOTE
-I suspect the patient's symptoms are related to her IBS  -Since the patient states that it may take 7 to 21 days to get a decision whether her Amitiza is improved I recommended that she contact her gastroenterologist to see if they have samples  -For now she may take MiraLAX as needed for constipation

## 2024-06-18 NOTE — PROGRESS NOTES
Transition of Care Visit  Name: Noreen Velazquez      : 1990      MRN: [id number]  Encounter Provider: Frank Rosales MD  Encounter Date: 2024   [facility]    Assessment & Plan   1. Bilateral low back pain with bilateral sciatica, unspecified chronicity  Assessment & Plan:  -Recommended patient apply warm heat to her low back as needed  -Instructed her to apply over-the-counter Salonpas patches as needed  -A referral was made to physical therapy  -If no improvement can consider advanced imaging in the form of MRI  Orders:  -     Ambulatory Referral to Physical Therapy; Future  2. Irritable bowel syndrome with constipation  Assessment & Plan:  -I suspect the patient's symptoms are related to her IBS  -Since the patient states that it may take 7 to 21 days to get a decision whether her Amitiza is improved I recommended that she contact her gastroenterologist to see if they have samples  -For now she may take MiraLAX as needed for constipation         History of Present Illness     Transitional Care Management Review:   Noreen Velazquez is a 34 y.o. female here for TCM follow up.     During the TCM phone call patient stated:  TCM Call     Date and time call was made  2024  1:31 PM    Hospital care reviewed  Records not available    Patient was hospitialized at  [facility]    Date of Admission  24    Date of discharge  24    Diagnosis  Abdominal pain    Disposition  Home    Were the patients medications reviewed and updated  No      TCM Call     Should patient be enrolled in anticoag monitoring?  No    Scheduled for follow up?  Yes    Not clinically warranted  Patient was admitted for the birth of her child.     I have advised the patient to call PCP with any new or worsening symptoms  Edwin Cruz - /MA        HPI  Patient was seen in the emergency department on  complaining of abdominal pain.  The  patient reports labs were obtained and found to be unremarkable.  She states they felt her symptoms were most likely related to her IBS-C. She reports she was disappointed that imaging was not obtained.  She contacted her gastroenterologist and an obstruction series was ordered.  Her x-ray revealed a nonobstructive bowel gas pattern.    Today, she reports she is still experiencing upper abdominal tenderness located mostly on the left side.  She reports she has been experiencing significant constipation since last week.  She states Amitiza was reordered for her however she was told it would take 7-21 business days for her prior authorization to go through.    She also complains of low back pain with discomfort radiating into both legs over the past 2 to 3 months.  She states her legs feel heavy at times and that this sensation is often accompanied by radiating pain from her low back that she describes as a burning sensation.    Review of Systems  All other systems negative except for pertinent findings noted in HPI.     Objective     /68 (BP Location: Left arm, Patient Position: Sitting, Cuff Size: Standard)   Pulse 80   Wt 51.4 kg (113 lb 6.4 oz)   SpO2 98%   BMI 22.15 kg/m²     Physical Exam  General: NAD  HEENT: NCAT, EOMI, normal conjunctiva  Cardiovascular: RRR, normal S1 and S2, no m/r/g  Pulmonary: Normal respiratory effort, no wheezes, rales or rhonchi  GI: Soft, mild tenderness to palpation over epigastric and left upper quadrant, normoactive bowel sounds  Musculoskeletal: Normal bulk and tone, tenderness to palpation over the right lumbar paraspinal muscles, bilateral quadratus lumborum tenderness to palpation, 5 out of 5 strength in upper and lower extremity muscle groups  Neuro: 2+ patellar and Achilles tendon DTRs bilaterally, no ankle clonus  Extremities: No lower extremity edema  Skin: Normal skin color, no rashes   Psychiatric: Normal mood and affect    Medications have been reviewed by  provider in current encounter    Administrative Statements

## 2024-06-18 NOTE — ASSESSMENT & PLAN NOTE
-Recommended patient apply warm heat to her low back as needed  -Instructed her to apply over-the-counter Salonpas patches as needed  -A referral was made to physical therapy  -If no improvement can consider advanced imaging in the form of MRI

## 2024-06-19 NOTE — TELEPHONE ENCOUNTER
PA for AMITIZA 8 MCG SUBMITTED     Submitted via    [x]CMM-KEY [id number]  []SurescriJollyDeck-Case ID #  []Faxed to plan   []Other website   []Phone call Case ID #     Office notes sent, clinical questions answered. Awaiting determination    Turnaround time for your insurance to make a decision on your Prior Authorization can take 7-21 business days.

## 2024-06-20 NOTE — TELEPHONE ENCOUNTER
PA for amitiza  8 mcg Denied    Reason:(Screenshot if applicable)        Message sent to office clinical pool Yes    Denial letter scanned into Media Yes    Appeal started No ( Provider will need to decide if appeal is warranted and send clinical documentation to PA team for initiation.)    **Please follow up with your patient regarding denial and next steps**

## 2024-06-21 NOTE — TELEPHONE ENCOUNTER
PA for Amitiza 8 mcg  appealed via     []CMM  []SS  [x]Letter sent to insurance via fax [phone]  []Other site or means     All necessary records sent. Will await response from insurance company    Turnaround time for a decision to be made on an appeal could take up to 30 business days

## 2024-06-21 NOTE — TELEPHONE ENCOUNTER
PA Appeal for amitiza 8 mcg  Approved     Date(s) approved 6/21/24-12/21/24      Patient advised by          [x] MyChart Message  [] Phone call   []LMOM  []L/M to call office as no active Communication consent on file  []Unable to leave detailed message as VM not approved on Communication consent       Pharmacy advised by    [x]Fax  []Phone call    Message sent to office clinical pool no    Approval letter scanned into Media Yes

## 2024-06-21 NOTE — TELEPHONE ENCOUNTER
Denise from [facility] pharmacy calling to speak with someone in office in regards to the prior auth, warm transferred to clinical

## 2024-07-08 ENCOUNTER — OFFICE VISIT (OUTPATIENT)
Dept: INTERNAL MEDICINE CLINIC | Facility: CLINIC | Age: 34
End: 2024-07-08
Payer: MEDICARE

## 2024-07-08 VITALS
OXYGEN SATURATION: 99 % | DIASTOLIC BLOOD PRESSURE: 64 MMHG | WEIGHT: 116 LBS | BODY MASS INDEX: 21.9 KG/M2 | SYSTOLIC BLOOD PRESSURE: 110 MMHG | HEIGHT: 61 IN | HEART RATE: 80 BPM

## 2024-07-08 DIAGNOSIS — L30.9 DERMATITIS: Primary | ICD-10-CM

## 2024-07-08 PROCEDURE — 99213 OFFICE O/P EST LOW 20 MIN: CPT | Performed by: INTERNAL MEDICINE

## 2024-07-08 NOTE — PROGRESS NOTES
Name: Noreen Velazquez      : 1990      MRN: [id number]  Encounter Provider: Frank Rosales MD  Encounter Date: 2024   Encounter department: [facility]    Assessment & Plan     1. Dermatitis  -Patient reports the spots from her current rash are similar to the spots she has developed previously for which she takes as needed Valtrex prescribed by her dermatologist.  Her rash however does look similar to prickly heat.  She may start the Valtrex for now but she has been given by her dermatologist.  If there is no improvement consider a trial of topical hydrocortisone cream.  Recommended patient wear loosefitting clothing with good breathe ability.  If there is no improvement patient to follow-up with dermatology.       Subjective      HPI  Patient presents today complaining of scattered rash involving her chest, abdomen and back.  She reports the rash on her chest started 3 to 4 days ago.  She describes it as pruritic.  She reports the spots look very similar to the rash she developed several years ago.  She states at that time it was felt that her rash was due to herpes.  Her dermatologist started her on Valtrex and her symptoms resolved.  Patient notes the rash is worse particularly in the areas where she has been sweating more like around her breasts.    All other systems negative except for pertinent findings noted in HPI.       Current Outpatient Medications on File Prior to Visit   Medication Sig   • ALPRAZolam (XANAX) 0.25 mg tablet Take 1 tablet (0.25 mg total) by mouth 2 (two) times a day as needed for anxiety   • Amitiza 8 MCG capsule Take 1 capsule (8 mcg total) by mouth 2 (two) times a day with meals   • escitalopram (LEXAPRO) 5 mg tablet Take 1 tablet (5 mg total) by mouth daily   • Multiple Vitamin (multivitamin) tablet Take 1 tablet by mouth daily       Objective     /64 (BP Location: Left arm, Patient Position: Sitting, Cuff Size: Standard)    "Pulse 80   Ht 5' 1\" (1.549 m)   Wt 52.6 kg (116 lb)   SpO2 99%   BMI 21.92 kg/m²     BP Readings from Last 3 Encounters:   07/08/24 110/64   06/18/24 114/68   06/13/24 151/72        Wt Readings from Last 3 Encounters:   07/08/24 52.6 kg (116 lb)   06/18/24 51.4 kg (113 lb 6.4 oz)   05/28/24 58.1 kg (128 lb)       Physical Exam    General: NAD  HEENT: NCAT, EOMI, normal conjunctiva  Extremities: No lower extremity edema  Skin: Small 2 to 3 mm mildly erythematous scattered patches located over breast, back and abdomen    Frank Rosales MD  "

## 2024-07-16 ENCOUNTER — OFFICE VISIT (OUTPATIENT)
Dept: OBGYN CLINIC | Facility: CLINIC | Age: 34
End: 2024-07-16
Payer: MEDICARE

## 2024-07-16 VITALS — DIASTOLIC BLOOD PRESSURE: 70 MMHG | WEIGHT: 117 LBS | BODY MASS INDEX: 22.11 KG/M2 | SYSTOLIC BLOOD PRESSURE: 122 MMHG

## 2024-07-16 DIAGNOSIS — N89.8 VAGINAL DISCHARGE: ICD-10-CM

## 2024-07-16 DIAGNOSIS — Z30.015 ENCOUNTER FOR INITIAL PRESCRIPTION OF VAGINAL RING HORMONAL CONTRACEPTIVE: Primary | ICD-10-CM

## 2024-07-16 PROCEDURE — 99213 OFFICE O/P EST LOW 20 MIN: CPT | Performed by: OBSTETRICS & GYNECOLOGY

## 2024-07-16 RX ORDER — ETONOGESTREL AND ETHINYL ESTRADIOL VAGINAL RING .015; .12 MG/D; MG/D
RING VAGINAL
Qty: 3 EACH | Refills: 3 | Status: SHIPPED | OUTPATIENT
Start: 2024-07-16

## 2024-07-16 RX ORDER — CETIRIZINE HYDROCHLORIDE 10 MG/1
20 TABLET ORAL 2 TIMES DAILY
COMMUNITY
Start: 2024-07-12

## 2024-07-16 RX ORDER — FLUCONAZOLE 200 MG/1
TABLET ORAL
Qty: 2 TABLET | Refills: 3 | Status: SHIPPED | OUTPATIENT
Start: 2024-07-16 | End: 2024-07-17

## 2024-07-16 RX ORDER — PREDNISONE 10 MG/1
TABLET ORAL
COMMUNITY
Start: 2024-07-12

## 2024-07-16 NOTE — PATIENT INSTRUCTIONS
Patient was informed probably negative findings on her molecular culture today.  She does have a history of recurrent BV and yeast infections.  Will give her a prescription for Diflucan as she goes away to the beach this coming week.  She will keep informed of her progress.  She also will give up prescription for Diflucan at the recommendation of dermatology for her progesterone induced dermatitis.  She is using NuvaRing before she knows how to use it she will start the first Sunday after her menstrual cycle starts.  She will keep me informed of her progress.

## 2024-07-16 NOTE — PROGRESS NOTES
This is a 34-year-old white female, she is a  1 para 1 with 1 vaginal delivery 4 years ago.  She has not been placed on prednisone as per dermatology for possible progesterone induced dermatitis.  She is also worried about recurrence of a yeast infection while she is on steroid therapy.  She is asking for prophylactic Diflucan.  She is also noted some clear watery discharge.  She is concerned she may be having a recurrence of her reaction.    Examination shows evidence of recent ovulation with clear watery discharge coming from the cervical os.  The patient is requesting a molecular panel which I suspect will be negative.  She is also going on vacation and she is asking to have a backup Diflucan in case she gets a yeast infection.    Dermatology also suggest she consider going back on the birth control pill the NuvaRing, and is there is suggestion that the dermatitis she has becomes at ovulation may be progesterone induced.  They are now asking for ovarian suppression.      Impression/plan we will start the patient on NuvaRing because she is a poor pill taker at the onset of her next menstrual cycle.  Will also give her a prophylactic prescription for Diflucan to take returns to go to vacation for for recurrent yeast infection which she has.  She will keep me informed of her progress.  A molecular panel was done.  I think this will be negative.  She will be informed results when they return.

## 2024-07-31 ENCOUNTER — OFFICE VISIT (OUTPATIENT)
Dept: INTERNAL MEDICINE CLINIC | Facility: CLINIC | Age: 34
End: 2024-07-31
Payer: MEDICARE

## 2024-07-31 VITALS
WEIGHT: 115 LBS | BODY MASS INDEX: 21.71 KG/M2 | SYSTOLIC BLOOD PRESSURE: 122 MMHG | HEIGHT: 61 IN | HEART RATE: 72 BPM | DIASTOLIC BLOOD PRESSURE: 70 MMHG | OXYGEN SATURATION: 99 %

## 2024-07-31 DIAGNOSIS — L30.8 PROGESTERONE DERMATITIS: Primary | ICD-10-CM

## 2024-07-31 PROCEDURE — 99213 OFFICE O/P EST LOW 20 MIN: CPT | Performed by: INTERNAL MEDICINE

## 2024-07-31 RX ORDER — TRIAMCINOLONE ACETONIDE 1 MG/G
CREAM TOPICAL
Qty: 80 G | Refills: 0 | Status: SHIPPED | OUTPATIENT
Start: 2024-07-31

## 2024-07-31 NOTE — PROGRESS NOTES
Name: Noreen Velazquez      : 1990      MRN: [id number]  Encounter Provider: Frank Rosales MD  Encounter Date: 2024   Encounter department: [facility]    Assessment & Plan     1. Progesterone dermatitis  -     triamcinolone (KENALOG) 0.1 % cream; Use twice a day for up to 7-14 days at a time as needed for progesterone dermatitis  -Very mild scattered papular rash noted on exam.  Encouraged patient to continue with Zyrtec once a day since the higher doses caused her significant sleepiness.  She has been given triamcinolone cream for spot treatment.  She is scheduled to start treatment with NuvaRing in the next few days.  Patient to establish care with new dermatologist at the end of August.       Subjective      HPI  Patient presents today for follow-up of her rash.  She reports since her last visit that she was able to see her dermatologist who felt that her rash was likely secondary to a progesterone dermatitis.  Patient reports her rash typically appears around the time of her menstrual cycle.  She underwent biopsy at [facility] which the patient reports came back positive for spongiotic dermatitis.  The patient was started on a 16-day taper of prednisone which she states she only completed half of since the rash completely resolved.  She was also instructed to take Zyrtec 20 mg twice daily however she notes the Zyrtec made her extremely sleepy so she only took it once a day.  Her dermatologist also recommended that she see her gynecologist to discuss going on hormonal therapy.  She reports her gynecologist is starting her on new her ring.  Unfortunately, she also found out that her dermatologist no longer takes her insurance so she will be establishing with [facility] at the end of August.      All other systems negative except for pertinent findings noted in HPI.       Current Outpatient Medications on File Prior to Visit   Medication Sig   • ALPRAZolam  "(XANAX) 0.25 mg tablet Take 1 tablet (0.25 mg total) by mouth 2 (two) times a day as needed for anxiety   • Amitiza 8 MCG capsule Take 1 capsule (8 mcg total) by mouth 2 (two) times a day with meals   • cetirizine (ZyrTEC) 10 mg tablet Take 20 mg by mouth 2 (two) times a day   • escitalopram (LEXAPRO) 5 mg tablet Take 1 tablet (5 mg total) by mouth daily   • etonogestrel-ethinyl estradiol (NuvaRing) 0.12-0.015 MG/24HR vaginal ring Insert vaginally and leave in place for 3 consecutive weeks, then remove for 1 week.   • Multiple Vitamin (multivitamin) tablet Take 1 tablet by mouth daily   • predniSONE 10 mg tablet TAKE 4 TABLETS X4 DAYS AT ONCE IN THE MORNING, 3 TABLETS X4 DAYS, 2 TABLETS X4 DAYS, 1 TAB X4 DAYS       Objective     /70   Pulse 72   Ht 5' 1\" (1.549 m)   Wt 52.2 kg (115 lb)   LMP 07/05/2024 (Exact Date)   SpO2 99%   BMI 21.73 kg/m²     BP Readings from Last 3 Encounters:   07/31/24 122/70   07/16/24 122/70   07/08/24 110/64        Wt Readings from Last 3 Encounters:   07/31/24 52.2 kg (115 lb)   07/16/24 53.1 kg (117 lb)   07/08/24 52.6 kg (116 lb)       Physical Exam    General: NAD  HEENT: NCAT, EOMI, normal conjunctiva  Cardiovascular: RRR, normal S1 and S2, no m/r/g  Pulmonary: Normal respiratory effort, no wheezes, rales or rhonchi  Skin: Normal skin color, no rashes, fine scattered erythematous papules located on left thigh, right forearm and left abdomen     Frank Rosales MD  "

## 2024-08-23 ENCOUNTER — NURSE TRIAGE (OUTPATIENT)
Age: 34
End: 2024-08-23

## 2024-08-23 RX ORDER — CLOBETASOL PROPIONATE 0.5 MG/G
CREAM TOPICAL 2 TIMES DAILY
COMMUNITY
Start: 2024-08-15

## 2024-08-23 RX ORDER — CLINDAMYCIN PHOSPHATE 20 MG/G
1 CREAM VAGINAL
COMMUNITY
Start: 2024-08-19

## 2024-08-23 RX ORDER — FEXOFENADINE HCL 180 MG/1
180 TABLET ORAL DAILY
COMMUNITY
Start: 2024-08-15

## 2024-08-23 RX ORDER — NITROFURANTOIN 25; 75 MG/1; MG/1
100 CAPSULE ORAL 2 TIMES DAILY
COMMUNITY
Start: 2024-08-20

## 2024-08-23 NOTE — TELEPHONE ENCOUNTER
"Pt called in with concerns for recurrent UTI and bleeding/pain with intercourse. States that she had intercourse with her  for the first time last week in a few months. States she experienced excruciating pain and bleeding afterwards. States that she then had some soreness for a day or two. Then she developed UTI symptoms- dysuria, urgency, frequency, occasional pelvic/back pain. She was seen in [facility], was told she did not have a UTI but they gave her macrobid 100 mg BID anyway. States she has been taking this medication since Tuesday with no relief. Advised pt to follow up with UC regarding urine concerns. She verbalized understanding. Pt scheduled for 8/28/24 with vaginal concerns. Pt thankful.     Reason for Disposition   Pain with sexual intercourse (dyspareunia)  (Exception: feels like prior yeast infection, minor abrasion, minor rash < 24 hour duration, mild itching)    Answer Assessment - Initial Assessment Questions  1. SYMPTOM: \"What's the main symptom you're concerned about?\" (e.g., pain, itching, dryness)      Vaginal pain and bleeding after intercourse  2. LOCATION: \"Where is the  symptoms located?\" (e.g., inside/outside, left/right)      vagina  3. ONSET: \"When did the  symptoms  start?\"      Earlier this week  4. PAIN: \"Is there any pain?\" If Yes, ask: \"How bad is it?\" (Scale: 1-10; mild, moderate, severe)      Excruciating during intercourse, mild soreness 1-2 days later  5. ITCHING: \"Is there any itching?\" If Yes, ask: \"How bad is it?\" (Scale: 1-10; mild, moderate, severe)      denies  6. CAUSE: \"What do you think is causing the discharge?\" \"Have you had the same problem before? What happened then?\"      denies  7. OTHER SYMPTOMS: \"Do you have any other symptoms?\" (e.g., fever, itching, vaginal bleeding, pain with urination, injury to genital area, vaginal foreign body)      denies  8. PREGNANCY: \"Is there any chance you are pregnant?\" \"When was your last menstrual period?\"      " LMP    Protocols used: Vaginal Symptoms-ADULT-[place]

## 2024-08-28 ENCOUNTER — OFFICE VISIT (OUTPATIENT)
Dept: INTERNAL MEDICINE CLINIC | Facility: CLINIC | Age: 34
End: 2024-08-28
Payer: MEDICARE

## 2024-08-28 ENCOUNTER — OFFICE VISIT (OUTPATIENT)
Dept: OBGYN CLINIC | Facility: CLINIC | Age: 34
End: 2024-08-28
Payer: MEDICARE

## 2024-08-28 VITALS — DIASTOLIC BLOOD PRESSURE: 78 MMHG | BODY MASS INDEX: 21.92 KG/M2 | WEIGHT: 116 LBS | SYSTOLIC BLOOD PRESSURE: 108 MMHG

## 2024-08-28 VITALS
HEART RATE: 92 BPM | HEIGHT: 61 IN | BODY MASS INDEX: 22.01 KG/M2 | DIASTOLIC BLOOD PRESSURE: 80 MMHG | SYSTOLIC BLOOD PRESSURE: 110 MMHG | WEIGHT: 116.6 LBS | OXYGEN SATURATION: 98 %

## 2024-08-28 DIAGNOSIS — L30.9 DERMATITIS: Primary | ICD-10-CM

## 2024-08-28 DIAGNOSIS — N94.10 DYSPAREUNIA IN FEMALE: ICD-10-CM

## 2024-08-28 DIAGNOSIS — N83.201 RIGHT OVARIAN CYST: Primary | ICD-10-CM

## 2024-08-28 PROCEDURE — 99213 OFFICE O/P EST LOW 20 MIN: CPT | Performed by: OBSTETRICS & GYNECOLOGY

## 2024-08-28 PROCEDURE — 99214 OFFICE O/P EST MOD 30 MIN: CPT | Performed by: INTERNAL MEDICINE

## 2024-08-28 NOTE — PROGRESS NOTES
Ambulatory Visit  Name: Noreen Velazquez      : 1990      MRN: [id number]  Encounter Provider: Lea Reyes, MD  Encounter Date: 2024   [facility]    Assessment & Plan   1. Dermatitis  Assessment & Plan:  I recommend that see an allergist to get tested for progesterone hypersensitivity  I will reach out to one of our allergists to see if they perform skin testing for this  For now, continue topical steroids and cetirizine/fexofenadine  I have spent a total time of 30 minutes in caring for this patient on the day of the visit/encounter including Impressions and Obtaining or reviewing history  .       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 She has not been on any contraceptive for >1 year  Biopsy showing spongiotic dermatitis  She has been on oral steroids, topical triamcinolone and Zyrtec with improvement  Needed to switch dermatology office due to insurance and now going to [facility]  Currently taking 1 Zyrtec nightly but switching to Allegra and topical clobetasol  The rash is on her upper chest at this time  She has been recommended to start Nuvaring but has not done so  She has been experiencing episodes of subjective tongue swelling and suspects it is related to progesterone dermatitis        Review of Systems   Skin:  Positive for rash.     Past Medical History:   Diagnosis Date   • 39 weeks gestation of pregnancy 2019   • Abnormal ECG    • Anxiety    • Bronchitis 2023   • Chest wall pain    • Contraceptive use     LAST ASSESSED: 2013   • Depression     d/c Buspar with preg - now started on Zoloft 25 mg 19   • Exposure to COVID-19 virus 2020   • External hemorrhoid, bleeding     LAST ASSESSED: 2014   • Fetal  abnormality affecting management of mother, antepartum 01/29/2020    Bilateral talipes equivarous  -Has established peds ortho @[facility] for after delivery   • Gallbladder attack    • Gastroenteritis 10/11/2023   • Gastroesophageal reflux disease    • Genetic screening 12/09/2019   • Hematochezia     LAST ASSESSED: 13MAR2014   • IBS (irritable bowel syndrome)    • Immunity status testing     LAST ASSESSED: 02JUN2014   • Inflammatory bowel disease    • Lactose intolerance    • Laryngopharyngeal reflux (LPR)    • Methicillin resistant Staphylococcus aureus infection    • Migraine     prev on effexor for migraine & anxiety - d/c approx 1 yr ago   • Nasal congestion 07/18/2023   • Oral candidiasis    • Poison ivy dermatitis     LAST ASSESSED: 21JUL2015   • Psychiatric disorder    • Screen for STD (sexually transmitted disease)    • Shingles    • Skin abscess     RIGHT LEG   • Tongue swelling    • Unintentional weight loss    • Upper respiratory tract infection 04/11/2022   • Urinary frequency     LAST ASSESSED: 13APR2014   • Urinary tract infection    • UTI (urinary tract infection)     uses Macrobid after intercourse   • Vacuum extractor delivery, delivered 06/11/2020   • Varicella     childhood     Past Surgical History:   Procedure Laterality Date   • COLONOSCOPY     • CONDYLOMA EXCISION/FULGURATION     • TONSILLECTOMY      ONSET: 1997   • UPPER GASTROINTESTINAL ENDOSCOPY     • WISDOM TOOTH EXTRACTION       Family History   Problem Relation Age of Onset   • Arthritis Mother    • Hypothyroidism Mother    • Thyroid disease Mother    • Heart attack Father    • Cancer Father         Kidney   • Hypertension Father    • Hypothyroidism Father         Hyper   • Thyroid disease Father    • Drug abuse Brother    • Seizures Brother    • No Known Problems Brother    • No Known Problems Brother    • Diabetes Maternal Grandmother         type 2   • Dementia Maternal Grandmother    • Cancer Maternal Grandfather         prostate   •  Emphysema Paternal Grandmother    • Early death Maternal Aunt         Cancer   • Early death Maternal Uncle         Heart attack   • Cancer Family         MALIGNANT NEOPLASM   • No Known Problems Daughter      Social History     Tobacco Use   • Smoking status: Former     Current packs/day: 0.00     Average packs/day: 0.2 packs/day for 10.2 years (2.5 ttl pk-yrs)     Types: Cigarettes     Start date: 2011     Quit date: 10/31/2019     Years since quittin.8     Passive exposure: Never   • Smokeless tobacco: Never   Vaping Use   • Vaping status: Never Used   Substance and Sexual Activity   • Alcohol use: Not Currently   • Drug use: Never   • Sexual activity: Yes     Partners: Male     Birth control/protection: Condom Male     Current Outpatient Medications on File Prior to Visit   Medication Sig   • ALPRAZolam (XANAX) 0.25 mg tablet Take 1 tablet (0.25 mg total) by mouth 2 (two) times a day as needed for anxiety   • Amitiza 8 MCG capsule Take 1 capsule (8 mcg total) by mouth 2 (two) times a day with meals   • cetirizine (ZyrTEC) 10 mg tablet Take 20 mg by mouth 2 (two) times a day   • clindamycin (CLEOCIN) 2 % vaginal cream Insert 1 applicator into the vagina daily at bedtime   • escitalopram (LEXAPRO) 5 mg tablet Take 1 tablet (5 mg total) by mouth daily   • Multiple Vitamin (multivitamin) tablet Take 1 tablet by mouth daily   • nitrofurantoin (MACROBID) 100 mg capsule Take 100 mg by mouth 2 (two) times a day   • triamcinolone (KENALOG) 0.1 % cream Use twice a day for up to 7-14 days at a time as needed for progesterone dermatitis   • clobetasol (TEMOVATE) 0.05 % cream Apply topically 2 (two) times a day   • etonogestrel-ethinyl estradiol (NuvaRing) 0.12-0.015 MG/24HR vaginal ring Insert vaginally and leave in place for 3 consecutive weeks, then remove for 1 week.   • fexofenadine (ALLEGRA) 180 MG tablet Take 180 mg by mouth daily     No Known Allergies  Immunization History   Administered Date(s) Administered  "  • Influenza, injectable, quadrivalent, preservative free 0.5 mL 11/20/2019   • Tdap 08/13/2013, 04/27/2020   • Tuberculin Skin Test-PPD Intradermal 08/13/2013, 06/02/2014     Objective     /80 (BP Location: Right arm, Patient Position: Sitting, Cuff Size: Standard)   Pulse 92   Ht 5' 1\" (1.549 m)   Wt 52.9 kg (116 lb 9.6 oz)   SpO2 98%   BMI 22.03 kg/m²     Physical Exam  Constitutional:       General: She is not in acute distress.     Appearance: She is well-developed. She is not ill-appearing, toxic-appearing or diaphoretic.   Eyes:      Conjunctiva/sclera: Conjunctivae normal.   Cardiovascular:      Rate and Rhythm: Normal rate and regular rhythm.      Heart sounds: Normal heart sounds. No murmur heard.  Pulmonary:      Effort: Pulmonary effort is normal. No respiratory distress.      Breath sounds: Normal breath sounds. No stridor. No wheezing or rales.   Abdominal:      General: There is no distension.      Palpations: Abdomen is soft. There is no mass.      Tenderness: There is no abdominal tenderness. There is no guarding or rebound.   Musculoskeletal:      Cervical back: Neck supple.      Right lower leg: No edema.      Left lower leg: No edema.   Skin:     Findings: Rash (milagro scattered pink papules on the upper chest) present.   Neurological:      Mental Status: She is alert and oriented to person, place, and time.   Psychiatric:         Mood and Affect: Mood normal.         Behavior: Behavior normal.         Thought Content: Thought content normal.         Judgment: Judgment normal.         "

## 2024-08-28 NOTE — PROGRESS NOTES
This is a 34-year-old white female, she is a  1 para 1.  She is not using any method of contraception.  Her last period was .  2 weeks ago during intercourse she had bleeding afterwards.  A lot of pain on the right side.  She has a history of a right ovarian cyst in the past.  The pain with intercourse is getting worse.  She was seen in urgent care recently they think she has a UTI and gave her Macrobid.  Now she is only Keflex.    Today the bladder today is nontender the urethra looks normal.  She will finish up her antibiotic.  Discharge she was tender in the right adnexa with deep palpation.  With a history of a right ovarian cyst.  I am concerned that this may be getting bigger.  Will check an ultrasound.  She will call me on Tuesday, 3 September with a progress report.

## 2024-08-28 NOTE — PATIENT INSTRUCTIONS
Patient was seen today for dyspareunia particular in the right side and a history of right ovarian cyst.  She is tender not right side today.  Will get an ultrasound to verify that.  She was also seen in urgent care and treated for UTI bladder today is less sensitive.  They switch her from Macrobid to Keflex.  I told her to continue to hydrate and keep me informed.  She will call me back on Tuesday, September 3 with a progress report.

## 2024-08-29 ENCOUNTER — HOSPITAL ENCOUNTER (OUTPATIENT)
Dept: RADIOLOGY | Facility: HOSPITAL | Age: 34
Discharge: HOME | End: 2024-08-29
Attending: OBSTETRICS & GYNECOLOGY
Payer: MEDICARE

## 2024-08-29 DIAGNOSIS — N83.201 RIGHT OVARIAN CYST: ICD-10-CM

## 2024-08-29 PROCEDURE — 76830 TRANSVAGINAL US NON-OB: CPT

## 2024-08-29 PROCEDURE — 76856 US EXAM PELVIC COMPLETE: CPT

## 2024-09-02 NOTE — ASSESSMENT & PLAN NOTE
I recommend that see an allergist to get tested for progesterone hypersensitivity  I will reach out to one of our allergists to see if they perform skin testing for this  For now, continue topical steroids and cetirizine/fexofenadine

## 2024-09-05 ENCOUNTER — NURSE TRIAGE (OUTPATIENT)
Age: 34
End: 2024-09-05

## 2024-09-05 DIAGNOSIS — N32.89 BLADDER SPASMS: Primary | ICD-10-CM

## 2024-09-05 RX ORDER — PHENAZOPYRIDINE HYDROCHLORIDE 200 MG/1
TABLET, FILM COATED ORAL
Qty: 6 TABLET | Refills: 2 | Status: SHIPPED | OUTPATIENT
Start: 2024-09-05 | End: 2024-09-07

## 2024-09-05 NOTE — TELEPHONE ENCOUNTER
"Patient calling in stating that she was treated for a UTI by urgent care, and patient saw Dr Chavez on 8/28/24. Patient was on macrobid and was switched to Keflex. Pt reporting macrobid wasn't working for the patient. Pt reporting taking keflex and completed the antibiotics on 8/29/24. Pt reporting having symptoms of urgency and frequency. Pt reporting burning after urination. Pt reporting burning after urination, pain is 5/10.     Pt reporting having flank pain, pain after urination, pt denies fever, pain with urination, blood in urine.      As per protocol, pt would need to be seen in the office today, patient was warm transferred to [facility] in the office for further assistance with scheduling.         Reason for Disposition   Side (flank) or lower back pain present    Answer Assessment - Initial Assessment Questions  1. SYMPTOM: \"What's the main symptom you're concerned about?\" (e.g., frequency, incontinence)      Frequency, urgency, and burning after urination   2. ONSET: \"When did the  symptoms  start?\"      Urgency and frequency started on Monday   3. PAIN: \"Is there any pain?\" If Yes, ask: \"How bad is it?\" (Scale: 1-10; mild, moderate, severe)      Pt reporting burning after urination, pain is 5/10   4. CAUSE: \"What do you think is causing the symptoms?\"      UTI  5. OTHER SYMPTOMS: \"Do you have any other symptoms?\" (e.g., fever, flank pain, blood in urine, pain with urination)      Pt reporting having flank pain, pain after urination, pt denies fever, pain with urination, blood in urine.  6. PREGNANCY: \"Is there any chance you are pregnant?\" \"When was your last menstrual period?\"      Pt denies, LMP 9/2/24    Protocols used: Urinary Symptoms-ADULT-[place]    "

## 2024-09-12 ENCOUNTER — OFFICE VISIT (OUTPATIENT)
Dept: OBGYN CLINIC | Facility: CLINIC | Age: 34
End: 2024-09-12
Payer: MEDICARE

## 2024-09-12 VITALS — DIASTOLIC BLOOD PRESSURE: 74 MMHG | WEIGHT: 117 LBS | SYSTOLIC BLOOD PRESSURE: 116 MMHG | BODY MASS INDEX: 22.11 KG/M2

## 2024-09-12 DIAGNOSIS — N89.8 VAGINAL DISCHARGE: Primary | ICD-10-CM

## 2024-09-12 PROCEDURE — 99213 OFFICE O/P EST LOW 20 MIN: CPT | Performed by: OBSTETRICS & GYNECOLOGY

## 2024-09-12 NOTE — PROGRESS NOTES
This is a 34-year-old white female well-known to me, she is a  1 para 1 with 1 vaginal delivery approximately 4 years ago.  Today she is complaining about increasing vaginal discharge and questionable odor.  She has an appointment tomorrow to see urology for evaluation of possible interstitial cystitis.  She has a variety of vague abdominal and pelvic discomfort.  Multiple exams have been negative.    Abdominal exam is benign.  There is no CVA tenderness.  Positive bowel sounds.  The patient is concerned she may have kidney stones.  I reassured her about this.    Pelvic exam shows the external genitalia normal limit the vagina is clean the discharge is not draining suspicious there is no odor.  I believe it will be benign.  A molecular panel was done.    Impression I am concerned the patient may be getting more anxious about her symptomatology which was negative so far.  I am happy she is going to see urology tomorrow for cystoscopy I think we need to rule out interstitial cystitis.  I do not believe she has any kidney stones.  I also informed the patient I believe the vaginal culture will be completely benign there is no suspicion that this could be a vaginal infection.

## 2024-09-12 NOTE — PATIENT INSTRUCTIONS
The patient was informed of negative GYN findings.  I think the vaginal culture will be negative.  We do need to rule out interstitial cystitis she sees urology tomorrow.  She will keep me informed.

## 2024-09-16 ENCOUNTER — OFFICE VISIT (OUTPATIENT)
Dept: INTERNAL MEDICINE CLINIC | Facility: CLINIC | Age: 34
End: 2024-09-16
Payer: MEDICARE

## 2024-09-16 VITALS
OXYGEN SATURATION: 99 % | SYSTOLIC BLOOD PRESSURE: 114 MMHG | DIASTOLIC BLOOD PRESSURE: 76 MMHG | HEART RATE: 93 BPM | BODY MASS INDEX: 22.2 KG/M2 | WEIGHT: 117.6 LBS | HEIGHT: 61 IN

## 2024-09-16 DIAGNOSIS — F41.1 GENERALIZED ANXIETY DISORDER: ICD-10-CM

## 2024-09-16 DIAGNOSIS — R60.0 BILATERAL LEG EDEMA: ICD-10-CM

## 2024-09-16 DIAGNOSIS — M79.89 HAND SWELLING: Primary | ICD-10-CM

## 2024-09-16 DIAGNOSIS — M54.50 ACUTE BILATERAL LOW BACK PAIN, UNSPECIFIED WHETHER SCIATICA PRESENT: ICD-10-CM

## 2024-09-16 DIAGNOSIS — F41.0 PANIC ATTACK: ICD-10-CM

## 2024-09-16 PROCEDURE — 99214 OFFICE O/P EST MOD 30 MIN: CPT | Performed by: INTERNAL MEDICINE

## 2024-09-16 NOTE — ASSESSMENT & PLAN NOTE
-For evaluation of bilateral lower extremity muscle pain with intermittent edema a CK level has been ordered.  Very low suspicion for DVT and most recent duplex study in December 2023 was negative.  Suspect her pain may be radiating from her low back.  A referral has been made to physical therapy.

## 2024-09-16 NOTE — ASSESSMENT & PLAN NOTE
-No evidence of swelling on exam today.  Given her report of intermittent swelling I will check a CRP and ESR to assess for possible underlying inflammation.   Render In Strict Bullet Format?: No Initiate Treatment: In 2 weeks start Compound 5% fluorouracil and 0.005% calcipotriene to these areas bid x 5-7 days. He has on hand. Detail Level: Zone

## 2024-09-16 NOTE — PROGRESS NOTES
Name: Noreen Velazquez      : 1990      MRN: [id number]  Encounter Provider: Frank Rosales MD  Encounter Date: 2024   Enc[facility] department: [facility]    Assessment & Plan     1. Hand swelling  Assessment & Plan:  -No evidence of swelling on exam today.  Given her report of intermittent swelling I will check a CRP and ESR to assess for possible underlying inflammation.  Orders:  -     Sedimentation rate, automated; Future  -     C-reactive protein; Future  -     CK; Future  -     Comprehensive metabolic panel; Future  2. Bilateral leg edema  Assessment & Plan:  -For evaluation of bilateral lower extremity muscle pain with intermittent edema a CK level has been ordered.  Very low suspicion for DVT and most recent duplex study in 2023 was negative.  Suspect her pain may be radiating from her low back.  A referral has been made to physical therapy.  Orders:  -     Sedimentation rate, automated; Future  -     C-reactive protein; Future  -     CK; Future  -     Comprehensive metabolic panel; Future  3. Acute bilateral low back pain, unspecified whether sciatica present  -     Ambulatory Referral to Physical Therapy; Future         Subjective      HPI  Patient presents today as an acute visit.  She reports since  which was right around the time of her last menstrual cycle she has been experiencing intermittent swelling and pain involving her hands and legs.  She reports currently she is not experiencing any swelling.  Of note she underwent a lower extremity duplex study in 2023 which was negative for DVT.    She is also concerned that she may have a UTI.  She reports she was recently treated by her gynecologist for 2 to 3 weeks ago.  She underwent a repeat urinalysis last week which revealed mixed urogenital roselia.  She states she was told her symptoms could be due to pelvic floor dysfunction or IC.  She was given a referral for pelvic floor  "therapy.    She also complains that she has been experiencing intermittent pain across her low back.  In addition to this she states she has been experiencing pain which radiates down through both of her legs more so on the right with occasional paresthesias.      All other systems negative except for pertinent findings noted in HPI.       Current Outpatient Medications on File Prior to Visit   Medication Sig    ALPRAZolam (XANAX) 0.25 mg tablet Take 1 tablet (0.25 mg total) by mouth 2 (two) times a day as needed for anxiety    Amitiza 8 MCG capsule Take 1 capsule (8 mcg total) by mouth 2 (two) times a day with meals    cetirizine (ZyrTEC) 10 mg tablet Take 20 mg by mouth 2 (two) times a day    clobetasol (TEMOVATE) 0.05 % cream Apply topically 2 (two) times a day    escitalopram (LEXAPRO) 5 mg tablet Take 1 tablet (5 mg total) by mouth daily    etonogestrel-ethinyl estradiol (NuvaRing) 0.12-0.015 MG/24HR vaginal ring Insert vaginally and leave in place for 3 consecutive weeks, then remove for 1 week.    fexofenadine (ALLEGRA) 180 MG tablet Take 180 mg by mouth daily    Multiple Vitamin (multivitamin) tablet Take 1 tablet by mouth daily    triamcinolone (KENALOG) 0.1 % cream Use twice a day for up to 7-14 days at a time as needed for progesterone dermatitis    clindamycin (CLEOCIN) 2 % vaginal cream Insert 1 applicator into the vagina daily at bedtime (Patient not taking: Reported on 9/16/2024)    nitrofurantoin (MACROBID) 100 mg capsule Take 100 mg by mouth 2 (two) times a day (Patient not taking: Reported on 9/12/2024)       Objective     /76   Pulse 93   Ht 5' 1\" (1.549 m)   Wt 53.3 kg (117 lb 9.6 oz)   LMP 09/02/2024 (Exact Date)   SpO2 99%   BMI 22.22 kg/m²     BP Readings from Last 3 Encounters:   09/16/24 114/76   09/12/24 116/74   08/28/24 108/78        Wt Readings from Last 3 Encounters:   09/16/24 53.3 kg (117 lb 9.6 oz)   09/12/24 53.1 kg (117 lb)   08/28/24 52.6 kg (116 lb)       Physical " Exam    General: NAD  HEENT: NCAT, EOMI, normal conjunctiva  Cardiovascular: RRR, normal S1 and S2, no m/r/g  Pulmonary: Normal respiratory effort, no wheezes, rales or rhonchi  Neuro: Non-focal, ambulating without difficulty, non-antalgic gait  Extremities: No hand or lower extremity edema  Skin: Normal skin color, no rashes     Frank Rosales MD

## 2024-09-17 RX ORDER — ESCITALOPRAM OXALATE 5 MG/1
5 TABLET ORAL DAILY
Qty: 90 TABLET | Refills: 1 | Status: SHIPPED | OUTPATIENT
Start: 2024-09-17

## 2024-12-13 NOTE — ASSESSMENT & PLAN NOTE
-I suspect the patient's tongue/throat discomfort are related to dry mouth  -I discussed the importance with her of smoking cessation  -Recommended she try Biotene mouth rinse twice a day  -Patient given a prescription to check an CASANDRA as well as Sjogren's antibody to rule out autoimmune cause  -Her report of intermittent tongue swelling is unusual.  Possibly secondary to underlying allergy.  Referral has been made to allergy for further evaluation.   Assistance OOB with selected safe patient handling equipment if applicable/Assistance with ambulation/Communicate fall risk and risk factors to all staff, patient, and family/Monitor gait and stability/Monitor for mental status changes and reorient to person, place, and time, as needed/Provide visual cue: yellow wristband, yellow gown, etc/Reinforce activity limits and safety measures with patient and family/Toileting schedule using arm’s reach rule for commode and bathroom/Use of alarms - bed, stretcher, chair and/or video monitoring/Call bell, personal items and telephone in reach/Instruct patient to call for assistance before getting out of bed/chair/stretcher/Non-slip footwear applied when patient is off stretcher/[place] to call system/Physically safe environment - no spills, clutter or unnecessary equipment/Purposeful Proactive Rounding/Room/bathroom lighting operational, light cord in reach

## 2024-12-17 ENCOUNTER — APPOINTMENT (OUTPATIENT)
Dept: LAB | Age: 34
End: 2024-12-17
Payer: MEDICARE

## 2024-12-17 ENCOUNTER — OFFICE VISIT (OUTPATIENT)
Dept: OBGYN CLINIC | Facility: CLINIC | Age: 34
End: 2024-12-17
Payer: MEDICARE

## 2024-12-17 ENCOUNTER — NURSE TRIAGE (OUTPATIENT)
Age: 34
End: 2024-12-17

## 2024-12-17 VITALS — SYSTOLIC BLOOD PRESSURE: 110 MMHG | WEIGHT: 116 LBS | BODY MASS INDEX: 21.92 KG/M2 | DIASTOLIC BLOOD PRESSURE: 78 MMHG

## 2024-12-17 DIAGNOSIS — N39.0 URINARY TRACT INFECTION WITHOUT HEMATURIA, SITE UNSPECIFIED: ICD-10-CM

## 2024-12-17 DIAGNOSIS — N89.8 VAGINAL DISCHARGE: Primary | ICD-10-CM

## 2024-12-17 LAB
BACTERIA UR QL AUTO: NORMAL /HPF
BILIRUB UR QL STRIP: NEGATIVE
CLARITY UR: CLEAR
COLOR UR: COLORLESS
GLUCOSE UR STRIP-MCNC: NEGATIVE MG/DL
HGB UR QL STRIP.AUTO: NEGATIVE
KETONES UR STRIP-MCNC: NEGATIVE MG/DL
LEUKOCYTE ESTERASE UR QL STRIP: NEGATIVE
NITRITE UR QL STRIP: NEGATIVE
NON-SQ EPI CELLS URNS QL MICRO: NORMAL /HPF
PH UR STRIP.AUTO: 6.5 [PH]
PROT UR STRIP-MCNC: NEGATIVE MG/DL
RBC #/AREA URNS AUTO: NORMAL /HPF
SP GR UR STRIP.AUTO: 1.01 (ref 1–1.03)
UROBILINOGEN UR STRIP-ACNC: <2 MG/DL
WBC #/AREA URNS AUTO: NORMAL /HPF

## 2024-12-17 PROCEDURE — 81001 URINALYSIS AUTO W/SCOPE: CPT

## 2024-12-17 PROCEDURE — 99213 OFFICE O/P EST LOW 20 MIN: CPT | Performed by: OBSTETRICS & GYNECOLOGY

## 2024-12-17 RX ORDER — NITROFURANTOIN MACROCRYSTALS 50 MG/1
CAPSULE ORAL
Qty: 20 CAPSULE | Refills: 3 | Status: SHIPPED | OUTPATIENT
Start: 2024-12-17

## 2024-12-17 NOTE — PATIENT INSTRUCTIONS
The patient was informed of basing negative exam and molecular panel was done.  Slight discomfort of the bladder but not an active cystitis.  We will check a urinalysis.  She will continue using her Macrobid [place] after intercourse.  She will continue using her probiotic.  She will not have boric acid when she is not using the probiotic.  She will keep informed her progress.

## 2024-12-17 NOTE — PROGRESS NOTES
This is a 34-year-old white female well-known to me who is probably current DVT.  She also is a problem honeymoon cystitis she has done well lately with using the probiotic  called CLAIRVEE, uses this for the first 15 days a month.  I will also ask she had boric acid to the regimen at the end of the month.  We will continue using the macro Olu after intercourse.  She says a recent bout of discomfort happened after intercourse last week.  Will also for urinalysis.  She has seen urogynecology.  And I suspect she might have a form of cystitis we will continue to watch that may be interstitial cystitis.  There has been a problem of late.    Examination of the vagina is clean no obvious discharge.  Molecular culture was done.  Manipulation of bladder slightly tender suspected for maybe cystitis we will check a urinalysis on her from the lab.  She will keep us informed of her progress.  
" She kicked me out, I had a mental breakdown, I just lost it"

## 2024-12-17 NOTE — TELEPHONE ENCOUNTER
"Patient calling to report vaginitis and UTI symptoms. Notes large amount of white discharge and vulvovaginal buring upon waking this morning. Additonally report burning with urination and light increase in frequency. Patient is prone to UTIs and usually takes medication to prevent them after intercourse for about 10 years. Feels this may not be effective anymore. Appointment scheduled with Dr. Chavez today.    Reason for Disposition   Patient wants to be seen    Answer Assessment - Initial Assessment Questions  1. SYMPTOM: \"What's the main symptom you're concerned about?\" (e.g., pain, itching, dryness)      White discharge - large amount - vulvar burning  2. LOCATION: \"Where is the  s/s located?\" (e.g., inside/outside, left/right)      Vulvovaginal burning  3. ONSET: \"When did the  s/s  start?\"      Today  4. PAIN: \"Is there any pain?\" If Yes, ask: \"How bad is it?\" (Scale: 1-10; mild, moderate, severe)      Mild pelvic cramping  5. ITCHING: \"Is there any itching?\" If Yes, ask: \"How bad is it?\" (Scale: 1-10; mild, moderate, severe)      Denies  6. CAUSE: \"What do you think is causing the discharge?\" \"Have you had the same problem before?\" \"What happened then?\"      unsure  7. OTHER SYMPTOMS: \"Do you have any other symptoms?\" (e.g., fever, itching, vaginal bleeding, pain with urination, injury to genital area, vaginal foreign body)      Urinary symptoms  8. PREGNANCY: \"Is there any chance you are pregnant?\" \"When was your last menstrual period?\"      Denies    Answer Assessment - Initial Assessment Questions  1. SYMPTOM: \"What's the main symptom you're concerned about?\" (e.g., frequency, incontinence)      Burning with urination, slight frequency  2. ONSET: \"When did the  s/s  start?\"      2-3 day  3. PAIN: \"Is there any pain?\" If Yes, ask: \"How bad is it?\" (Scale: 1-10; mild, moderate, severe)      Mild pelvic cramping  4. CAUSE: \"What do you think is causing the symptoms?\"      Possible UTI  5. OTHER SYMPTOMS: \"Do " "you have any other symptoms?\" (e.g., blood in urine, fever, flank pain, pain with urination)      Abnormal vaginal discharge  6. PREGNANCY: \"Is there any chance you are pregnant?\" \"When was your last menstrual period?\"      Denies    Protocols used: Urinary Symptoms-Adult-OH, Vaginal Symptoms-Adult-OH    "

## 2024-12-18 ENCOUNTER — RESULTS FOLLOW-UP (OUTPATIENT)
Dept: OBGYN CLINIC | Facility: CLINIC | Age: 34
End: 2024-12-18

## 2024-12-19 ENCOUNTER — TELEPHONE (OUTPATIENT)
Age: 34
End: 2024-12-19

## 2024-12-19 DIAGNOSIS — N76.0 BV (BACTERIAL VAGINOSIS): Primary | ICD-10-CM

## 2024-12-19 DIAGNOSIS — B96.89 BV (BACTERIAL VAGINOSIS): Primary | ICD-10-CM

## 2024-12-19 LAB
BV BACTERIA RRNA VAG QL NAA+PROBE: POSITIVE
C GLABRATA RNA VAG QL NAA+PROBE: NOT DETECTED
CANDIDA RRNA VAG QL PROBE: NOT DETECTED
T VAGINALIS RRNA SPEC QL NAA+PROBE: NOT DETECTED

## 2024-12-19 RX ORDER — METRONIDAZOLE 500 MG/1
TABLET ORAL
Qty: 14 TABLET | Refills: 1 | Status: SHIPPED | OUTPATIENT
Start: 2024-12-19 | End: 2024-12-25

## 2024-12-19 NOTE — TELEPHONE ENCOUNTER
Patient calling in seeking response from provider regarding BV result. Patient states she is hoping for treatment prior to the weekend. Provider not in office this afternoon or tomorrow. Msg to on-call provider for review.

## 2024-12-19 NOTE — TELEPHONE ENCOUNTER
"Pt called to review lab results, stating she saw \"abnormal\" results. Communicated results have not yet been interpreted by provider, and that a nurse will F/U to review results/recommendations. Pt verbalized understanding.  "

## 2024-12-19 NOTE — TELEPHONE ENCOUNTER
Chart reviewed, pt was started on Flagyl as prescribed by Dr. Chavez and he notified patient. See other encounter.

## 2024-12-30 ENCOUNTER — TELEPHONE (OUTPATIENT)
Age: 34
End: 2024-12-30

## 2024-12-30 NOTE — TELEPHONE ENCOUNTER
Incoming call from patient. Recently treated for BV with Flagyl on 12/19. Patient finished entire course of Flagyl without missing any doses. Last dose taken 12/26. Patient states that by the evening of 12/27 her symptoms had returned. Symptoms include vaginal burning, increase in thick vaginal discharge, vaginal irritation and pelvic pain. Denies itching and odor. Patient unsure if she has now developed yeast infection or if BV has just returned.     Routing to provider for review and recommendations.

## 2025-01-15 ENCOUNTER — OFFICE VISIT (OUTPATIENT)
Dept: OBGYN CLINIC | Facility: CLINIC | Age: 35
End: 2025-01-15
Payer: MEDICARE

## 2025-01-15 VITALS — BODY MASS INDEX: 21.69 KG/M2 | WEIGHT: 114.8 LBS | DIASTOLIC BLOOD PRESSURE: 80 MMHG | SYSTOLIC BLOOD PRESSURE: 120 MMHG

## 2025-01-15 DIAGNOSIS — N89.8 VAGINAL DISCHARGE: Primary | ICD-10-CM

## 2025-01-15 PROCEDURE — 99213 OFFICE O/P EST LOW 20 MIN: CPT | Performed by: OBSTETRICS & GYNECOLOGY

## 2025-01-15 NOTE — PATIENT INSTRUCTIONS
The patient was again seen for recurrent discharge culture was taken and does not look suspicious there is no odor there is no blood there is no frothy appearance.  We will also give her phone numbers to call for evaluation at [facility].  She may decide to go to a local clinic.

## 2025-01-15 NOTE — PROGRESS NOTES
This is a 34-year-old female well-known to me again complaining about problems of discharge with no itching or burning.  She used boric acid and other products to do better.    Examination shows the stent to be clean the discharge does not look infectious.  The patient is requesting culture again.  I believe it will be negative.  She will be informed results when they return.  We have also decided now to refer her to the vaginal discharge clinic at [facility] or she may consider the vaginal clinic at [facility].

## 2025-01-16 ENCOUNTER — OFFICE VISIT (OUTPATIENT)
Dept: INTERNAL MEDICINE CLINIC | Facility: CLINIC | Age: 35
End: 2025-01-16
Payer: MEDICARE

## 2025-01-16 ENCOUNTER — RESULTS FOLLOW-UP (OUTPATIENT)
Dept: OBGYN CLINIC | Facility: CLINIC | Age: 35
End: 2025-01-16

## 2025-01-16 VITALS
WEIGHT: 116.4 LBS | OXYGEN SATURATION: 99 % | BODY MASS INDEX: 21.98 KG/M2 | HEIGHT: 61 IN | DIASTOLIC BLOOD PRESSURE: 76 MMHG | SYSTOLIC BLOOD PRESSURE: 116 MMHG | HEART RATE: 83 BPM

## 2025-01-16 DIAGNOSIS — K58.1 IRRITABLE BOWEL SYNDROME WITH CONSTIPATION: Primary | ICD-10-CM

## 2025-01-16 LAB
BV BACTERIA RRNA VAG QL NAA+PROBE: NEGATIVE
C GLABRATA RNA VAG QL NAA+PROBE: NOT DETECTED
C TRACH RRNA SPEC QL NAA+PROBE: NOT DETECTED
CANDIDA RRNA VAG QL PROBE: NOT DETECTED
N GONORRHOEA RRNA SPEC QL NAA+PROBE: NOT DETECTED
T VAGINALIS RRNA SPEC QL NAA+PROBE: NOT DETECTED

## 2025-01-16 PROCEDURE — 99213 OFFICE O/P EST LOW 20 MIN: CPT | Performed by: INTERNAL MEDICINE

## 2025-01-16 NOTE — ASSESSMENT & PLAN NOTE
-Symptoms may be related to an IBS flare.  -Recommended patient restart her Amitiza.  -Instructed her to take 17g of Miralax daily for 1-2 days given report of feeling incomplete evacuation. This may be due to increased stool burden.  Also recommended a trial of a heating pad as some of her belly discomfort may be related to abdominal wall pain.

## 2025-01-16 NOTE — PROGRESS NOTES
"Name: oNreen Velazquez      : 1990      MRN: [id number]  Encounter Provider: Frank Rosales MD  Encounter Date: 2025   [facility]  :  Assessment & Plan  Irritable bowel syndrome with constipation  -Symptoms may be related to an IBS flare.  -Recommended patient restart her Amitiza.  -Instructed her to take 17g of Miralax daily for 1-2 days given report of feeling incomplete evacuation. This may be due to increased stool burden.  Also recommended a trial of a heating pad as some of her belly discomfort may be related to abdominal wall pain.              History of Present Illness     HPI  Patient presents as an acute visit. She complains of intermittent right sided shooting abdominal pain which started around the time she had her upper EGD. She was seen in the ED several months and had a negative w/u. An obstructive series at the time was negative. She has been feeling nauseous and sick to her stomach. She feels her right side is swollen. She feels she is not evacuating all the way. She denies any melena or hematochezia. Of note, patient reports she stopped taking her Amitiza several months ago.     Review of Systems  All other systems negative except for pertinent findings noted in HPI.       Objective   /76 (BP Location: Left arm, Patient Position: Sitting, Cuff Size: Standard)   Pulse 83   Ht 5' 1\" (1.549 m)   Wt 52.8 kg (116 lb 6.4 oz)   LMP 2024 (Exact Date)   SpO2 99%   BMI 21.99 kg/m²      Physical Exam  General: NAD  HEENT: NCAT, EOMI, normal conjunctiva  Cardiovascular: RRR, normal S1 and S2, no m/r/g  Pulmonary: Normal respiratory effort, no wheezes, rales or rhonchi  GI: Soft, right abdominal wall tenderness to palpation, nondistended, normoactive bowel sounds  Skin: Normal skin color, no rashes   Psychiatric: Normal mood and affect      "

## 2025-02-03 ENCOUNTER — TELEMEDICINE (OUTPATIENT)
Dept: INTERNAL MEDICINE CLINIC | Facility: CLINIC | Age: 35
End: 2025-02-03
Payer: MEDICARE

## 2025-02-03 VITALS — HEIGHT: 61 IN | TEMPERATURE: 97.5 F | WEIGHT: 114 LBS | BODY MASS INDEX: 21.52 KG/M2

## 2025-02-03 DIAGNOSIS — A08.4 VIRAL GASTROENTERITIS: Primary | ICD-10-CM

## 2025-02-03 PROCEDURE — 99213 OFFICE O/P EST LOW 20 MIN: CPT | Performed by: INTERNAL MEDICINE

## 2025-02-03 RX ORDER — ONDANSETRON 4 MG/1
4 TABLET, ORALLY DISINTEGRATING ORAL EVERY 6 HOURS PRN
Qty: 20 TABLET | Refills: 0 | Status: SHIPPED | OUTPATIENT
Start: 2025-02-03

## 2025-02-03 NOTE — LETTER
February 3, 2025     Patient: Noreen Velazquez  YOB: 1990  Date of Visit: 2/3/2025      To Whom it May Concern:    Noreen Velazquez is under my professional care. Noreen was seen in my office on 2/3/2025.  Please excuse from work on 2/3 - 2/4/2025.    If you have any questions or concerns, please don't hesitate to call.         Sincerely,          Lea Reyes, MD        CC: No Recipients

## 2025-02-03 NOTE — PROGRESS NOTES
"Virtual Regular Visit  Name: Noreen Velazquez      : 1990      MRN: [id number]  Encounter Provider: Lea Reyes, MD  Encounter Date: 2/3/2025   [facility]: [facility]      Verification of patient location:  Patient is located at Home in the following state in which I hold an active license [place] :  Assessment & Plan  Viral gastroenteritis  Zofran for nausea, may take Imodium as needed  Stay hydrated water and electrolytes  Call if symptoms worsen or work note needs to be extended  Orders:  •  ondansetron (ZOFRAN-ODT) 4 mg disintegrating tablet; Take 1 tablet (4 mg total) by mouth every 6 (six) hours as needed for nausea or vomiting    Viral gastroenteritis               Encounter provider Lea Reyes, MD    The patient was identified by name and date of birth. Noreen Velazquez was informed that this is a telemedicine visit and that the visit is being conducted through the Epic Embedded platform. She agrees to proceed..  My office door was closed. No one else was in the room.  She acknowledged consent and understanding of privacy and security of the video platform. The patient has agreed to participate and understands they can discontinue the visit at any time.    Patient is aware this is a billable service.     History of Present Illness     Started feeling ill yesterday while in the ER with her  who has viral gastroenteritis  Today she started with nausea and diarrhea, no abdominal pain, vomiting, no fever or chills  Staying hydrated        Review of Systems   Constitutional:  Positive for fatigue. Negative for chills and fever.       Objective   Temp 97.5 °F (36.4 °C) (Probe)   Ht 5' 1\" (1.549 m)   Wt 51.7 kg (114 lb)   BMI 21.54 kg/m²     Physical Exam  Constitutional:       General: She is not in acute distress.     Appearance: She is ill-appearing.   Pulmonary:      Effort: No respiratory distress.         Visit Time  Total Visit Duration: 15mins  "

## 2025-02-05 ENCOUNTER — TELEPHONE (OUTPATIENT)
Age: 35
End: 2025-02-05

## 2025-02-05 NOTE — TELEPHONE ENCOUNTER
Pt. Saw Dr. Reyes on Monday and the doctor told her if she needs her work note extended to call.  She is still not feeling well and would like the note extended until Monday 2/10/25.  Could you please put it in her MyChart and she will print it out.   Ty

## 2025-03-03 ENCOUNTER — OFFICE VISIT (OUTPATIENT)
Dept: INTERNAL MEDICINE CLINIC | Facility: CLINIC | Age: 35
End: 2025-03-03
Payer: MEDICARE

## 2025-03-03 VITALS
DIASTOLIC BLOOD PRESSURE: 78 MMHG | TEMPERATURE: 97.9 F | SYSTOLIC BLOOD PRESSURE: 118 MMHG | WEIGHT: 114.2 LBS | HEIGHT: 61 IN | HEART RATE: 83 BPM | OXYGEN SATURATION: 100 % | BODY MASS INDEX: 21.56 KG/M2

## 2025-03-03 DIAGNOSIS — R22.1 NECK SWELLING: Primary | ICD-10-CM

## 2025-03-03 DIAGNOSIS — F41.1 GENERALIZED ANXIETY DISORDER: ICD-10-CM

## 2025-03-03 DIAGNOSIS — M26.609 TMJ DYSFUNCTION: ICD-10-CM

## 2025-03-03 PROCEDURE — 99213 OFFICE O/P EST LOW 20 MIN: CPT | Performed by: INTERNAL MEDICINE

## 2025-03-03 RX ORDER — ESCITALOPRAM OXALATE 10 MG/1
10 TABLET ORAL DAILY
Qty: 90 TABLET | Refills: 1 | Status: SHIPPED | OUTPATIENT
Start: 2025-03-03 | End: 2025-08-30

## 2025-03-03 NOTE — PROGRESS NOTES
Name: Noreen Velazquez      : 1990      MRN: [id number]  Encounter Provider: Lea Reyes, MD  Encounter Date: 3/3/2025   Encounter department: [facility]    Assessment & Plan  Neck swelling  Subjective feeling of swelling on the left side of the neck and lymphadenopathy  Patient reassured that her exam is unremarkable  Discussed continuing Aleve as needed  She has called ENT and will make an appointment to have them see her to       TMJ dysfunction  Clenches her teeth likely from severe anxiety  Use mouthguard  Increase Lexapro to 10 mg       Generalized anxiety disorder  Increase Lexapro to 10 mg  Strongly recommended to start counseling  Orders:  •  escitalopram (LEXAPRO) 10 mg tablet; Take 1 tablet (10 mg total) by mouth daily         History of Present Illness     Acute on chronic left sided jaw pain, dental pain 2 weeks ago  Known TMJ, clenches teeth and has a mouth guard  Has seen be ENT and dentist  Taking Aleve PRN  Yesterday, experienced severe pain under the tongue on the left side  She can fell a lump under the left tongue  Aleve helped but the pain returned after a few hours  Today, feels pain in the left neck  Denies fever chills sore throat  Sometimes has trouble swallowing  Admits to increasing anxiety          Review of Systems   Constitutional:  Negative for chills and fever.   HENT:  Positive for trouble swallowing. Negative for ear pain, rhinorrhea and sore throat.    Respiratory:  Negative for cough.    Gastrointestinal:  Positive for constipation (Improved on Amitiza).   Psychiatric/Behavioral:  Positive for sleep disturbance. The patient is nervous/anxious.      Past Medical History:   Diagnosis Date   • 39 weeks gestation of pregnancy 2019   • Abnormal ECG    • Anxiety    • Bronchitis 2023   • Chest wall pain 2024   • Contraceptive use     LAST ASSESSED: 2013   • Depression     d/c Buspar with preg - now started on Zoloft 25 mg 19    • Exposure to COVID-19 virus 12/08/2020   • External hemorrhoid, bleeding     LAST ASSESSED: 05MAR2014   • Fetal abnormality affecting management of mother, antepartum 01/29/2020    Bilateral talipes equivarous  -Has established peds ortho @[facility] for after delivery   • Gallbladder attack    • Gastroenteritis 10/11/2023   • Gastroesophageal reflux disease 1/31/2024   • Genetic screening 12/09/2019   • Hematochezia     LAST ASSESSED: 13MAR2014   • IBS (irritable bowel syndrome)    • Immunity status testing     LAST ASSESSED: 02JUN2014   • Inflammatory bowel disease    • Lactose intolerance    • Laryngopharyngeal reflux (LPR)    • Methicillin resistant Staphylococcus aureus infection    • Migraine     prev on effexor for migraine & anxiety - d/c approx 1 yr ago   • Nasal congestion 07/18/2023   • Oral candidiasis 1/11/2024   • Poison ivy dermatitis     LAST ASSESSED: 21JUL2015   • Psychiatric disorder    • Screen for STD (sexually transmitted disease) 1/11/2024   • Shingles    • Skin abscess     RIGHT LEG   • Tongue swelling 1/11/2024   • Unintentional weight loss 1/31/2024   • Upper respiratory tract infection 04/11/2022   • Urinary frequency     LAST ASSESSED: 13APR2014   • Urinary tract infection    • UTI (urinary tract infection)     uses Macrobid after intercourse   • Vacuum extractor delivery, delivered 06/11/2020   • Varicella     childhood     Past Surgical History:   Procedure Laterality Date   • COLONOSCOPY     • CONDYLOMA EXCISION/FULGURATION     • TONSILLECTOMY      ONSET: 1997   • UPPER GASTROINTESTINAL ENDOSCOPY     • WISDOM TOOTH EXTRACTION       Family History   Problem Relation Age of Onset   • Arthritis Mother    • Hypothyroidism Mother    • Thyroid disease Mother    • Heart attack Father    • Cancer Father         Kidney   • Hypertension Father    • Hypothyroidism Father         Hyper   • Thyroid disease Father    • Drug abuse Brother    • Seizures Brother    • No Known Problems Brother    • No  Known Problems Brother    • Diabetes Maternal Grandmother         type 2   • Dementia Maternal Grandmother    • Cancer Maternal Grandfather         prostate   • Emphysema Paternal Grandmother    • Early death Maternal Aunt         Cancer   • Early death Maternal Uncle         Heart attack   • Cancer Family         MALIGNANT NEOPLASM   • No Known Problems Daughter      Social History     Tobacco Use   • Smoking status: Former     Current packs/day: 0.00     Average packs/day: 0.2 packs/day for 10.2 years (2.5 ttl pk-yrs)     Types: Cigarettes     Start date: 2011     Quit date: 10/31/2019     Years since quittin.3     Passive exposure: Never   • Smokeless tobacco: Never   Vaping Use   • Vaping status: Never Used   Substance and Sexual Activity   • Alcohol use: Not Currently   • Drug use: Never   • Sexual activity: Yes     Partners: Male     Birth control/protection: Condom Male     Current Outpatient Medications on File Prior to Visit   Medication Sig   • ALPRAZolam (XANAX) 0.25 mg tablet Take 1 tablet (0.25 mg total) by mouth 2 (two) times a day as needed for anxiety (Patient not taking: Reported on 2024)   • Amitiza 8 MCG capsule Take 1 capsule (8 mcg total) by mouth 2 (two) times a day with meals   • cetirizine (ZyrTEC) 10 mg tablet Take 20 mg by mouth 2 (two) times a day   • clindamycin (CLEOCIN) 2 % vaginal cream Insert 1 applicator into the vagina daily at bedtime (Patient not taking: Reported on 2024)   • clobetasol (TEMOVATE) 0.05 % cream Apply topically 2 (two) times a day (Patient not taking: Reported on 3/3/2025)   • etonogestrel-ethinyl estradiol (NuvaRing) 0.12-0.015 MG/24HR vaginal ring Insert vaginally and leave in place for 3 consecutive weeks, then remove for 1 week. (Patient not taking: Reported on 3/3/2025)   • fexofenadine (ALLEGRA) 180 MG tablet Take 180 mg by mouth daily (Patient not taking: Reported on 3/3/2025)   • Multiple Vitamin (multivitamin) tablet Take 1 tablet by  "mouth daily   • nitrofurantoin (MACROBID) 100 mg capsule Take 100 mg by mouth 2 (two) times a day (Patient not taking: No sig reported)   • nitrofurantoin (MACRODANTIN) 50 mg capsule These take 1 tablet orally before and after intercourse.   • ondansetron (ZOFRAN-ODT) 4 mg disintegrating tablet Take 1 tablet (4 mg total) by mouth every 6 (six) hours as needed for nausea or vomiting   • triamcinolone (KENALOG) 0.1 % cream Use twice a day for up to 7-14 days at a time as needed for progesterone dermatitis   • [DISCONTINUED] escitalopram (LEXAPRO) 5 mg tablet TAKE 1 TABLET (5 MG TOTAL) BY MOUTH DAILY.     No Known Allergies  Immunization History   Administered Date(s) Administered   • Influenza, injectable, quadrivalent, preservative free 0.5 mL 11/20/2019   • Tdap 08/13/2013, 04/27/2020   • Tuberculin Skin Test-PPD Intradermal 08/13/2013, 06/02/2014     Objective   /78 (BP Location: Left arm, Patient Position: Sitting, Cuff Size: Standard)   Pulse 83   Temp 97.9 °F (36.6 °C) (Tympanic)   Ht 5' 1\" (1.549 m)   Wt 51.8 kg (114 lb 3.2 oz)   SpO2 100%   BMI 21.58 kg/m²     Physical Exam  Constitutional:       General: She is not in acute distress.     Appearance: She is well-developed. She is not ill-appearing, toxic-appearing or diaphoretic.   HENT:      Right Ear: External ear normal. There is no impacted cerumen.      Left Ear: External ear normal. There is no impacted cerumen.      Mouth/Throat:      Mouth: No oral lesions.      Tongue: No lesions.      Pharynx: No oropharyngeal exudate or posterior oropharyngeal erythema.   Eyes:      Conjunctiva/sclera: Conjunctivae normal.   Cardiovascular:      Rate and Rhythm: Normal rate and regular rhythm.      Heart sounds: Normal heart sounds. No murmur heard.  Pulmonary:      Effort: Pulmonary effort is normal. No respiratory distress.      Breath sounds: Normal breath sounds. No stridor. No wheezing or rales.   Abdominal:      General: There is no distension.     "  Palpations: Abdomen is soft. There is no mass.      Tenderness: There is no abdominal tenderness. There is no guarding or rebound.   Musculoskeletal:      Cervical back: Neck supple.      Right lower leg: No edema.      Left lower leg: No edema.   Lymphadenopathy:      Cervical: No cervical adenopathy.   Neurological:      Mental Status: She is alert and oriented to person, place, and time.   Psychiatric:         Mood and Affect: Mood is anxious.         Behavior: Behavior normal.         Thought Content: Thought content normal.         Judgment: Judgment normal.

## 2025-03-03 NOTE — ASSESSMENT & PLAN NOTE
Increase Lexapro to 10 mg  Strongly recommended to start counseling  Orders:  •  escitalopram (LEXAPRO) 10 mg tablet; Take 1 tablet (10 mg total) by mouth daily

## 2025-03-04 ENCOUNTER — TELEPHONE (OUTPATIENT)
Age: 35
End: 2025-03-04

## 2025-03-04 NOTE — TELEPHONE ENCOUNTER
PA for escitalopram 10mg SUBMITTED to [employer]    via    [x]CMM-KEY: [id number]  []Surescripts-Case ID #   []Availity-Auth ID # NDC #   []Faxed to plan   []Other website   []Phone call Case ID #     [x]PA sent as URGENT    All office notes, labs and other pertaining documents and studies sent. Clinical questions answered. Awaiting determination from insurance company.     Turnaround time for your insurance to make a decision on your Prior Authorization can take 7-21 business days.

## 2025-03-05 NOTE — TELEPHONE ENCOUNTER
PA for escitalopram 10mg NOT REQUIRED     Reason (screenshot if applicable)          Patient advised by          [] MyChart Message  [] Phone call   []LMOM  []L/M to call office as no active Communication consent on file  []Unable to leave detailed message as VM not approved on Communication consent       Pharmacy advised by    []Fax  [x]Phone call  Patient picked up 3/3/25

## 2025-04-09 ENCOUNTER — OFFICE VISIT (OUTPATIENT)
Dept: INTERNAL MEDICINE CLINIC | Facility: CLINIC | Age: 35
End: 2025-04-09
Payer: MEDICARE

## 2025-04-09 VITALS
SYSTOLIC BLOOD PRESSURE: 122 MMHG | WEIGHT: 113.8 LBS | DIASTOLIC BLOOD PRESSURE: 62 MMHG | BODY MASS INDEX: 21.5 KG/M2 | HEART RATE: 102 BPM | TEMPERATURE: 97.8 F | OXYGEN SATURATION: 99 %

## 2025-04-09 DIAGNOSIS — J02.0 STREP PHARYNGITIS: Primary | ICD-10-CM

## 2025-04-09 LAB
S PYO AG THROAT QL: NEGATIVE
SARS-COV-2 AG UPPER RESP QL IA: NEGATIVE
VALID CONTROL: NORMAL

## 2025-04-09 PROCEDURE — 87880 STREP A ASSAY W/OPTIC: CPT | Performed by: INTERNAL MEDICINE

## 2025-04-09 PROCEDURE — 99213 OFFICE O/P EST LOW 20 MIN: CPT | Performed by: INTERNAL MEDICINE

## 2025-04-09 PROCEDURE — 87811 SARS-COV-2 COVID19 W/OPTIC: CPT | Performed by: INTERNAL MEDICINE

## 2025-04-09 RX ORDER — AMOXICILLIN 500 MG/1
500 CAPSULE ORAL 2 TIMES DAILY
Qty: 20 CAPSULE | Refills: 0 | Status: SHIPPED | OUTPATIENT
Start: 2025-04-09 | End: 2025-04-14

## 2025-04-09 NOTE — PATIENT INSTRUCTIONS
- Your throat swab has come back positive for strep.  -You will be started on amoxicillin 500 mg twice a day for 10 days.  -You may perform warm salt water gargles at home.  Mix 1 teaspoon of salt in 8 ounces of water and gargle for 20 to 30 seconds.  -For throat discomfort you may also use over-the-counter throat lozenges.  Be sure to stay well-hydrated and to avoid acidic/spicy foods.

## 2025-04-09 NOTE — LETTER
April 9, 2025     Patient: Noreen Velazquez  YOB: 1990  Date of Visit: 4/9/2025      To Whom it May Concern:    Noreen Velazquez is under my professional care. Noreen was seen in my office on 4/9/2025. Noreen may return to school on 4/11/25 .    If you have any questions or concerns, please don't hesitate to call.         Sincerely,          Frank Rosales MD

## 2025-04-09 NOTE — PROGRESS NOTES
Name: Noreen Velazquez      : 1990      MRN: [id number]  Encounter Provider: Frank Rosales MD  Encounter Date: 2025   [facility]  :  Assessment & Plan  Strep pharyngitis  - Start amoxicillin 500 mg twice daily x 10 days  - Recommended throat lozenges as needed for throat discomfort  - Warm salt water gargles once a day  - May also use occasional NSAIDs but recommended caution with this given her history of GERD.  Prefer Tylenol instead.  Orders:  •  amoxicillin (AMOXIL) 500 mg capsule; Take 1 capsule (500 mg total) by mouth 2 (two) times a day for 10 days           History of Present Illness   HPI  Patient presents today as an acute visit. She complains of throat discomfort over the past 5 days.  She states when looking in the mirror her throat looked inflamed and she saw white spots.  She denies any fevers or chills.    Review of Systems  All other systems negative except for pertinent findings noted in HPI.       Objective   /62 (BP Location: Left arm, Patient Position: Sitting, Cuff Size: Standard)   Pulse 102   Temp 97.8 °F (36.6 °C)   Wt 51.6 kg (113 lb 12.8 oz)   SpO2 99%   BMI 21.50 kg/m²      Physical Exam  General: NAD  HEENT: NCAT, EOMI, normal conjunctiva, posterior pharyngeal erythema with scattered exudates, tonsils surgically absent  Cardiovascular: RRR, normal S1 and S2, no m/r/g  Pulmonary: Normal respiratory effort, no wheezes, rales or rhonchi  Extremities: No lower extremity edema  Skin: Normal skin color, no rashes   Psychiatric: Normal mood and affect

## 2025-04-11 ENCOUNTER — TELEPHONE (OUTPATIENT)
Age: 35
End: 2025-04-11

## 2025-04-11 NOTE — LETTER
April 11, 2025     Patient: Noreen Velazquez  YOB: 1990  Date of Visit: 4/11/2025      To Whom it May Concern:    Noreen Velazquez is under my professional care. Noreen was seen in my office on 4/9/2025. Noreen may return to work on 4/14 .    If you have any questions or concerns, please don't hesitate to call.         Sincerely,          Frank Rosales MD   Breath sounds clear and equal bilaterally.

## 2025-04-11 NOTE — TELEPHONE ENCOUNTER
Patient was seen by Dr. Rosales for Strep Throat on 4/9 and is taking antibiotics. Was given a note to return to work today 4/11 and is still not feeling well enough to go back. Wanted to advise Dr. Rosales of this and also get the note extended to cover today. Please update note. Patient will get it from her MyChart.

## 2025-04-14 ENCOUNTER — TELEMEDICINE (OUTPATIENT)
Dept: INTERNAL MEDICINE CLINIC | Facility: CLINIC | Age: 35
End: 2025-04-14
Payer: MEDICARE

## 2025-04-14 DIAGNOSIS — J02.0 STREP PHARYNGITIS: Primary | ICD-10-CM

## 2025-04-14 PROCEDURE — 99213 OFFICE O/P EST LOW 20 MIN: CPT | Performed by: INTERNAL MEDICINE

## 2025-04-14 RX ORDER — CEPHALEXIN 500 MG/1
500 CAPSULE ORAL 2 TIMES DAILY
Qty: 20 CAPSULE | Refills: 0 | Status: SHIPPED | OUTPATIENT
Start: 2025-04-14 | End: 2025-04-24

## 2025-04-14 NOTE — PROGRESS NOTES
Virtual Regular VisitName: Noreen Velazquez      : 1990      MRN: [id number]  Encounter Provider: Frank Rosales MD  Encounter Date: 2025   [facility]: [facility]  :  Assessment & Plan  Strep pharyngitis  - Concern for antibiotic resistance due to very little improvement over the past 4 days on amoxicillin.  Amoxicillin will be discontinued in favor of Keflex 500 mg twice daily.  Patient reports she already has an appointment scheduled with ENT for routine follow-up on .  Reviewed red flag signs and symptoms that would warrant ED eval.  Orders:  •  cephalexin (KEFLEX) 500 mg capsule; Take 1 capsule (500 mg total) by mouth 2 (two) times a day for 10 days        History of Present Illness     HPI  Patient presents today via telemedicine as an acute visit.  She reports despite being on amoxicillin for the past several days there is only been mild improvement in her symptoms.  She states the back of her throat is still red and inflamed.  She does note that there are not as many white spots present.  She states she feels as though her throat swollen but denies any difficulty with swallowing or breathing.  No fevers or chills.  She has been taking Tylenol and Aleve as needed for pain.    Review of Systems  All other systems negative except for pertinent findings noted in HPI.     Objective   There were no vitals taken for this visit.    Physical Exam  General: No acute distress  HEENT: NCAT  Pulmonary: No respiratory distress  Psychiatric: Normal mood and affect       Administrative Statements   Encounter provider Frank Rosales MD    The Patient is located at Home and in the following state in which I hold an active license [place].    The patient was identified by name and date of birth. Noreen Velazquez was informed that this is a telemedicine visit and that the visit is being conducted through the Epic Embedded platform. She agrees to  proceed..  My office door was closed. No one else was in the room.  She acknowledged consent and understanding of privacy and security of the video platform. The patient has agreed to participate and understands they can discontinue the visit at any time.    I have spent a total time of 7 minutes in caring for this patient on the day of the visit/encounter including Documenting in the medical record, Reviewing/placing orders in the medical record (including tests, medications, and/or procedures), and Obtaining or reviewing history  , not including the time spent for establishing the audio/video connection.

## 2025-04-17 ENCOUNTER — TELEPHONE (OUTPATIENT)
Dept: INTERNAL MEDICINE CLINIC | Facility: CLINIC | Age: 35
End: 2025-04-17

## 2025-04-17 NOTE — TELEPHONE ENCOUNTER
Lm in regards of appt with ; As per Dr.Reyes, she can change her appt for a later day to see her later on today/.

## 2025-05-05 ENCOUNTER — OFFICE VISIT (OUTPATIENT)
Dept: INTERNAL MEDICINE CLINIC | Facility: CLINIC | Age: 35
End: 2025-05-05
Payer: MEDICARE

## 2025-05-05 VITALS
SYSTOLIC BLOOD PRESSURE: 116 MMHG | BODY MASS INDEX: 21.64 KG/M2 | OXYGEN SATURATION: 99 % | HEART RATE: 98 BPM | DIASTOLIC BLOOD PRESSURE: 70 MMHG | WEIGHT: 114.6 LBS | HEIGHT: 61 IN | TEMPERATURE: 96.6 F

## 2025-05-05 DIAGNOSIS — J02.9 PHARYNGITIS, UNSPECIFIED ETIOLOGY: Primary | ICD-10-CM

## 2025-05-05 DIAGNOSIS — J32.9 RECURRENT SINUSITIS: ICD-10-CM

## 2025-05-05 LAB — S PYO AG THROAT QL: NEGATIVE

## 2025-05-05 PROCEDURE — 87880 STREP A ASSAY W/OPTIC: CPT | Performed by: INTERNAL MEDICINE

## 2025-05-05 PROCEDURE — 99213 OFFICE O/P EST LOW 20 MIN: CPT | Performed by: INTERNAL MEDICINE

## 2025-05-05 NOTE — LETTER
May 5, 2025     Patient: Noreen Velazquez  YOB: 1990  Date of Visit: 5/5/2025      To Whom it May Concern:    Noreen Velazquez is under my professional care. Noreen was seen in my office on 5/5/2025. Noreen may return to work on 5/7. Please excuse from work 5/5-5/6/2025 .    If you have any questions or concerns, please don't hesitate to call.         Sincerely,          Lea Reyes, MD        CC: No Recipients

## 2025-05-05 NOTE — PROGRESS NOTES
Name: Noreen Velazquez      : 1990      MRN: [id number]  Encounter Provider: Lea Reyes, MD  Encounter Date: 2025   Encounter department: [facility]    Assessment & Plan  Pharyngitis, unspecified etiology  Likely viral  Symptom directed treatment   Orders:  •  POCT rapid ANTIGEN strepA  •  Throat culture; Future    Recurrent sinusitis  R/o immunodeficiency  Orders:  •  CBC and differential  •  Comprehensive metabolic panel  •  IgG, IgA, IgM         History of Present Illness     Low grade fever 2 days ago,   Worse symptoms yesterday with Tmax 101  Recent strep infection and lingering swollen glands on the left  She was on amoxicillin x 5 d then switched to Keflex when she did not feel better. She finished another 5 days of Keflex about 2 weeks ago. Symptoms eventually resolved  No sick contacts    Fever - 9 weeks to 74 years   This is a new problem. The current episode started in the past 7 days. The problem occurs daily. The problem has been unchanged. The maximum temperature noted was 101 to 101.9 F. The temperature was taken using an oral thermometer. Associated symptoms include congestion, coughing, headaches, muscle aches, nausea and a sore throat. Pertinent negatives include no abdominal pain, chest pain, diarrhea, ear pain, rash, sleepiness, urinary pain, vomiting or wheezing.   Risk factors: hx of cancer    Risk factors: no contaminated food, no contaminated water, no immunosuppression, no occupational exposure, no recent sickness, no recent travel and no sick contacts      Review of Systems   Constitutional:  Positive for appetite change, fatigue and fever.   HENT:  Positive for congestion and sore throat. Negative for ear pain.    Respiratory:  Positive for cough. Negative for wheezing.    Cardiovascular:  Negative for chest pain.   Gastrointestinal:  Positive for nausea. Negative for abdominal pain, diarrhea and vomiting.   Genitourinary:  Negative for dysuria.    Skin:  Negative for rash.   Neurological:  Positive for dizziness and headaches.     Past Medical History:   Diagnosis Date   • 39 weeks gestation of pregnancy 12/09/2019   • Abnormal ECG    • Anxiety    • Bronchitis 04/03/2023   • Chest wall pain 02/07/2024   • Contraceptive use     LAST ASSESSED: 03MAY2013   • Depression     d/c Buspar with preg - now started on Zoloft 25 mg 11/1/19   • Ear problems    • Exposure to COVID-19 virus 12/08/2020   • External hemorrhoid, bleeding     LAST ASSESSED: 05MAR2014   • Fetal abnormality affecting management of mother, antepartum 01/29/2020    Bilateral talipes equivarous  -Has [facility]   • Gallbladder attack    • Gastroenteritis 10/11/2023   • Gastroesophageal reflux disease 01/31/2024   • Genetic screening 12/09/2019   • GERD (gastroesophageal reflux disease)    • Headache(784.0)    • Hematochezia     LAST ASSESSED: 13MAR2014   • IBS (irritable bowel syndrome)    • Immunity status testing     LAST ASSESSED: 02JUN2014   • Inflammatory bowel disease    • Lactose intolerance    • Laryngopharyngeal reflux (LPR)    • Methicillin resistant Staphylococcus aureus infection    • Migraine     prev on effexor for migraine & anxiety - d/c approx 1 yr ago   • Nasal congestion 07/18/2023   • Oral candidiasis 01/11/2024   • Poison ivy dermatitis     LAST ASSESSED: 21JUL2015   • Psychiatric disorder    • Screen for STD (sexually transmitted disease) 01/11/2024   • Shingles    • Skin abscess     RIGHT LEG   • TMJ dysfunction    • Tongue swelling 01/11/2024   • Unintentional weight loss 01/31/2024   • Upper respiratory tract infection 04/11/2022   • Urinary frequency     LAST ASSESSED: 13APR2014   • Urinary tract infection    • UTI (urinary tract infection)     uses Macrobid after intercourse   • Vacuum extractor delivery, delivered 06/11/2020   • Varicella     childhood     Past Surgical History:   Procedure Laterality Date   • COLONOSCOPY     •  CONDYLOMA EXCISION/FULGURATION     • TONSILLECTOMY      ONSET:    • UPPER GASTROINTESTINAL ENDOSCOPY     • WISDOM TOOTH EXTRACTION       Family History   Problem Relation Age of Onset   • Arthritis Mother    • Hypothyroidism Mother    • Thyroid disease Mother    • Heart attack Father    • Cancer Father         Kidney   • Hypertension Father    • Hypothyroidism Father         Hyper   • Thyroid disease Father    • Drug abuse Brother    • Seizures Brother    • No Known Problems Brother    • No Known Problems Brother    • Diabetes Maternal Grandmother         type 2   • Dementia Maternal Grandmother    • Cancer Maternal Grandfather         prostate   • Emphysema Paternal Grandmother    • Early death Maternal Aunt         Cancer   • Early death Maternal Uncle         Heart attack   • Cancer Family         MALIGNANT NEOPLASM   • No Known Problems Daughter      Social History     Tobacco Use   • Smoking status: Former     Current packs/day: 0.00     Average packs/day: 0.2 packs/day for 10.2 years (2.5 ttl pk-yrs)     Types: Cigarettes     Start date: 2011     Quit date: 10/31/2019     Years since quittin.5     Passive exposure: Never   • Smokeless tobacco: Never   Vaping Use   • Vaping status: Never Used   Substance and Sexual Activity   • Alcohol use: Not Currently   • Drug use: Never   • Sexual activity: Yes     Partners: Male     Birth control/protection: Condom Male     Current Outpatient Medications on File Prior to Visit   Medication Sig   • Amitiza 8 MCG capsule Take 1 capsule (8 mcg total) by mouth 2 (two) times a day with meals   • cetirizine (ZyrTEC) 10 mg tablet Take 20 mg by mouth 2 (two) times a day   • escitalopram (LEXAPRO) 10 mg tablet Take 1 tablet (10 mg total) by mouth daily   • Multiple Vitamin (multivitamin) tablet Take 1 tablet by mouth daily   • nitrofurantoin (MACRODANTIN) 50 mg capsule These take 1 tablet orally before and after intercourse.   • ondansetron (ZOFRAN-ODT) 4 mg  "disintegrating tablet Take 1 tablet (4 mg total) by mouth every 6 (six) hours as needed for nausea or vomiting   • triamcinolone (KENALOG) 0.1 % cream Use twice a day for up to 7-14 days at a time as needed for progesterone dermatitis   • ALPRAZolam (XANAX) 0.25 mg tablet Take 1 tablet (0.25 mg total) by mouth 2 (two) times a day as needed for anxiety (Patient not taking: Reported on 12/17/2024)   • clindamycin (CLEOCIN) 2 % vaginal cream Insert 1 applicator into the vagina daily at bedtime (Patient not taking: Reported on 9/16/2024)   • clobetasol (TEMOVATE) 0.05 % cream Apply topically 2 (two) times a day (Patient not taking: Reported on 5/5/2025)   • etonogestrel-ethinyl estradiol (NuvaRing) 0.12-0.015 MG/24HR vaginal ring Insert vaginally and leave in place for 3 consecutive weeks, then remove for 1 week. (Patient not taking: Reported on 5/5/2025)   • fexofenadine (ALLEGRA) 180 MG tablet Take 180 mg by mouth daily (Patient not taking: Reported on 5/5/2025)   • nitrofurantoin (MACROBID) 100 mg capsule Take 100 mg by mouth 2 (two) times a day (Patient not taking: No sig reported)     No Known Allergies  Immunization History   Administered Date(s) Administered   • Influenza, injectable, quadrivalent, preservative free 0.5 mL 11/20/2019   • Tdap 08/13/2013, 04/27/2020   • Tuberculin Skin Test-PPD Intradermal 08/13/2013, 06/02/2014     Objective   /70 (BP Location: Left arm, Patient Position: Sitting, Cuff Size: Standard)   Pulse 98   Temp (!) 96.6 °F (35.9 °C) (Tympanic)   Ht 5' 1\" (1.549 m)   Wt 52 kg (114 lb 9.6 oz)   SpO2 99%   BMI 21.65 kg/m²     Physical Exam  Constitutional:       General: She is not in acute distress.     Appearance: She is well-developed. She is ill-appearing. She is not toxic-appearing or diaphoretic.   HENT:      Right Ear: Tympanic membrane and external ear normal. There is no impacted cerumen.      Left Ear: Tympanic membrane and external ear normal. There is no impacted " cerumen.      Mouth/Throat:      Pharynx: Posterior oropharyngeal erythema present. No oropharyngeal exudate.   Eyes:      Conjunctiva/sclera: Conjunctivae normal.   Cardiovascular:      Rate and Rhythm: Normal rate and regular rhythm.      Heart sounds: Normal heart sounds. No murmur heard.  Pulmonary:      Effort: Pulmonary effort is normal. No respiratory distress.      Breath sounds: Normal breath sounds. No stridor. No wheezing or rales.   Abdominal:      General: There is no distension.      Palpations: Abdomen is soft. There is no mass.      Tenderness: There is no abdominal tenderness. There is no guarding or rebound.   Musculoskeletal:      Cervical back: Neck supple.      Right lower leg: No edema.      Left lower leg: No edema.   Neurological:      Mental Status: She is alert and oriented to person, place, and time.   Psychiatric:         Mood and Affect: Mood normal.         Behavior: Behavior normal.         Thought Content: Thought content normal.         Judgment: Judgment normal.

## 2025-05-07 ENCOUNTER — TELEPHONE (OUTPATIENT)
Age: 35
End: 2025-05-07

## 2025-05-07 NOTE — TELEPHONE ENCOUNTER
Patient got a note from Dr. Reyes for work.  Can we extend note please.  She was out of work  Monday 05/5-05/07 and returning 05/08    Please put in MYCHART    Thank You

## 2025-07-03 ENCOUNTER — HOSPITAL ENCOUNTER (OUTPATIENT)
Dept: RADIOLOGY | Age: 35
Discharge: HOME | End: 2025-07-03
Attending: INTERNAL MEDICINE
Payer: MEDICARE

## 2025-07-03 ENCOUNTER — OFFICE VISIT (OUTPATIENT)
Dept: INTERNAL MEDICINE CLINIC | Facility: CLINIC | Age: 35
End: 2025-07-03
Payer: MEDICARE

## 2025-07-03 VITALS
DIASTOLIC BLOOD PRESSURE: 70 MMHG | OXYGEN SATURATION: 100 % | HEART RATE: 85 BPM | WEIGHT: 116.4 LBS | SYSTOLIC BLOOD PRESSURE: 118 MMHG | BODY MASS INDEX: 21.99 KG/M2

## 2025-07-03 DIAGNOSIS — K11.8 SUBMANDIBULAR GLAND TENDERNESS: ICD-10-CM

## 2025-07-03 DIAGNOSIS — K11.8 SUBMANDIBULAR GLAND TENDERNESS: Primary | ICD-10-CM

## 2025-07-03 PROCEDURE — 99213 OFFICE O/P EST LOW 20 MIN: CPT | Performed by: INTERNAL MEDICINE

## 2025-07-03 PROCEDURE — 76536 US EXAM OF HEAD AND NECK: CPT

## 2025-07-03 NOTE — PROGRESS NOTES
Name: Noreen Velazquez      : 1990      MRN: [id number]  Encounter Provider: Frank Rosales MD  Encounter Date: 7/3/2025   Enc[facility]: [facility]  :  Assessment & Plan  Submandibular gland tenderness  I do not appreciate any sublingual mass or swelling on examination.  She does have mild tenderness involving the left submandibular gland but no significant swelling was observed.  Given the chronicity of her pain over the past 3 months I recommended obtaining a soft tissue ultrasound of the neck.  She can try candies containing xylitol or lemon drops as well.  Discussed red flag signs and symptoms that would warrant urgent evaluation such as fever, chills or purulent drainage from the gland.  Encourage patient to keep her scheduled follow-up with the ENT at the end of the month.  Orders:  •  US head neck soft tissue; Future         History of Present Illness   HPI  Patient presents today as an acute visit.  She complains of a swollen gland on her tongue that is causing significant pain. She states the swelling and discomfort has been present since March. She states it was previously evaluated by her PCP regarding this same issue. She states she has an ENT  appointment at the end of the month. She denies any fevers, chills, or purulent drainage from the site. She has been taking Tylenol and Aleve as needed.     Review of Systems  All other systems negative except for pertinent findings noted in HPI.     Objective   /70 (BP Location: Left arm, Patient Position: Sitting, Cuff Size: Standard)   Pulse 85   Wt 52.8 kg (116 lb 6.4 oz)   SpO2 100%   BMI 21.99 kg/m²      Physical Exam  General: NAD  HEENT: NCAT, EOMI, normal conjunctiva, mild left sided submandibular gland tenderness to palpation, no swelling noted  Cardiovascular: RRR, normal S1 and S2, no m/r/g  Pulmonary: Normal respiratory effort, no wheezes, rales or rhonchi  Extremities: No lower  extremity edema  Skin: Normal skin color, no rashes   Psychiatric: Normal mood and affect

## 2025-07-03 NOTE — LETTER
July 3, 2025     Patient: Noreen Velazquez  YOB: 1990  Date of Visit: 7/3/2025      To Whom it May Concern:    Noreen Velazquez is under my professional care. Noreen was seen in my office on 7/3/2025.  Please excuse her from work on 7/3/2025.    If you have any questions or concerns, please don't hesitate to call.         Sincerely,          Frank Rosales MD        CC: No Recipients

## 2025-07-03 NOTE — PATIENT INSTRUCTIONS
- For further evaluation of the tenderness over your submandibular gland in order for a soft tissue ultrasound has been placed.  You may also try sucking on candies containing xylitol or lemon drops to help stimulate flow of saliva through your glands.  Keep your scheduled appointment with ENT at the end of the month.  If you develop fevers, chills or rapid swelling of the gland please notify us.

## 2025-07-11 ENCOUNTER — TELEPHONE (OUTPATIENT)
Age: 35
End: 2025-07-11

## 2025-07-11 NOTE — TELEPHONE ENCOUNTER
Notify patient her ultrasound is normal.  Recommend she follow-up with ENT as discussed during her appointment.

## 2025-07-11 NOTE — TELEPHONE ENCOUNTER
"Called patient to relay message and she states in the dictation of the ultrasound its says something about \"other salivary diseases\" and wants to know if its a concern. Please advise   "

## 2025-07-11 NOTE — TELEPHONE ENCOUNTER
No, there is no other concern for salivary diseases.  Her glands were normal in size and there was nothing to suggest inflammation or any stones.

## 2025-07-11 NOTE — TELEPHONE ENCOUNTER
Patient called in stated that Dr. Rosales ordered a ultrasound on neck that was done on 07/03. [facility] and would like to have provider review and call back.. Thank you.

## 2025-08-04 ENCOUNTER — TELEPHONE (OUTPATIENT)
Age: 35
End: 2025-08-04

## 2025-08-04 ENCOUNTER — OFFICE VISIT (OUTPATIENT)
Dept: GASTROENTEROLOGY | Facility: CLINIC | Age: 35
End: 2025-08-04
Payer: MEDICARE

## 2025-08-04 VITALS
HEART RATE: 92 BPM | BODY MASS INDEX: 22.78 KG/M2 | WEIGHT: 116 LBS | DIASTOLIC BLOOD PRESSURE: 80 MMHG | SYSTOLIC BLOOD PRESSURE: 130 MMHG | HEIGHT: 60 IN

## 2025-08-04 DIAGNOSIS — K58.1 IRRITABLE BOWEL SYNDROME WITH CONSTIPATION: ICD-10-CM

## 2025-08-04 DIAGNOSIS — K21.9 GASTROESOPHAGEAL REFLUX DISEASE, UNSPECIFIED WHETHER ESOPHAGITIS PRESENT: ICD-10-CM

## 2025-08-04 DIAGNOSIS — R10.9 ABDOMINAL PAIN, UNSPECIFIED ABDOMINAL LOCATION: Primary | ICD-10-CM

## 2025-08-04 DIAGNOSIS — R09.A2 GLOBUS SENSATION: ICD-10-CM

## 2025-08-04 PROCEDURE — 99214 OFFICE O/P EST MOD 30 MIN: CPT | Performed by: PHYSICIAN ASSISTANT

## 2025-08-04 RX ORDER — LUBIPROSTONE 24 UG/1
24 CAPSULE ORAL 2 TIMES DAILY WITH MEALS
Qty: 60 CAPSULE | Refills: 2 | Status: SHIPPED | OUTPATIENT
Start: 2025-08-04

## 2025-08-05 ENCOUNTER — TELEPHONE (OUTPATIENT)
Age: 35
End: 2025-08-05

## 2025-08-12 ENCOUNTER — HOSPITAL ENCOUNTER (OUTPATIENT)
Dept: RADIOLOGY | Age: 35
Discharge: HOME | End: 2025-08-12
Attending: PHYSICIAN ASSISTANT
Payer: MEDICARE

## 2025-08-15 ENCOUNTER — TELEPHONE (OUTPATIENT)
Age: 35
End: 2025-08-15